# Patient Record
Sex: FEMALE | Race: WHITE | Employment: OTHER | URBAN - METROPOLITAN AREA
[De-identification: names, ages, dates, MRNs, and addresses within clinical notes are randomized per-mention and may not be internally consistent; named-entity substitution may affect disease eponyms.]

---

## 2017-01-01 ENCOUNTER — HOSPITAL ENCOUNTER (OUTPATIENT)
Dept: PHYSICAL THERAPY | Age: 77
End: 2017-01-01

## 2017-01-01 ENCOUNTER — HOME HEALTH ADMISSION (OUTPATIENT)
Dept: HOME HEALTH SERVICES | Facility: HOME HEALTH | Age: 77
End: 2017-01-01
Payer: COMMERCIAL

## 2017-01-01 ENCOUNTER — HOSPITAL ENCOUNTER (OUTPATIENT)
Dept: SURGERY | Age: 77
Discharge: HOME | End: 2017-10-04
Attending: SURGERY

## 2017-01-01 ENCOUNTER — HOSPITAL ENCOUNTER (OUTPATIENT)
Dept: MRI IMAGING | Age: 77
Discharge: HOME | End: 2017-08-10
Attending: SURGERY
Payer: COMMERCIAL

## 2017-01-01 ENCOUNTER — PATIENT OUTREACH (OUTPATIENT)
Dept: CASE MANAGEMENT | Age: 77
End: 2017-01-01

## 2017-01-01 ENCOUNTER — HOME CARE VISIT (OUTPATIENT)
Dept: SCHEDULING | Facility: HOME HEALTH | Age: 77
End: 2017-01-01
Payer: COMMERCIAL

## 2017-01-01 ENCOUNTER — HOSPITAL ENCOUNTER (OUTPATIENT)
Dept: MAMMOGRAPHY | Age: 77
Discharge: HOME | End: 2017-08-07
Attending: SURGERY
Payer: COMMERCIAL

## 2017-01-01 ENCOUNTER — HOSPITAL ENCOUNTER (INPATIENT)
Age: 77
LOS: 1 days | Discharge: HOME HEALTH | DRG: 580 | End: 2017-10-14
Attending: SURGERY | Admitting: SURGERY
Payer: COMMERCIAL

## 2017-01-01 ENCOUNTER — HOSPITAL ENCOUNTER (OUTPATIENT)
Dept: MAMMOGRAPHY | Age: 77
Discharge: HOME | End: 2017-08-01
Attending: FAMILY MEDICINE
Payer: COMMERCIAL

## 2017-01-01 ENCOUNTER — HOME CARE VISIT (OUTPATIENT)
Dept: HOME HEALTH SERVICES | Facility: HOME HEALTH | Age: 77
End: 2017-01-01
Payer: COMMERCIAL

## 2017-01-01 ENCOUNTER — ANESTHESIA (OUTPATIENT)
Dept: SURGERY | Age: 77
DRG: 580 | End: 2017-01-01
Payer: COMMERCIAL

## 2017-01-01 ENCOUNTER — APPOINTMENT (OUTPATIENT)
Dept: NUCLEAR MEDICINE | Age: 77
DRG: 580 | End: 2017-01-01
Attending: SURGERY
Payer: COMMERCIAL

## 2017-01-01 ENCOUNTER — HOSPITAL ENCOUNTER (OUTPATIENT)
Dept: RADIATION ONCOLOGY | Age: 77
Discharge: HOME | End: 2017-12-29
Payer: COMMERCIAL

## 2017-01-01 ENCOUNTER — HOSPITAL ENCOUNTER (OUTPATIENT)
Dept: LAB | Age: 77
Discharge: HOME | End: 2017-12-29

## 2017-01-01 ENCOUNTER — APPOINTMENT (OUTPATIENT)
Dept: RADIATION ONCOLOGY | Age: 77
End: 2017-01-01
Payer: COMMERCIAL

## 2017-01-01 ENCOUNTER — APPOINTMENT (OUTPATIENT)
Dept: GENERAL RADIOLOGY | Age: 77
DRG: 580 | End: 2017-01-01
Attending: SURGERY
Payer: COMMERCIAL

## 2017-01-01 ENCOUNTER — HOSPITAL ENCOUNTER (OUTPATIENT)
Dept: RADIATION ONCOLOGY | Age: 77
Discharge: HOME | End: 2017-12-29

## 2017-01-01 ENCOUNTER — HOSPITAL ENCOUNTER (OUTPATIENT)
Dept: PET IMAGING | Age: 77
Discharge: HOME | End: 2017-12-19
Payer: COMMERCIAL

## 2017-01-01 ENCOUNTER — ANESTHESIA EVENT (OUTPATIENT)
Dept: SURGERY | Age: 77
DRG: 580 | End: 2017-01-01
Payer: COMMERCIAL

## 2017-01-01 VITALS
OXYGEN SATURATION: 98 % | RESPIRATION RATE: 18 BRPM | HEART RATE: 70 BPM | DIASTOLIC BLOOD PRESSURE: 52 MMHG | BODY MASS INDEX: 32.07 KG/M2 | HEIGHT: 63 IN | SYSTOLIC BLOOD PRESSURE: 118 MMHG | WEIGHT: 181 LBS | TEMPERATURE: 98.5 F

## 2017-01-01 VITALS
RESPIRATION RATE: 17 BRPM | SYSTOLIC BLOOD PRESSURE: 128 MMHG | OXYGEN SATURATION: 95 % | DIASTOLIC BLOOD PRESSURE: 68 MMHG | TEMPERATURE: 97.8 F | HEART RATE: 68 BPM

## 2017-01-01 VITALS
OXYGEN SATURATION: 94 % | TEMPERATURE: 96.4 F | DIASTOLIC BLOOD PRESSURE: 70 MMHG | SYSTOLIC BLOOD PRESSURE: 120 MMHG | HEART RATE: 67 BPM

## 2017-01-01 VITALS
OXYGEN SATURATION: 95 % | HEART RATE: 68 BPM | SYSTOLIC BLOOD PRESSURE: 118 MMHG | DIASTOLIC BLOOD PRESSURE: 70 MMHG | TEMPERATURE: 97.6 F | RESPIRATION RATE: 16 BRPM

## 2017-01-01 VITALS
WEIGHT: 170 LBS | DIASTOLIC BLOOD PRESSURE: 62 MMHG | BODY MASS INDEX: 30.11 KG/M2 | OXYGEN SATURATION: 93 % | SYSTOLIC BLOOD PRESSURE: 103 MMHG | TEMPERATURE: 97.6 F | HEART RATE: 99 BPM | RESPIRATION RATE: 18 BRPM

## 2017-01-01 VITALS
DIASTOLIC BLOOD PRESSURE: 62 MMHG | SYSTOLIC BLOOD PRESSURE: 118 MMHG | HEART RATE: 56 BPM | TEMPERATURE: 97.1 F | RESPIRATION RATE: 19 BRPM

## 2017-01-01 VITALS — SYSTOLIC BLOOD PRESSURE: 118 MMHG | OXYGEN SATURATION: 94 % | DIASTOLIC BLOOD PRESSURE: 74 MMHG | HEART RATE: 64 BPM

## 2017-01-01 VITALS
DIASTOLIC BLOOD PRESSURE: 70 MMHG | SYSTOLIC BLOOD PRESSURE: 118 MMHG | TEMPERATURE: 98.1 F | HEART RATE: 88 BPM | RESPIRATION RATE: 19 BRPM

## 2017-01-01 VITALS
DIASTOLIC BLOOD PRESSURE: 74 MMHG | RESPIRATION RATE: 18 BRPM | SYSTOLIC BLOOD PRESSURE: 118 MMHG | OXYGEN SATURATION: 95 % | HEART RATE: 70 BPM

## 2017-01-01 VITALS
SYSTOLIC BLOOD PRESSURE: 128 MMHG | TEMPERATURE: 98 F | DIASTOLIC BLOOD PRESSURE: 70 MMHG | RESPIRATION RATE: 17 BRPM | HEART RATE: 67 BPM | OXYGEN SATURATION: 96 %

## 2017-01-01 VITALS
OXYGEN SATURATION: 94 % | DIASTOLIC BLOOD PRESSURE: 70 MMHG | RESPIRATION RATE: 18 BRPM | HEART RATE: 75 BPM | TEMPERATURE: 98.7 F | SYSTOLIC BLOOD PRESSURE: 132 MMHG

## 2017-01-01 VITALS
RESPIRATION RATE: 16 BRPM | BODY MASS INDEX: 32.09 KG/M2 | OXYGEN SATURATION: 95 % | WEIGHT: 181.13 LBS | HEIGHT: 63 IN | HEART RATE: 79 BPM | SYSTOLIC BLOOD PRESSURE: 118 MMHG | DIASTOLIC BLOOD PRESSURE: 79 MMHG | TEMPERATURE: 97.3 F

## 2017-01-01 DIAGNOSIS — N63.10 BREAST MASS, RIGHT: ICD-10-CM

## 2017-01-01 DIAGNOSIS — Z17.0 MALIGNANT NEOPLASM INVOLVING BOTH NIPPLE AND AREOLA IN BOTH BREASTS IN FEMALE, ESTROGEN RECEPTOR POSITIVE (HCC): Primary | ICD-10-CM

## 2017-01-01 DIAGNOSIS — Z17.1 MALIGNANT NEOPLASM OF BOTH BREASTS IN FEMALE, ESTROGEN RECEPTOR NEGATIVE, UNSPECIFIED SITE OF BREAST (HCC): ICD-10-CM

## 2017-01-01 DIAGNOSIS — C50.012 MALIGNANT NEOPLASM INVOLVING BOTH NIPPLE AND AREOLA IN BOTH BREASTS IN FEMALE, ESTROGEN RECEPTOR POSITIVE (HCC): ICD-10-CM

## 2017-01-01 DIAGNOSIS — C50.912 MALIGNANT NEOPLASM OF BOTH BREASTS IN FEMALE, ESTROGEN RECEPTOR NEGATIVE, UNSPECIFIED SITE OF BREAST (HCC): ICD-10-CM

## 2017-01-01 DIAGNOSIS — C50.911 MALIGNANT NEOPLASM OF BOTH BREASTS IN FEMALE, ESTROGEN RECEPTOR NEGATIVE, UNSPECIFIED SITE OF BREAST (HCC): ICD-10-CM

## 2017-01-01 DIAGNOSIS — C50.012 MALIGNANT NEOPLASM INVOLVING BOTH NIPPLE AND AREOLA IN BOTH BREASTS IN FEMALE, ESTROGEN RECEPTOR POSITIVE (HCC): Primary | ICD-10-CM

## 2017-01-01 DIAGNOSIS — C50.011 MALIGNANT NEOPLASM INVOLVING BOTH NIPPLE AND AREOLA IN BOTH BREASTS IN FEMALE, ESTROGEN RECEPTOR POSITIVE (HCC): ICD-10-CM

## 2017-01-01 DIAGNOSIS — N63.20 LEFT BREAST MASS: ICD-10-CM

## 2017-01-01 DIAGNOSIS — Z90.13 S/P BILATERAL MASTECTOMY: ICD-10-CM

## 2017-01-01 DIAGNOSIS — Z17.0 MALIGNANT NEOPLASM INVOLVING BOTH NIPPLE AND AREOLA IN BOTH BREASTS IN FEMALE, ESTROGEN RECEPTOR POSITIVE (HCC): ICD-10-CM

## 2017-01-01 DIAGNOSIS — N63.20 MASSES OF BOTH BREASTS: ICD-10-CM

## 2017-01-01 DIAGNOSIS — C50.911 BREAST CANCER, RIGHT (HCC): ICD-10-CM

## 2017-01-01 DIAGNOSIS — C50.011 MALIGNANT NEOPLASM INVOLVING BOTH NIPPLE AND AREOLA IN BOTH BREASTS IN FEMALE, ESTROGEN RECEPTOR POSITIVE (HCC): Primary | ICD-10-CM

## 2017-01-01 DIAGNOSIS — N63.10 MASSES OF BOTH BREASTS: ICD-10-CM

## 2017-01-01 DIAGNOSIS — C50.919 MALIGNANT NEOPLASM OF FEMALE BREAST (HCC): ICD-10-CM

## 2017-01-01 LAB
ABO + RH BLD: NORMAL
ANION GAP SERPL CALC-SCNC: 8 MMOL/L (ref 7–16)
ANION GAP SERPL CALC-SCNC: 9 MMOL/L (ref 7–16)
APPEARANCE UR: CLEAR
BACTERIA SPEC CULT: NORMAL
BACTERIA SPEC CULT: NORMAL
BASOPHILS # BLD: 0 K/UL (ref 0–0.2)
BASOPHILS NFR BLD: 0 % (ref 0–2)
BILIRUB UR QL: NEGATIVE
BLOOD GROUP ANTIBODIES SERPL: NORMAL
BUN SERPL-MCNC: 20 MG/DL (ref 8–23)
BUN SERPL-MCNC: 21 MG/DL (ref 8–23)
CALCIUM SERPL-MCNC: 8.2 MG/DL (ref 8.3–10.4)
CALCIUM SERPL-MCNC: 8.7 MG/DL (ref 8.3–10.4)
CHLORIDE SERPL-SCNC: 104 MMOL/L (ref 98–107)
CHLORIDE SERPL-SCNC: 105 MMOL/L (ref 98–107)
CO2 SERPL-SCNC: 25 MMOL/L (ref 21–32)
CO2 SERPL-SCNC: 28 MMOL/L (ref 21–32)
COLOR UR: YELLOW
CREAT BLD-MCNC: 0.9 MG/DL (ref 0.6–1)
CREAT SERPL-MCNC: 0.81 MG/DL (ref 0.6–1)
CREAT SERPL-MCNC: 1.07 MG/DL (ref 0.6–1)
DIFFERENTIAL METHOD BLD: ABNORMAL
EOSINOPHIL # BLD: 0.2 K/UL (ref 0–0.8)
EOSINOPHIL NFR BLD: 1 % (ref 0.5–7.8)
ERYTHROCYTE [DISTWIDTH] IN BLOOD BY AUTOMATED COUNT: 13.8 % (ref 11.9–14.6)
ERYTHROCYTE [DISTWIDTH] IN BLOOD BY AUTOMATED COUNT: 13.9 % (ref 11.9–14.6)
GLUCOSE SERPL-MCNC: 118 MG/DL (ref 65–100)
GLUCOSE SERPL-MCNC: 141 MG/DL (ref 65–100)
GLUCOSE UR STRIP.AUTO-MCNC: NEGATIVE MG/DL
HCT VFR BLD AUTO: 33.1 % (ref 35.8–46.3)
HCT VFR BLD AUTO: 34.7 % (ref 35.8–46.3)
HGB BLD-MCNC: 11.2 G/DL (ref 11.7–15.4)
HGB BLD-MCNC: 11.8 G/DL (ref 11.7–15.4)
HGB UR QL STRIP: NEGATIVE
IMM GRANULOCYTES # BLD: 0 K/UL (ref 0–0.5)
IMM GRANULOCYTES NFR BLD: 0.3 % (ref 0–5)
KETONES UR QL STRIP.AUTO: NEGATIVE MG/DL
LEUKOCYTE ESTERASE UR QL STRIP.AUTO: NEGATIVE
LYMPHOCYTES # BLD: 2.2 K/UL (ref 0.5–4.6)
LYMPHOCYTES NFR BLD: 19 % (ref 13–44)
MCH RBC QN AUTO: 31.8 PG (ref 26.1–32.9)
MCH RBC QN AUTO: 31.9 PG (ref 26.1–32.9)
MCHC RBC AUTO-ENTMCNC: 33.8 G/DL (ref 31.4–35)
MCHC RBC AUTO-ENTMCNC: 34 G/DL (ref 31.4–35)
MCV RBC AUTO: 93.5 FL (ref 79.6–97.8)
MCV RBC AUTO: 94.3 FL (ref 79.6–97.8)
MONOCYTES # BLD: 1.2 K/UL (ref 0.1–1.3)
MONOCYTES NFR BLD: 10 % (ref 4–12)
NEUTS SEG # BLD: 7.8 K/UL (ref 1.7–8.2)
NEUTS SEG NFR BLD: 70 % (ref 43–78)
NITRITE UR QL STRIP.AUTO: NEGATIVE
PH UR STRIP: 5.5 [PH] (ref 5–9)
PLATELET # BLD AUTO: 140 K/UL (ref 150–450)
PLATELET # BLD AUTO: 147 K/UL (ref 150–450)
PMV BLD AUTO: 10 FL (ref 10.8–14.1)
PMV BLD AUTO: 10.2 FL (ref 10.8–14.1)
POTASSIUM SERPL-SCNC: 3 MMOL/L (ref 3.5–5.1)
POTASSIUM SERPL-SCNC: 3.8 MMOL/L (ref 3.5–5.1)
PROT UR STRIP-MCNC: NEGATIVE MG/DL
RBC # BLD AUTO: 3.51 M/UL (ref 4.05–5.25)
RBC # BLD AUTO: 3.71 M/UL (ref 4.05–5.25)
SERVICE CMNT-IMP: NORMAL
SERVICE CMNT-IMP: NORMAL
SODIUM SERPL-SCNC: 138 MMOL/L (ref 136–145)
SODIUM SERPL-SCNC: 141 MMOL/L (ref 136–145)
SP GR UR REFRACTOMETRY: 1.01 (ref 1–1.02)
SPECIMEN EXP DATE BLD: NORMAL
TROPONIN I SERPL-MCNC: <0.04 NG/ML (ref 0.02–0.05)
UROBILINOGEN UR QL STRIP.AUTO: 0.2 EU/DL (ref 0.2–1)
WBC # BLD AUTO: 11.1 K/UL (ref 4.3–11.1)
WBC # BLD AUTO: 11.3 K/UL (ref 4.3–11.1)

## 2017-01-01 PROCEDURE — A9577 INJ MULTIHANCE: HCPCS | Performed by: SURGERY

## 2017-01-01 PROCEDURE — 77030031139 HC SUT VCRL2 J&J -A: Performed by: SURGERY

## 2017-01-01 PROCEDURE — 77030020782 HC GWN BAIR PAWS FLX 3M -B: Performed by: ANESTHESIOLOGY

## 2017-01-01 PROCEDURE — 74011250637 HC RX REV CODE- 250/637: Performed by: INTERNAL MEDICINE

## 2017-01-01 PROCEDURE — G0299 HHS/HOSPICE OF RN EA 15 MIN: HCPCS

## 2017-01-01 PROCEDURE — 77010033678 HC OXYGEN DAILY

## 2017-01-01 PROCEDURE — 77030013567 HC DRN WND RESERV BARD -A: Performed by: SURGERY

## 2017-01-01 PROCEDURE — 84484 ASSAY OF TROPONIN QUANT: CPT | Performed by: INTERNAL MEDICINE

## 2017-01-01 PROCEDURE — 77030013674 HC FIL EXPND TISS ALGN -A: Performed by: SURGERY

## 2017-01-01 PROCEDURE — 77030011640 HC PAD GRND REM COVD -A: Performed by: SURGERY

## 2017-01-01 PROCEDURE — 88305 TISSUE EXAM BY PATHOLOGIST: CPT | Performed by: SURGERY

## 2017-01-01 PROCEDURE — 74011250637 HC RX REV CODE- 250/637: Performed by: SURGERY

## 2017-01-01 PROCEDURE — 19083 BX BREAST 1ST LESION US IMAG: CPT

## 2017-01-01 PROCEDURE — 77030002996 HC SUT SLK J&J -A: Performed by: SURGERY

## 2017-01-01 PROCEDURE — 77030008703 HC TU ET UNCUF COVD -A: Performed by: ANESTHESIOLOGY

## 2017-01-01 PROCEDURE — G0157 HHC PT ASSISTANT EA 15: HCPCS

## 2017-01-01 PROCEDURE — 36415 COLL VENOUS BLD VENIPUNCTURE: CPT | Performed by: INTERNAL MEDICINE

## 2017-01-01 PROCEDURE — 74011250636 HC RX REV CODE- 250/636: Performed by: ANESTHESIOLOGY

## 2017-01-01 PROCEDURE — 77030002933 HC SUT MCRYL J&J -A: Performed by: SURGERY

## 2017-01-01 PROCEDURE — 94640 AIRWAY INHALATION TREATMENT: CPT

## 2017-01-01 PROCEDURE — 74011636320 HC RX REV CODE- 636/320: Performed by: INTERNAL MEDICINE

## 2017-01-01 PROCEDURE — 88361 TUMOR IMMUNOHISTOCHEM/COMPUT: CPT

## 2017-01-01 PROCEDURE — 74011250637 HC RX REV CODE- 250/637: Performed by: PLASTIC SURGERY

## 2017-01-01 PROCEDURE — 74011000250 HC RX REV CODE- 250: Performed by: SURGERY

## 2017-01-01 PROCEDURE — 74011000250 HC RX REV CODE- 250: Performed by: INTERNAL MEDICINE

## 2017-01-01 PROCEDURE — 74011250636 HC RX REV CODE- 250/636: Performed by: INTERNAL MEDICINE

## 2017-01-01 PROCEDURE — 74011000250 HC RX REV CODE- 250

## 2017-01-01 PROCEDURE — 99218 HC RM OBSERVATION: CPT

## 2017-01-01 PROCEDURE — 99211 OFF/OP EST MAY X REQ PHY/QHP: CPT

## 2017-01-01 PROCEDURE — 77030026227 HC FUNL KELLR 2 PCH ALGN -C: Performed by: SURGERY

## 2017-01-01 PROCEDURE — 74011250636 HC RX REV CODE- 250/636: Performed by: SURGERY

## 2017-01-01 PROCEDURE — 77030002916 HC SUT ETHLN J&J -A: Performed by: SURGERY

## 2017-01-01 PROCEDURE — 77066 DX MAMMO INCL CAD BI: CPT

## 2017-01-01 PROCEDURE — 71010 XR CHEST SNGL V: CPT

## 2017-01-01 PROCEDURE — 77030002966 HC SUT PDS J&J -A: Performed by: SURGERY

## 2017-01-01 PROCEDURE — 76060000040 HC ANESTHESIA 4.5 TO 5 HR: Performed by: SURGERY

## 2017-01-01 PROCEDURE — 77030012406 HC DRN WND PENRS BARD -A: Performed by: SURGERY

## 2017-01-01 PROCEDURE — 76210000016 HC OR PH I REC 1 TO 1.5 HR: Performed by: SURGERY

## 2017-01-01 PROCEDURE — 77030013131 HC IV BLD ST ICUM -A

## 2017-01-01 PROCEDURE — 65270000029 HC RM PRIVATE

## 2017-01-01 PROCEDURE — 88307 TISSUE EXAM BY PATHOLOGIST: CPT | Performed by: SURGERY

## 2017-01-01 PROCEDURE — 77030011256 HC DRSG MEPILEX <16IN NO BORD MOLN -A

## 2017-01-01 PROCEDURE — 74011250636 HC RX REV CODE- 250/636

## 2017-01-01 PROCEDURE — A9552 F18 FDG: HCPCS

## 2017-01-01 PROCEDURE — 07B60ZX EXCISION OF LEFT AXILLARY LYMPHATIC, OPEN APPROACH, DIAGNOSTIC: ICD-10-PCS | Performed by: SURGERY

## 2017-01-01 PROCEDURE — 88331 PATH CONSLTJ SURG 1 BLK 1SPC: CPT | Performed by: SURGERY

## 2017-01-01 PROCEDURE — 82565 ASSAY OF CREATININE: CPT

## 2017-01-01 PROCEDURE — 76010000176 HC OR TIME 4.5 TO 5 HR INTENSV-TIER 1: Performed by: SURGERY

## 2017-01-01 PROCEDURE — 77030018836 HC SOL IRR NACL ICUM -A: Performed by: SURGERY

## 2017-01-01 PROCEDURE — 76642 ULTRASOUND BREAST LIMITED: CPT

## 2017-01-01 PROCEDURE — G0151 HHCP-SERV OF PT,EA 15 MIN: HCPCS

## 2017-01-01 PROCEDURE — 400013 HH SOC

## 2017-01-01 PROCEDURE — 80048 BASIC METABOLIC PNL TOTAL CA: CPT | Performed by: INTERNAL MEDICINE

## 2017-01-01 PROCEDURE — 77030010514 HC APPL CLP LIG COVD -B: Performed by: SURGERY

## 2017-01-01 PROCEDURE — 85027 COMPLETE CBC AUTOMATED: CPT | Performed by: INTERNAL MEDICINE

## 2017-01-01 PROCEDURE — 73060999999 HC MISC LAB CHARGE

## 2017-01-01 PROCEDURE — 85025 COMPLETE CBC W/AUTO DIFF WBC: CPT | Performed by: INTERNAL MEDICINE

## 2017-01-01 PROCEDURE — 77030032490 HC SLV COMPR SCD KNE COVD -B: Performed by: SURGERY

## 2017-01-01 PROCEDURE — 77030013211 HC SUPP BRA GLDA -B: Performed by: SURGERY

## 2017-01-01 PROCEDURE — 77030034849: Performed by: SURGERY

## 2017-01-01 PROCEDURE — 81003 URINALYSIS AUTO W/O SCOPE: CPT | Performed by: INTERNAL MEDICINE

## 2017-01-01 PROCEDURE — 77030012268 HC CATH PAIN PMP/Q AVNM -E: Performed by: SURGERY

## 2017-01-01 PROCEDURE — 0HHV0NZ INSERTION OF TISSUE EXPANDER INTO BILATERAL BREAST, OPEN APPROACH: ICD-10-PCS | Performed by: PLASTIC SURGERY

## 2017-01-01 PROCEDURE — 76937 US GUIDE VASCULAR ACCESS: CPT

## 2017-01-01 PROCEDURE — 07B50ZX EXCISION OF RIGHT AXILLARY LYMPHATIC, OPEN APPROACH, DIAGNOSTIC: ICD-10-PCS | Performed by: SURGERY

## 2017-01-01 PROCEDURE — 0HTV0ZZ RESECTION OF BILATERAL BREAST, OPEN APPROACH: ICD-10-PCS | Performed by: SURGERY

## 2017-01-01 PROCEDURE — 77030011266 HC ELECTRD BLD INSL COVD -A: Performed by: SURGERY

## 2017-01-01 PROCEDURE — C1789 PROSTHESIS, BREAST, IMP: HCPCS | Performed by: SURGERY

## 2017-01-01 PROCEDURE — 86900 BLOOD TYPING SEROLOGIC ABO: CPT | Performed by: ANESTHESIOLOGY

## 2017-01-01 PROCEDURE — 94760 N-INVAS EAR/PLS OXIMETRY 1: CPT

## 2017-01-01 PROCEDURE — A9541 TC99M SULFUR COLLOID: HCPCS

## 2017-01-01 PROCEDURE — G0152 HHCP-SERV OF OT,EA 15 MIN: HCPCS

## 2017-01-01 PROCEDURE — 74011000250 HC RX REV CODE- 250: Performed by: PLASTIC SURGERY

## 2017-01-01 PROCEDURE — 74011636320 HC RX REV CODE- 636/320: Performed by: SURGERY

## 2017-01-01 PROCEDURE — 87040 BLOOD CULTURE FOR BACTERIA: CPT | Performed by: INTERNAL MEDICINE

## 2017-01-01 PROCEDURE — 77030018846 HC SOL IRR STRL H20 ICUM -A: Performed by: SURGERY

## 2017-01-01 PROCEDURE — 77030008467 HC STPLR SKN COVD -B: Performed by: SURGERY

## 2017-01-01 PROCEDURE — 77059 MRI BREAST BI W WO CONT: CPT

## 2017-01-01 DEVICE — TEXTURED, ULTRA HIGH PROFILE, SUTURE TABS, INTEGRAL INJECTION DOME 700CC
Type: IMPLANTABLE DEVICE | Site: BREAST | Status: FUNCTIONAL
Brand: ARTOURA BREAST TISSUE EXPANDER

## 2017-01-01 DEVICE — Z DISCONTINUED USE 2423046 ALLOGRAFT DERMAL THCK 6X16 CM RDY TO USE TISS MTRX ALLDERM: Type: IMPLANTABLE DEVICE | Site: BREAST | Status: FUNCTIONAL

## 2017-01-01 RX ORDER — FUROSEMIDE 10 MG/ML
20 INJECTION INTRAMUSCULAR; INTRAVENOUS ONCE
Status: DISCONTINUED | OUTPATIENT
Start: 2017-01-01 | End: 2017-01-01

## 2017-01-01 RX ORDER — SODIUM CHLORIDE 0.9 % (FLUSH) 0.9 %
10 SYRINGE (ML) INJECTION
Status: COMPLETED | OUTPATIENT
Start: 2017-01-01 | End: 2017-01-01

## 2017-01-01 RX ORDER — OXYCODONE HYDROCHLORIDE 5 MG/1
5 TABLET ORAL
Status: DISCONTINUED | OUTPATIENT
Start: 2017-01-01 | End: 2017-01-01 | Stop reason: HOSPADM

## 2017-01-01 RX ORDER — LIDOCAINE HYDROCHLORIDE 20 MG/ML
15 SOLUTION OROPHARYNGEAL EVERY 6 HOURS
Status: DISCONTINUED | OUTPATIENT
Start: 2017-01-01 | End: 2017-01-01

## 2017-01-01 RX ORDER — LIDOCAINE HYDROCHLORIDE 20 MG/ML
INJECTION, SOLUTION EPIDURAL; INFILTRATION; INTRACAUDAL; PERINEURAL AS NEEDED
Status: DISCONTINUED | OUTPATIENT
Start: 2017-01-01 | End: 2017-01-01 | Stop reason: HOSPADM

## 2017-01-01 RX ORDER — BUPIVACAINE HYDROCHLORIDE AND EPINEPHRINE 2.5; 5 MG/ML; UG/ML
INJECTION, SOLUTION EPIDURAL; INFILTRATION; INTRACAUDAL; PERINEURAL AS NEEDED
Status: DISCONTINUED | OUTPATIENT
Start: 2017-01-01 | End: 2017-01-01 | Stop reason: HOSPADM

## 2017-01-01 RX ORDER — MIDAZOLAM HYDROCHLORIDE 1 MG/ML
2 INJECTION, SOLUTION INTRAMUSCULAR; INTRAVENOUS
Status: DISCONTINUED | OUTPATIENT
Start: 2017-01-01 | End: 2017-01-01 | Stop reason: HOSPADM

## 2017-01-01 RX ORDER — HYDROMORPHONE HCL IN 0.9% NACL 50 MG/50ML
1 PLASTIC BAG, INJECTION (ML) INJECTION
Status: DISCONTINUED | OUTPATIENT
Start: 2017-01-01 | End: 2017-01-01 | Stop reason: HOSPADM

## 2017-01-01 RX ORDER — SODIUM CHLORIDE 0.9 % (FLUSH) 0.9 %
5-10 SYRINGE (ML) INJECTION AS NEEDED
Status: DISCONTINUED | OUTPATIENT
Start: 2017-01-01 | End: 2017-01-01 | Stop reason: HOSPADM

## 2017-01-01 RX ORDER — CYCLOBENZAPRINE HCL 10 MG
10 TABLET ORAL
Status: DISCONTINUED | OUTPATIENT
Start: 2017-01-01 | End: 2017-01-01 | Stop reason: HOSPADM

## 2017-01-01 RX ORDER — IPRATROPIUM BROMIDE AND ALBUTEROL SULFATE 2.5; .5 MG/3ML; MG/3ML
3 SOLUTION RESPIRATORY (INHALATION)
Status: COMPLETED | OUTPATIENT
Start: 2017-01-01 | End: 2017-01-01

## 2017-01-01 RX ORDER — ATORVASTATIN CALCIUM 10 MG/1
20 TABLET, FILM COATED ORAL DAILY
Status: DISCONTINUED | OUTPATIENT
Start: 2017-01-01 | End: 2017-01-01 | Stop reason: HOSPADM

## 2017-01-01 RX ORDER — DOCUSATE SODIUM 100 MG/1
100 CAPSULE, LIQUID FILLED ORAL
Status: DISCONTINUED | OUTPATIENT
Start: 2017-01-01 | End: 2017-01-01 | Stop reason: HOSPADM

## 2017-01-01 RX ORDER — ZOLPIDEM TARTRATE 5 MG/1
5 TABLET ORAL
Status: DISCONTINUED | OUTPATIENT
Start: 2017-01-01 | End: 2017-01-01

## 2017-01-01 RX ORDER — CEFAZOLIN SODIUM IN 0.9 % NACL 2 G/50 ML
2 INTRAVENOUS SOLUTION, PIGGYBACK (ML) INTRAVENOUS ONCE
Status: COMPLETED | OUTPATIENT
Start: 2017-01-01 | End: 2017-01-01

## 2017-01-01 RX ORDER — NALOXONE HYDROCHLORIDE 0.4 MG/ML
0.4 INJECTION, SOLUTION INTRAMUSCULAR; INTRAVENOUS; SUBCUTANEOUS AS NEEDED
Status: DISCONTINUED | OUTPATIENT
Start: 2017-01-01 | End: 2017-01-01 | Stop reason: HOSPADM

## 2017-01-01 RX ORDER — SODIUM CHLORIDE 0.9 % (FLUSH) 0.9 %
5-10 SYRINGE (ML) INJECTION EVERY 8 HOURS
Status: DISCONTINUED | OUTPATIENT
Start: 2017-01-01 | End: 2017-01-01 | Stop reason: HOSPADM

## 2017-01-01 RX ORDER — VANCOMYCIN HYDROCHLORIDE 1 G/20ML
INJECTION, POWDER, LYOPHILIZED, FOR SOLUTION INTRAVENOUS AS NEEDED
Status: DISCONTINUED | OUTPATIENT
Start: 2017-01-01 | End: 2017-01-01 | Stop reason: HOSPADM

## 2017-01-01 RX ORDER — CYCLOBENZAPRINE HCL 10 MG
10 TABLET ORAL 3 TIMES DAILY
Status: CANCELLED | OUTPATIENT
Start: 2017-01-01

## 2017-01-01 RX ORDER — LIDOCAINE HYDROCHLORIDE 10 MG/ML
10 INJECTION INFILTRATION; PERINEURAL
Status: COMPLETED | OUTPATIENT
Start: 2017-01-01 | End: 2017-01-01

## 2017-01-01 RX ORDER — ISOSULFAN BLUE 50 MG/5ML
INJECTION, SOLUTION SUBCUTANEOUS AS NEEDED
Status: DISCONTINUED | OUTPATIENT
Start: 2017-01-01 | End: 2017-01-01 | Stop reason: HOSPADM

## 2017-01-01 RX ORDER — ONDANSETRON 2 MG/ML
INJECTION INTRAMUSCULAR; INTRAVENOUS AS NEEDED
Status: DISCONTINUED | OUTPATIENT
Start: 2017-01-01 | End: 2017-01-01 | Stop reason: HOSPADM

## 2017-01-01 RX ORDER — OXYCODONE AND ACETAMINOPHEN 7.5; 325 MG/1; MG/1
1 TABLET ORAL
Status: DISCONTINUED | OUTPATIENT
Start: 2017-01-01 | End: 2017-01-01 | Stop reason: HOSPADM

## 2017-01-01 RX ORDER — FUROSEMIDE 10 MG/ML
40 INJECTION INTRAMUSCULAR; INTRAVENOUS ONCE
Status: DISCONTINUED | OUTPATIENT
Start: 2017-01-01 | End: 2017-01-01

## 2017-01-01 RX ORDER — BUPIVACAINE HYDROCHLORIDE 2.5 MG/ML
INJECTION, SOLUTION EPIDURAL; INFILTRATION; INTRACAUDAL AS NEEDED
Status: DISCONTINUED | OUTPATIENT
Start: 2017-01-01 | End: 2017-01-01 | Stop reason: HOSPADM

## 2017-01-01 RX ORDER — LORATADINE 10 MG/1
5 TABLET ORAL
Status: DISCONTINUED | OUTPATIENT
Start: 2017-01-01 | End: 2017-01-01 | Stop reason: HOSPADM

## 2017-01-01 RX ORDER — CEFAZOLIN SODIUM IN 0.9 % NACL 2 G/50 ML
2 INTRAVENOUS SOLUTION, PIGGYBACK (ML) INTRAVENOUS EVERY 8 HOURS
Status: COMPLETED | OUTPATIENT
Start: 2017-01-01 | End: 2017-01-01

## 2017-01-01 RX ORDER — PROPOFOL 10 MG/ML
INJECTION, EMULSION INTRAVENOUS AS NEEDED
Status: DISCONTINUED | OUTPATIENT
Start: 2017-01-01 | End: 2017-01-01 | Stop reason: HOSPADM

## 2017-01-01 RX ORDER — ONDANSETRON 2 MG/ML
4 INJECTION INTRAMUSCULAR; INTRAVENOUS
Status: DISCONTINUED | OUTPATIENT
Start: 2017-01-01 | End: 2017-01-01 | Stop reason: HOSPADM

## 2017-01-01 RX ORDER — NEOSTIGMINE METHYLSULFATE 1 MG/ML
INJECTION INTRAVENOUS AS NEEDED
Status: DISCONTINUED | OUTPATIENT
Start: 2017-01-01 | End: 2017-01-01 | Stop reason: HOSPADM

## 2017-01-01 RX ORDER — ACETAMINOPHEN 325 MG/1
650 TABLET ORAL
Status: DISCONTINUED | OUTPATIENT
Start: 2017-01-01 | End: 2017-01-01 | Stop reason: HOSPADM

## 2017-01-01 RX ORDER — DEXTROSE, SODIUM CHLORIDE, AND POTASSIUM CHLORIDE 5; .45; .15 G/100ML; G/100ML; G/100ML
75 INJECTION INTRAVENOUS CONTINUOUS
Status: DISCONTINUED | OUTPATIENT
Start: 2017-01-01 | End: 2017-01-01

## 2017-01-01 RX ORDER — FENTANYL CITRATE 50 UG/ML
INJECTION, SOLUTION INTRAMUSCULAR; INTRAVENOUS AS NEEDED
Status: DISCONTINUED | OUTPATIENT
Start: 2017-01-01 | End: 2017-01-01 | Stop reason: HOSPADM

## 2017-01-01 RX ORDER — SODIUM CHLORIDE, SODIUM LACTATE, POTASSIUM CHLORIDE, CALCIUM CHLORIDE 600; 310; 30; 20 MG/100ML; MG/100ML; MG/100ML; MG/100ML
75 INJECTION, SOLUTION INTRAVENOUS CONTINUOUS
Status: DISCONTINUED | OUTPATIENT
Start: 2017-01-01 | End: 2017-01-01 | Stop reason: HOSPADM

## 2017-01-01 RX ORDER — SERTRALINE HYDROCHLORIDE 100 MG/1
100 TABLET, FILM COATED ORAL DAILY
Status: DISCONTINUED | OUTPATIENT
Start: 2017-01-01 | End: 2017-01-01 | Stop reason: HOSPADM

## 2017-01-01 RX ORDER — HYDROMORPHONE HYDROCHLORIDE 1 MG/ML
1 INJECTION, SOLUTION INTRAMUSCULAR; INTRAVENOUS; SUBCUTANEOUS
Status: DISCONTINUED | OUTPATIENT
Start: 2017-01-01 | End: 2017-01-01 | Stop reason: SDUPTHER

## 2017-01-01 RX ORDER — FUROSEMIDE 10 MG/ML
20 INJECTION INTRAMUSCULAR; INTRAVENOUS ONCE
Status: COMPLETED | OUTPATIENT
Start: 2017-01-01 | End: 2017-01-01

## 2017-01-01 RX ORDER — KETOROLAC TROMETHAMINE 30 MG/ML
15 INJECTION, SOLUTION INTRAMUSCULAR; INTRAVENOUS
Status: DISPENSED | OUTPATIENT
Start: 2017-01-01 | End: 2017-01-01

## 2017-01-01 RX ORDER — ROCURONIUM BROMIDE 10 MG/ML
INJECTION, SOLUTION INTRAVENOUS AS NEEDED
Status: DISCONTINUED | OUTPATIENT
Start: 2017-01-01 | End: 2017-01-01 | Stop reason: HOSPADM

## 2017-01-01 RX ORDER — DIPHENHYDRAMINE HYDROCHLORIDE 50 MG/ML
12.5 INJECTION, SOLUTION INTRAMUSCULAR; INTRAVENOUS
Status: DISCONTINUED | OUTPATIENT
Start: 2017-01-01 | End: 2017-01-01 | Stop reason: HOSPADM

## 2017-01-01 RX ORDER — METHYLENE BLUE 10 MG/ML
INJECTION INTRAVENOUS AS NEEDED
Status: DISCONTINUED | OUTPATIENT
Start: 2017-01-01 | End: 2017-01-01 | Stop reason: HOSPADM

## 2017-01-01 RX ORDER — SCOLOPAMINE TRANSDERMAL SYSTEM 1 MG/1
1.5 PATCH, EXTENDED RELEASE TRANSDERMAL
COMMUNITY
End: 2017-01-01

## 2017-01-01 RX ORDER — DEXAMETHASONE SODIUM PHOSPHATE 4 MG/ML
INJECTION, SOLUTION INTRA-ARTICULAR; INTRALESIONAL; INTRAMUSCULAR; INTRAVENOUS; SOFT TISSUE AS NEEDED
Status: DISCONTINUED | OUTPATIENT
Start: 2017-01-01 | End: 2017-01-01 | Stop reason: HOSPADM

## 2017-01-01 RX ORDER — BACITRACIN ZINC 500 UNIT/G
OINTMENT (GRAM) TOPICAL AS NEEDED
Status: DISCONTINUED | OUTPATIENT
Start: 2017-01-01 | End: 2017-01-01 | Stop reason: HOSPADM

## 2017-01-01 RX ORDER — ONDANSETRON HYDROCHLORIDE 8 MG/1
8 TABLET, FILM COATED ORAL
COMMUNITY
End: 2017-01-01 | Stop reason: SDUPTHER

## 2017-01-01 RX ORDER — ATENOLOL 25 MG/1
25 TABLET ORAL DAILY
Status: DISCONTINUED | OUTPATIENT
Start: 2017-01-01 | End: 2017-01-01 | Stop reason: HOSPADM

## 2017-01-01 RX ORDER — HYDROMORPHONE HYDROCHLORIDE 2 MG/ML
0.5 INJECTION, SOLUTION INTRAMUSCULAR; INTRAVENOUS; SUBCUTANEOUS
Status: COMPLETED | OUTPATIENT
Start: 2017-01-01 | End: 2017-01-01

## 2017-01-01 RX ORDER — SCOLOPAMINE TRANSDERMAL SYSTEM 1 MG/1
1.5 PATCH, EXTENDED RELEASE TRANSDERMAL
Status: DISCONTINUED | OUTPATIENT
Start: 2017-01-01 | End: 2017-01-01 | Stop reason: HOSPADM

## 2017-01-01 RX ORDER — OXYCODONE AND ACETAMINOPHEN 10; 325 MG/1; MG/1
1 TABLET ORAL AS NEEDED
Status: DISCONTINUED | OUTPATIENT
Start: 2017-01-01 | End: 2017-01-01 | Stop reason: HOSPADM

## 2017-01-01 RX ORDER — GLYCOPYRROLATE 0.2 MG/ML
INJECTION INTRAMUSCULAR; INTRAVENOUS AS NEEDED
Status: DISCONTINUED | OUTPATIENT
Start: 2017-01-01 | End: 2017-01-01 | Stop reason: HOSPADM

## 2017-01-01 RX ADMIN — FENTANYL CITRATE 25 MCG: 50 INJECTION, SOLUTION INTRAMUSCULAR; INTRAVENOUS at 14:33

## 2017-01-01 RX ADMIN — CYCLOBENZAPRINE HYDROCHLORIDE 10 MG: 10 TABLET, FILM COATED ORAL at 08:53

## 2017-01-01 RX ADMIN — GADOBENATE DIMEGLUMINE 18 ML: 529 INJECTION, SOLUTION INTRAVENOUS at 11:28

## 2017-01-01 RX ADMIN — Medication 10 ML: at 21:34

## 2017-01-01 RX ADMIN — CYCLOBENZAPRINE HYDROCHLORIDE 10 MG: 10 TABLET, FILM COATED ORAL at 22:55

## 2017-01-01 RX ADMIN — LIDOCAINE HYDROCHLORIDE 15 ML: 20 SOLUTION ORAL; TOPICAL at 22:38

## 2017-01-01 RX ADMIN — FUROSEMIDE 60 MG: 40 TABLET ORAL at 11:20

## 2017-01-01 RX ADMIN — IPRATROPIUM BROMIDE AND ALBUTEROL SULFATE 3 ML: .5; 3 SOLUTION RESPIRATORY (INHALATION) at 21:25

## 2017-01-01 RX ADMIN — DEXTROSE MONOHYDRATE, SODIUM CHLORIDE, AND POTASSIUM CHLORIDE 75 ML/HR: 50; 4.5; 1.49 INJECTION, SOLUTION INTRAVENOUS at 08:58

## 2017-01-01 RX ADMIN — HYDROCHLOROTHIAZIDE: 12.5 CAPSULE ORAL at 08:52

## 2017-01-01 RX ADMIN — Medication 10 ML: at 11:28

## 2017-01-01 RX ADMIN — PROPOFOL 50 MG: 10 INJECTION, EMULSION INTRAVENOUS at 13:42

## 2017-01-01 RX ADMIN — SERTRALINE HYDROCHLORIDE 100 MG: 100 TABLET ORAL at 08:53

## 2017-01-01 RX ADMIN — ACETAMINOPHEN 650 MG: 325 TABLET, FILM COATED ORAL at 05:05

## 2017-01-01 RX ADMIN — PROPOFOL 150 MG: 10 INJECTION, EMULSION INTRAVENOUS at 11:45

## 2017-01-01 RX ADMIN — ACETAMINOPHEN 650 MG: 325 TABLET, FILM COATED ORAL at 21:37

## 2017-01-01 RX ADMIN — LIDOCAINE HYDROCHLORIDE 15 ML: 20 SOLUTION ORAL; TOPICAL at 15:08

## 2017-01-01 RX ADMIN — IPRATROPIUM BROMIDE AND ALBUTEROL SULFATE 3 ML: .5; 3 SOLUTION RESPIRATORY (INHALATION) at 04:00

## 2017-01-01 RX ADMIN — HYDROMORPHONE HYDROCHLORIDE 1 MG: 1 INJECTION, SOLUTION INTRAMUSCULAR; INTRAVENOUS; SUBCUTANEOUS at 20:13

## 2017-01-01 RX ADMIN — ATORVASTATIN CALCIUM 20 MG: 10 TABLET, FILM COATED ORAL at 08:52

## 2017-01-01 RX ADMIN — ONDANSETRON 4 MG: 2 INJECTION INTRAMUSCULAR; INTRAVENOUS at 11:45

## 2017-01-01 RX ADMIN — FENTANYL CITRATE 50 MCG: 50 INJECTION, SOLUTION INTRAMUSCULAR; INTRAVENOUS at 13:03

## 2017-01-01 RX ADMIN — FENTANYL CITRATE 50 MCG: 50 INJECTION, SOLUTION INTRAMUSCULAR; INTRAVENOUS at 13:32

## 2017-01-01 RX ADMIN — HYDROMORPHONE HYDROCHLORIDE 0.5 MG: 2 INJECTION, SOLUTION INTRAMUSCULAR; INTRAVENOUS; SUBCUTANEOUS at 16:40

## 2017-01-01 RX ADMIN — HYDROCHLOROTHIAZIDE: 12.5 CAPSULE ORAL at 08:16

## 2017-01-01 RX ADMIN — OXYCODONE HYDROCHLORIDE AND ACETAMINOPHEN 1 TABLET: 7.5; 325 TABLET ORAL at 07:17

## 2017-01-01 RX ADMIN — ACETAMINOPHEN 650 MG: 325 TABLET, FILM COATED ORAL at 12:59

## 2017-01-01 RX ADMIN — GLYCOPYRROLATE 0.4 MG: 0.2 INJECTION INTRAMUSCULAR; INTRAVENOUS at 16:00

## 2017-01-01 RX ADMIN — FENTANYL CITRATE 50 MCG: 50 INJECTION, SOLUTION INTRAMUSCULAR; INTRAVENOUS at 12:45

## 2017-01-01 RX ADMIN — HYDROMORPHONE HYDROCHLORIDE 0.5 MG: 2 INJECTION, SOLUTION INTRAMUSCULAR; INTRAVENOUS; SUBCUTANEOUS at 16:30

## 2017-01-01 RX ADMIN — ACETAMINOPHEN 650 MG: 325 TABLET, FILM COATED ORAL at 04:41

## 2017-01-01 RX ADMIN — SERTRALINE HYDROCHLORIDE 100 MG: 100 TABLET ORAL at 08:36

## 2017-01-01 RX ADMIN — HYDROMORPHONE HYDROCHLORIDE 0.5 MG: 2 INJECTION, SOLUTION INTRAMUSCULAR; INTRAVENOUS; SUBCUTANEOUS at 16:35

## 2017-01-01 RX ADMIN — OXYCODONE HYDROCHLORIDE AND ACETAMINOPHEN 1 TABLET: 7.5; 325 TABLET ORAL at 16:03

## 2017-01-01 RX ADMIN — ONDANSETRON 4 MG: 2 INJECTION INTRAMUSCULAR; INTRAVENOUS at 08:52

## 2017-01-01 RX ADMIN — FENTANYL CITRATE 25 MCG: 50 INJECTION, SOLUTION INTRAMUSCULAR; INTRAVENOUS at 14:05

## 2017-01-01 RX ADMIN — SERTRALINE HYDROCHLORIDE 100 MG: 100 TABLET ORAL at 08:16

## 2017-01-01 RX ADMIN — ATORVASTATIN CALCIUM 20 MG: 10 TABLET, FILM COATED ORAL at 08:16

## 2017-01-01 RX ADMIN — SODIUM CHLORIDE, SODIUM LACTATE, POTASSIUM CHLORIDE, AND CALCIUM CHLORIDE: 600; 310; 30; 20 INJECTION, SOLUTION INTRAVENOUS at 11:35

## 2017-01-01 RX ADMIN — ATORVASTATIN CALCIUM 20 MG: 10 TABLET, FILM COATED ORAL at 08:35

## 2017-01-01 RX ADMIN — CEFAZOLIN 2 G: 1 INJECTION, POWDER, FOR SOLUTION INTRAMUSCULAR; INTRAVENOUS; PARENTERAL at 11:36

## 2017-01-01 RX ADMIN — HYDROCHLOROTHIAZIDE: 12.5 CAPSULE ORAL at 08:36

## 2017-01-01 RX ADMIN — DOCUSATE SODIUM 100 MG: 100 CAPSULE, LIQUID FILLED ORAL at 08:52

## 2017-01-01 RX ADMIN — KETOROLAC TROMETHAMINE 15 MG: 30 INJECTION, SOLUTION INTRAMUSCULAR at 08:52

## 2017-01-01 RX ADMIN — ATENOLOL 25 MG: 25 TABLET ORAL at 08:53

## 2017-01-01 RX ADMIN — CEFAZOLIN 2 G: 1 INJECTION, POWDER, FOR SOLUTION INTRAMUSCULAR; INTRAVENOUS; PARENTERAL at 03:40

## 2017-01-01 RX ADMIN — LIDOCAINE HYDROCHLORIDE 100 MG: 20 INJECTION, SOLUTION EPIDURAL; INFILTRATION; INTRACAUDAL; PERINEURAL at 11:45

## 2017-01-01 RX ADMIN — DIATRIZOATE MEGLUMINE AND DIATRIZOATE SODIUM 10 ML: 660; 100 LIQUID ORAL; RECTAL at 11:11

## 2017-01-01 RX ADMIN — NEOSTIGMINE METHYLSULFATE 3 MG: 1 INJECTION INTRAVENOUS at 16:00

## 2017-01-01 RX ADMIN — FENTANYL CITRATE 25 MCG: 50 INJECTION, SOLUTION INTRAMUSCULAR; INTRAVENOUS at 14:55

## 2017-01-01 RX ADMIN — DEXTROSE MONOHYDRATE, SODIUM CHLORIDE, AND POTASSIUM CHLORIDE 75 ML/HR: 50; 4.5; 1.49 INJECTION, SOLUTION INTRAVENOUS at 18:36

## 2017-01-01 RX ADMIN — DOCUSATE SODIUM 100 MG: 100 CAPSULE, LIQUID FILLED ORAL at 08:16

## 2017-01-01 RX ADMIN — HYDROMORPHONE HYDROCHLORIDE 0.5 MG: 2 INJECTION, SOLUTION INTRAMUSCULAR; INTRAVENOUS; SUBCUTANEOUS at 16:45

## 2017-01-01 RX ADMIN — LIDOCAINE HYDROCHLORIDE 10 ML: 10 INJECTION, SOLUTION INFILTRATION; PERINEURAL at 10:57

## 2017-01-01 RX ADMIN — Medication 10 ML: at 11:11

## 2017-01-01 RX ADMIN — IPRATROPIUM BROMIDE AND ALBUTEROL SULFATE 3 ML: .5; 3 SOLUTION RESPIRATORY (INHALATION) at 07:44

## 2017-01-01 RX ADMIN — ATENOLOL 25 MG: 25 TABLET ORAL at 08:16

## 2017-01-01 RX ADMIN — CEFAZOLIN 2 G: 1 INJECTION, POWDER, FOR SOLUTION INTRAMUSCULAR; INTRAVENOUS; PARENTERAL at 12:58

## 2017-01-01 RX ADMIN — Medication 5 ML: at 05:42

## 2017-01-01 RX ADMIN — ROCURONIUM BROMIDE 40 MG: 10 INJECTION, SOLUTION INTRAVENOUS at 11:45

## 2017-01-01 RX ADMIN — KETOROLAC TROMETHAMINE 15 MG: 30 INJECTION, SOLUTION INTRAMUSCULAR at 19:42

## 2017-01-01 RX ADMIN — ATENOLOL 25 MG: 25 TABLET ORAL at 08:35

## 2017-01-01 RX ADMIN — Medication 10 ML: at 22:38

## 2017-01-01 RX ADMIN — FUROSEMIDE 20 MG: 10 INJECTION, SOLUTION INTRAMUSCULAR; INTRAVENOUS at 05:04

## 2017-01-01 RX ADMIN — DEXAMETHASONE SODIUM PHOSPHATE 10 MG: 4 INJECTION, SOLUTION INTRA-ARTICULAR; INTRALESIONAL; INTRAMUSCULAR; INTRAVENOUS; SOFT TISSUE at 11:45

## 2017-01-01 RX ADMIN — CEFAZOLIN 2 G: 1 INJECTION, POWDER, FOR SOLUTION INTRAMUSCULAR; INTRAVENOUS; PARENTERAL at 19:41

## 2017-01-01 RX ADMIN — FENTANYL CITRATE 100 MCG: 50 INJECTION, SOLUTION INTRAMUSCULAR; INTRAVENOUS at 11:45

## 2017-09-19 PROBLEM — C50.011 BILATERAL MALIGNANT NEOPLASM INVOLVING BOTH NIPPLE AND AREOLA IN FEMALE (HCC): Status: ACTIVE | Noted: 2017-01-01

## 2017-09-19 PROBLEM — C50.012 BILATERAL MALIGNANT NEOPLASM INVOLVING BOTH NIPPLE AND AREOLA IN FEMALE (HCC): Status: ACTIVE | Noted: 2017-01-01

## 2017-10-04 NOTE — PERIOP NOTES
Patient verified name, , and surgery as listed in [facility]. Type 3 surgery, walk in assessment complete. Labs per surgeon: none;   Labs per anesthesia protocol: cbc, bmp~drawn on 17~all within anesthesia guidelines, placed on chart for reference;  EKG: none needed per protocol    Hibiclens and instructions given per hospital policy. Patient provided with and instructed on educational handouts including Guide to Surgery, Pain Management, Hand Hygiene, Blood Transfusion Education, and [facility] Brochure. Patient answered medical/surgical history questions at their best of ability. All prior to admission medications documented in [facility]. Original medication prescription bottle NOT visualized during patient appointment. Patient instructed to hold all vitamins 7 days prior to surgery and NSAIDS 5 days prior to surgery, patient verbalized understanding. Medications to be held: Meloxicam hold for 5 days prior to surgery. Patient instructed to continue previous medications as prescribed prior to surgery and to take the following medications the day of surgery according to anesthesia guidelines with a small sip of water: Atenolol, Lipitor, Zyrtec, Zoloft, Zantac; patient reminded to bring nonformulary Zyrtec. Patient teach back successful and patient demonstrates knowledge of instructions.

## 2017-10-11 PROBLEM — C50.919 BREAST CANCER (HCC): Status: ACTIVE | Noted: 2017-01-01

## 2017-10-11 NOTE — PROGRESS NOTES
Admission Assessment completed. Pt is alert and oriented x 3, drowsy but answers questions appropriately. Verbalizes needs. States pain is controlled. Ivan to bedside bag with blue urine. IV in Lt foot # 20. Mastectomy bra with gauze dsg in place. Pain pump and drain. Family at bedside.

## 2017-10-11 NOTE — OP NOTES
[address]   OPERATIVE REPORT       Name:  Clifton Mendoza   MR#:  [id number]   :  1940   Account #:  [de-identified]   Date of Adm:  10/11/2017       DATE OF PROCEDURE: 10/11/2017     PREOPERATIVE DIAGNOSIS: Bilateral breast cancer. POSTOPERATIVE DIAGNOSIS: Bilateral breast cancer with positive   lymph nodes on the right. PROCEDURE:   1. Right modified radical mastectomy. 2. Right sentinel lymph node biopsy with frozen section. 3. Left simple mastectomy. 4. Left sentinel lymph node biopsy with frozen section. 5. Bilateral breast reconstruction by Dr. Ayesha Prado (please see his   dictation). ANESTHESIA: General endotracheal.    SURGEON: Carlos Zhong DO    ASSISTANT: None. COMPLICATIONS: None. DISPOSITION: Stable. INDICATIONS: This is a 71-year-old female with bilateral breast   cancer diagnosed with biopsy and MRI. She is prepared for   bilateral simple mastectomy and bilateral sentinel lymph node   biopsy with possibility of bilateral axillary dissection. Consent was obtained by describing the procedure to the patient,   including potential complications to include infection,   bleeding, axillary dissection, nerve damage and lymphedema. Consent was obtained, placed upon the chart. She was   administered Ancef 2 grams IV preoperatively, taken to the   operating suite in a supine position. General anesthesia was   initiated without complications. She had been injected in the   periareolar space bilaterally with technetium sulfur colloid. General anesthesia was initiated without complications. Following this, we injected 5 mL of Lymphazurin blue dye in the   subareolar space bilaterally and then provided 5 minutes of   breast massage. The patient was then prepped and draped in the   usual sterile fashion and then the right sentinel lymph node was   addressed first using a NeoProbe, the proximal location was   identified in the right axilla.  A #15 scalpel blade was used to   make a 4 cm incision and Bovie cauterization was used to dissect   down the subcutaneous tissue. Using a combination of NeoProbe   and identifying lymphatic blue channels, we were able to   identify sentinel lymph node. This was excised using Ligaclips   and spot cauterization. The lymph node was sent to Pathology for   frozen section and Pathology called reporting a positive lymph   node. The left sentinel lymph node was then located using a   NeoProbe and then a #15 scalpel blade was used to make a 4 cm   incision. Bovie cauterization used to dissect down the   subcutaneous tissue using NeoProbe guidance as well as   identifying lymphatic blue channels. We were able to identify 2   lymph nodes that were blue and hot and these were both sent as   sentinel lymph nodes of the left. Pathology called frozen   section reporting negative lymph nodes. At this point, we   transitioned back to the right side, planning for a right   modified radical mastectomy. The patient had an ulcerated tumor   just superior to the nipple and a #15 scalpel blade was used to   make a superior incision encompassing the ulcerated cancer. Bovie cauterization was used to dissect down the subcutaneous   tissue to the clavicle superiorly and to the sternum medially. An inferior incision was then created with a 15 scalpel blade,   dissection was taken to the rectus muscle and then laterally to   the pectoralis muscle. We then dissected the breast off of the   pectoralis muscle, while including the prepectoral fascia from a   medial to lateral orientation. We continued our dissection into   the right axilla tracing our dissection up to the axillary vein   superiorly, and posteriorly to the latissimus dorsi muscle. The   long thoracodorsal nerves were identified. The intercostal   brachial nerve was identified and sacrificed.  Once the lymph   node basin was evacuated, we concluded the dissection and marked   the specimen en bloc with axillary contents with a long lateral   and short superior suture and sent this to the pathologist. At   this point, we copiously irrigated with saline until clear. We   ensured hemostasis using spot cauterization and a combination of   2-0 silk sutures. At this point, Dr. Marybeth Benítez was called and he   began preparation of the right breast for tissue expanders. We   then transitioned to the left side and a superior incision was   created with a #15 scalpel blade in an elliptical fashion and   Bovie cauterization was used to dissect down the subcutaneous   tissue to the clavicle on the left, and to the sternum medially   and inferior incision was created with a 15 scalpel blade. Dissection was taken down to the rectus muscle inferiorly and   then to the serratus muscle laterally. The breast was then   dissected off of the pectoralis muscle, while including the   prepectoral fascia from a medial to lateral approach. The   specimen was then marked with a long lateral and short superior   suture and sent as a left breast simple mastectomy specimen. At   this point, I concluded my portion of the surgery. Hemostasis   was achieved. We copiously irrigated with saline until clear. Dr. Marybeth Benítez would conclude the tissue expanders and close all   wounds. At this point in the case, patient tolerated the   procedure well without immediate complications. FINDINGS: A 60-year-old female with bilateral breast cancer. Shel[place] lymph node on the right was positive. A right modified   radical mastectomy was performed. The left sentinel lymph node   was negative and a left simple mastectomy was performed. She   tolerated the procedure well. Pathology pending. CAROLANN ARENAS DO DD / Ivy Mayo   D:  10/11/2017   14:19   T:  10/11/2017   15:06   Job #:  [id number]

## 2017-10-11 NOTE — BRIEF OP NOTE
BRIEF OPERATIVE NOTE    Date of Procedure: 10/11/2017   Preoperative Diagnosis: Cancer of right nipple ([address] [C50.011]  Cancer of left nipple (HCC) [C50.012]  Postoperative Diagnosis: Cancer of right nipple (HCC) [C50.011]  Cancer of left nipple (HCC) [C50.012]    Procedure(s):  BILATERAL BREAST MASTECTOMY SIMPLE ARTOURA ULTRA HIGH PROFILE BREAST IMPLANTS 700cc 13.5 cm 8.3cm [id number] X 3 /  MEDIUM HEIGHT 550cc 13.5 cm 11.7cm 7.4cm [id number] X 3   BILATERAL SENTINEL NODE BIOPSY WITH LYMPHATIC MAPPING  RIGHT AXILLARY DISSECTION  BILATERAL BREAST RECONSTRUCTION WITH PLACEMENT TISSUE EXPANDERS AND ALLODERM  Surgeon(s) and Role:  Panel 1:     * Nolberto Dowd DO - Primary    Panel 2:     * Paola Lisa MD - Primary         Assistant Staff:       Surgical Staff:  Circ-1: Fransisca Brunner, RN  Circ-Relief: Shawn Rodriguez RN  Scrub Tech-1: Lawrcodey Minus  Scrub Tech-2: Selin Hough  Scrub Tech-3: Dale Lopez  Event Time In   Incision Start 1206   Incision Close      Anesthesia: General   Estimated Blood Loss: 100cc  Specimens:   ID Type Source Tests Collected by Time Destination   1 : RIGHT BREAST SENTINEL NODE # 1 Frozen Section Breast  Marinell Neither, DO 10/11/2017 1230 Pathology   2 : LEFT BREAST SENTINEL NODE # 1 Frozen Section Breast  Marinell Neither, DO 10/11/2017 1245 Pathology   3 : Left [place] Lymph Node # 2 Frozen Section Breast  Edytall Neither, DO 10/11/2017 1248 Pathology   4 : Right Breast and Axillary Contents Fresh Breast  Marinell Neither, DO 10/11/2017 1325 Pathology   5 : RIGHT BREAST CONTENTS Fresh Breast  Marinell Neither, DO 10/11/2017 1402 Pathology      Findings: Right breast positive sentinel lymph node, followed by Right MRM. Left negative SLN followed by left simple mastectomy. Complications: none  Implants:   Implant Name Type Inv.  Item Serial No.  Lot No. LRB No. Used Action   GRAFT TISS ALLODERM 6X16CM -- THICK 96 SQ CM - [id number] GRAFT TISS ALLODERM 6X16CM -- THICK 96 SQ CM [id number] [employer] [id number] N/A 1 Implanted     Naoma Anahi Jensen, [address]

## 2017-10-11 NOTE — ANESTHESIA POSTPROCEDURE EVALUATION
Post-Anesthesia Evaluation and Assessment    Patient: Jenae Das MRN: [id number]  SSN: [id number]    YOB: 1940  Age: 77 y.o. Sex: female       Cardiovascular Function/Vital Signs  Visit Vitals    /50 (BP 1 Location: Left arm, BP Patient Position: At rest)    Pulse 85    Temp 35.6 °C (96 °F)    Resp 19    Ht 5' 3\" (1.6 m)    Wt 82.1 kg (181 lb)    SpO2 95%    BMI 32.06 kg/m2       Patient is status post general anesthesia for Procedure(s):  BILATERAL BREAST MASTECTOMY SIMPLE ARTOURA ULTRA HIGH PROFILE BREAST IMPLANTS 700cc 13.5 cm 8.3cm [id number] X 3 /  MEDIUM HEIGHT 550cc 13.5 cm 11.7cm 7.4cm [id number] X 3   BILATERAL SENTINEL NODE BIOPSY WITH LYMPHATIC MAPPING  RIGHT AXILLARY DISSECTION  BILATERAL BREAST RECONSTRUCTION WITH PLACEMENT TISSUE EXPANDERS AND ALLODERM. Nausea/Vomiting: None    Postoperative hydration reviewed and adequate. Pain:  Pain Scale 1: Visual (10/11/17 1707)  Pain Intensity 1: 0 (10/11/17 1707)   Managed    Neurological Status:   Neuro (WDL): Exceptions to WDL (10/11/17 1707)  Neuro  Neurologic State: Drowsy (10/11/17 1707)  Orientation Level: Oriented X4 (10/11/17 1707)  Cognition: Follows commands (10/11/17 1707)  Speech: Clear (10/11/17 1707)  LUE Motor Response: Purposeful (10/11/17 1707)  LLE Motor Response: Purposeful (10/11/17 1707)  RUE Motor Response: Purposeful (10/11/17 1707)  RLE Motor Response: Purposeful (10/11/17 1707)   At baseline    Mental Status and Level of Consciousness: Arousable    Pulmonary Status:   O2 Device: Nasal cannula (10/11/17 1722)   Adequate oxygenation and airway patent    Complications related to anesthesia: None    Post-anesthesia assessment completed.  No concerns    Signed By: Oswaldo Herndon MD     October 11, 2017

## 2017-10-11 NOTE — PROGRESS NOTES
Pt back from surgery. Tolerated well. Alert and oriented with occasional confusion. Verbalizes needs well. Ivan to bedside bag draining well. Clears until tolerated. Safety measures in place. Continue to monitor.

## 2017-10-11 NOTE — BRIEF OP NOTE
BRIEF OPERATIVE NOTE    Date of Procedure: 10/11/2017   Preoperative Diagnosis: Cancer of right nipple ([address] [C50.011]  Cancer of left nipple (HCC) [C50.012]  Postoperative Diagnosis: bilateral breast cancer  [C50.011]  Cancer of left nipple (HCC) [C50.012]    Procedure(s):  BILATERAL BREAST MASTECTOMY SIMPLE ARTOURA ULTRA HIGH PROFILE BREAST IMPLANTS 700cc 13.5 cm 8.3cm [id number] X 3 /  MEDIUM HEIGHT 550cc 13.5 cm 11.7cm 7.4cm [id number] X 3   BILATERAL SENTINEL NODE BIOPSY WITH LYMPHATIC MAPPING  RIGHT AXILLARY DISSECTION  BILATERAL BREAST RECONSTRUCTION WITH PLACEMENT TISSUE EXPANDERS AND ALLODERM  Surgeon(s) and Role:  Panel 1:     * Shari Souza DO - Primary    Panel 2:     * Beryl Dorman MD - Primary         Assistant Staff:       Surgical Staff:  Circ-1: Maximino Gill RN  Circ-Relief: Hansel Live RN; Jennifer Hernandez RN  Scrub Tech-1: Berny Hoyt  Scrub Tech-2: Ivon Lawrence  Scrub Tech-Relief: Wiley Myers  Scrub Tech-3: Manuel Solis  Event Time In   Incision Start 1206   Incision Close 1600     Anesthesia: General   Estimated Blood Loss: 15-20 cc  Specimens:   ID Type Source Tests Collected by Time Destination   1 : RIGHT BREAST SENTINEL NODE # 1 Frozen Section Breast  Shari Souza, DO 10/11/2017 1230 Pathology   2 : LEFT BREAST SENTINEL NODE # 1 Frozen Section Breast  Shari Mealjailyn, DO 10/11/2017 1245 Pathology   3 : Left [place] Lymph Node # 2 Frozen Section Breast  Shari Souza, DO 10/11/2017 1248 Pathology   4 : Right Breast and Axillary Contents Fresh Breast  Shari Mealy, DO 10/11/2017 1325 Pathology   5 : LEFT  BREAST CONTENTS Fresh Breast  Shari Mealy, DO 10/11/2017 1402 Pathology      Findings: WNL   Complications: NONe  Implants:   Implant Name Type Inv.  Item Serial No.  Lot No. LRB No. Used Action   GRAFT TISS ALLODERM 6X16CM -- THICK 96 SQ CM - [id number]  GRAFT TISS ALLODERM 6X16CM -- THICK 96 SQ CM [id number] [employer] [id number] N/A 1 Implanted   EXPNDR BRST TISS UHP 700ML --  - [id number]  EXPNDR BRST TISS UHP 700ML --  [id number] [employer] [id number] Left 1 Implanted   EXPNDR BRST TISS UHP 700ML --  - [id number]   EXPNDR BRST TISS UHP 700ML --  [id number] [employer] [id number] Left 1 Implanted

## 2017-10-11 NOTE — ANESTHESIA PREPROCEDURE EVALUATION
Anesthetic History               Review of Systems / Medical History  Patient summary reviewed, nursing notes reviewed and pertinent labs reviewed    Pulmonary                   Neuro/Psych              Cardiovascular    Hypertension          Hyperlipidemia    Exercise tolerance: >4 METS     GI/Hepatic/Renal     GERD: well controlled    Renal disease: stones       Endo/Other        Obesity     Other Findings              Physical Exam    Airway  Mallampati: II  TM Distance: 4 - 6 cm  Neck ROM: decreased range of motion   Mouth opening: Normal     Cardiovascular  Regular rate and rhythm,  S1 and S2 normal,  no murmur, click, rub, or gallop             Dental    Dentition: Edentulous     Pulmonary  Breath sounds clear to auscultation               Abdominal  GI exam deferred       Other Findings            Anesthetic Plan    ASA: 3  Anesthesia type: general            Anesthetic plan and risks discussed with: Patient and Family

## 2017-10-11 NOTE — PROGRESS NOTES
TRANSFER - IN REPORT:    Verbal report received from Dania Allen, (name) on Deena Viramontes  being received from PACU (unit) for routine progression of care      Report consisted of patients Situation, Background, Assessment and   Recommendations(SBAR). Information from the following report(s) SBAR, Kardex and Procedure Summary was reviewed with the receiving nurse. Opportunity for questions and clarification was provided. Assessment completed upon patients arrival to unit and care assumed.

## 2017-10-11 NOTE — PERIOP NOTES
TRANSFER - OUT REPORT:    Verbal report given to clarice on Jovani Jeffers  being transferred to [facility] for routine progression of care       Report consisted of patients Situation, Background, Assessment and   Recommendations(SBAR). Information from the following report(s) SBAR was reviewed with the receiving nurse. Lines:   Peripheral IV 10/11/17 Left Foot (Active)   Site Assessment Clean, dry, & intact 10/11/2017  5:16 PM   Phlebitis Assessment 0 10/11/2017  5:16 PM   Infiltration Assessment 0 10/11/2017  5:16 PM   Dressing Status Clean, dry, & intact 10/11/2017  5:16 PM   Dressing Type Tape;Transparent 10/11/2017  5:16 PM   Hub Color/Line Status Pink 10/11/2017  5:16 PM        Opportunity for questions and clarification was provided.       Patient transported with:   O2 @ 2 liters

## 2017-10-11 NOTE — IP AVS SNAPSHOT
[address] [place] [address] 
[phone] Patient: Clau Camacho MRN: [id number] BON:[id number] You are allergic to the following Allergen Reactions Sulfa (Sulfonamide Antibiotics) Itching Tetracycline Nausea Only Recent Documentation Height Weight BMI OB Status Smoking Status 1.6 m 82.1 kg 32.06 kg/m2 Postmenopausal Former Smoker Unresulted Labs Order Current Status CULTURE, BLOOD Preliminary result CULTURE, BLOOD Preliminary result Emergency Contacts Name Discharge Info Relation Home Work Mobile [id number] [employer] CAREGIVER [3] Daughter [21] [phone] About your hospitalization You were admitted on:  October 11, 2017 You last received care in the:  [address] You were discharged on:  October 14, 2017 Unit phone number:  [phone] Why you were hospitalized Your primary diagnosis was:  S/P Bilateral Mastectomy Your diagnoses also included:  Breast Cancer (Hcc) Providers Seen During Your Hospitalizations Provider Role Specialty Primary office phone Benedict Alford DO Attending Provider General Surgery [phone] Ann Marie Sheehan MD Attending Provider Plastic Surgery [phone] Your Primary Care Physician (PCP) Primary Care Physician Office Phone Office Fax Mame Herrera [phone] Follow-up Information Follow up With Details Comments Contact Info Everton Chavis MD   [address] [place] [id number] [phone] Your Appointments Thursday October 26, 2017  1:50 PM EDT Global Post Op with Emilie Crespo DO  
[facility] ([facility]) [address] [place] [id number] [phone] Current Discharge Medication List  
  
 CONTINUE these medications which have CHANGED Dose & Instructions Dispensing Information Comments Morning Noon Evening Bedtime  
 atenolol 25 mg tablet Commonly known as:  TENORMIN What changed:  additional instructions Your last dose was:  9 am  
Your next dose is:  Tomorrow am  
   
 Dose:  25 mg Take 1 Tab by mouth daily. Quantity:  90 Tab Refills:  3 Do not fill until patient requests  
    
   
   
   
  
 atorvastatin 20 mg tablet Commonly known as:  LIPITOR What changed:  additional instructions Your last dose was:  9 am  
Your next dose is:  Tomorrow am  
   
 Dose:  20 mg Take 1 Tab by mouth daily. Quantity:  90 Tab Refills:  3  
     
   
   
   
  
 meloxicam 7.5 mg tablet Commonly known as:  MOBIC What changed:   
- when to take this 
- reasons to take this Dose:  7.5-15 mg Take 1-2 Tabs by mouth daily. Quantity:  180 Tab Refills:  3  
     
   
   
   
  
 sertraline 100 mg tablet Commonly known as:  ZOLOFT What changed:  additional instructions Your last dose was:  9 am  
Your next dose is:  Tomorrow 9 am  
   
 Dose:  100 mg Take 1 Tab by mouth daily. Quantity:  90 Tab Refills:  3 CONTINUE these medications which have NOT CHANGED Dose & Instructions Dispensing Information Comments Morning Noon Evening Bedtime  
 doxylamine succinate 25 mg tablet Commonly known as:  Elihue Foots Dose:  25 mg Take 25 mg by mouth nightly as needed for Sleep. For sleep Refills:  0  
     
   
   
   
  
 losartan-hydroCHLOROthiazide 50-12.5 mg per tablet Commonly known as:  HYZAAR Your last dose was:  9 am today Your next dose is:  9 am tomorrow Dose:  1 Tab Take 1 Tab by mouth daily. Quantity:  90 Tab Refills:  3 Do not fill until patient requests  
    
   
   
   
  
 meclizine 25 mg tablet Commonly known as:  ANTIVERT  Dose:  25 mg  
 Take 1 Tab by mouth every six (6) hours as needed for Dizziness. Quantity:  30 Tab Refills:  1 MUCINEX -30 mg per tablet Generic drug:  guaiFENesin-dextromethorphan SR Dose:  1 Tab Take 1 Tab by mouth every twelve (12) hours as needed for Cough. Refills:  0  
     
   
   
   
  
 scopolamine 1 mg over 3 days Pt3d Commonly known as:  TRANSDERM-SCOP Dose:  1.5 mg  
1.5 mg by TransDERmal route every seventy-two (72) hours. Refills:  0 STOOL SOFTENER PO Take  by mouth as needed. Refills:  0 SUDAFED 12 HOUR PO Take  by mouth as needed. Refills:  0  
     
   
   
   
  
 ZANTAC 150 mg tablet Generic drug:  raNITIdine Dose:  150 mg Take 150 mg by mouth as needed for Indigestion. Take / use AM day of surgery  per anesthesia protocols. Refills:  0 ZOFRAN (AS HYDROCHLORIDE) 8 mg tablet Generic drug:  ondansetron hcl Dose:  8 mg Take 8 mg by mouth every eight (8) hours as needed for Nausea. Refills:  0 ZyrTEC 10 mg Cap Generic drug:  Cetirizine Dose:  10 mg Take 10 mg by mouth daily. Take / use AM day of surgery  per anesthesia protocols. Refills:  0 Discharge Instructions DISCHARGE SUMMARY from Nurse The following personal items are in your possession at time of discharge: 
 
Dental Appliances: Uppers, Lowers Visual Aid: Glasses Jewelry: None PATIENT INSTRUCTIONS: 
 
After general anesthesia or intravenous sedation, for 24 hours or while taking prescription Narcotics: · Limit your activities · Do not drive and operate hazardous machinery · Do not make important personal or business decisions · Do  not drink alcoholic beverages · If you have not urinated within 8 hours after discharge, please contact your surgeon on call. Report the following to your surgeon: 
· Excessive pain, swelling, redness or odor of or around the surgical area · Temperature over 100.5 · Nausea and vomiting lasting longer than 4 hours or if unable to take medications · Any signs of decreased circulation or nerve impairment to extremity: change in color, persistent  numbness, tingling, coldness or increase pain · Any questions What to do at Home: 
Recommended activity: No lifting, Driving, or Strenuous exercise until follow up appt with surgeon If you experience any of the following symptoms  Increased drainage from BRADY drains or thicker, clotty drainage, redness or swelling, in arms or armpit areas, fever greater than 100.5 and unrelieved by tylenol, please follow up with surgeon. *  Please give a list of your current medications to your Primary Care Provider. *  Please update this list whenever your medications are discontinued, doses are 
    changed, or new medications (including over-the-counter products) are added. *  Please carry medication information at all times in case of emergency situations. Axillary Lymph Node Dissection: What to Expect at Home Your Recovery Right after the surgery you will probably feel weak, and your shoulder area will feel sore and stiff for a few days. It may be hard to move your arm and shoulder in all directions. Your doctor or physical therapist will teach you some arm exercises. You now have a higher chance of swelling in the affected arm. This is called lymphedema. From now on, you will have to be careful when using your arm. You will have a scar under your arm that will fade over time. You may also notice a hollow area in your armpit. It may also feel like you have a lump in your armpit. You may lose some feeling under your arm, or the arm may have a tingling or burning feeling. The loss of feeling may last only a little while, or it may last the rest of your life. You will probably be able to go back to work or your normal routine in 3 to 6 weeks. This depends on the type of work you do and any further treatment. If cancer was found in the lymph nodes, you will probably need more treatment. An axillary node dissection may be done at the same time as other breast cancer surgeries. If this is the case, your recovery may be different. When you find out that you have cancer, you may feel many emotions and may need some help coping. Seek out family, friends, and counselors for support. You also can do things at home to make yourself feel better while you go through treatment. Call the Your.MD (3-412.915.7525) or visit its website at [employer] for more information. This care sheet gives you a general idea about how long it will take for you to recover. But each person recovers at a different pace. Follow the steps below to get better as quickly as possible. How can you care for yourself at home? Activity · Rest when you feel tired. Getting enough sleep will help you recover. · Try to walk each day. Start by walking a little more than you did the day before. Bit by bit, increase the amount you walk. Walking boosts blood flow and helps prevent pneumonia and constipation. · Avoid strenuous activities, such as biking, jogging, weightlifting, or aerobic exercise, until your doctor says it is okay. This includes housework, especially if you have to use your affected arm. You will probably be able to do your normal activities in 3 to 6 weeks. But for the next 3 to 6 months, be careful when you do tasks that use the same motions over and over, such as vacuuming, weed pulling, and window cleaning. · For 4 to 6 weeks, avoid lifting anything that weighs more than 10 to 15 pounds or that would make you strain. This may include heavy grocery bags and milk containers, a heavy briefcase or backpack, cat litter or dog food bags, a vacuum , or a child. · Ask your doctor when you can drive again. · You will probably be able to go back to work or your normal routine in 3 to 6 weeks. It will also depend on the type of work you do and any further treatment. · You may be able to take showers (unless you have a drain in your incision) 24 to 48 hours after surgery. Pat the cut (incision) dry. Do not take a bath for the first 2 weeks, or until your doctor tells you it is okay. If you have a drain coming out of your incision, follow your doctor's instructions to empty and care for it. · Take precautions to prevent infection and swelling in your arm. This is called lymphedema. ¨ Wear gloves when you garden, handle garbage, wash dishes, and clean house. ¨ Protect your hands and arms from burns, including sunburns. ¨ Do not wear tight sleeves, elastic cuffs, bracelets, wristwatches, or rings on the affected arm. ¨ Do not let anyone take blood pressure, draw blood, or give shots in that arm. ¨ Do not carry heavy purses, suitcases, grocery bags, and other heavy items with that arm. ¨ Keep the skin of that arm well moisturized. ¨ Do not cut your cuticles. ¨ Use an electric shaver if you shave your armpits. ¨ Protect yourself from insect bites on the arm. ¨ Wear medical alert jewelry that says you can develop lymphedema. You can buy this at most drugstores and on the Internet. Diet · You can eat your normal diet. If your stomach is upset, try bland, low-fat foods like plain rice, broiled chicken, toast, and yogurt. · You may notice that your bowel movements are not regular right after your surgery. This is common. Try to avoid constipation and straining with bowel movements. You may want to take a fiber supplement every day. If you have not had a bowel movement after a couple of days, ask your doctor about taking a mild laxative. Medicines · Your doctor will tell you if and when you can restart your medicines.  He or she will also give you instructions about taking any new medicines. · If you take blood thinners, such as warfarin (Coumadin), clopidogrel (Plavix), or aspirin, be sure to talk to your doctor. He or she will tell you if and when to start taking those medicines again. Make sure that you understand exactly what your doctor wants you to do. · Be safe with medicines. Take pain medicines exactly as directed. ¨ If the doctor gave you a prescription medicine for pain, take it as prescribed. ¨ If you are not taking a prescription pain medicine, ask your doctor if you can take an over-the-counter medicine. · If your doctor prescribed antibiotics, take them as directed. Do not stop taking them just because you feel better. You need to take the full course of antibiotics. · If you think your pain medicine is making you sick to your stomach: 
¨ Take your medicine after meals (unless your doctor has told you not to). ¨ Ask your doctor for a different pain medicine. Incision care · If you have strips of tape on the cut (incision) the doctor made, leave the tape on for a week or until it falls off. · After 24 to 48 hours, wash the area daily with warm, soapy water and pat it dry. Keep the area clean and dry. · You may cover the area with a gauze bandage if it weeps or rubs against clothing. Change the bandage every day. Exercise · You will need to do arm exercises once your doctor tells you it is okay. Do the range-of-motion exercises as instructed by your doctor. Elevation · Prop up your arm on a pillow anytime you sit or lie down. Try to keep it above the level of your heart. This will help reduce swelling. Other instructions · You may have a drain in your armpit. Follow your doctor's instructions to empty and care for it. Follow-up care is a key part of your treatment and safety.  Be sure to make and go to all appointments, and call your doctor if you are having problems. It's also a good idea to know your test results and keep a list of the medicines you take. When should you call for help? Call 911 anytime you think you may need emergency care. For example, call if: 
· You passed out (lost consciousness). · You have severe trouble breathing. · You have sudden chest pain and shortness of breath, or you cough up blood. Call your doctor now or seek immediate medical care if: 
· You have signs of lymphedema. ¨ You have a feeling of tightness or swelling in or around your arm. ¨ You have pain, weakness that keeps getting worse, or a tingling \"pins and needles\" feeling. ¨ Your arm feels full or heavy. ¨ You notice that your hand or wrist is becoming stiff and hard to move. ¨ You notice swelling in your fingers. · You have increased swelling in the breast. 
· You have loose stitches, or your incision comes open. · Bright red blood has soaked through the bandage over your incision. · You have signs of infection, such as: 
¨ Increased pain, swelling, warmth, or redness. ¨ Red streaks leading from the incision. ¨ Pus draining from the incision. ¨ A fever. · Fluid is leaking around the drain, you have a sudden increase in fluid, or you have no new fluid in the drain for 24 hours. Watch closely for changes in your health, and be sure to contact your doctor if: 
· Your rings, watches, or bracelets feel tight, but you have not gained weight. · You do not have a bowel movement after taking a laxative. Where can you learn more? Go to http://jennifer-jag.info/. Enter L189 in the search box to learn more about \"Axillary Lymph Node Dissection: What to Expect at Home. \" Current as of: September 22, 2016 Content Version: 11.3 © 0246-9248 [employer]. Care instructions adapted under license by [employer] (which disclaims liability or warranty for this information).  If you have questions about a medical condition or this instruction, always ask your healthcare professional. [place] [address] any warranty or liability for your use of this information. [place] Node Biopsy for Breast Cancer: What to Expect at Home Your Recovery After a sentinel node biopsy, many women have no side effects. Some women have pain or bruising at the cut (incision) and feel tired. Your breast and underarm area may be slightly swollen. This may last a few days. You should feel close to normal in a few days. The incision the doctor made usually heals in about 2 weeks. The scar usually fades with time. Some women have a buildup of fluid in the area where the lymph nodes were removed. This is known as seroma. This goes away on its own, or your doctor can drain it. When you had this test, your doctor injected blue dye or radioactive material (or both) into your breast. The blue dye may give your breast a bluish color and turn your urine green for about 24 hours. The radioactive material leaves the body on its own in 24 to 48 hours. A sentinel node biopsy may be done at the same time as other breast surgeries. If this is the case, how you recover will be different. This care sheet gives you a general idea about how long it will take for you to recover. But each person recovers at a different pace. Follow the steps below to get better as quickly as possible. How can you care for yourself at home? Activity · Rest when you feel tired. Getting enough sleep will help you recover. · Try to walk each day. Start by walking a little more than you did the day before. Bit by bit, increase the amount you walk. Walking boosts blood flow and helps prevent pneumonia and constipation. · You may drive when you are no longer taking pain medicine and you feel up to it. · You can lift things when you feel comfortable doing so. · Most women return to work and their normal routines in 2 to 7 days. · You may shower 24 to 48 hours after surgery, if your doctor okays it. Pat the incision dry. Do not take a bath for the first 2 weeks, or until your doctor tells you it is okay. · Avoid activity or exercise that may put stress on the cut. This includes washing windows, vacuuming, or gardening with the affected arm. Diet · You can eat your normal diet. If your stomach is upset, try bland, low-fat foods like plain rice, broiled chicken, toast, and yogurt. · You may notice that your bowels are not regular right after your surgery. This is common. Try to avoid constipation and straining with bowel movements. Take a fiber supplement such as Citrucel or Metamucil every day. If you have not had a bowel movement after a couple of days, take a mild laxative. Medicines · Your doctor will tell you if and when you can restart your medicines. He or she will also give you instructions about taking any new medicines. · If you take blood thinners, such as warfarin (Coumadin), clopidogrel (Plavix), or aspirin, be sure to talk to your doctor. He or she will tell you if and when to start taking those medicines again. Make sure that you understand exactly what your doctor wants you to do. · Take pain medicines exactly as directed. ¨ If the doctor gave you a prescription medicine for pain, take it as prescribed. ¨ If you are not taking a prescription pain medicine, take an over-the-counter medicine such as acetaminophen (Tylenol), ibuprofen (Advil, Motrin), or naproxen (Aleve). Read and follow all instructions on the label. ¨ Do not take two or more pain medicines at the same time unless the doctor told you to. Many pain medicines have acetaminophen, which is Tylenol. Too much acetaminophen (Tylenol) can be harmful. · If your doctor prescribed antibiotics, take them as directed. Do not stop taking them just because you feel better. You need to take the full course of antibiotics. · If you think your pain medicine is making you sick to your stomach: 
¨ Take your medicine after meals (unless your doctor has told you not to). ¨ Ask your doctor for a different pain medicine. Incision care · If you have strips of tape on the cut (incision) the doctor made, leave the tape on for about 1 week or until it falls off. · After you can shower, wash the area daily with warm, soapy water and pat it dry. Follow-up care is a key part of your treatment and safety. Be sure to make and go to all appointments, and call your doctor if you are having problems. It's also a good idea to know your test results and keep a list of the medicines you take. When should you call for help? Call 911 anytime you think you may need emergency care. For example, call if: 
· You passed out (lost consciousness). · You have severe trouble breathing. · You have sudden chest pain and shortness of breath, or you cough up blood. Call your doctor now or seek immediate medical care if: 
· You have increased swelling in the breast. 
· You have signs of infection, such as: 
¨ Increased pain, swelling, warmth, or redness. ¨ Red streaks leading from the incision. ¨ Pus draining from the incision. ¨ Swollen lymph nodes in your neck, armpits, or groin. ¨ A fever. · You have loose stitches, or your incision comes open. · You need to change your dressing 3 times because of bleeding. Watch closely for changes in your health, and be sure to contact your doctor if: 
· You have signs of lymphedema. ¨ You have a feeling of tightness or swelling in or around your arm. ¨ You have pain, weakness that keeps getting worse, or a tingling \"pins and needles\" feeling. ¨ Your arm feels full or heavy. ¨ You notice that your hand or wrist is becoming stiff and hard to move. ¨ You notice swelling in your fingers. · You do not have a bowel movement after taking a laxative. Where can you learn more? Go to http://jennifer-jag.info/. Enter 813 1354 in the search box to learn more about \"[place] Node Biopsy for Breast Cancer: What to Expect at Home. \" Current as of: August 1, 2016 Content Version: 11.3 © 5474-8910 [employer]. Care instructions adapted under license by [employer] (which disclaims liability or warranty for this information). If you have questions about a medical condition or this instruction, always ask your healthcare professional. [address] any warranty or liability for your use of this information. These are general instructions for a healthy lifestyle: No smoking/ No tobacco products/ Avoid exposure to second hand smoke Surgeon General's Warning:  Quitting smoking now greatly reduces serious risk to your health. Obesity, smoking, and sedentary lifestyle greatly increases your risk for illness A healthy diet, regular physical exercise & weight monitoring are important for maintaining a healthy lifestyle You may be retaining fluid if you have a history of heart failure or if you experience any of the following symptoms:  Weight gain of 3 pounds or more overnight or 5 pounds in a week, increased swelling in our hands or feet or shortness of breath while lying flat in bed. Please call your doctor as soon as you notice any of these symptoms; do not wait until your next office visit. Recognize signs and symptoms of STROKE: 
 
F-face looks uneven A-arms unable to move or move unevenly S-speech slurred or non-existent T-time-call 911 as soon as signs and symptoms begin-DO NOT go Back to bed or wait to see if you get better-TIME IS BRAIN. Warning Signs of HEART ATTACK Call 911 if you have these symptoms: 
? Chest discomfort.  Most heart attacks involve discomfort in the center of the chest that lasts more than a few minutes, or that goes away and comes back. It can feel like uncomfortable pressure, squeezing, fullness, or pain. ? Discomfort in other areas of the upper body. Symptoms can include pain or discomfort in one or both arms, the back, neck, jaw, or stomach. ? Shortness of breath with or without chest discomfort. ? Other signs may include breaking out in a cold sweat, nausea, or lightheadedness. Don't wait more than five minutes to call [address]! Fast action can save your life. Calling 911 is almost always the fastest way to get lifesaving treatment. Emergency Medical Services staff can begin treatment when they arrive  up to an hour sooner than if someone gets to the hospital by car. The discharge information has been reviewed with the patient. The patient verbalized understanding. Discharge medications reviewed with the patient and appropriate educational materials and side effects teaching were provided. Discharge Orders None Introducing [facility]! Dear Miriam Flower: 
Thank you for requesting a [employer] account. Our records indicate that you already have an active [employer] account. You can access your account anytime at [email or web address] "[email or web address] Did you know that you can access your hospital and ER discharge instructions at any time in [employer]? You can also review all of your test results from your hospital stay or ER visit. Additional Information If you have questions, please visit the Frequently Asked Questions section of the [employer] website at [email or web address] "[email or web address] Remember, [employer] is NOT to be used for urgent needs. For medical emergencies, dial 911. Now available from your iPhone and Android! General Information Please provide this summary of care documentation to your next provider. Patient Signature:  ____________________________________________________________ Date:  ____________________________________________________________  
  
Suzon Mems Provider Signature:  ____________________________________________________________ Date:  ____________________________________________________________

## 2017-10-11 NOTE — IP AVS SNAPSHOT
Summary of Care Report The Summary of Care report has been created to help improve care coordination. Users with access to [employer] or [address] (Web-based application) may access additional patient information including the Discharge Summary. If you are not currently a [address] user and need more information, please call the number listed below in the [place] [address] section and ask to be connected with Medical Records. Facility Information Name Address Phone [address] [place] [address] [address] [place] [id number] [phone] Patient Information Patient Name Sex  Ann-Marie Latham ([id number]) Female 1940 Discharge Information Admitting Provider Service Area Unit Santiago [facility]edillo MD / [id number] [facility] / [phone] Discharge Provider Discharge Date/Time Discharge Disposition Destination (none) 10/14/2017 Morning (Pending) AHR (none) Patient Language Language ENGLISH [13] Hospital Problems as of 10/14/2017  Reviewed: 10/12/2017  5:36 AM by Argentina Crespo,  Class Noted - Resolved Last Modified POA Active Problems Breast cancer ([facility] [address]  10/11/2017 - Present 10/11/2017 by Susann Galeazzi, MD Unknown Entered by Susann Galeazzi, MD  
  * (Principal)S/P bilateral mastectomy  10/12/2017 - Present 10/12/2017 by Argentina Crespo, DO Unknown Entered by Argentina Crespo, DO Non-Hospital Problems as of 10/14/2017  Reviewed: 10/12/2017  5:36 AM by Argentina Crespo, DO Class Noted - Resolved Last Modified Active Problems   HTN (hypertension), benign  Unknown - Present 2013 by Mary Rivas MD  
  Entered by Mary Rivas MD  
  Hypercholesteremia  Unknown - Present 2013 by Mary Rivas MD  
  Entered by Mary Rivas MD  
 [id number](875.7)  Unknown - Present 5/9/2013 by Florencio Pruett MD  
  Entered by Florencio Pruett MD  
  Recurrent major depressive disorder, in remission [facility])  Unknown - Present 9/19/2017 by Florecnio Pruett MD  
  Entered by Florencio Pruett MD  
  Allergic rhinitis  Unknown - Present 12/2/2013 by Florencio Pruett MD  
  Entered by Florencio Pruett MD  
  Chronic midline low back pain without sciatica  8/17/2016 - Present 8/17/2016 by Florencio Pruett MD  
  Entered by Florencio Pruett MD  
  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 [facility])  9/19/2017 - Present 9/19/2017 by Florencio Pruett MD  
  Entered by Florencio Pruett MD  
  
You are allergic to the following Allergen Reactions Sulfa (Sulfonamide Antibiotics) Itching Tetracycline Nausea Only Current Discharge Medication List  
  
CONTINUE these medications which have CHANGED Dose & Instructions Dispensing Information Comments  
 atenolol 25 mg tablet Commonly known as:  TENORMIN What changed:  additional instructions Dose:  25 mg Take 1 Tab by mouth daily. Quantity:  90 Tab Refills:  3 Do not fill until patient requests  
  
 atorvastatin 20 mg tablet Commonly known as:  LIPITOR What changed:  additional instructions Dose:  20 mg Take 1 Tab by mouth daily. Quantity:  90 Tab Refills:  3  
   
 meloxicam 7.5 mg tablet Commonly known as:  MOBIC What changed:   
- when to take this 
- reasons to take this Dose:  7.5-15 mg Take 1-2 Tabs by mouth daily. Quantity:  180 Tab Refills:  3  
   
 sertraline 100 mg tablet Commonly known as:  ZOLOFT What changed:  additional instructions Dose:  100 mg Take 1 Tab by mouth daily. Quantity:  90 Tab Refills:  3 CONTINUE these medications which have NOT CHANGED Dose & Instructions Dispensing Information Comments  
 doxylamine succinate 25 mg tablet Commonly known as:  Aubrey Killings  Dose:  25 mg  
 Take 25 mg by mouth nightly as needed for Sleep. For sleep Refills:  0  
   
 losartan-hydroCHLOROthiazide 50-12.5 mg per tablet Commonly known as:  HYZAAR Dose:  1 Tab Take 1 Tab by mouth daily. Quantity:  90 Tab Refills:  3 Do not fill until patient requests  
  
 meclizine 25 mg tablet Commonly known as:  ANTIVERT Dose:  25 mg Take 1 Tab by mouth every six (6) hours as needed for Dizziness. Quantity:  30 Tab Refills:  1 MUCINEX -30 mg per tablet Generic drug:  guaiFENesin-dextromethorphan SR Dose:  1 Tab Take 1 Tab by mouth every twelve (12) hours as needed for Cough. Refills:  0  
   
 scopolamine 1 mg over 3 days Pt3d Commonly known as:  TRANSDERM-SCOP Dose:  1.5 mg  
1.5 mg by TransDERmal route every seventy-two (72) hours. Refills:  0 STOOL SOFTENER PO Take  by mouth as needed. Refills:  0 SUDAFED 12 HOUR PO Take  by mouth as needed. Refills:  0  
   
 ZANTAC 150 mg tablet Generic drug:  raNITIdine Dose:  150 mg Take 150 mg by mouth as needed for Indigestion. Take / use AM day of surgery  per anesthesia protocols. Refills:  0 ZOFRAN (AS HYDROCHLORIDE) 8 mg tablet Generic drug:  ondansetron hcl Dose:  8 mg Take 8 mg by mouth every eight (8) hours as needed for Nausea. Refills:  0 ZyrTEC 10 mg Cap Generic drug:  Cetirizine Dose:  10 mg Take 10 mg by mouth daily. Take / use AM day of surgery  per anesthesia protocols. Refills:  0 Current Immunizations Name Date Influenza High Dose Vaccine PF 9/7/2017, 10/28/2016, 10/20/2014 Pneumococcal Conjugate (PCV-13) 8/17/2016 Pneumococcal Polysaccharide (PPSV-23) 9/7/2017 Surgery Information ID Date/Time Status Primary Surgeon All Procedures Location  [id number] 10/11/2017 1130 Posted Carmine Crespo, DO BILATERAL BREAST MASTECTOMY SIMPLE ARTOURA ULTRA HIGH PROFILE BREAST IMPLANTS 700cc 13.5 cm 8.3cm [id number] X 3 /  MEDIUM HEIGHT 550cc 13.5 cm 11.7cm 7.4cm [id number] X 3 BILATERAL SENTINEL NODE BIOPSY WITH LYMPHATIC MAPPING 
RIGHT AXILLARY DISSECTION 
BILATERAL BREAST RECONSTRUCTION WITH PLACEMENT TISSUE EXPANDERS AND ALLODERM SFE MAIN OR Follow-up Information Follow up With Details Comments Contact Info Michelle Olguin MD   [address] [place] [address] Tariq Bartholomew [id number] [phone] Discharge Instructions DISCHARGE SUMMARY from Nurse The following personal items are in your possession at time of discharge: 
 
Dental Appliances: Uppers, Lowers Visual Aid: Glasses Jewelry: None PATIENT INSTRUCTIONS: 
 
After general anesthesia or intravenous sedation, for 24 hours or while taking prescription Narcotics: · Limit your activities · Do not drive and operate hazardous machinery · Do not make important personal or business decisions · Do  not drink alcoholic beverages · If you have not urinated within 8 hours after discharge, please contact your surgeon on call. Report the following to your surgeon: 
· Excessive pain, swelling, redness or odor of or around the surgical area · Temperature over 100.5 · Nausea and vomiting lasting longer than 4 hours or if unable to take medications · Any signs of decreased circulation or nerve impairment to extremity: change in color, persistent  numbness, tingling, coldness or increase pain · Any questions What to do at Home: 
Recommended activity: No lifting, Driving, or Strenuous exercise until follow up appt with surgeon If you experience any of the following symptoms  Increased drainage from BRADY drains or thicker, clotty drainage, redness or swelling, in arms or armpit areas, fever greater than 100.5 and unrelieved by tylenol, please follow up with surgeon. *  Please give a list of your current medications to your Primary Care Provider. *  Please update this list whenever your medications are discontinued, doses are 
    changed, or new medications (including over-the-counter products) are added. *  Please carry medication information at all times in case of emergency situations. Axillary Lymph Node Dissection: What to Expect at Home Your Recovery Right after the surgery you will probably feel weak, and your shoulder area will feel sore and stiff for a few days. It may be hard to move your arm and shoulder in all directions. Your doctor or physical therapist will teach you some arm exercises. You now have a higher chance of swelling in the affected arm. This is called lymphedema. From now on, you will have to be careful when using your arm. You will have a scar under your arm that will fade over time. You may also notice a hollow area in your armpit. It may also feel like you have a lump in your armpit. You may lose some feeling under your arm, or the arm may have a tingling or burning feeling. The loss of feeling may last only a little while, or it may last the rest of your life. You will probably be able to go back to work or your normal routine in 3 to 6 weeks. This depends on the type of work you do and any further treatment. If cancer was found in the lymph nodes, you will probably need more treatment. An axillary node dissection may be done at the same time as other breast cancer surgeries. If this is the case, your recovery may be different. When you find out that you have cancer, you may feel many emotions and may need some help coping. Seek out family, friends, and counselors for support. You also can do things at home to make yourself feel better while you go through treatment. Call the [facility] ([phone]) or visit its website at [address] for more information.  
This care sheet gives you a general idea about how long it will take for you to recover. But each person recovers at a different pace. Follow the steps below to get better as quickly as possible. How can you care for yourself at home? Activity · Rest when you feel tired. Getting enough sleep will help you recover. · Try to walk each day. Start by walking a little more than you did the day before. Bit by bit, increase the amount you walk. Walking boosts blood flow and helps prevent pneumonia and constipation. · Avoid strenuous activities, such as biking, jogging, weightlifting, or aerobic exercise, until your doctor says it is okay. This includes housework, especially if you have to use your affected arm. You will probably be able to do your normal activities in 3 to 6 weeks. But for the next 3 to 6 months, be careful when you do tasks that use the same motions over and over, such as vacuuming, weed pulling, and window cleaning. · For 4 to 6 weeks, avoid lifting anything that weighs more than 10 to 15 pounds or that would make you strain. This may include heavy grocery bags and milk containers, a heavy briefcase or backpack, cat litter or dog food bags, a vacuum , or a child. · Ask your doctor when you can drive again. · You will probably be able to go back to work or your normal routine in 3 to 6 weeks. It will also depend on the type of work you do and any further treatment. · You may be able to take showers (unless you have a drain in your incision) 24 to 48 hours after surgery. Pat the cut (incision) dry. Do not take a bath for the first 2 weeks, or until your doctor tells you it is okay. If you have a drain coming out of your incision, follow your doctor's instructions to empty and care for it. · Take precautions to prevent infection and swelling in your arm. This is called lymphedema. ¨ Wear gloves when you garden, handle garbage, wash dishes, and clean house. ¨ Protect your hands and arms from burns, including sunburns. ¨ Do not wear tight sleeves, elastic cuffs, bracelets, wristwatches, or rings on the affected arm. ¨ Do not let anyone take blood pressure, draw blood, or give shots in that arm. ¨ Do not carry heavy purses, suitcases, grocery bags, and other heavy items with that arm. ¨ Keep the skin of that arm well moisturized. ¨ Do not cut your cuticles. ¨ Use an electric shaver if you shave your armpits. ¨ Protect yourself from insect bites on the arm. ¨ Wear medical alert jewelry that says you can develop lymphedema. You can buy this at most [employer] and on the Internet. Diet · You can eat your normal diet. If your stomach is upset, try bland, low-fat foods like plain rice, broiled chicken, toast, and yogurt. · You may notice that your bowel movements are not regular right after your surgery. This is common. Try to avoid constipation and straining with bowel movements. You may want to take a fiber supplement every day. If you have not had a bowel movement after a couple of days, ask your doctor about taking a mild laxative. Medicines · Your doctor will tell you if and when you can restart your medicines. He or she will also give you instructions about taking any new medicines. · If you take blood thinners, such as warfarin (Coumadin), clopidogrel (Plavix), or aspirin, be sure to talk to your doctor. He or she will tell you if and when to start taking those medicines again. Make sure that you understand exactly what your doctor wants you to do. · Be safe with medicines. Take pain medicines exactly as directed. ¨ If the doctor gave you a prescription medicine for pain, take it as prescribed. ¨ If you are not taking a prescription pain medicine, ask your doctor if you can take an over-the-counter medicine. · If your doctor prescribed antibiotics, take them as directed. Do not stop taking them just because you feel better. You need to take the full course of antibiotics. · If you think your pain medicine is making you sick to your stomach: 
¨ Take your medicine after meals (unless your doctor has told you not to). ¨ Ask your doctor for a different pain medicine. Incision care · If you have strips of tape on the cut (incision) the doctor made, leave the tape on for a week or until it falls off. · After 24 to 48 hours, wash the area daily with warm, soapy water and pat it dry. Keep the area clean and dry. · You may cover the area with a gauze bandage if it weeps or rubs against clothing. Change the bandage every day. Exercise · You will need to do arm exercises once your doctor tells you it is okay. Do the range-of-motion exercises as instructed by your doctor. Elevation · Prop up your arm on a pillow anytime you sit or lie down. Try to keep it above the level of your heart. This will help reduce swelling. Other instructions · You may have a drain in your armpit. Follow your doctor's instructions to empty and care for it. Follow-up care is a key part of your treatment and safety. Be sure to make and go to all appointments, and call your doctor if you are having problems. It's also a good idea to know your test results and keep a list of the medicines you take. When should you call for help? Call 911 anytime you think you may need emergency care. For example, call if: 
· You passed out (lost consciousness). · You have severe trouble breathing. · You have sudden chest pain and shortness of breath, or you cough up blood. Call your doctor now or seek immediate medical care if: 
· You have signs of lymphedema. ¨ You have a feeling of tightness or swelling in or around your arm. ¨ You have pain, weakness that keeps getting worse, or a tingling \"pins and needles\" feeling. ¨ Your arm feels full or heavy. ¨ You notice that your hand or wrist is becoming stiff and hard to move. ¨ You notice swelling in your fingers.  
· You have increased swelling in the breast. 
 · You have loose stitches, or your incision comes open. · Bright red blood has soaked through the bandage over your incision. · You have signs of infection, such as: 
¨ Increased pain, swelling, warmth, or redness. ¨ Red streaks leading from the incision. ¨ Pus draining from the incision. ¨ A fever. · Fluid is leaking around the drain, you have a sudden increase in fluid, or you have no new fluid in the drain for 24 hours. Watch closely for changes in your health, and be sure to contact your doctor if: 
· Your rings, watches, or bracelets feel tight, but you have not gained weight. · You do not have a bowel movement after taking a laxative. Where can you learn more? Go to http://jennifer-jag.info/. Enter L189 in the search box to learn more about \"Axillary Lymph Node Dissection: What to Expect at Home. \" Current as of: September 22, 2016 Content Version: 11.3 © 0627-2690 [employer]. Care instructions adapted under license by [employer] (which disclaims liability or warranty for this information). If you have questions about a medical condition or this instruction, always ask your healthcare professional. [place] [address] any warranty or liability for your use of this information. [place] Node Biopsy for Breast Cancer: What to Expect at Home Your Recovery After a sentinel node biopsy, many women have no side effects. Some women have pain or bruising at the cut (incision) and feel tired. Your breast and underarm area may be slightly swollen. This may last a few days. You should feel close to normal in a few days. The incision the doctor made usually heals in about 2 weeks. The scar usually fades with time. Some women have a buildup of fluid in the area where the lymph nodes were removed. This is known as seroma. This goes away on its own, or your doctor can drain it. When you had this test, your doctor injected blue dye or radioactive material (or both) into your breast. The blue dye may give your breast a bluish color and turn your urine green for about 24 hours. The radioactive material leaves the body on its own in 24 to 48 hours. A sentinel node biopsy may be done at the same time as other breast surgeries. If this is the case, how you recover will be different. This care sheet gives you a general idea about how long it will take for you to recover. But each person recovers at a different pace. Follow the steps below to get better as quickly as possible. How can you care for yourself at home? Activity · Rest when you feel tired. Getting enough sleep will help you recover. · Try to walk each day. Start by walking a little more than you did the day before. Bit by bit, increase the amount you walk. Walking boosts blood flow and helps prevent pneumonia and constipation. · You may drive when you are no longer taking pain medicine and you feel up to it. · You can lift things when you feel comfortable doing so. · Most women return to work and their normal routines in 2 to 7 days. · You may shower 24 to 48 hours after surgery, if your doctor okays it. Pat the incision dry. Do not take a bath for the first 2 weeks, or until your doctor tells you it is okay. · Avoid activity or exercise that may put stress on the cut. This includes washing windows, vacuuming, or gardening with the affected arm. Diet · You can eat your normal diet. If your stomach is upset, try bland, low-fat foods like plain rice, broiled chicken, toast, and yogurt. · You may notice that your bowels are not regular right after your surgery. This is common. Try to avoid constipation and straining with bowel movements. Take a fiber supplement such as Citrucel or Metamucil every day. If you have not had a bowel movement after a couple of days, take a mild laxative. Medicines · Your doctor will tell you if and when you can restart your medicines. He or she will also give you instructions about taking any new medicines. · If you take blood thinners, such as warfarin (Coumadin), clopidogrel (Plavix), or aspirin, be sure to talk to your doctor. He or she will tell you if and when to start taking those medicines again. Make sure that you understand exactly what your doctor wants you to do. · Take pain medicines exactly as directed. ¨ If the doctor gave you a prescription medicine for pain, take it as prescribed. ¨ If you are not taking a prescription pain medicine, take an over-the-counter medicine such as acetaminophen (Tylenol), ibuprofen (Advil, Motrin), or naproxen (Aleve). Read and follow all instructions on the label. ¨ Do not take two or more pain medicines at the same time unless the doctor told you to. Many pain medicines have acetaminophen, which is Tylenol. Too much acetaminophen (Tylenol) can be harmful. · If your doctor prescribed antibiotics, take them as directed. Do not stop taking them just because you feel better. You need to take the full course of antibiotics. · If you think your pain medicine is making you sick to your stomach: 
¨ Take your medicine after meals (unless your doctor has told you not to). ¨ Ask your doctor for a different pain medicine. Incision care · If you have strips of tape on the cut (incision) the doctor made, leave the tape on for about 1 week or until it falls off. · After you can shower, wash the area daily with warm, soapy water and pat it dry. Follow-up care is a key part of your treatment and safety. Be sure to make and go to all appointments, and call your doctor if you are having problems. It's also a good idea to know your test results and keep a list of the medicines you take. When should you call for help? Call 911 anytime you think you may need emergency care. For example, call if: 
· You passed out (lost consciousness). · You have severe trouble breathing. · You have sudden chest pain and shortness of breath, or you cough up blood. Call your doctor now or seek immediate medical care if: 
· You have increased swelling in the breast. 
· You have signs of infection, such as: 
¨ Increased pain, swelling, warmth, or redness. ¨ Red streaks leading from the incision. ¨ Pus draining from the incision. ¨ Swollen lymph nodes in your neck, armpits, or groin. ¨ A fever. · You have loose stitches, or your incision comes open. · You need to change your dressing 3 times because of bleeding. Watch closely for changes in your health, and be sure to contact your doctor if: 
· You have signs of lymphedema. ¨ You have a feeling of tightness or swelling in or around your arm. ¨ You have pain, weakness that keeps getting worse, or a tingling \"pins and needles\" feeling. ¨ Your arm feels full or heavy. ¨ You notice that your hand or wrist is becoming stiff and hard to move. ¨ You notice swelling in your fingers. · You do not have a bowel movement after taking a laxative. Where can you learn more? Go to http://jennifer-jag.info/. Enter 960 5721 in the search box to learn more about \"[place] Node Biopsy for Breast Cancer: What to Expect at Home. \" Current as of: August 1, 2016 Content Version: 11.3 © 8720-5955 [employer]. Care instructions adapted under license by [employer] (which disclaims liability or warranty for this information). If you have questions about a medical condition or this instruction, always ask your healthcare professional. [place] [address] any warranty or liability for your use of this information. These are general instructions for a healthy lifestyle: No smoking/ No tobacco products/ Avoid exposure to second hand smoke Surgeon General's Warning:  Quitting smoking now greatly reduces serious risk to your health. Obesity, smoking, and sedentary lifestyle greatly increases your risk for illness A healthy diet, regular physical exercise & weight monitoring are important for maintaining a healthy lifestyle You may be retaining fluid if you have a history of heart failure or if you experience any of the following symptoms:  Weight gain of 3 pounds or more overnight or 5 pounds in a week, increased swelling in our hands or feet or shortness of breath while lying flat in bed. Please call your doctor as soon as you notice any of these symptoms; do not wait until your next office visit. Recognize signs and symptoms of STROKE: 
 
F-face looks uneven A-arms unable to move or move unevenly S-speech slurred or non-existent T-time-call 911 as soon as signs and symptoms begin-DO NOT go Back to bed or wait to see if you get better-TIME IS BRAIN. Warning Signs of HEART ATTACK Call 911 if you have these symptoms: 
? Chest discomfort. Most heart attacks involve discomfort in the center of the chest that lasts more than a few minutes, or that goes away and comes back. It can feel like uncomfortable pressure, squeezing, fullness, or pain. ? Discomfort in other areas of the upper body. Symptoms can include pain or discomfort in one or both arms, the back, neck, jaw, or stomach. ? Shortness of breath with or without chest discomfort. ? Other signs may include breaking out in a cold sweat, nausea, or lightheadedness. Don't wait more than five minutes to call [address]! Fast action can save your life. Calling 911 is almost always the fastest way to get lifesaving treatment. Emergency Medical Services staff can begin treatment when they arrive  up to an hour sooner than if someone gets to the hospital by car. The discharge information has been reviewed with the patient. The patient verbalized understanding.  
 
Discharge medications reviewed with the patient and appropriate educational materials and side effects teaching were provided. Chart Review Routing History No Routing History on File

## 2017-10-11 NOTE — IP AVS SNAPSHOT
[address] 
[phone] Patient: Carlota Christian MRN: [id number] Current Discharge Medication List  
  
CONTINUE these medications which have CHANGED Dose & Instructions Dispensing Information Comments Morning Noon Evening Bedtime  
 atenolol 25 mg tablet Commonly known as:  TENORMIN What changed:  additional instructions Your last dose was:  9 am  
Your next dose is:  Tomorrow am  
   
 Dose:  25 mg Take 1 Tab by mouth daily. Quantity:  90 Tab Refills:  3 Do not fill until patient requests  
    
   
   
   
  
 atorvastatin 20 mg tablet Commonly known as:  LIPITOR What changed:  additional instructions Your last dose was:  9 am  
Your next dose is:  Tomorrow am  
   
 Dose:  20 mg Take 1 Tab by mouth daily. Quantity:  90 Tab Refills:  3  
     
   
   
   
  
 meloxicam 7.5 mg tablet Commonly known as:  MOBIC What changed:   
- when to take this 
- reasons to take this Dose:  7.5-15 mg Take 1-2 Tabs by mouth daily. Quantity:  180 Tab Refills:  3  
     
   
   
   
  
 sertraline 100 mg tablet Commonly known as:  ZOLOFT What changed:  additional instructions Your last dose was:  9 am  
Your next dose is:  Tomorrow 9 am  
   
 Dose:  100 mg Take 1 Tab by mouth daily. Quantity:  90 Tab Refills:  3 CONTINUE these medications which have NOT CHANGED Dose & Instructions Dispensing Information Comments Morning Noon Evening Bedtime  
 doxylamine succinate 25 mg tablet Commonly known as:  Orasuncion Ann Dose:  25 mg Take 25 mg by mouth nightly as needed for Sleep. For sleep Refills:  0  
     
   
   
   
  
 losartan-hydroCHLOROthiazide 50-12.5 mg per tablet Commonly known as:  HYZAAR Your last dose was:  9 am today Your next dose is:  9 am tomorrow Dose:  1 Tab Take 1 Tab by mouth daily. Quantity:  90 Tab Refills:  3 Do not fill until patient requests  
    
   
   
   
  
 meclizine 25 mg tablet Commonly known as:  ANTIVERT Dose:  25 mg Take 1 Tab by mouth every six (6) hours as needed for Dizziness. Quantity:  30 Tab Refills:  1 MUCINEX -30 mg per tablet Generic drug:  guaiFENesin-dextromethorphan SR Dose:  1 Tab Take 1 Tab by mouth every twelve (12) hours as needed for Cough. Refills:  0  
     
   
   
   
  
 scopolamine 1 mg over 3 days Pt3d Commonly known as:  TRANSDERM-SCOP Dose:  1.5 mg  
1.5 mg by TransDERmal route every seventy-two (72) hours. Refills:  0 STOOL SOFTENER PO Take  by mouth as needed. Refills:  0 SUDAFED 12 HOUR PO Take  by mouth as needed. Refills:  0  
     
   
   
   
  
 ZANTAC 150 mg tablet Generic drug:  raNITIdine Dose:  150 mg Take 150 mg by mouth as needed for Indigestion. Take / use AM day of surgery  per anesthesia protocols. Refills:  0 ZOFRAN (AS HYDROCHLORIDE) 8 mg tablet Generic drug:  ondansetron hcl Dose:  8 mg Take 8 mg by mouth every eight (8) hours as needed for Nausea. Refills:  0 ZyrTEC 10 mg Cap Generic drug:  Cetirizine Dose:  10 mg Take 10 mg by mouth daily. Take / use AM day of surgery  per anesthesia protocols. Refills:  0

## 2017-10-11 NOTE — H&P
[facility]   [address] [facility]  Radha Pandyawilliam Javed  Phone [phone]   Fax [phone]        Date of visit: 10/11/17        Primary/Requesting provider: Rowan Pritchard MD          Chief Complaint   Patient presents with    Follow-up       discuss surgical option               Name: Radha Liriano      MRN: [id number]       : 1940       Age: 77 y.o. Sex: female      PCP: Rowan Pritchard MD         CC:         Chief Complaint   Patient presents with    Follow-up       discuss surgical option          HPI:      Radha Liriano is a 77 y.o. female who presents for evaluation of bilateral breast cancer. Patient returns the office today to discuss details of surgery. She has met with Dr. Mattie Salter the plastic surgeon for reconstruction at the time of surgery. She is here to coordinate surgery and to discuss details. In review she has large breast cancers bilaterally and has elected bilateral mastectomy with reconstruction. I have recommended bilateral simple mastectomy with sentinel lymph node biopsy and possible axillary dissection. I discussed the details of the procedure with patient and her daughter today in the office. We will coordinate surgery with Dr. Elgin Davila and schedule for the near future. Please refer to previous progress notes for details of the MRI biopsy results and pathology. .      PMH:          Past Medical History:   Diagnosis Date    Depression      Headache       previously on Topamax    HTN (hypertension), benign      Hypercholesteremia           PSH:           Past Surgical History:   Procedure Laterality Date    HX CATARACT REMOVAL         bilateral    HX COLONOSCOPY   Oct 2010     Diverticula    HX HERNIA REPAIR   2011    HX KNEE REPLACEMENT         left         MEDS:          Current Outpatient Prescriptions   Medication Sig    sertraline (ZOLOFT) 100 mg tablet Take 1 Tab by mouth daily.     atorvastatin (LIPITOR) 20 mg tablet Take 1 Tab by mouth daily.  fexofenadine-pseudoephedrine (ALLEGRA-D 24 HOUR) 180-240 mg per tablet Take 1 Tab by mouth daily.  ranitidine (ZANTAC) 150 mg tablet Take 150 mg by mouth as needed for Indigestion.  cholecalciferol (VITAMIN D3) 1,000 unit cap Take  by mouth daily.  atenolol (TENORMIN) 25 mg tablet Take 1 Tab by mouth daily.  meloxicam (MOBIC) 7.5 mg tablet Take 1-2 Tabs by mouth daily.  losartan-hydrochlorothiazide (HYZAAR) 50-12.5 mg per tablet Take 1 Tab by mouth daily.  meclizine (ANTIVERT) 25 mg tablet Take 1 Tab by mouth every six (6) hours as needed for Dizziness.      No current facility-administered medications for this visit.          ALLERGIES:            Allergies   Allergen Reactions    Sulfa (Sulfonamide Antibiotics) Itching    Tetracycline Nausea Only         SH:             Social History   Substance Use Topics    Smoking status: Former Smoker       Types: Cigarettes       Quit date: 1/1/1992    Smokeless tobacco: Never Used    Alcohol use 0.0 oz/week        0 Standard drinks or equivalent per week          Comment: Occasional         FH:            Family History   Problem Relation Age of Onset    Other Brother         Myodisplasia syndrome     Heart Disease Brother      Hypertension Brother      Hypertension Mother      Diabetes Mother      Cancer Daughter         cervical            Review of Systems   Respiratory: Negative for cough, hemoptysis, sputum production and shortness of breath. Cardiovascular: Negative. Negative for chest pain, palpitations, orthopnea, claudication, leg swelling and PND. Gastrointestinal: Negative for abdominal pain, blood in stool, constipation, diarrhea, heartburn, nausea and vomiting. Neurological: Negative for headaches. Psychiatric/Behavioral: Negative. Negative for depression, hallucinations, substance abuse and suicidal ideas.  The patient is not nervous/anxious.             Physical Exam:           Visit Vitals    BP 124/88    Pulse 66    Ht 5' 3\" (1.6 m)    Wt 184 lb (83.5 kg)    BMI 32.59 kg/m2            Physical Exam  Physical exam is deferred on today's visit. Assessment/Plan:  Aly Mckeon is a 77 y.o. female who has signs and symptoms consistent with bilateral breast cancer. I recommended bilateral simple mastectomy with sentinel lymph node biopsy frozen section and possible axillary dissection. The details of the surgery were discussed and we will coordinate reconstruction with Dr. Sudhakar Lyon for date in the near future.         ICD-10-CM ICD-9-CM     1. 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 C50.011 174. 0       C50.012             I went through the risks of bleeding, infection and anesthesia.      Counseling time:counseling time more than 50% of visit: 30 minutes were utilized in office visit today 50% of the time was used to discuss details of simple mastectomy sentinel lymph node biopsy and axillary dissection.   Once again we reviewed tumor pathology and answered all questions.     Signed: Misael Crespo, DO

## 2017-10-12 PROBLEM — Z90.13 S/P BILATERAL MASTECTOMY: Status: ACTIVE | Noted: 2017-01-01

## 2017-10-12 NOTE — PROGRESS NOTES
Assessment completed and documented. Mild pain. Respirations even and unlabored but coarse. Surgical bra in place. Bowel sounds hypoactive but abdomen is soft and non-tender. Patient is currently resting in bed. Will continue to monitor with hourly rounding.

## 2017-10-12 NOTE — PROGRESS NOTES
Am assessment completed. Pt is alert and oriented x 3, lungs clear breathing a little short when ambulating to bathroom due to Incentive not being used. Voiding without difficulty. Pt is unsteady on feet. Complaining of sore throat and something in throat - using chloraseptic spray. Pt got nauseated when breakfast arrived the vomited medium amount of emesis. Zofran administered IV. Also gave patient flexeril and toradol for anti-inflammatory pain. Relabeled drains and recorded amounts and taught daughter how to empty and measure. Pain pump operational. Bra and dsg intact no break through bleeding. IV in Lt foot fluids infusing well. Continue to monitor. Safety measures in place.

## 2017-10-12 NOTE — PROGRESS NOTES
\"rough morning\"- can't keep solids down. Feels as though it gets stuck in mid esophagus and then regurgitates. Visit Vitals    /58 (BP 1 Location: Right leg, BP Patient Position: At rest)    Pulse 80    Temp 97.8 °F (36.6 °C)    Resp 18    Ht 5' 3\" (1.6 m)    Wt 181 lb (82.1 kg)    SpO2 95%    BMI 32.06 kg/m2      Lungs-clear  CV-reg  Chest- no ischemia of skin flaps. JPs all blood-tinged. Plan-  Try viscous lidocaine  Keep diet to clear/full liquids.   Hopefully can go home tomorrow

## 2017-10-13 NOTE — PROGRESS NOTES
Talked to the hospitalist and informed her of patient's vitals and altered mental status. She ordered for the patient to be put on O2. No further orders. Will continue to monitor patient.

## 2017-10-13 NOTE — PROGRESS NOTES
Assessment completed and documented. Lung sounds coarse and respirations tachy-patient having pain when she breathes due to surgery. Will medicate for pain and reassess. Abdomen soft and non-tender. BS hypoactive. Currently resting in bed with family at bedside. Will continue to monitor with hourly rounding.

## 2017-10-13 NOTE — PROGRESS NOTES
Patient's pain has not improved and respirations are still tachy at 28/min. Patient also has an elevated temp at 100.1 axillary and lung sounds are coarse. Have paged the MD on call.

## 2017-10-13 NOTE — PROGRESS NOTES
Am assessment completed. Pt is alert and oriented and can actually carry on a conversation but then says something totally off the wall that makes no sense. Daughter at bedside. Lungs have scattered crackles and diminished in base. Verbalizes needs well. Daughter is now keeping up with drains and recording. Will give me the totals. Mastectomy Bra in place with dry gauze. Safety measures in place. Continue to monitor.

## 2017-10-13 NOTE — PROGRESS NOTES
Brief hospitalist note    Patient 76F admitted on 10/11 with bilateral mastectomy and reconstruction. Hospitliast consulted after midnight due to tachypnea and confusion. She was found to have some pulm edema and given oral lasix. Delerium thought process of sun-downing vs medication s/e. Today patient is completely oriented and knows the name of her RN today. Seems likely close to baseline although RN reports she still has some periods of confusion. cvs rrr s1 s2 no mrg, lungs bibasilar crackles, ext w/o edema. abd soft/nt/nd. Suspect delirium to improve with time - likely narcotic and anesthesia s/e. No infectious source identified yet and \"low grade temp\" has normalized. Follow cultures. Will repeat dose of lasix for pulm edema.     Will see in AM.

## 2017-10-13 NOTE — CONSULTS
HOSPITALIST HISTORY AND PHYSICAL  NAME:  Mira Singh   Age:  77 y.o.  :   1940   MRN:   [id number]  PCP: Radha Morgan MD  Consulting MD:  Treatment Team: Attending Provider: Kiko Goodman MD; Consulting Provider: Dean Zhang MD; Utilization Review: Meseret Martin RN; Consulting Provider: Clarice Shetty MD    REASON FOR ADMISSION: tachypnea, fever, confusion    HPI:   Patient is a 74yr old female admitted on 10/11/2017- after having bilateral mastectomy with  Reconstruction. Was confused earlier today suspected to be secondary to her pain meds. Persisted tonight with the patient trying to get in and out of bed, having phone conversations in the bed without the phone, wanting to get into bed although she is already in bed. . She was noted to be tachypneic RR in the 30's and to have a low grade fever so hospitalist consulted. CXr pending. Pt daughter at bedside.      Complete ROS done and is as stated in HPI or otherwise negative  Past Medical History:   Diagnosis Date    Adverse effect of anesthesia     after hernia surgery bp dropped low and she went to ICU    Arthritis     hands; wrists; back; right knee    Breast cancer ([address]     Depression     Fibrocystic breast disease     bilateral    GERD (gastroesophageal reflux disease)     Headache(784.0)     previously on Topamax    HTN (hypertension), benign     Hypercholesteremia     Kidney stones     Morbid obesity ([address]       Past Surgical History:   Procedure Laterality Date    HX BREAST RECONSTRUCTION  10/11/2017    HX BREAST RECONSTRUCTION Bilateral 10/11/2017    BILATERAL BREAST RECONSTRUCTION WITH PLACEMENT TISSUE EXPANDERS AND ALLODERM performed by Kiko Goodman MD at [address] [place] [address] HX CATARACT REMOVAL      bilateral    HX COLONOSCOPY  Oct 2010    Diverticula    HX HERNIA REPAIR      umbilical hernia; done at [facility] in [place]    HX KNEE REPLACEMENT      left    HX KNEE REPLACEMENT  2009 total knee replacement (Left)     HX MASTECTOMY Bilateral 10/11/2017    BILATERAL BREAST MASTECTOMY SIMPLE ARTOURA ULTRA HIGH PROFILE BREAST IMPLANTS 700cc 13.5 cm 8.3cm [id number] X 3 /  MEDIUM HEIGHT 550cc 13.5 cm 11.7cm 7.4cm [id number] X 3  performed by Ian Zendejas DO at [facility] HX SKIN BIOPSY  10/03/2017    on nose      Prior to Admission Medications   Prescriptions Last Dose Informant Patient Reported? Taking? Cetirizine (ZYRTEC) 10 mg cap 10/10/2017 at Unknown time  Yes Yes   Sig: Take 10 mg by mouth daily. Take / use AM day of surgery  per anesthesia protocols. DOCUSATE CALCIUM (STOOL SOFTENER PO) 10/10/2017 at Unknown time  Yes Yes   Sig: Take  by mouth as needed. PSEUDOEPHEDRINE HCL (SUDAFED 12 HOUR PO) 9/11/2017 at Unknown time  Yes Yes   Sig: Take  by mouth as needed. atenolol (TENORMIN) 25 mg tablet 10/11/2017 at 0600  No Yes   Sig: Take 1 Tab by mouth daily. Patient taking differently: Take 25 mg by mouth daily. Take / use AM day of surgery  per anesthesia protocols. Indications: hypertension   atorvastatin (LIPITOR) 20 mg tablet 10/11/2017 at Unknown time  No Yes   Sig: Take 1 Tab by mouth daily. Patient taking differently: Take 20 mg by mouth daily. Take / use AM day of surgery  per anesthesia protocols. Indications: hypercholesterolemia, hyperlipidemia   doxylamine succinate (UNISOM) 25 mg tablet 10/10/2017 at Unknown time  Yes Yes   Sig: Take 25 mg by mouth nightly as needed for Sleep. For sleep   guaiFENesin-dextromethorphan SR (MUCINEX DM) 600-30 mg per tablet   Yes Yes   Sig: Take 1 Tab by mouth every twelve (12) hours as needed for Cough. losartan-hydroCHLOROthiazide (HYZAAR) 50-12.5 mg per tablet 10/11/2017 at Unknown time  No Yes   Sig: Take 1 Tab by mouth daily. Patient taking differently: Take 1 Tab by mouth daily.  Indications: hypertension   meclizine (ANTIVERT) 25 mg tablet 9/11/2017 at Unknown time  No Yes   Sig: Take 1 Tab by mouth every six (6) hours as needed for Dizziness. Patient taking differently: Take 25 mg by mouth every six (6) hours as needed for Dizziness. Indications: VERTIGO   meloxicam (MOBIC) 7.5 mg tablet 10/4/2017 at Unknown time  No Yes   Sig: Take 1-2 Tabs by mouth daily. Patient taking differently: Take 7.5-15 mg by mouth as needed. Indications: OSTEOARTHRITIS   ondansetron hcl (ZOFRAN, AS HYDROCHLORIDE,) 8 mg tablet 10/11/2017 at Unknown time  Yes Yes   Sig: Take 8 mg by mouth every eight (8) hours as needed for Nausea. ranitidine (ZANTAC) 150 mg tablet 10/11/2017 at Unknown time  Yes Yes   Sig: Take 150 mg by mouth as needed for Indigestion. Take / use AM day of surgery  per anesthesia protocols. scopolamine (TRANSDERM-SCOP) 1 mg over 3 days pt3d 10/11/2017 at Unknown time  Yes Yes   Si.5 mg by TransDERmal route every seventy-two (72) hours. sertraline (ZOLOFT) 100 mg tablet 10/11/2017 at Unknown time  No Yes   Sig: Take 1 Tab by mouth daily. Patient taking differently: Take 100 mg by mouth daily. Take / use AM day of surgery  per anesthesia protocols.   Indications: ANXIETY WITH DEPRESSION      Facility-Administered Medications: None     Allergies   Allergen Reactions    Sulfa (Sulfonamide Antibiotics) Itching    Tetracycline Nausea Only      Social History   Substance Use Topics    Smoking status: Former Smoker     Types: Cigarettes     Quit date: 1992    Smokeless tobacco: Never Used    Alcohol use 0.0 oz/week     0 Standard drinks or equivalent per week      Comment: Occasional      Family History   Problem Relation Age of Onset    Other Brother      Myodisplasia syndrome     Heart Disease Brother     Hypertension Brother     Hypertension Mother     Diabetes Mother     Cancer Daughter      cervical      Objective:     Visit Vitals    /57 (BP 1 Location: Right leg, BP Patient Position: At rest)  Comment (BP 1 Location): lower right leg    Pulse 86    Temp 100.3 °F (37.9 °C)    Resp (!) 36    Ht 5' 3\" (1.6 m)    Wt 82.1 kg (181 lb)    SpO2 92%    BMI 32.06 kg/m2      Temp (24hrs), Av.2 °F (36.8 °C), Min:96.8 °F (36 °C), Max:100.3 °F (37.9 °C)    Oxygen Therapy  O2 Sat (%): 92 % (10/13/17 0112)  Pulse via Oximetry: 74 beats per minute (10/12/17 0857)  O2 Device: Room air (10/12/17 0857)  O2 Flow Rate (L/min): 0 l/min (10/12/17 0857)  Physical Exam:  General:    Alert, cooperative, no distress, appears stated age. Head:   Normocephalic, without obvious abnormality, atraumatic. Nose:  Nares normal. No drainage or sinus tenderness. Lungs:   Clear to auscultation bilaterally. No Wheezing or Rhonchi. No rales. Mild creps  Heart:   Regular rate and rhythm,  no murmur, rub or gallop. Abdomen:   Soft, non-tender. Not distended. Bowel sounds normal.   Extremities: No cyanosis. No edema. No clubbing  Skin:     Texture, turgor normal. Lesion to rt nose which she says is squamous cell cancer that needs to be removed once current issues are adressed. Not Jaundiced  Neurologic: Alert and oriented x 3, no focal deficits but does get confused and goes off on tangents. Data Review: personally by me   Recent Results (from the past 24 hour(s))   CBC WITH AUTOMATED DIFF    Collection Time: 10/13/17  2:27 AM   Result Value Ref Range    WBC 11.3 (H) 4.3 - 11.1 K/uL    RBC 3.51 (L) 4.05 - 5.25 M/uL    HGB 11.2 (L) 11.7 - 15.4 g/dL    HCT 33.1 (L) 35.8 - 46.3 %    MCV 94.3 79.6 - 97.8 FL    MCH 31.9 26.1 - 32.9 PG    MCHC 33.8 31.4 - 35.0 g/dL    RDW 13.9 11.9 - 14.6 %    PLATELET 104 (L) 051 - 450 K/uL    MPV 10.2 (L) 10.8 - 14.1 FL    DF AUTOMATED      NEUTROPHILS 70 43 - 78 %    LYMPHOCYTES 19 13 - 44 %    MONOCYTES 10 4.0 - 12.0 %    EOSINOPHILS 1 0.5 - 7.8 %    BASOPHILS 0 0.0 - 2.0 %    IMMATURE GRANULOCYTES 0.3 0.0 - 5.0 %    ABS. NEUTROPHILS 7.8 1.7 - 8.2 K/UL    ABS. LYMPHOCYTES 2.2 0.5 - 4.6 K/UL    ABS. MONOCYTES 1.2 0.1 - 1.3 K/UL    ABS. EOSINOPHILS 0.2 0.0 - 0.8 K/UL    ABS.  BASOPHILS 0.0 0.0 - 0.2 K/UL    ABS. IMM. GRANS. 0.0 0.0 - 0.5 K/UL   METABOLIC PANEL, BASIC    Collection Time: 10/13/17  2:27 AM   Result Value Ref Range    Sodium 138 136 - 145 mmol/L    Potassium 3.8 3.5 - 5.1 mmol/L    Chloride 105 98 - 107 mmol/L    CO2 25 21 - 32 mmol/L    Anion gap 8 7 - 16 mmol/L    Glucose 118 (H) 65 - 100 mg/dL    BUN 20 8 - 23 MG/DL    Creatinine 0.81 0.6 - 1.0 MG/DL    GFR est AA >60 >60 ml/min/1.73m2    GFR est non-AA >60 >60 ml/min/1.73m2    Calcium 8.2 (L) 8.3 - 10.4 MG/DL   TROPONIN I    Collection Time: 10/13/17  2:27 AM   Result Value Ref Range    Troponin-I, Qt. <0.04 0.02 - 0.05 NG/ML   CULTURE, BLOOD    Collection Time: 10/13/17  2:27 AM   Result Value Ref Range    Special Requests: LEFT ANTECUBITAL      Culture result: PENDING    CULTURE, BLOOD    Collection Time: 10/13/17  2:28 AM   Result Value Ref Range    Special Requests: LEFT HAND      Culture result: PENDING      Imaging /Procedures /Studies reviewed personally by me  XR Results (most recent):  No results found for this or any previous visit. CT Scan  No results found for this or any previous visit. VAS/US Results (most recent):  No results found for this or any previous visit. Assessment and Plan:      Active Hospital Problems    Diagnosis Date Noted    S/P bilateral mastectomy 10/12/2017    Breast cancer ([facility] 10/11/2017       PLAN  · Concern for Hypoxic respiratory failure and tachypnea- O2 sats have dropped a few percentage points and she is tachypneic- will continue o2 to decrease work of breathing, give lasix, duonebs, cxr- monitor for resolution if she does not improve and no cause found consider cta  · Low grade fever- send fever workup - check labs- none since admit  · Metabolic encephalopathy  - pt can answer appropriately when focused but does have some delirium suspect some sundowning vs infectious component    · Encourage ICS- only using sporadically per daughter  · S/p surgery as per gen surgery and plastics. · Further workup and mgt as her clinical course dictates.    · Will follow    Signed By: Mamadou Perez MD     October 13, 2017

## 2017-10-13 NOTE — PROGRESS NOTES
Respirations still increased at 36 and patient now has a MEWS of 3. Charge nurse and MD notified. MD has ordered a hospitalist consult. Will consult them to evaluate patient.

## 2017-10-13 NOTE — PROGRESS NOTES
Patient with a MEWS 3. Patient resting quietly. RR 34. Primary RN spoke with MD, orders received. Primary RN will continue to monitor.

## 2017-10-13 NOTE — PROGRESS NOTES
Spoke with MD about patient's breathing and temp and he ordered a chest x ray. Will continue to monitor.

## 2017-10-13 NOTE — PHYSICIAN ADVISORY
Letter of Determination: Upgrade from Observation to Inpatient Status    This patient was originally hospitalized as observation status on 10/12/2012 for post procedural complication with hypoxemia. This patient now meets for Inpatient Admission in accordance with CMS regulation Section 43 .3. Specifically, patient's stay is now over Two Midnights and was medically necessary. The patient's stay was medically necessary based on oxygen saturation to 91% on 3 liters of oxygen by nasal canula, with continued decline and progression of tachypnia with respiratory rate to 36 breaths per minute, persistent after initial evaluation to 24 breaths per minute. A consultation was placed to pulmonary medicine for evlauation. Consistent with CMS guidelines, patient meets for inpatient status. It is our recommendation that this patient's hospitalization status should be upgraded from OBSERVATION to INPATIENT status.      The final decision regarding the patient's hospitalization status depends on the attending physician's judgement.     Regine Mark MD, DEEPA,   Physician Abel Tony.

## 2017-10-14 NOTE — PROGRESS NOTES
[address]  Face to Face Encounter    Patients Name: Domingo Kate    YOB: 1940    Ordering Physician: Carmine Crespo    Primary Diagnosis: bilateral masectomy    Date of Face to Face:   10/14/2017                                  Face to Face Encounter findings are related to primary reason for home care:   yes. 1. I certify that the patient needs intermittent care as follows: skilled nursing care:  skilled observation/assessment, patient education  physical therapy: strengthening    2. I certify that this patient is homebound, that is: 1) patient requires the use of a walker device, special transportation, or assistance of another to leave the home; or 2) patient's condition makes leaving the home medically contraindicated; and 3) patient has a normal inability to leave the home and leaving the home requires considerable and taxing effort. Patient may leave the home for infrequent and short duration for medical reasons, and occasional absences for non-medical reasons. Homebound status is due to the following functional limitations: Patient currently under activity restrictions secondary to recent surgical procedure, this hinders their ability to safely leave the home. 3. I certify that this patient is under my care and that I, or a nurse practitioner or [facility], or clinical nurse specialist, or certified nurse midwife, working with me, had a Face-to-Face Encounter that meets the physician Face-to-Face Encounter requirements. The following are the clinical findings from the [address] [place] [address] encounter that support the need for skilled services and is a summary of the encounter: See hospital chart.         See attached progess note      Malik Mcdermott LMSW  10/14/2017      THE FOLLOWING TO BE COMPLETED BY THE COMMUNITY PHYSICIAN:    I concur with the findings described above from the [facility] encounter that this patient is homebound and in need of a skilled service.     Certifying Physician: _____________________________________      Printed Certifying Physician Name: _____________________________________    Date: _________________

## 2017-10-14 NOTE — DISCHARGE INSTRUCTIONS
DISCHARGE SUMMARY from Nurse    The following personal items are in your possession at time of discharge:    Dental Appliances: Uppers, Lowers  Visual Aid: Glasses        Jewelry: None                   PATIENT INSTRUCTIONS:    After general anesthesia or intravenous sedation, for 24 hours or while taking prescription Narcotics:  · Limit your activities  · Do not drive and operate hazardous machinery  · Do not make important personal or business decisions  · Do  not drink alcoholic beverages  · If you have not urinated within 8 hours after discharge, please contact your surgeon on call. Report the following to your surgeon:  · Excessive pain, swelling, redness or odor of or around the surgical area  · Temperature over 100.5  · Nausea and vomiting lasting longer than 4 hours or if unable to take medications  · Any signs of decreased circulation or nerve impairment to extremity: change in color, persistent  numbness, tingling, coldness or increase pain  · Any questions        What to do at Home:  Recommended activity: No lifting, Driving, or Strenuous exercise until follow up appt with surgeon  If you experience any of the following symptoms  Increased drainage from BRADY drains or thicker, clotty drainage, redness or swelling, in arms or armpit areas, fever greater than 100.5 and unrelieved by tylenol, please follow up with surgeon. *  Please give a list of your current medications to your Primary Care Provider. *  Please update this list whenever your medications are discontinued, doses are      changed, or new medications (including over-the-counter products) are added. *  Please carry medication information at all times in case of emergency situations. Axillary Lymph Node Dissection: What to Expect at [address] [place] [address]  Right after the surgery you will probably feel weak, and your shoulder area will feel sore and stiff for a few days.  It may be hard to move your arm and shoulder in all directions. Your doctor or physical therapist will teach you some arm exercises. You now have a higher chance of swelling in the affected arm. This is called lymphedema. From now on, you will have to be careful when using your arm. You will have a scar under your arm that will fade over time. You may also notice a hollow area in your armpit. It may also feel like you have a lump in your armpit. You may lose some feeling under your arm, or the arm may have a tingling or burning feeling. The loss of feeling may last only a little while, or it may last the rest of your life. You will probably be able to go back to work or your normal routine in 3 to 6 weeks. This depends on the type of work you do and any further treatment. If cancer was found in the lymph nodes, you will probably need more treatment. An axillary node dissection may be done at the same time as other breast cancer surgeries. If this is the case, your recovery may be different. When you find out that you have cancer, you may feel many emotions and may need some help coping. Seek out family, friends, and counselors for support. You also can do things at home to make yourself feel better while you go through treatment. Call the [employer] (5-828.725.6947) or visit its website at [employer] for more information. This care sheet gives you a general idea about how long it will take for you to recover. But each person recovers at a different pace. Follow the steps below to get better as quickly as possible. How can you care for yourself at home? Activity  · Rest when you feel tired. Getting enough sleep will help you recover. · Try to walk each day. Start by walking a little more than you did the day before. Bit by bit, increase the amount you walk. Walking boosts blood flow and helps prevent pneumonia and constipation.   · Avoid strenuous activities, such as biking, jogging, weightlifting, or aerobic exercise, until your doctor says it is okay. This includes housework, especially if you have to use your affected arm. You will probably be able to do your normal activities in 3 to 6 weeks. But for the next 3 to 6 months, be careful when you do tasks that use the same motions over and over, such as vacuuming, weed pulling, and window cleaning. · For 4 to 6 weeks, avoid lifting anything that weighs more than 10 to 15 pounds or that would make you strain. This may include heavy grocery bags and milk containers, a heavy briefcase or backpack, cat litter or dog food bags, a vacuum , or a child. · Ask your doctor when you can drive again. · You will probably be able to go back to work or your normal routine in 3 to 6 weeks. It will also depend on the type of work you do and any further treatment. · You may be able to take showers (unless you have a drain in your incision) 24 to 48 hours after surgery. Pat the cut (incision) dry. Do not take a bath for the first 2 weeks, or until your doctor tells you it is okay. If you have a drain coming out of your incision, follow your doctor's instructions to empty and care for it. · Take precautions to prevent infection and swelling in your arm. This is called lymphedema. ¨ Wear gloves when you garden, handle garbage, wash dishes, and clean house. ¨ Protect your hands and arms from burns, including sunburns. ¨ Do not wear tight sleeves, elastic cuffs, bracelets, wristwatches, or rings on the affected arm. ¨ Do not let anyone take blood pressure, draw blood, or give shots in that arm. ¨ Do not carry heavy purses, suitcases, grocery bags, and other heavy items with that arm. ¨ Keep the skin of that arm well moisturized. ¨ Do not cut your cuticles. ¨ Use an electric shaver if you shave your armpits. ¨ Protect yourself from insect bites on the arm. ¨ Wear medical alert jewelry that says you can develop lymphedema. You can buy this at most drugstores and on the Internet.   Diet  · You can eat your normal diet. If your stomach is upset, try bland, low-fat foods like plain rice, broiled chicken, toast, and yogurt. · You may notice that your bowel movements are not regular right after your surgery. This is common. Try to avoid constipation and straining with bowel movements. You may want to take a fiber supplement every day. If you have not had a bowel movement after a couple of days, ask your doctor about taking a mild laxative. Medicines  · Your doctor will tell you if and when you can restart your medicines. He or she will also give you instructions about taking any new medicines. · If you take blood thinners, such as warfarin (Coumadin), clopidogrel (Plavix), or aspirin, be sure to talk to your doctor. He or she will tell you if and when to start taking those medicines again. Make sure that you understand exactly what your doctor wants you to do. · Be safe with medicines. Take pain medicines exactly as directed. ¨ If the doctor gave you a prescription medicine for pain, take it as prescribed. ¨ If you are not taking a prescription pain medicine, ask your doctor if you can take an over-the-counter medicine. · If your doctor prescribed antibiotics, take them as directed. Do not stop taking them just because you feel better. You need to take the full course of antibiotics. · If you think your pain medicine is making you sick to your stomach:  ¨ Take your medicine after meals (unless your doctor has told you not to). ¨ Ask your doctor for a different pain medicine. Incision care  · If you have strips of tape on the cut (incision) the doctor made, leave the tape on for a week or until it falls off. · After 24 to 48 hours, wash the area daily with warm, soapy water and pat it dry. Keep the area clean and dry. · You may cover the area with a gauze bandage if it weeps or rubs against clothing. Change the bandage every day.   Exercise  · You will need to do arm exercises once your doctor tells you it is okay. Do the range-of-motion exercises as instructed by your doctor. Elevation  · Prop up your arm on a pillow anytime you sit or lie down. Try to keep it above the level of your heart. This will help reduce swelling. Other instructions  · You may have a drain in your armpit. Follow your doctor's instructions to empty and care for it. Follow-up care is a key part of your treatment and safety. Be sure to make and go to all appointments, and call your doctor if you are having problems. It's also a good idea to know your test results and keep a list of the medicines you take. When should you call for help? Call 911 anytime you think you may need emergency care. For example, call if:  · You passed out (lost consciousness). · You have severe trouble breathing. · You have sudden chest pain and shortness of breath, or you cough up blood. Call your doctor now or seek immediate medical care if:  · You have signs of lymphedema. ¨ You have a feeling of tightness or swelling in or around your arm. ¨ You have pain, weakness that keeps getting worse, or a tingling \"pins and needles\" feeling. ¨ Your arm feels full or heavy. ¨ You notice that your hand or wrist is becoming stiff and hard to move. ¨ You notice swelling in your fingers. · You have increased swelling in the breast.  · You have loose stitches, or your incision comes open. · Bright red blood has soaked through the bandage over your incision. · You have signs of infection, such as:  ¨ Increased pain, swelling, warmth, or redness. ¨ Red streaks leading from the incision. ¨ Pus draining from the incision. ¨ A fever. · Fluid is leaking around the drain, you have a sudden increase in fluid, or you have no new fluid in the drain for 24 hours. Watch closely for changes in your health, and be sure to contact your doctor if:  · Your rings, watches, or bracelets feel tight, but you have not gained weight.   · You do not have a bowel movement after taking a laxative. Where can you learn more? Go to http://jennifer-jag.info/. Enter L189 in the search box to learn more about \"Axillary Lymph Node Dissection: What to Expect at Home. \"  Current as of: September 22, 2016  Content Version: 11.3  © 4277-4439 [employer]. Care instructions adapted under license by [employer] (which disclaims liability or warranty for this information). If you have questions about a medical condition or this instruction, always ask your healthcare professional. [address] any warranty or liability for your use of this information. [place] Node Biopsy for Breast Cancer: What to Expect at Home  Your Recovery  After a sentinel node biopsy, many women have no side effects. Some women have pain or bruising at the cut (incision) and feel tired. Your breast and underarm area may be slightly swollen. This may last a few days. You should feel close to normal in a few days. The incision the doctor made usually heals in about 2 weeks. The scar usually fades with time. Some women have a buildup of fluid in the area where the lymph nodes were removed. This is known as seroma. This goes away on its own, or your doctor can drain it. When you had this test, your doctor injected blue dye or radioactive material (or both) into your breast. The blue dye may give your breast a bluish color and turn your urine green for about 24 hours. The radioactive material leaves the body on its own in 24 to 48 hours. A sentinel node biopsy may be done at the same time as other breast surgeries. If this is the case, how you recover will be different. This care sheet gives you a general idea about how long it will take for you to recover. But each person recovers at a different pace. Follow the steps below to get better as quickly as possible. How can you care for yourself at home? Activity  · Rest when you feel tired.  Getting enough sleep will help you recover. · Try to walk each day. Start by walking a little more than you did the day before. Bit by bit, increase the amount you walk. Walking boosts blood flow and helps prevent pneumonia and constipation. · You may drive when you are no longer taking pain medicine and you feel up to it. · You can lift things when you feel comfortable doing so. · Most women return to work and their normal routines in 2 to 7 days. · You may shower 24 to 48 hours after surgery, if your doctor okays it. Pat the incision dry. Do not take a bath for the first 2 weeks, or until your doctor tells you it is okay. · Avoid activity or exercise that may put stress on the cut. This includes washing windows, vacuuming, or gardening with the affected arm. Diet  · You can eat your normal diet. If your stomach is upset, try bland, low-fat foods like plain rice, broiled chicken, toast, and yogurt. · You may notice that your bowels are not regular right after your surgery. This is common. Try to avoid constipation and straining with bowel movements. Take a fiber supplement such as Citrucel or Metamucil every day. If you have not had a bowel movement after a couple of days, take a mild laxative. Medicines  · Your doctor will tell you if and when you can restart your medicines. He or she will also give you instructions about taking any new medicines. · If you take blood thinners, such as warfarin (Coumadin), clopidogrel (Plavix), or aspirin, be sure to talk to your doctor. He or she will tell you if and when to start taking those medicines again. Make sure that you understand exactly what your doctor wants you to do. · Take pain medicines exactly as directed. ¨ If the doctor gave you a prescription medicine for pain, take it as prescribed. ¨ If you are not taking a prescription pain medicine, take an over-the-counter medicine such as acetaminophen (Tylenol), ibuprofen (Advil, Motrin), or naproxen (Aleve).  Read and follow all instructions on the label. ¨ Do not take two or more pain medicines at the same time unless the doctor told you to. Many pain medicines have acetaminophen, which is Tylenol. Too much acetaminophen (Tylenol) can be harmful. · If your doctor prescribed antibiotics, take them as directed. Do not stop taking them just because you feel better. You need to take the full course of antibiotics. · If you think your pain medicine is making you sick to your stomach:  ¨ Take your medicine after meals (unless your doctor has told you not to). ¨ Ask your doctor for a different pain medicine. Incision care  · If you have strips of tape on the cut (incision) the doctor made, leave the tape on for about 1 week or until it falls off. · After you can shower, wash the area daily with warm, soapy water and pat it dry. Follow-up care is a key part of your treatment and safety. Be sure to make and go to all appointments, and call your doctor if you are having problems. It's also a good idea to know your test results and keep a list of the medicines you take. When should you call for help? Call 911 anytime you think you may need emergency care. For example, call if:  · You passed out (lost consciousness). · You have severe trouble breathing. · You have sudden chest pain and shortness of breath, or you cough up blood. Call your doctor now or seek immediate medical care if:  · You have increased swelling in the breast.  · You have signs of infection, such as:  ¨ Increased pain, swelling, warmth, or redness. ¨ Red streaks leading from the incision. ¨ Pus draining from the incision. ¨ Swollen lymph nodes in your neck, armpits, or groin. ¨ A fever. · You have loose stitches, or your incision comes open. · You need to change your dressing 3 times because of bleeding. Watch closely for changes in your health, and be sure to contact your doctor if:  · You have signs of lymphedema.   ¨ You have a feeling of tightness or swelling in or around your arm. ¨ You have pain, weakness that keeps getting worse, or a tingling \"pins and needles\" feeling. ¨ Your arm feels full or heavy. ¨ You notice that your hand or wrist is becoming stiff and hard to move. ¨ You notice swelling in your fingers. · You do not have a bowel movement after taking a laxative. Where can you learn more? Go to http://jennifer-jag.info/. Enter 488 9736 in the search box to learn more about \"[place] Node Biopsy for Breast Cancer: What to Expect at Home. \"  Current as of: August 1, 2016  Content Version: 11.3  © 2180-0833 [employer]. Care instructions adapted under license by [employer] (which disclaims liability or warranty for this information). If you have questions about a medical condition or this instruction, always ask your healthcare professional. [place] [address] any warranty or liability for your use of this information. These are general instructions for a healthy lifestyle:    No smoking/ No tobacco products/ Avoid exposure to second hand smoke    Surgeon General's Warning:  Quitting smoking now greatly reduces serious risk to your health. Obesity, smoking, and sedentary lifestyle greatly increases your risk for illness    A healthy diet, regular physical exercise & weight monitoring are important for maintaining a healthy lifestyle    You may be retaining fluid if you have a history of heart failure or if you experience any of the following symptoms:  Weight gain of 3 pounds or more overnight or 5 pounds in a week, increased swelling in our hands or feet or shortness of breath while lying flat in bed. Please call your doctor as soon as you notice any of these symptoms; do not wait until your next office visit.     Recognize signs and symptoms of STROKE:    F-face looks uneven    A-arms unable to move or move unevenly    S-speech slurred or non-existent    T-time-call 911 as soon as signs and symptoms begin-DO NOT go       Back to bed or wait to see if you get better-TIME IS BRAIN. Warning Signs of HEART ATTACK     Call 911 if you have these symptoms:   Chest discomfort. Most heart attacks involve discomfort in the center of the chest that lasts more than a few minutes, or that goes away and comes back. It can feel like uncomfortable pressure, squeezing, fullness, or pain.  Discomfort in other areas of the upper body. Symptoms can include pain or discomfort in one or both arms, the back, neck, jaw, or stomach.  Shortness of breath with or without chest discomfort.  Other signs may include breaking out in a cold sweat, nausea, or lightheadedness. Don't wait more than five minutes to call 911 - MINUTES MATTER! Fast action can save your life. Calling 911 is almost always the fastest way to get lifesaving treatment. Emergency Medical Services staff can begin treatment when they arrive -- up to an hour sooner than if someone gets to the hospital by car. The discharge information has been reviewed with the patient. The patient verbalized understanding. Discharge medications reviewed with the patient and appropriate educational materials and side effects teaching were provided.

## 2017-10-14 NOTE — PROGRESS NOTES
Pt assessment completed. BRADY drains emptied with minimal drainage. No reports of pain at this time. Will be discharged today per MD orders.

## 2017-10-14 NOTE — PROGRESS NOTES
General Surgery Progress Note    10/14/2017    Admit Date: 10/11/2017    Subjective:     Surgery On Call (for Dr. Khushbu Melo)  No further confusion overnight after 2 days of no benzodiazepines or narcotics. The patient's daughter feels she can go home today. Objective:     Visit Vitals    /89 (BP 1 Location: Right leg, BP Patient Position: At rest)    Pulse 80    Temp 99.6 °F (37.6 °C)    Resp 20    Ht 5' 3\" (1.6 m)    Wt 181 lb (82.1 kg)    SpO2 94%    BMI 32.06 kg/m2         Intake/Output Summary (Last 24 hours) at 10/14/17 0751  Last data filed at 10/14/17 0442   Gross per 24 hour   Intake                0 ml   Output              495 ml   Net             -495 ml        EXAM:  Chest: Dressings dry without strikethrough. BRADY drains with serous drainage. Data Review    Recent Results (from the past 24 hour(s))   METABOLIC PANEL, BASIC    Collection Time: 10/14/17  6:15 AM   Result Value Ref Range    Sodium 141 136 - 145 mmol/L    Potassium 3.0 (L) 3.5 - 5.1 mmol/L    Chloride 104 98 - 107 mmol/L    CO2 28 21 - 32 mmol/L    Anion gap 9 7 - 16 mmol/L    Glucose 141 (H) 65 - 100 mg/dL    BUN 21 8 - 23 MG/DL    Creatinine 1.07 (H) 0.6 - 1.0 MG/DL    GFR est AA >60 >60 ml/min/1.73m2    GFR est non-AA 53 (L) >60 ml/min/1.73m2    Calcium 8.7 8.3 - 10.4 MG/DL   CBC W/O DIFF    Collection Time: 10/14/17  6:15 AM   Result Value Ref Range    WBC 11.1 4.3 - 11.1 K/uL    RBC 3.71 (L) 4.05 - 5.25 M/uL    HGB 11.8 11.7 - 15.4 g/dL    HCT 34.7 (L) 35.8 - 46.3 %    MCV 93.5 79.6 - 97.8 FL    MCH 31.8 26.1 - 32.9 PG    MCHC 34.0 31.4 - 35.0 g/dL    RDW 13.8 11.9 - 14.6 %    PLATELET 788 (L) 030 - 450 K/uL    MPV 10.0 (L) 10.8 - 14.1 [facility] Problems  Date Reviewed: 10/12/2017          Codes Class Noted POA    * (Principal)S/P bilateral mastectomy ICD-10-CM: Z90.13  ICD-9-CM: V45.71  10/12/2017 Unknown        Breast cancer ([facility] 76[facility] ICD-10-CM: C50.919  ICD-9-CM: 174.9  10/11/2017 Unknown          1. Discharge to home  2. Set up follow up appointments with Dr. José Miguel Zhu and Dr. Sudhakar Lyon on 10/16/17  3. Tylenol/ibuprofen for pain. No prescriptions written.    MD Jake

## 2017-10-14 NOTE — PROGRESS NOTES
Shift assessment complete. Pt resting quietly in bed. No signs of acute distress. Resp even and unlabored. Family at bedside. 4 BRADY drains draining and charged. Pt ambulated to bathroom with minimal assistance, tolerated well. Assisted pt back to bed. Call light within reach.

## 2017-10-14 NOTE — PROGRESS NOTES
Progress Note    Patient: Radha Liriano MRN: [id number]  SSN: [id number]    YOB: 1940  Age: 77 y.o. Sex: female      Admit Date: 10/11/2017    LOS: 1 day     Subjective:   Patient 76F admitted on 10/11 with bilateral mastectomy and reconstruction. Hospitliast consulted after midnight due to tachypnea and confusion. She was found to have some pulm edema and given oral lasix. Delerium thought process of sun-downing vs medication s/e. This morning she is alert and oriented, daughter at bedside. She had no shortness of breath. She was wearing 2 L NC, I have asked her to take that off. All other ROS negative  Objective:     Vitals:    10/13/17 2147 10/14/17 0028 10/14/17 0405 10/14/17 0719   BP:  126/67 157/89 118/52   Pulse:  80 80 70   Resp:  25 20 18   Temp:  99.5 °F (37.5 °C) 99.6 °F (37.6 °C) 98.5 °F (36.9 °C)   SpO2: 95% 92% 94% 98%   Weight:       Height:            Intake and Output:  Current Shift: 10/14 0701 - 10/14 1900  In: -   Out: 23 [Drains:23]  Last three shifts: 10/12 1901 - 10/14 0700  In: 308 [I.V.:308]  Out: 1800 [Urine:860; Drains:940]    Physical Exam:   General appearance: NAD, conversant  Neck: Trachea midline   Lungs: CTA, with normal respiratory effort and no intercostal retractions  CV: S1,S2 present, no added murmurs  Abdomen: Soft, non-tender; no masses   Extremities: No peripheral edema or extremity lymphadenopathy  Skin: Normal temperature, turgor and texture; no subcutaneous nodules  Psych: Appropriate affect, alert and oriented to person, place and time    Lab/Data Review: All lab results for the last 24 hours reviewed.      Assessment:     Principal Problem:    S/P bilateral mastectomy (10/12/2017)    Active Problems:    Breast cancer ([address] (10/11/2017)        Plan:     #Delirium  -likely from medications, narcotics and benzodiazepines  -Dr. Phelps Said has told ehr to take only Tylenol and NSAID, patient would really like to take her Meloxicam when she goes home, I advised that if she did she not to take any ibuprofen.    -Will follow up with surgery   -stable for DC, seems like she will go home today    Signed By: Mee Elizabeth MD     October 14, 2017

## 2017-10-14 NOTE — PROGRESS NOTES
Asked to start a PIV. Using U/S assistance, placed a 20g jelco in patients left forarm. Primary nurse informed.   Kisha Parra RN, VAT

## 2017-10-14 NOTE — PROGRESS NOTES
Found one line from ONQ pump on floor. Other line still attached. Dr. Aisha Bliss notified, orders received to clamp the line that has detached.

## 2017-10-16 NOTE — OP NOTES
[address]   OPERATIVE REPORT       Name:  Bill Bunch   MR#:  [id number]   :  1940   Account #:  [de-identified]   Date of Adm:  10/11/2017       DATE OF SURGERY: 10/11/2017. PREPROCEDURE DIAGNOSIS: Carcinoma of the breast.    POSTPROCEDURE DIAGNOSIS: Carcinoma of the breast.    NAME OF PROCEDURE: Bilateral immediate breast reconstruction   with tissue expanders (SimpleRelevance Artoura ultrahigh profile   expander, catalog #[id number] 700 mL expander, left serial   number [id number], right serial number [id number])    SURGEON: Chante Carter III, MD.    ASSISTANT: ANDREW Anthony. ANESTHESIA: General.    ESTIMATED BLOOD LOSS: For my portion of the procedure, 20 mL. FLUIDS AT OPERATION: 3000 mL of crystalloid. CULTURES: None. DRAINS: Two #10 flat BRADY drains were placed in each breast.    COMPLICATIONS: None. CONDITION AT CLOSE OF PROCEDURE: Stable. INDICATIONS: The patient was diagnosed with carcinoma of the   breast and is to undergo bilateral mastectomies and desired   immediate reconstruction. After reviewing the options available,   she has elected to proceed with tissue expanders. The risks and   benefits as well as alternatives were reviewed. She states she   understands the risks to include, but not be limited to   bleeding, infection, scar, asymmetry, poor cosmetic result, loss   of part or all of the mastectomy flaps, implant malposition,   deflation, migration, extrusion or encapsulation resulting in a   firm, painful or distorted breast, hematoma or seroma formation,   deep venous thrombosis, pulmonary emboli, the need for   transfusion, the need for hospitalization and the remote risk of   death. Understanding these issues, she wishes to proceed. PROCEDURE: The patient had been met at the office prior to   surgery where the midline was marked and the inframammary folds   were marked bilaterally.  A transverse elliptical incision was   designed to encompass the nipple areolar complex bilaterally and   the ulcerated skin lesion on the right breast just above the   nipple areolar complex with roughly 2-3 mm margins of normal   tissue. Please see Dr. Sher Mane notes for full details regarding   the position, prep, drape, mastectomy and sentinel node biopsy   and subsequent axillary dissections. Upon completion of that   portion of the procedure, the pectoralis major muscle was   disinserted bilaterally at the level of the fold. Dissection was   then carried out deep to the muscle to the extent of the   previously marked breast pocket. A 6 x 16 cm sheet of thick   AlloDerm, lot number [id number] was rinsed in saline and   vancomycin solution and then divided lengthwise to obtain two 3   x 16 cm sheets. These were pie crusted with a 15 blade and   properly oriented and then sutured to the inframammary fold   utilizing 3-0 PDS suture. A 2 mL per hour pain pump catheter was   introduced on each side deep to the muscle and secured at the   skin with 5-0 nylon. The pocket was inspected for hemostasis and   then irrigated with vancomycin solution. The skin was reprepped   with Betadine. The operative team donned new surgical gloves. The implants were opened, rinsed with vancomycin solution. The   air was evacuated. The implants were placed deep to the muscle,   taking care to seat the base of the expander at the level of the   inframammary fold. The suture tabs were fixated to the chest   wall utilizing 3-0 PDS suture. The cephalic end of the AlloDerm   was then sutured to the cut caudal end of the pectoralis major   muscle for complete implant coverage. The right breast expander   was filled to 300 mL, which created mild tension on the closure    more skin had been removed on this side due to the ulcerated   lesion.  On the left side, a total of 420 mL was placed in the   expander and again creating mild tension on the skin flaps and   two 10 flat BRADY drains were introduced on each side, one in the   axilla and one along the inframammary fold. These were secured   to the skin with 3-0 nylon. The patient was then closed with 3-0   Vicryl in the deep dermis and a 4-0 Monocryl subcuticular   suture. Dressings were applied. The pain pump catheters were   primed. The bulbs were placed to suction. The patient was   awakened, extubated, and transferred to the recovery room in   stable condition.         Nereyda Herrmann MD      [facility] / [facility], [facility].   D:  10/15/2017   17:37   T:  10/16/2017   04:44   Job #:  [id number]

## 2017-10-19 NOTE — DISCHARGE SUMMARY
Discharge Note    [facility]  Admission date:  10/11/2017  Discharge date:  10/14/2017    Admitting Diagnosis:  Cancer of right nipple ([facility] [address] Chris Tam; Cancer of left nippl*    Discharge Diagnoses:    Hospital Problems  Date Reviewed: 10/12/2017          Codes Class Noted POA    * (Principal)S/P bilateral mastectomy ICD-10-CM: Z90.13  ICD-9-CM: V45.71  10/12/2017 Unknown        Breast cancer ([facility] [address] ICD-10-CM: C50.919  ICD-9-CM: 174.9  10/11/2017 Unknown              Consultations: Plastic surgery    Procedures/studies:    Procedure(s):  BILATERAL BREAST MASTECTOMY SIMPLE ARTOURA ULTRA HIGH PROFILE BREAST IMPLANTS 700cc 13.5 cm 8.3cm [id number] X 3 /  MEDIUM HEIGHT 550cc 13.5 cm 11.7cm 7.4cm [id number] X 3   BILATERAL SENTINEL NODE BIOPSY WITH LYMPHATIC MAPPING  RIGHT AXILLARY DISSECTION  BILATERAL BREAST RECONSTRUCTION WITH PLACEMENT TISSUE EXPANDERS AND ALLODERM              Hospital Course: Patient was admitted after bilateral mastectomy with Right axillary dissection for post op care. Post operatively the patient had some bouts of confusion. She otherwise remained stable. Her pain was eventually controlled without narcotics and she was discharged home with expectant follow-up. Physical Exam:   GEN: well developed and in no acute distress   HEENT: PERRL, EOMI, no alar flaring or epistaxis, oral mucosa moist without cyanosis,   NECK: no JVD, no retractions, no thyromegaly or masses,   LUNGS: resp even/unlab on room air   HEART: RRR with no M,G,R;  ABDOMEN: soft with no tenderness, no peritoneal signs, rebound, or guarding  EXTREMITIES: warm with no cyanosis, no upper or lower extremity edema  SKIN: no jaundice; rashes, sores, or ecchymoses  NEURO:  Awake, alert, oriented x3, no distress, nonfocal exam.    No results for input(s): WBC, HGB, HCT, PLT, HGBEXT, HCTEXT, PLTEXT in the last 72 hours. No results for input(s): NA, K, CL, CO2, BUN, CREA, MG, PHOS, TROIQ, INR, BNPP in the last 72 hours.     No lab exists for component: TROIP, INREXT    Discharge Medications: Cannot display discharge medications since this patient is not currently admitted. Diet: Regular diet  Activity: No lifting or straining  Follow-up: With Dr. Lew Webster in 2 weeks.     Polo Crespo, DO

## 2017-10-20 NOTE — ADT AUTH CERT NOTES
Mastectomy, Complete, with Insertion of Breast Prosthesis or Tissue Expander - Care Day 1 (10/13/2017) by Pamela Verdin RN        Review Status Review Entered       Completed 10/19/2017       Details              Care Day: 1 Care Date: 10/13/2017 Level of Care:        Guideline Day 1        Level Of Care       (X) Early AM OR to floor              Clinical Status       (X) * Clinical Indications met [H]       10/19/2017 12:50 PM EDT by Justina Willis         Breast Cancer                     Routes       (X) IV fluids, medications       10/19/2017 12:50 PM EDT by Justina Willis         Tylenol 650mg po prn x 1, Duoneb q 8hrs, Tenormin 25mg po q day, Lipitor 20mg po daily, Hyzaar 50/12.5mg po daily, Zoloft 100mg po daily, Lasix 20mg IM x 1                                          * Milestone              Additional Notes       Patient is a 74yr old female admitted on 10/11/2017- after having bilateral mastectomy with  Reconstruction. Was confused earlier today suspected to be secondary to her pain meds. Persisted tonight with the patient trying to get in and out of bed, having phone conversations in the bed without the phone, wanting to get into bed although she is already in bed. . She was noted to be tachypneic RR in the 30's and to have a low grade fever.            Alert and oriented x 3, no focal deficits but does get confused and goes off on tangents.       o /57        o Pulse 86       o Temp 100.3 °F (37.9 °C)       o Resp (!) 36       o Ht 5' 3\" (1.6 m)       o Wt 82.1 kg (181 lb)       o SpO2 92%2L/NC to RA              Assessment and Plan:                      Active Hospital Problems         Diagnosis Date Noted       o S/P bilateral mastectomy 10/12/2017       o Breast cancer ([address] 10/11/2017                       PLAN       \" Concern for Hypoxic respiratory failure and tachypnea- O2 sats have dropped a few percentage points and she is tachypneic- will continue o2 to decrease work of breathing, give lasix, duonebs, cxr- monitor for resolution if she does not improve and no cause found consider cta       \" Low grade fever- send fever workup - check labs- none since admit       \" Metabolic encephalopathy  - pt can answer appropriately when focused but does have some delirium suspect some sundowning vs infectious component         \" Encourage ICS- only using sporadically per daughter       \" S/p surgery as per gen surgery and plastics.        \" Further workup and mgt as her clinical course dictates.        Labs:  WBC 11.3, RBC 3.51, H/H 11.2/33.1, Glu 118, blood cx pending       CXR: Diffuse interstitial prominence, suggesting a component of interstitial edema.           Mastectomy, Complete, with Insertion of Breast Prosthesis or Tissue Expander - Clinical Indications for Procedure by Tanvir Patel RN        Review Status Review Entered       Completed 10/19/2017       Details              Clinical Indications for Procedure       Most Recent : Francois Willard Most Recent Date: 10/19/2017 12:47 PM EDT       (X) Procedure is indicated for 1 or more of the following  (1) (2) (3) (4) (5) (6):          (X) Subcutaneous or total (simple) mastectomy needed for 1 or more of the following :             (X) Ductal carcinoma in situ not appropriate for partial mastectomy             10/19/2017 12:47 PM EDT by Francois Willard               Preoperative Diagnosis: Cancer of right nipple ([address] [C50.011] Cancer of left nipple ([address] [C50.012] Postoperative Diagnosis: bilateral breast cancer [C50.011] Cancer of left nipple ([address] [C50.012]

## 2017-11-08 NOTE — PROGRESS NOTES
11/8/17  Saw patient for a new patient consult with Dr. Holden Connolly and Cathy Bernstein RN. She was accompanied by her daughter. She was referred by Dr. Carolyn Johnson for bilateral infiltrating ductal carcinoma. She is s/p bilateral mastectomies with right axillary lymph node dissection; left sentinel node biopsy and bilateral expanders. Dr. Trini Jameson is her plastic surgeon. Drains are still in place. Dr. Holden Connolly explained pathology, risk of recurrence, treatment options and risk reduction, and potential side effects. She is reluctant to consider chemotherapy or radiation; her daughter recently passed away from cervical cancer at age 52 despite both treatments. Dr. Holden Connolly recommends a PET scan and patient is receptive. She will return for follow-up/PET results and further discuss plan of care. Role of navigation was explained and contact information printed for patient. She was advised to contact us with any concerns or questions. Navigation will follow.

## 2017-11-28 NOTE — PROGRESS NOTES
11/28/17  Spoke with patient by phone. She asked that we cancel her PET and Radiation Oncology consult for this week. Her drains have not been discontinued. She sees Dr. Anthony Flores this Friday and will follow-up with her after that to check the status of the drains. Advised her that we are close to her 2 month post op window for treatment and she verbalizes understanding. Dr. Darin Arana aware.

## 2017-12-29 NOTE — PROGRESS NOTES
12/29/17 saw pt today with Dr. Jenny Larsen for follow up breast cancer. PET showed no evidence of cancer. She was seen by Dr. sIa Keating to discuss radiation. Recommendation is to proceed with chemo and then radiation. Regimen would be TC. Potential side effects discussed. Pt is agreeable with the plan. Chemo education will be arranged. Provided opportunity to ask questions and all were discussed. Plan is to start chemo in 1 week. Encouraged to call with any concerns. Navigation will continue to follow.

## 2017-12-29 NOTE — CONSULTS
Patient: Sierra Carrel MRN: [id number]  SSN: [id number]    YOB: 1940  Age: 69 y.o. Sex: female      Other Providers:  Sonya Pascual MD, Dorian Segundo MD    CHIEF COMPLAINT: Breast cancer    DIAGNOSIS: Bilateral breast cancer  Right breast cT4N0, [id number], Stage IIIB, high grade IDC, ER negative, IA 15% positive, HER2 negative. Skin invasion with 1/15 lymph nodes  Left breast cT2, [id number], Stage IIB, grade 2 IDC, ER1%, IA/HER2 negative. 5.5 cm primary    PREVIOUS TREATMENT:  1) 8/10/17:  Bilateral mastectomy with bilateral sentinel node sampling and delayed immediate reconstruction with expander placement. 2) Completion right axillary dissection     HISTORY OF PRESENT ILLNESS:  Sierra Carrel is a 69 y.o. female who I am seeing at the request of Dr. Ortiz Banda. She originally noted a mass in her right breast in early 2017 which she neglected for a short time, then presented to her PCP in July 2017, at which time it was weeping through the skin and causing nipple inversion. There was also a palpable lump in the left UOQ as well. Mammogram and ultrasound 8/1/17 showed a 2.2 cm right retroareolar mass, and a 2.2 cm mass corresponding to the left palpable lump associated with extensive adjacent microcalcifications suspicious for invasive carcinoma with DCIS. She underwent ultrasound-guided core biopsy of bilateral lesions on 8/7/17. Pathology from the left breast revealed grade 2 IDC, ER1%, IA/HER2 negative. Pathology from the right breast revealed high grade IDC, ER negative, IA 15% positive, HER2 negative. MRI showed more extensive enhancement on the right measuring approximately 6cm and extending to the skin, and enhancement occupying the majority of the left breast concerning for extensive tumor. She was seen by general surgery and she ultimately elected to proceed with bilateral mastectomy with reconstruction.   On 8/10/2017, she underwent right modified radical mastectomy, right sentinel lymph node biopsy with frozen section, left simple mastectomy, left sentinel lymph node biopsy with frozen section, and bilateral breast reconstruction by Dr. Shubham Buckley. Subsequent pathology from the right breast and axillary contents revealed infiltrating duct carcinoma, high grade measuring approximately 2.5 cm in greatest dimension but with skin involvement. [place] node was positive, so completion axillary dissection was undertaken with additional fifteen lymph nodes negative for metastatic carcinoma (1/16 total). Pathology from the left breast revealed infiltrating duct carcinoma, intermediate grade measuring 5.5 cm in greatest dimension with associated ductal carcinoma in situ, both sentinel nodes in the left axilla were negative for metastatic carcinoma. She was referred to [facility] for medical oncology evaluation and discussion of treatment options for her newly diagnosed bilateral breast cancer and met with Dr. Zane Bueno 11/8/17. He discussed the importance of adjuvant chemotherapy based on numerous factors, bilateral disease, tumor size, lymph node involvement, and triple negative histology. He discussed the use of Taxotere plus Cytoxan ×4 cycles. According to the record, she was hesitant to consider the therapy based on risk of side effects. He also wished to have PET CT staging which was dated 12/19/2017 and showed no evidence of recurrent tumor or metastatic disease. She had her expanders expanded but was not clear if there were any remaining.         PAST MEDICAL HISTORY:    Past Medical History:   Diagnosis Date    Adverse effect of anesthesia     after hernia surgery bp dropped low and she went to ICU    Arthritis     hands; wrists; back; right knee    Breast cancer ([address]     Depression     Fibrocystic breast disease     bilateral    GERD (gastroesophageal reflux disease)     Headache(784.0)     previously on Topamax    HTN (hypertension), benign     Hypercholesteremia     Kidney stones        The patient denies history of collagen vascular diseases, pacemaker insertion, prior radiation or prior chemotherapy. PAST SURGICAL HISTORY:   Past Surgical History:   Procedure Laterality Date    HX BREAST RECONSTRUCTION  10/11/2017    HX BREAST RECONSTRUCTION Bilateral 10/11/2017    BILATERAL BREAST RECONSTRUCTION WITH PLACEMENT TISSUE EXPANDERS AND ALLODERM performed by Zuleima Mclaughlin MD at [address] HX CATARACT REMOVAL      bilateral    HX COLONOSCOPY  Oct 2010    Diverticula    HX HERNIA REPAIR  89/3492    umbilical hernia; done at [address] in [place]    HX KNEE REPLACEMENT      left    HX KNEE REPLACEMENT  02/19/2009    total knee replacement (Left)     HX MASTECTOMY Bilateral 10/11/2017    BILATERAL BREAST MASTECTOMY SIMPLE ARTOURA ULTRA HIGH PROFILE BREAST IMPLANTS 700cc 13.5 cm 8.3cm [id number] X 3 /  MEDIUM HEIGHT 550cc 13.5 cm 11.7cm 7.4cm [id number] X 3  performed by Jose M Lemus DO at [address] HX SKIN BIOPSY  10/03/2017    on nose       MEDICATIONS:     Current Outpatient Prescriptions:     albuterol (PROVENTIL HFA, VENTOLIN HFA, PROAIR HFA) 90 mcg/actuation inhaler, Take 1-2 Puffs by inhalation every four (4) hours as needed for Wheezing or Shortness of Breath., Disp: 1 Inhaler, Rfl: 0    predniSONE (DELTASONE) 20 mg tablet, Take 2 daily for 3 days, then 1 daily for 2 days, Disp: 8 Tab, Rfl: 0    metoprolol succinate (TOPROL-XL) 50 mg XL tablet, Take 1 Tab by mouth daily. , Disp: 90 Tab, Rfl: 3    acetaminophen (TYLENOL) 325 mg tablet, Take 325 mg by mouth every four (4) hours as needed for Pain., Disp: , Rfl:     oxyCODONE IR (ROXICODONE) 5 mg immediate release tablet, Take 5 mg by mouth every six (6) hours as needed for Pain., Disp: , Rfl:     ondansetron hcl (ZOFRAN, AS HYDROCHLORIDE,) 8 mg tablet, Take 8 mg by mouth every eight (8) hours as needed for Nausea., Disp: , Rfl:     guaiFENesin-dextromethorphan SR (MUCINEX DM) 600-30 mg per tablet, Take 1 Tab by mouth every twelve (12) hours as needed for Cough. , Disp: , Rfl:     DOCUSATE CALCIUM (STOOL SOFTENER PO), Take  by mouth as needed. , Disp: , Rfl:     meloxicam (MOBIC) 7.5 mg tablet, Take 1-2 Tabs by mouth daily. (Patient taking differently: Take 7.5-15 mg by mouth as needed. Indications: OSTEOARTHRITIS), Disp: 180 Tab, Rfl: 3    losartan-hydroCHLOROthiazide (HYZAAR) 50-12.5 mg per tablet, Take 1 Tab by mouth daily. (Patient taking differently: Take 1 Tab by mouth daily. Indications: hypertension), Disp: 90 Tab, Rfl: 3    Cetirizine (ZYRTEC) 10 mg cap, Take 10 mg by mouth daily. Take / use AM day of surgery  per anesthesia protocols. , Disp: , Rfl:     PSEUDOEPHEDRINE HCL (SUDAFED 12 HOUR PO), Take  by mouth as needed. , Disp: , Rfl:     sertraline (ZOLOFT) 100 mg tablet, Take 1 Tab by mouth daily. (Patient taking differently: Take 100 mg by mouth daily. Take / use AM day of surgery  per anesthesia protocols. Indications: ANXIETY WITH DEPRESSION), Disp: 90 Tab, Rfl: 3    atorvastatin (LIPITOR) 20 mg tablet, Take 1 Tab by mouth daily. (Patient taking differently: Take 20 mg by mouth daily. Take / use AM day of surgery  per anesthesia protocols. Indications: hypercholesterolemia, hyperlipidemia), Disp: 90 Tab, Rfl: 3    ranitidine (ZANTAC) 150 mg tablet, Take 150 mg by mouth as needed for Indigestion. Take / use AM day of surgery  per anesthesia protocols. , Disp: , Rfl:     meclizine (ANTIVERT) 25 mg tablet, Take 1 Tab by mouth every six (6) hours as needed for Dizziness. (Patient taking differently: Take 25 mg by mouth every six (6) hours as needed for Dizziness.  Indications: VERTIGO), Disp: 30 Tab, Rfl: 1    ALLERGIES:   Allergies   Allergen Reactions    Dilaudid [Hydromorphone] Other (comments)     \"out of her mind\"    Sulfa (Sulfonamide Antibiotics) Itching    Tetracycline Nausea Only       SOCIAL HISTORY:   Social History     Social History    Marital status:      Spouse name: N/A    Number of children: N/A    Years of education: N/A     Occupational History     Retired     [employer]     Social History Main Topics    Smoking status: Former Smoker     Types: Cigarettes     Quit date: 1/1/1992    Smokeless tobacco: Never Used    Alcohol use 0.0 oz/week     0 Standard drinks or equivalent per week      Comment: Occasional    Drug use: No    Sexual activity: Not Currently     Other Topics Concern    Not on file     Social History Narrative     June 2012       FAMILY HISTORY:   Family History   Problem Relation Age of Onset    Other Brother      Myodisplasia syndrome     Heart Disease Brother     Hypertension Brother     Hypertension Mother     Diabetes Mother     Cancer Daughter      cervical       REVIEW OF SYSTEMS: A full 12 point review of systems was completed and was negative unless noted in the history of present illness. PHYSICAL EXAMINATION:   ECOG Performance status 1  VITAL SIGNS:   Visit Vitals    /62    Pulse 99    Temp 97.6 °F (36.4 °C)    Resp 18    Wt 77.1 kg (170 lb)    SpO2 93%    BMI 30.11 kg/m2        GENERAL: The patient is well-developed, ambulatory, alert and in no acute distress. HEENT: Head is normocephalic, atraumatic. Pupils are equal, round and reactive to light and accommodation. Extraocular movement intact. Hearing is intact bilaterally to finger rub. Oral cavity reveals no lesions. Mucous membranes are moist. NECK: Neck is supple with no masses. CARDIOVASCULAR: Heart is regular rate and rhythm. There are no murmurs rubs or gallups. Radial pulses are 2+ RESPIRATORY: Lungs are clear to auscultation and percussion. There is normal respiratory effort. GASTROINTESTINAL: The abdomen is soft, non-tender, nondistended with no hepatospelnomagaly. Digital rectal examination: deferred LYMPHATIC: There is no cervical, supraclavicular or axillary lymphadenopathy bilaterally. MUSCULOSKELETAL: Extremities reveal no cyanosis, clubbing or edema.  is 5+/5. BREASTS: Examination of the bilateral chest walls shows some asymmetry with more indentation medially in the right breast with the implants being placed higher in this location as well. Saray Gone NEURO:  Cranial nerves II-XII grossly intact. Muscular strength and sensation are intact throughout all four extremities. PATHOLOGY:    10/11/17:     DIAGNOSIS   A: RIGHT BREAST SENTINEL NODE: MACROMETASTATIC CARCINOMA TO ONE OF ONE LYMPH NODES WITH EXTRACAPSULAR EXTENSION. B: LEFT BREAST SENTINEL NODE #1: LYMPH NODE NEGATIVE FOR METASTATIC CARCINOMA. C: LEFT SENTINEL NODE #2: LYMPH NODE NEGATIVE FOR METASTATIC CARCINOMA. D: RIGHT BREAST AND AXILLARY CONTENTS: INFILTRATING DUCT CARCINOMA, HIGH GRADE (POORLY DIFFERENTIATED) MEASURING APPROXIMATELY 2.5 X 2.1 X 2 CM. DEFINITE IN SITU COMPONENT ARE NOT IDENTIFIED. TUMOR FOCALLY EXTENDS INTO SKIN WITH INVOLVEMENT OF DERMAL LYMPHATICS AND MICROSCOPIC FOCUS OF EPIDERMAL ULCERATION. TUMOR IS PRESENT APPROXIMATELY 0.9 CM FROM MARKED DEEP MARGIN AND GREATER THAN 1 CM FROM OTHER MARGINS. 15 LYMPH NODES NEGATIVE FOR METASTATIC CARCINOMA (SEE A). SEE COMMENT. E: LEFT BREAST: INFITLRATING DUCT CARCINOMA, INTERMEDIATE GRADE (MODERATELY DIFFERENTIATED) MEASURING APPROXIMATELY 5.5 X 5.5 X 2.5 CM WITH ASSOCIATED DUCTAL CARCINOMA IN SITU, HIGH GRADE (CRIBRIFORM TYPE) AND AREAS CONSISTENT WITH LYMPHOVASCULAR INVASION. TUMOR IS PRESENT APPROXIMATELY 0.3 CM FROM MARKED ANTERIOR (SUPERFICIAL) MARGIN WITH OTHER MARGINS GREATER THAN 1 CM FROM TUMOR. MICROSCOPIC FOCI OF TUMOR WITHIN BREAST MOST CONSISTENT WITH INTRAMAMMARY LYMPHATIC SPREAD. SEE COMMENT. Comment   Infiltrating carcinoma in right breast is similar to that in prior core biopsy [id number], B which has estrogen receptors negative, progesterone receptors 16% and Her2 negative.  If clinically indicated receptor studies can be repeated in this material.   Infiltrating carcinoma in left breast is similar to that in prior core biopsy [id number], A which has estrogen receptors of 1%, progesterone receptors negative and Her2 negative. If clinically indicated receptor studies can be repeated in this material.     LABORATORY:   Lab Results   Component Value Date/Time    Sodium 141 10/14/2017 06:15 AM    Potassium 3.0 10/14/2017 06:15 AM    Chloride 104 10/14/2017 06:15 AM    CO2 28 10/14/2017 06:15 AM    Anion gap 9 10/14/2017 06:15 AM    Glucose 141 10/14/2017 06:15 AM    BUN 21 10/14/2017 06:15 AM    Creatinine 1.07 10/14/2017 06:15 AM    GFR est AA >60 10/14/2017 06:15 AM    GFR est non-AA 53 10/14/2017 06:15 AM    Calcium 8.7 10/14/2017 06:15 AM    Albumin 4.2 09/19/2017 02:57 PM    Protein, total 7.0 09/19/2017 02:57 PM    A-G Ratio 1.5 09/19/2017 02:57 PM    AST (SGOT) 25 09/19/2017 02:57 PM    ALT (SGPT) 17 09/19/2017 02:57 PM     Lab Results   Component Value Date/Time    WBC 11.1 10/14/2017 06:15 AM    HGB 11.8 10/14/2017 06:15 AM    HCT 34.7 10/14/2017 06:15 AM    PLATELET 965 97/96/2294 06:15 AM       RADIOLOGY:    I have personally reviewed the imaging and agree with the reports below. 8/1/17:  BILATERAL DIAGNOSTIC DIGITAL MAMMOGRAPHY,  BILATERAL BREAST SONOGRAPHY:      CLINICAL HISTORY:  77year-old status post remote bilateral benign biopsies with  no family history of breast cancer presents today with bilateral palpable lumps.     COMPARISON: February 7, 2008.     BILATERAL MAMMOGRAM:  Metallic BBs were placed at sites of palpable concern. Bilateral craniocaudal, mediolateral oblique, straight lateral, and spot  compression views demonstrate heterogeneously dense tissue bilaterally. There  is a new spiculated right retroareolar mass with overlying skin thickening and  nipple retraction which is very suspicious for malignancy. There is also an  irregular soft tissue mass corresponding to the left posterior 1:30 palpable  lump.   Its margins are irregular, and there are associated pleomorphic  microcalcifications which are very suspicious for malignancy. Calcifications  extend toward the nipple over at least 7 cm there are suspicious for DCIS. Only  small axillary lymph nodes are present bilaterally.     BILATERAL BREAST ULTRASOUND:  Targeted ultrasound evaluation of the right 12:00  retroareolar mass demonstrates an irregular hypoechoic mass with posterior  acoustical shadowing which is very suspicious for malignancy. Maximal diameter  is measured at approximately 2.2 cm. At the left 1:30 position 7 cm from the  nipple, there is likewise a 2.2 cm lobular hypoechoic mass which is suspicious  for malignancy. There is inconsistent acoustical shadowing as well. Ultrasound  evaluation of both axillae demonstrates no pathologically enlarged or dysmorphic  lymph nodes to suggest metastatic disease.     IMPRESSION  IMPRESSION:     AN APPROXIMATELY 2.2 CM IRREGULAR RIGHT RETROAREOLAR MASS WITH SKIN THICKENING  IS VERY SUSPICIOUS FOR CARCINOMA, POSSIBLY INFLAMMATORY, AND A 2.2 CM MASS  CORRESPONDING TO THE LEFT 1:30 PALPABLE LUMP ASSOCIATED WITH EXTENSIVE ADJACENT  MICROCALCIFICATIONS IS SUSPICIOUS FOR INVASIVE CARCINOMA WITH DCIS. BIOPSIES  ARE RECOMMENDED.       BI-RADS Assessment Category 5: Highly suggestive of malignancy- Appropriate  action should be taken. BD3  Information about breast density will be included with the letter sent to the  patient with her mammogram results. 8/10/17:  BREAST MRI BEFORE AND AFTER CONTRAST     CLINICAL HISTORY:  80-year-old with recent needle biopsy diagnosis of bilateral  palpable infiltrating duct carcinoma; for preoperative evaluation.     TECHNIQUE: Standard axial and sagittal images were obtained before and during  bolus injection of 18 cc of MultiHance IV.   CAD was employed.     COMPARISON:  Prior studies dating to February 7, 2008 including bilateral  mammography and ultrasound of August 1, 2017 and bilateral ultrasound biopsies  of August 7, 2017.     FINDINGS: There is moderate background parenchymal enhancement bilaterally. The  irregular right anterior 12:00 carcinoma measures approximately 3.1 cm and  extends to the medial skin, which is retracted. Moreover, adjacent  reticulonodular enhancement extending posterolaterally associated with  suspicious microcalcifications likely represents extension of tumor. Maximal  MRI diameter on the right is measured at approximately 6.0 cm. The ill-defined  left 1:30 carcinoma measures approximately 4.3 cm including anterior and  posterior satellite nodules. However, there is extensive nonmasslike  enhancement associated with pleomorphic microcalcifications occupying most of  the left breast with maximal diameters 7.8 cm which likely reflects extensive  tumor. No pathologically enlarged or dysmorphic lymph nodes are seen. The  liver, lungs, and chest wall appear unremarkable as imaged.     IMPRESSION  IMPRESSION:       1. LARGE RIGHT BREAST CANCER EXTENDS TO THE SKIN MEDIALLY WITH RETRACTION AND  IS ASSOCIATED WITH RETICULONODULAR ENHANCEMENT EXTENDING POSTEROLATERALLY WITH  MAXIMAL DIAMETER OF APPROXIMATELY 6.0 CM.     2. LARGE LEFT 1:30 CARCINOMA IS ASSOCIATED WITH EXTENSIVE NONMASSLIKE  ENHANCEMENT ASSOCIATED WITH PLEOMORPHIC MICROCALCIFICATIONS WITH MAXIMAL  DIAMETER OF 7.8 CM OCCUPYING MOST OF THE BREAST.     3.  NO SPECIFIC MRI EVIDENCE OF METASTATIC DISEASE IN THE AXILLAE OR ELSEWHERE  IN THE IMAGED CHEST. Pet/ct Tumor Image Skull Thigh (sub)    Result Date: 12/19/2017  PET/CT INDICATION: Breast cancer staging, bilateral breast cancer TECHNIQUE: After oral administration of gastroview and intravenous administration of 19.5 mCi of F18 FDG, noncontrast CT images were obtained for attenuation correction and for fusion with emission PET images. A series of overlapping emission PET images were then obtained beginning 60 minutes after injection of FDG. The area imaged spanned the region from the skull base to the mid thighs.  COMPARISON: Breast MRI dated 1017 FINDINGS: Head/Neck:  There is no abnormal uptake in the neck. There is no significant adenopathy. Chest:  Patient is now post bilateral mastectomy with implant reconstruction. There is minimal uptake around both implants, likely reactive. No discrete hypermetabolic mass is seen in either breast.  There are no hypermetabolic axillary or mediastinal adenopathy. There are no pulmonary masses. There are chronic changes in both lungs with areas of groundglass density. There is bronchiectasis in the right upper lobe. Abdomen: There are no hypermetabolic lesions in the liver or adrenal glands. There is no significant adenopathy. Pelvis: There is no significant abnormal uptake in the pelvis. There is no significant adenopathy. There are no bony lesions. IMPRESSION: No evidence of recurrent tumor or metastatic disease. IMPRESSION:  Eddi Jean Baptiste is a 69 y.o. female with Stage IIB left breast cancer and IIIB right breast cancer. The natural history of breast cancer was reviewed with the patient. Prognostic features including stage, performance status and presence or absence of lymph node involvement were reviewed with the patient. Various treatment options including lumpectomy alone, breast conservation therapy, and mastectomy were compared and contrasted with regard to outcome and quality of life. We discussed the data in support of Radiation following mastectomy. We discussed the [employer] and [employer] clinical trials showing a survival benefit from the patient's receiving adjuvant radiation with chemotherapy as opposed to chemotherapy alone. This included patient's with T3, T4, and node positive disease. There are several caveats to the trial including the fact that many patients did not receive adequate lymph node dissections.   Despite these limitations, it has become the standard to treat adjuvantly for patients with T3 or T4 tumors as well as four or more lymph nodes. In the context of this data, I have recommended a course of post postmastectomy radiation. By virtue of her extensive skin involvement in the positive lymph node on the right, she certainly needs radiation to the chest wall is I would estimate her local recurrence rate as easily greater than 50%. I also offered radiation to the left breast based on the tumor size. A dose of 60 Gy will be given over 6 weeks with the first 5 weeks delivered to the chest wall and regional lymphatics followed by a local scar boost for the last week of treatment. The practicalities, indications, benefits, side-effects and complications of radiation therapy were discussed. Skin changes fatigue, and potential for long-term complications including radiation pneumonitis, and rib fractures were among the complications highlighted. I have recommended a course of radiation therapy following mastectomy. However, I spent a great deal time trying to convince her of the benefit of chemotherapy as I feel her local recurrence risk is high just as her original and distant recurrence risk and I would strongly urge her she complete all recommended adjuvant therapies. The patient  asked numerous questions, which were answered to their satisfaction. She seemed to understand the complexities of the therapy, but was very because of how debilitated she feels already. I spoke with Dr. Jennifer Laurent in asked to contact me after his appointment this afternoon when decisions have been finalized about adjuvant chemotherapy    Plan:  1. Genetic testing-not indicated  2. Smoking cessation-not indicated  3. Patient is still deciding between proceeding with chemotherapy, radiation, both, or neither. If we do proceed, a dose of 50 Gy to the bilateral chest wall and regional lymphatics would be followed by a boost to the scars. This would be delivered after chemotherapy if it is given, or once the expansions are complete if no chemotherapy is given. Forest Banks MD  12/29/17

## 2017-12-29 NOTE — PROGRESS NOTES
Pt here today for initial RT consult for breast cancer in both breasts, and who has had bilateral mastectomies with reconstruction--expanders in place. The 12/19/17 PET scan was negative for mets. It has not been decided medically if pt will have RT as of yet, she sees Dr. Kenyatta Stevenson in [place] [address] this afternoon. An overview of RT was given. RT consents were not signed today.

## 2018-01-01 ENCOUNTER — APPOINTMENT (OUTPATIENT)
Dept: GENERAL RADIOLOGY | Age: 78
DRG: 166 | End: 2018-01-01
Attending: INTERNAL MEDICINE
Payer: COMMERCIAL

## 2018-01-01 ENCOUNTER — HOSPITAL ENCOUNTER (OUTPATIENT)
Dept: INTERVENTIONAL RADIOLOGY/VASCULAR | Age: 78
Discharge: HOME | End: 2018-01-06
Payer: COMMERCIAL

## 2018-01-01 ENCOUNTER — HOSPITAL ENCOUNTER (OUTPATIENT)
Dept: LAB | Age: 78
Discharge: HOME | End: 2018-01-05
Payer: COMMERCIAL

## 2018-01-01 ENCOUNTER — HOSPITAL ENCOUNTER (OUTPATIENT)
Dept: INFUSION THERAPY | Age: 78
Discharge: HOME | End: 2018-01-08
Payer: COMMERCIAL

## 2018-01-01 ENCOUNTER — HOSPITAL ENCOUNTER (OUTPATIENT)
Dept: INFUSION THERAPY | Age: 78
Discharge: HOME | End: 2018-01-05
Payer: COMMERCIAL

## 2018-01-01 ENCOUNTER — APPOINTMENT (OUTPATIENT)
Dept: INFUSION THERAPY | Age: 78
End: 2018-01-01

## 2018-01-01 ENCOUNTER — HOSPITAL ENCOUNTER (OUTPATIENT)
Dept: INFUSION THERAPY | Age: 78
End: 2018-01-01
Payer: COMMERCIAL

## 2018-01-01 ENCOUNTER — HOSPITAL ENCOUNTER (OUTPATIENT)
Dept: INFUSION THERAPY | Age: 78
Discharge: HOME | End: 2018-01-22
Payer: COMMERCIAL

## 2018-01-01 ENCOUNTER — HOSPITAL ENCOUNTER (OUTPATIENT)
Dept: INFUSION THERAPY | Age: 78
End: 2018-01-01

## 2018-01-01 ENCOUNTER — HOME HEALTH ADMISSION (OUTPATIENT)
Dept: HOME HEALTH SERVICES | Facility: HOME HEALTH | Age: 78
End: 2018-01-01

## 2018-01-01 ENCOUNTER — HOSPITAL ENCOUNTER (OUTPATIENT)
Dept: LAB | Age: 78
Discharge: HOME | End: 2018-01-22
Payer: COMMERCIAL

## 2018-01-01 ENCOUNTER — HOSPITAL ENCOUNTER (INPATIENT)
Age: 78
LOS: 20 days | DRG: 166 | End: 2018-02-12
Attending: EMERGENCY MEDICINE | Admitting: INTERNAL MEDICINE
Payer: COMMERCIAL

## 2018-01-01 ENCOUNTER — APPOINTMENT (OUTPATIENT)
Dept: GENERAL RADIOLOGY | Age: 78
DRG: 166 | End: 2018-01-01
Attending: EMERGENCY MEDICINE
Payer: COMMERCIAL

## 2018-01-01 ENCOUNTER — DOCUMENTATION ONLY (OUTPATIENT)
Dept: HEMATOLOGY | Age: 78
End: 2018-01-01

## 2018-01-01 ENCOUNTER — HOSPITAL ENCOUNTER (OUTPATIENT)
Dept: INTERVENTIONAL RADIOLOGY/VASCULAR | Age: 78
Discharge: HOME | End: 2018-01-06
Attending: NURSE PRACTITIONER
Payer: COMMERCIAL

## 2018-01-01 ENCOUNTER — APPOINTMENT (OUTPATIENT)
Dept: GENERAL RADIOLOGY | Age: 78
DRG: 166 | End: 2018-01-01
Attending: FAMILY MEDICINE
Payer: COMMERCIAL

## 2018-01-01 ENCOUNTER — PATIENT OUTREACH (OUTPATIENT)
Dept: CASE MANAGEMENT | Age: 78
End: 2018-01-01

## 2018-01-01 ENCOUNTER — HOSPITAL ENCOUNTER (OUTPATIENT)
Dept: INFUSION THERAPY | Age: 78
Discharge: HOME | End: 2018-01-06
Payer: COMMERCIAL

## 2018-01-01 VITALS
BODY MASS INDEX: 31.35 KG/M2 | DIASTOLIC BLOOD PRESSURE: 68 MMHG | RESPIRATION RATE: 18 BRPM | OXYGEN SATURATION: 92 % | SYSTOLIC BLOOD PRESSURE: 149 MMHG | HEART RATE: 98 BPM | WEIGHT: 177 LBS | TEMPERATURE: 98.1 F

## 2018-01-01 VITALS
DIASTOLIC BLOOD PRESSURE: 43 MMHG | RESPIRATION RATE: 18 BRPM | HEART RATE: 84 BPM | OXYGEN SATURATION: 93 % | SYSTOLIC BLOOD PRESSURE: 92 MMHG | TEMPERATURE: 98 F

## 2018-01-01 VITALS
BODY MASS INDEX: 30.62 KG/M2 | TEMPERATURE: 97.5 F | OXYGEN SATURATION: 30 % | DIASTOLIC BLOOD PRESSURE: 19 MMHG | WEIGHT: 172.84 LBS | SYSTOLIC BLOOD PRESSURE: 49 MMHG

## 2018-01-01 VITALS
DIASTOLIC BLOOD PRESSURE: 79 MMHG | BODY MASS INDEX: 30.11 KG/M2 | SYSTOLIC BLOOD PRESSURE: 149 MMHG | HEART RATE: 85 BPM | OXYGEN SATURATION: 93 % | RESPIRATION RATE: 16 BRPM | WEIGHT: 170 LBS | TEMPERATURE: 97.4 F

## 2018-01-01 VITALS
TEMPERATURE: 97.7 F | RESPIRATION RATE: 18 BRPM | DIASTOLIC BLOOD PRESSURE: 81 MMHG | OXYGEN SATURATION: 93 % | SYSTOLIC BLOOD PRESSURE: 175 MMHG | HEART RATE: 91 BPM

## 2018-01-01 DIAGNOSIS — J80 ARDS (ADULT RESPIRATORY DISTRESS SYNDROME) (HCC): ICD-10-CM

## 2018-01-01 DIAGNOSIS — F33.40 RECURRENT MAJOR DEPRESSIVE DISORDER, IN REMISSION (HCC): Chronic | ICD-10-CM

## 2018-01-01 DIAGNOSIS — J96.01 ACUTE RESPIRATORY FAILURE WITH HYPOXIA (HCC): ICD-10-CM

## 2018-01-01 DIAGNOSIS — C50.912 MALIGNANT NEOPLASM OF BOTH BREASTS IN FEMALE, ESTROGEN RECEPTOR NEGATIVE, UNSPECIFIED SITE OF BREAST (HCC): ICD-10-CM

## 2018-01-01 DIAGNOSIS — J93.12 SECONDARY SPONTANEOUS PNEUMOTHORAX: ICD-10-CM

## 2018-01-01 DIAGNOSIS — Z17.1 MALIGNANT NEOPLASM OF BOTH BREASTS IN FEMALE, ESTROGEN RECEPTOR NEGATIVE, UNSPECIFIED SITE OF BREAST (HCC): ICD-10-CM

## 2018-01-01 DIAGNOSIS — J18.9 HCAP (HEALTHCARE-ASSOCIATED PNEUMONIA): ICD-10-CM

## 2018-01-01 DIAGNOSIS — C50.911 MALIGNANT NEOPLASM OF BOTH BREASTS IN FEMALE, ESTROGEN RECEPTOR NEGATIVE, UNSPECIFIED SITE OF BREAST (HCC): ICD-10-CM

## 2018-01-01 DIAGNOSIS — C50.912 BILATERAL MALIGNANT NEOPLASM OF BREAST IN FEMALE, UNSPECIFIED ESTROGEN RECEPTOR STATUS, UNSPECIFIED SITE OF BREAST (HCC): Chronic | ICD-10-CM

## 2018-01-01 DIAGNOSIS — R79.89 ELEVATED BRAIN NATRIURETIC PEPTIDE (BNP) LEVEL: ICD-10-CM

## 2018-01-01 DIAGNOSIS — C50.911 BILATERAL MALIGNANT NEOPLASM OF BREAST IN FEMALE, UNSPECIFIED ESTROGEN RECEPTOR STATUS, UNSPECIFIED SITE OF BREAST (HCC): Chronic | ICD-10-CM

## 2018-01-01 DIAGNOSIS — R91.8 PULMONARY INFILTRATES: ICD-10-CM

## 2018-01-01 DIAGNOSIS — I10 HTN (HYPERTENSION), BENIGN: Chronic | ICD-10-CM

## 2018-01-01 DIAGNOSIS — A41.9 SEPSIS, DUE TO UNSPECIFIED ORGANISM: ICD-10-CM

## 2018-01-01 DIAGNOSIS — E86.0 DEHYDRATION: ICD-10-CM

## 2018-01-01 DIAGNOSIS — N17.9 ACUTE KIDNEY INJURY (HCC): ICD-10-CM

## 2018-01-01 DIAGNOSIS — C50.012 BILATERAL MALIGNANT NEOPLASM INVOLVING BOTH NIPPLE AND AREOLA IN FEMALE, UNSPECIFIED ESTROGEN RECEPTOR STATUS (HCC): ICD-10-CM

## 2018-01-01 DIAGNOSIS — E87.6 HYPOKALEMIA: ICD-10-CM

## 2018-01-01 DIAGNOSIS — C50.011 BILATERAL MALIGNANT NEOPLASM INVOLVING BOTH NIPPLE AND AREOLA IN FEMALE, UNSPECIFIED ESTROGEN RECEPTOR STATUS (HCC): ICD-10-CM

## 2018-01-01 DIAGNOSIS — C50.919 MALIGNANT NEOPLASM OF FEMALE BREAST, UNSPECIFIED ESTROGEN RECEPTOR STATUS, UNSPECIFIED LATERALITY, UNSPECIFIED SITE OF BREAST (HCC): ICD-10-CM

## 2018-01-01 DIAGNOSIS — J18.9 PNEUMONIA OF RIGHT MIDDLE LOBE DUE TO INFECTIOUS ORGANISM: Primary | ICD-10-CM

## 2018-01-01 DIAGNOSIS — J11.1 INFLUENZA: ICD-10-CM

## 2018-01-01 DIAGNOSIS — A49.3 INFECTION DUE TO MYCOPLASMA: ICD-10-CM

## 2018-01-01 DIAGNOSIS — Z90.13 S/P BILATERAL MASTECTOMY: Chronic | ICD-10-CM

## 2018-01-01 LAB
ALBUMIN SERPL-MCNC: 2 G/DL (ref 3.2–4.6)
ALBUMIN SERPL-MCNC: 2 G/DL (ref 3.2–4.6)
ALBUMIN SERPL-MCNC: 2.1 G/DL (ref 3.2–4.6)
ALBUMIN SERPL-MCNC: 2.3 G/DL (ref 3.2–4.6)
ALBUMIN SERPL-MCNC: 2.3 G/DL (ref 3.2–4.6)
ALBUMIN SERPL-MCNC: 3 G/DL (ref 3.2–4.6)
ALBUMIN SERPL-MCNC: 3.3 G/DL (ref 3.2–4.6)
ALBUMIN/GLOB SERPL: 0.5 {RATIO} (ref 1.2–3.5)
ALBUMIN/GLOB SERPL: 0.6 {RATIO} (ref 1.2–3.5)
ALBUMIN/GLOB SERPL: 0.7 {RATIO} (ref 1.2–3.5)
ALBUMIN/GLOB SERPL: 0.7 {RATIO} (ref 1.2–3.5)
ALBUMIN/GLOB SERPL: 0.8 {RATIO}
ALP SERPL-CCNC: 70 U/L (ref 50–136)
ALP SERPL-CCNC: 79 U/L (ref 50–136)
ALP SERPL-CCNC: 83 U/L (ref 50–136)
ALP SERPL-CCNC: 94 U/L (ref 50–136)
ALP SERPL-CCNC: 95 U/L (ref 50–136)
ALP SERPL-CCNC: 95 U/L (ref 50–136)
ALP SERPL-CCNC: 99 U/L (ref 50–136)
ALT SERPL-CCNC: 13 U/L (ref 12–65)
ALT SERPL-CCNC: 14 U/L (ref 12–65)
ALT SERPL-CCNC: 19 U/L (ref 12–65)
ALT SERPL-CCNC: 19 U/L (ref 12–65)
ALT SERPL-CCNC: 20 U/L (ref 12–65)
ALT SERPL-CCNC: 21 U/L (ref 12–65)
ALT SERPL-CCNC: 23 U/L (ref 12–65)
ANION GAP SERPL CALC-SCNC: 10 MMOL/L (ref 7–16)
ANION GAP SERPL CALC-SCNC: 11 MMOL/L (ref 7–16)
ANION GAP SERPL CALC-SCNC: 12 MMOL/L (ref 7–16)
ANION GAP SERPL CALC-SCNC: 12 MMOL/L (ref 7–16)
ANION GAP SERPL CALC-SCNC: 5 MMOL/L (ref 7–16)
ANION GAP SERPL CALC-SCNC: 7 MMOL/L (ref 7–16)
ANION GAP SERPL CALC-SCNC: 8 MMOL/L
ANION GAP SERPL CALC-SCNC: 8 MMOL/L (ref 7–16)
ANION GAP SERPL CALC-SCNC: 9 MMOL/L (ref 7–16)
APPEARANCE FLD: NORMAL
APPEARANCE UR: ABNORMAL
ARTERIAL PATENCY WRIST A: ABNORMAL
ARTERIAL PATENCY WRIST A: POSITIVE
AST SERPL-CCNC: 22 U/L (ref 15–37)
AST SERPL-CCNC: 26 U/L (ref 15–37)
AST SERPL-CCNC: 32 U/L (ref 15–37)
ATRIAL RATE: 78 BPM
BACTERIA SPEC CULT: ABNORMAL
BACTERIA SPEC CULT: NEGATIVE
BACTERIA SPEC CULT: NORMAL
BACTERIA URNS QL MICRO: 0 /HPF
BASE DEFICIT BLDA-SCNC: 0.5 MMOL/L (ref 0–2)
BASE DEFICIT BLDA-SCNC: 0.5 MMOL/L (ref 0–2)
BASE EXCESS BLDA CALC-SCNC: 0.1 MMOL/L (ref 0–3)
BASE EXCESS BLDA CALC-SCNC: 0.8 MMOL/L (ref 0–3)
BASE EXCESS BLDA CALC-SCNC: 12.3 MMOL/L (ref 0–3)
BASE EXCESS BLDA CALC-SCNC: 13.8 MMOL/L (ref 0–3)
BASE EXCESS BLDA CALC-SCNC: 2.9 MMOL/L (ref 0–3)
BASE EXCESS BLDA CALC-SCNC: 3.9 MMOL/L (ref 0–3)
BASE EXCESS BLDA CALC-SCNC: 5.7 MMOL/L (ref 0–3)
BASE EXCESS BLDA CALC-SCNC: 5.8 MMOL/L (ref 0–3)
BASE EXCESS BLDA CALC-SCNC: 6.2 MMOL/L (ref 0–3)
BASE EXCESS BLDA CALC-SCNC: 6.2 MMOL/L (ref 0–3)
BASE EXCESS BLDA CALC-SCNC: 8.6 MMOL/L (ref 0–3)
BASOPHILS # BLD: 0 K/UL (ref 0–0.2)
BASOPHILS # BLD: 0.1 K/UL (ref 0–0.2)
BASOPHILS NFR BLD: 0 % (ref 0–2)
BDY SITE: ABNORMAL
BILIRUB SERPL-MCNC: 0.3 MG/DL (ref 0.2–1.1)
BILIRUB SERPL-MCNC: 0.4 MG/DL (ref 0.2–1.1)
BILIRUB SERPL-MCNC: 0.5 MG/DL (ref 0.2–1.1)
BILIRUB UR QL: NEGATIVE
BNP SERPL-MCNC: 127 PG/ML
BNP SERPL-MCNC: 238 PG/ML
BNP SERPL-MCNC: 251 PG/ML
BNP SERPL-MCNC: 39 PG/ML
BUN SERPL-MCNC: 10 MG/DL (ref 8–23)
BUN SERPL-MCNC: 15 MG/DL (ref 8–23)
BUN SERPL-MCNC: 16 MG/DL (ref 8–23)
BUN SERPL-MCNC: 17 MG/DL (ref 8–23)
BUN SERPL-MCNC: 20 MG/DL (ref 8–23)
BUN SERPL-MCNC: 21 MG/DL (ref 8–23)
BUN SERPL-MCNC: 23 MG/DL (ref 8–23)
BUN SERPL-MCNC: 23 MG/DL (ref 8–23)
BUN SERPL-MCNC: 25 MG/DL (ref 8–23)
BUN SERPL-MCNC: 27 MG/DL (ref 8–23)
BUN SERPL-MCNC: 28 MG/DL (ref 8–23)
BUN SERPL-MCNC: 28 MG/DL (ref 8–23)
BUN SERPL-MCNC: 32 MG/DL (ref 8–23)
BUN SERPL-MCNC: 33 MG/DL (ref 8–23)
BUN SERPL-MCNC: 51 MG/DL (ref 8–23)
BUN SERPL-MCNC: 66 MG/DL (ref 8–23)
BUN SERPL-MCNC: 87 MG/DL (ref 8–23)
BUN SERPL-MCNC: 89 MG/DL (ref 8–23)
C. PNEUMONAIE, CPPT: NOT DETECTED
CALCIUM SERPL-MCNC: 7.4 MG/DL (ref 8.3–10.4)
CALCIUM SERPL-MCNC: 7.7 MG/DL (ref 8.3–10.4)
CALCIUM SERPL-MCNC: 8 MG/DL (ref 8.3–10.4)
CALCIUM SERPL-MCNC: 8 MG/DL (ref 8.3–10.4)
CALCIUM SERPL-MCNC: 8.2 MG/DL (ref 8.3–10.4)
CALCIUM SERPL-MCNC: 8.4 MG/DL (ref 8.3–10.4)
CALCIUM SERPL-MCNC: 8.5 MG/DL (ref 8.3–10.4)
CALCIUM SERPL-MCNC: 8.6 MG/DL (ref 8.3–10.4)
CALCIUM SERPL-MCNC: 8.7 MG/DL (ref 8.3–10.4)
CALCIUM SERPL-MCNC: 8.8 MG/DL (ref 8.3–10.4)
CALCIUM SERPL-MCNC: 8.9 MG/DL (ref 8.3–10.4)
CALCIUM SERPL-MCNC: 8.9 MG/DL (ref 8.3–10.4)
CALCIUM SERPL-MCNC: 9 MG/DL (ref 8.3–10.4)
CALCIUM SERPL-MCNC: 9 MG/DL (ref 8.3–10.4)
CALCIUM SERPL-MCNC: 9.1 MG/DL (ref 8.3–10.4)
CALCULATED P AXIS, ECG09: 59 DEGREES
CALCULATED R AXIS, ECG10: 28 DEGREES
CALCULATED T AXIS, ECG11: 28 DEGREES
CASTS URNS QL MICRO: ABNORMAL /LPF
CD3 CELLS # SPEC: 79 %
CD3+CD4+ CELLS # BLD: 68 %
CD4:CD8 RATIO, C48RBT: 8.5 RATIO
CD8, CD8BT: 8 %
CHLORIDE SERPL-SCNC: 100 MMOL/L (ref 98–107)
CHLORIDE SERPL-SCNC: 102 MMOL/L (ref 98–107)
CHLORIDE SERPL-SCNC: 102 MMOL/L (ref 98–107)
CHLORIDE SERPL-SCNC: 103 MMOL/L (ref 98–107)
CHLORIDE SERPL-SCNC: 103 MMOL/L (ref 98–107)
CHLORIDE SERPL-SCNC: 105 MMOL/L (ref 98–107)
CHLORIDE SERPL-SCNC: 105 MMOL/L (ref 98–107)
CHLORIDE SERPL-SCNC: 107 MMOL/L (ref 98–107)
CHLORIDE SERPL-SCNC: 108 MMOL/L (ref 98–107)
CHLORIDE SERPL-SCNC: 109 MMOL/L (ref 98–107)
CHLORIDE SERPL-SCNC: 111 MMOL/L (ref 98–107)
CHLORIDE SERPL-SCNC: 111 MMOL/L (ref 98–107)
CHLORIDE SERPL-SCNC: 112 MMOL/L (ref 98–107)
CHLORIDE SERPL-SCNC: 113 MMOL/L (ref 98–107)
CHLORIDE SERPL-SCNC: 114 MMOL/L (ref 98–107)
CHLORIDE SERPL-SCNC: 90 MMOL/L (ref 98–107)
CMV DNA # BAL NAA+PROBE: NOT DETECTED IU/ML
CO2 SERPL-SCNC: 20 MMOL/L (ref 21–32)
CO2 SERPL-SCNC: 22 MMOL/L (ref 21–32)
CO2 SERPL-SCNC: 22 MMOL/L (ref 21–32)
CO2 SERPL-SCNC: 23 MMOL/L (ref 21–32)
CO2 SERPL-SCNC: 23 MMOL/L (ref 21–32)
CO2 SERPL-SCNC: 24 MMOL/L (ref 21–32)
CO2 SERPL-SCNC: 25 MMOL/L (ref 21–32)
CO2 SERPL-SCNC: 26 MMOL/L (ref 21–32)
CO2 SERPL-SCNC: 27 MMOL/L (ref 21–32)
CO2 SERPL-SCNC: 28 MMOL/L (ref 21–32)
CO2 SERPL-SCNC: 30 MMOL/L (ref 21–32)
CO2 SERPL-SCNC: 30 MMOL/L (ref 21–32)
CO2 SERPL-SCNC: 32 MMOL/L (ref 21–32)
CO2 SERPL-SCNC: 35 MMOL/L (ref 21–32)
CO2 SERPL-SCNC: 35 MMOL/L (ref 21–32)
CO2 SERPL-SCNC: 44 MMOL/L (ref 21–32)
COHGB MFR BLD: 0 % (ref 0.5–1.5)
COHGB MFR BLD: 0 % (ref 0.5–1.5)
COHGB MFR BLD: 0.3 % (ref 0.5–1.5)
COHGB MFR BLD: 0.5 % (ref 0.5–1.5)
COHGB MFR BLD: 0.7 % (ref 0.5–1.5)
COHGB MFR BLD: 0.7 % (ref 0.5–1.5)
COHGB MFR BLD: 0.9 % (ref 0.5–1.5)
COHGB MFR BLD: 1.1 % (ref 0.5–1.5)
COHGB MFR BLD: 1.1 % (ref 0.5–1.5)
COLOR FLD: COLORLESS
COLOR UR: YELLOW
CREAT SERPL-MCNC: 0.57 MG/DL (ref 0.6–1)
CREAT SERPL-MCNC: 0.62 MG/DL (ref 0.6–1)
CREAT SERPL-MCNC: 0.7 MG/DL (ref 0.6–1)
CREAT SERPL-MCNC: 0.71 MG/DL (ref 0.6–1)
CREAT SERPL-MCNC: 0.73 MG/DL (ref 0.6–1)
CREAT SERPL-MCNC: 0.73 MG/DL (ref 0.6–1)
CREAT SERPL-MCNC: 0.77 MG/DL (ref 0.6–1)
CREAT SERPL-MCNC: 0.79 MG/DL (ref 0.6–1)
CREAT SERPL-MCNC: 0.8 MG/DL (ref 0.6–1)
CREAT SERPL-MCNC: 0.8 MG/DL (ref 0.6–1)
CREAT SERPL-MCNC: 0.81 MG/DL (ref 0.6–1)
CREAT SERPL-MCNC: 0.87 MG/DL (ref 0.6–1)
CREAT SERPL-MCNC: 0.88 MG/DL (ref 0.6–1)
CREAT SERPL-MCNC: 0.89 MG/DL (ref 0.6–1)
CREAT SERPL-MCNC: 0.9 MG/DL (ref 0.6–1)
CREAT SERPL-MCNC: 0.91 MG/DL (ref 0.6–1)
CREAT SERPL-MCNC: 0.95 MG/DL (ref 0.6–1)
CREAT SERPL-MCNC: 1.08 MG/DL (ref 0.6–1)
CREAT SERPL-MCNC: 1.24 MG/DL (ref 0.6–1)
CREAT SERPL-MCNC: 1.25 MG/DL (ref 0.6–1)
CREAT SERPL-MCNC: 1.64 MG/DL (ref 0.6–1)
CREAT SERPL-MCNC: 1.78 MG/DL (ref 0.6–1)
CRP SERPL-MCNC: 17.2 MG/DL (ref 0–0.9)
CRP SERPL-MCNC: 6 MG/DL (ref 0–0.9)
DIAGNOSIS, 93000: NORMAL
DIFFERENTIAL METHOD BLD: ABNORMAL
DO-HGB BLD-MCNC: 1 % (ref 0–5)
DO-HGB BLD-MCNC: 2 % (ref 0–5)
DO-HGB BLD-MCNC: 3 % (ref 0–5)
DO-HGB BLD-MCNC: 3 % (ref 0–5)
DO-HGB BLD-MCNC: 4 % (ref 0–5)
DO-HGB BLD-MCNC: 4 % (ref 0–5)
DO-HGB BLD-MCNC: 5 % (ref 0–5)
DO-HGB BLD-MCNC: 5 % (ref 0–5)
DO-HGB BLD-MCNC: 6 % (ref 0–5)
DO-HGB BLD-MCNC: 8 % (ref 0–5)
EBV PCR QUANT VIACTI,XEBVPT: NORMAL
EOSINOPHIL # BLD: 0 K/UL (ref 0–0.8)
EOSINOPHIL # BLD: 0.1 K/UL (ref 0–0.8)
EOSINOPHIL # BLD: 0.5 K/UL (ref 0–0.8)
EOSINOPHIL NFR BLD: 0 % (ref 0.5–7.8)
EOSINOPHIL NFR BLD: 1 % (ref 0.5–7.8)
EOSINOPHIL NFR BLD: 5 % (ref 0.5–7.8)
EPI CELLS #/AREA URNS HPF: ABNORMAL /HPF
ERYTHROCYTE [DISTWIDTH] IN BLOOD BY AUTOMATED COUNT: 14.4 % (ref 11.9–14.6)
ERYTHROCYTE [DISTWIDTH] IN BLOOD BY AUTOMATED COUNT: 14.6 % (ref 11.9–14.6)
ERYTHROCYTE [DISTWIDTH] IN BLOOD BY AUTOMATED COUNT: 14.8 % (ref 11.9–14.6)
ERYTHROCYTE [DISTWIDTH] IN BLOOD BY AUTOMATED COUNT: 15 % (ref 11.9–14.6)
ERYTHROCYTE [DISTWIDTH] IN BLOOD BY AUTOMATED COUNT: 15.2 % (ref 11.9–14.6)
ERYTHROCYTE [DISTWIDTH] IN BLOOD BY AUTOMATED COUNT: 15.3 % (ref 11.9–14.6)
ERYTHROCYTE [DISTWIDTH] IN BLOOD BY AUTOMATED COUNT: 15.5 % (ref 11.9–14.6)
ERYTHROCYTE [DISTWIDTH] IN BLOOD BY AUTOMATED COUNT: 15.5 % (ref 11.9–14.6)
ERYTHROCYTE [DISTWIDTH] IN BLOOD BY AUTOMATED COUNT: 15.6 % (ref 11.9–14.6)
ERYTHROCYTE [DISTWIDTH] IN BLOOD BY AUTOMATED COUNT: 15.6 % (ref 11.9–14.6)
ERYTHROCYTE [DISTWIDTH] IN BLOOD BY AUTOMATED COUNT: 15.7 % (ref 11.9–14.6)
ERYTHROCYTE [DISTWIDTH] IN BLOOD BY AUTOMATED COUNT: 15.8 % (ref 11.9–14.6)
ERYTHROCYTE [DISTWIDTH] IN BLOOD BY AUTOMATED COUNT: 16 % (ref 11.9–14.6)
ERYTHROCYTE [DISTWIDTH] IN BLOOD BY AUTOMATED COUNT: 16.4 % (ref 11.9–14.6)
ERYTHROCYTE [DISTWIDTH] IN BLOOD BY AUTOMATED COUNT: 16.4 % (ref 11.9–14.6)
ERYTHROCYTE [DISTWIDTH] IN BLOOD BY AUTOMATED COUNT: 16.5 % (ref 11.9–14.6)
ERYTHROCYTE [DISTWIDTH] IN BLOOD BY AUTOMATED COUNT: 16.8 % (ref 11.9–14.6)
ERYTHROCYTE [DISTWIDTH] IN BLOOD BY AUTOMATED COUNT: 16.9 % (ref 11.9–14.6)
ERYTHROCYTE [DISTWIDTH] IN BLOOD BY AUTOMATED COUNT: 17.1 % (ref 11.9–14.6)
ERYTHROCYTE [DISTWIDTH] IN BLOOD BY AUTOMATED COUNT: 17.2 % (ref 11.9–14.6)
ERYTHROCYTE [DISTWIDTH] IN BLOOD BY AUTOMATED COUNT: 17.7 % (ref 11.9–14.6)
ERYTHROCYTE [DISTWIDTH] IN BLOOD BY AUTOMATED COUNT: 17.8 % (ref 11.9–14.6)
FIO2 ON VENT: 100 %
FIO2 ON VENT: 75 %
FIO2 ON VENT: 80 %
FIO2 ON VENT: 80 %
FIO2 ON VENT: 85 %
FIO2 ON VENT: 90 %
FLUAV AG NPH QL IA: NEGATIVE
FLUAV AG NPH QL IA: NEGATIVE
FLUAV RNA SPEC QL NAA+PROBE: NEGATIVE
FLUBV AG NPH QL IA: NEGATIVE
FLUBV AG NPH QL IA: POSITIVE
FLUBV RNA SPEC QL NAA+PROBE: NEGATIVE
GLOBULIN SER CALC-MCNC: 3.5 G/DL (ref 2.3–3.5)
GLOBULIN SER CALC-MCNC: 3.6 G/DL (ref 2.3–3.5)
GLOBULIN SER CALC-MCNC: 3.7 G/DL (ref 2.3–3.5)
GLOBULIN SER CALC-MCNC: 3.7 G/DL (ref 2.3–3.5)
GLOBULIN SER CALC-MCNC: 4 G/DL (ref 2.3–3.5)
GLOBULIN SER CALC-MCNC: 4.2 G/DL (ref 2.3–3.5)
GLOBULIN SER CALC-MCNC: 4.4 G/DL
GLUCOSE BLD STRIP.AUTO-MCNC: 110 MG/DL (ref 65–100)
GLUCOSE BLD STRIP.AUTO-MCNC: 266 MG/DL (ref 65–100)
GLUCOSE SERPL-MCNC: 101 MG/DL (ref 65–100)
GLUCOSE SERPL-MCNC: 103 MG/DL (ref 65–100)
GLUCOSE SERPL-MCNC: 110 MG/DL (ref 65–100)
GLUCOSE SERPL-MCNC: 123 MG/DL (ref 65–100)
GLUCOSE SERPL-MCNC: 127 MG/DL (ref 65–100)
GLUCOSE SERPL-MCNC: 129 MG/DL (ref 65–100)
GLUCOSE SERPL-MCNC: 130 MG/DL (ref 65–100)
GLUCOSE SERPL-MCNC: 131 MG/DL (ref 65–100)
GLUCOSE SERPL-MCNC: 133 MG/DL (ref 65–100)
GLUCOSE SERPL-MCNC: 137 MG/DL (ref 65–100)
GLUCOSE SERPL-MCNC: 137 MG/DL (ref 65–100)
GLUCOSE SERPL-MCNC: 173 MG/DL (ref 65–100)
GLUCOSE SERPL-MCNC: 199 MG/DL (ref 65–100)
GLUCOSE SERPL-MCNC: 209 MG/DL (ref 65–100)
GLUCOSE SERPL-MCNC: 243 MG/DL (ref 65–100)
GLUCOSE SERPL-MCNC: 244 MG/DL (ref 65–100)
GLUCOSE SERPL-MCNC: 322 MG/DL (ref 65–100)
GLUCOSE SERPL-MCNC: 83 MG/DL (ref 65–100)
GLUCOSE SERPL-MCNC: 85 MG/DL (ref 65–100)
GLUCOSE SERPL-MCNC: 87 MG/DL (ref 65–100)
GLUCOSE SERPL-MCNC: 94 MG/DL (ref 65–100)
GLUCOSE SERPL-MCNC: 98 MG/DL (ref 65–100)
GLUCOSE UR STRIP.AUTO-MCNC: NEGATIVE MG/DL
GRAM STN SPEC: ABNORMAL
HADV DNA SPEC QL NAA+PROBE: NEGATIVE
HCO3 BLDA-SCNC: 24 MMOL/L (ref 22–26)
HCO3 BLDA-SCNC: 29 MMOL/L (ref 22–26)
HCO3 BLDA-SCNC: 30 MMOL/L (ref 22–26)
HCO3 BLDA-SCNC: 31 MMOL/L (ref 22–26)
HCO3 BLDA-SCNC: 32 MMOL/L (ref 22–26)
HCO3 BLDA-SCNC: 35 MMOL/L (ref 22–26)
HCO3 BLDA-SCNC: 38 MMOL/L (ref 22–26)
HCO3 BLDA-SCNC: 41 MMOL/L (ref 22–26)
HCO3 BLDA-SCNC: 44 MMOL/L (ref 22–26)
HCT VFR BLD AUTO: 28.7 % (ref 35.8–46.3)
HCT VFR BLD AUTO: 29 % (ref 35.8–46.3)
HCT VFR BLD AUTO: 30 % (ref 35.8–46.3)
HCT VFR BLD AUTO: 30.9 % (ref 35.8–46.3)
HCT VFR BLD AUTO: 31.1 % (ref 35.8–46.3)
HCT VFR BLD AUTO: 31.1 % (ref 35.8–46.3)
HCT VFR BLD AUTO: 31.2 % (ref 35.8–46.3)
HCT VFR BLD AUTO: 31.4 % (ref 35.8–46.3)
HCT VFR BLD AUTO: 31.8 % (ref 35.8–46.3)
HCT VFR BLD AUTO: 32.1 % (ref 35.8–46.3)
HCT VFR BLD AUTO: 32.1 % (ref 35.8–46.3)
HCT VFR BLD AUTO: 32.3 % (ref 35.8–46.3)
HCT VFR BLD AUTO: 32.4 % (ref 35.8–46.3)
HCT VFR BLD AUTO: 32.7 % (ref 35.8–46.3)
HCT VFR BLD AUTO: 32.7 % (ref 35.8–46.3)
HCT VFR BLD AUTO: 32.8 % (ref 35.8–46.3)
HCT VFR BLD AUTO: 33.1 % (ref 35.8–46.3)
HCT VFR BLD AUTO: 33.3 % (ref 35.8–46.3)
HCT VFR BLD AUTO: 33.6 % (ref 35.8–46.3)
HCT VFR BLD AUTO: 33.8 % (ref 35.8–46.3)
HCT VFR BLD AUTO: 33.8 % (ref 35.8–46.3)
HCT VFR BLD AUTO: 40.4 % (ref 35.8–46.3)
HGB BLD-MCNC: 10 G/DL (ref 11.7–15.4)
HGB BLD-MCNC: 10.1 G/DL (ref 11.7–15.4)
HGB BLD-MCNC: 10.3 G/DL (ref 11.7–15.4)
HGB BLD-MCNC: 10.4 G/DL (ref 11.7–15.4)
HGB BLD-MCNC: 10.5 G/DL (ref 11.7–15.4)
HGB BLD-MCNC: 10.6 G/DL (ref 11.7–15.4)
HGB BLD-MCNC: 10.7 G/DL (ref 11.7–15.4)
HGB BLD-MCNC: 10.7 G/DL (ref 11.7–15.4)
HGB BLD-MCNC: 10.8 G/DL (ref 11.7–15.4)
HGB BLD-MCNC: 10.8 G/DL (ref 11.7–15.4)
HGB BLD-MCNC: 10.9 G/DL (ref 11.7–15.4)
HGB BLD-MCNC: 11.1 G/DL (ref 11.7–15.4)
HGB BLD-MCNC: 11.2 G/DL (ref 11.7–15.4)
HGB BLD-MCNC: 11.3 G/DL (ref 11.7–15.4)
HGB BLD-MCNC: 11.5 G/DL (ref 11.7–15.4)
HGB BLD-MCNC: 13.4 G/DL (ref 11.7–15.4)
HGB BLD-MCNC: 8.7 G/DL (ref 11.7–15.4)
HGB BLD-MCNC: 8.8 G/DL (ref 11.7–15.4)
HGB BLD-MCNC: 9.8 G/DL (ref 11.7–15.4)
HGB BLDMV-MCNC: 10 GM/DL (ref 11.7–15)
HGB BLDMV-MCNC: 10.2 GM/DL (ref 11.7–15)
HGB BLDMV-MCNC: 10.2 GM/DL (ref 11.7–15)
HGB BLDMV-MCNC: 10.3 GM/DL (ref 11.7–15)
HGB BLDMV-MCNC: 10.7 GM/DL (ref 11.7–15)
HGB BLDMV-MCNC: 11.2 GM/DL (ref 11.7–15)
HGB BLDMV-MCNC: 11.4 GM/DL (ref 11.7–15)
HGB BLDMV-MCNC: 11.6 GM/DL (ref 11.7–15)
HGB BLDMV-MCNC: 11.7 GM/DL (ref 11.7–15)
HGB BLDMV-MCNC: 12.3 GM/DL (ref 11.7–15)
HGB BLDMV-MCNC: 9.2 GM/DL (ref 11.7–15)
HGB BLDMV-MCNC: 9.3 GM/DL (ref 11.7–15)
HGB BLDMV-MCNC: 9.9 GM/DL (ref 11.7–15)
HGB UR QL STRIP: NEGATIVE
HMPV RNA SPEC QL NAA+PROBE: NEGATIVE
HPIV1 RNA SPEC QL NAA+PROBE: NEGATIVE
HPIV2 RNA SPEC QL NAA+PROBE: NEGATIVE
HPIV3 RNA SPEC QL NAA+PROBE: NEGATIVE
HSV1 DNA # BAL NAA+PROBE: NOT DETECTED COPIES/ML
HSV2 DNA # BAL NAA+PROBE: NOT DETECTED COPIES/ML
IMM GRANULOCYTES # BLD: 0 K/UL (ref 0–0.5)
IMM GRANULOCYTES # BLD: 0.1 K/UL (ref 0–0.5)
IMM GRANULOCYTES # BLD: 0.2 K/UL (ref 0–0.5)
IMM GRANULOCYTES # BLD: 0.2 K/UL (ref 0–0.5)
IMM GRANULOCYTES NFR BLD AUTO: 0 % (ref 0–5)
IMM GRANULOCYTES NFR BLD AUTO: 1 % (ref 0–5)
KETONES UR QL STRIP.AUTO: NEGATIVE MG/DL
L PNEUMO DNA SPEC QL NAA+PROBE: NOT DETECTED
LACTATE BLD-SCNC: 1.2 MMOL/L (ref 0.5–1.9)
LEUKOCYTE ESTERASE UR QL STRIP.AUTO: ABNORMAL
LYMPHOCYTES # BLD: 0.6 K/UL (ref 0.5–4.6)
LYMPHOCYTES # BLD: 0.7 K/UL (ref 0.5–4.6)
LYMPHOCYTES # BLD: 0.7 K/UL (ref 0.5–4.6)
LYMPHOCYTES # BLD: 0.8 K/UL (ref 0.5–4.6)
LYMPHOCYTES # BLD: 1 K/UL (ref 0.5–4.6)
LYMPHOCYTES # BLD: 1.1 K/UL (ref 0.5–4.6)
LYMPHOCYTES # BLD: 1.2 K/UL (ref 0.5–4.6)
LYMPHOCYTES # BLD: 1.3 K/UL (ref 0.5–4.6)
LYMPHOCYTES # BLD: 1.4 K/UL (ref 0.5–4.6)
LYMPHOCYTES # BLD: 3.1 K/UL (ref 0.5–4.6)
LYMPHOCYTES NFR BLD: 15 % (ref 13–44)
LYMPHOCYTES NFR BLD: 3 % (ref 13–44)
LYMPHOCYTES NFR BLD: 30 % (ref 13–44)
LYMPHOCYTES NFR BLD: 4 % (ref 13–44)
LYMPHOCYTES NFR BLD: 5 % (ref 13–44)
LYMPHOCYTES NFR BLD: 5 % (ref 13–44)
LYMPHOCYTES NFR BLD: 6 % (ref 13–44)
LYMPHOCYTES NFR BLD: 6 % (ref 13–44)
LYMPHOCYTES NFR BLD: 7 % (ref 13–44)
LYMPHOCYTES NFR BLD: 8 % (ref 13–44)
LYMPHOCYTES NFR FLD: 31 %
M PNEUMO IGG SER IA-ACNC: 214 U/ML (ref 0–99)
M PNEUMO IGM SER IA-ACNC: <770 U/ML (ref 0–769)
M. PNEUMONIAE, MPPT: NOT DETECTED
MAGNESIUM SERPL-MCNC: 1.5 MG/DL (ref 1.8–2.4)
MAGNESIUM SERPL-MCNC: 1.6 MG/DL (ref 1.8–2.4)
MAGNESIUM SERPL-MCNC: 1.6 MG/DL (ref 1.8–2.4)
MAGNESIUM SERPL-MCNC: 2 MG/DL (ref 1.8–2.4)
MAGNESIUM SERPL-MCNC: 2.1 MG/DL (ref 1.8–2.4)
MAGNESIUM SERPL-MCNC: 2.2 MG/DL (ref 1.8–2.4)
MAGNESIUM SERPL-MCNC: 2.2 MG/DL (ref 1.8–2.4)
MAGNESIUM SERPL-MCNC: 2.3 MG/DL (ref 1.8–2.4)
MAGNESIUM SERPL-MCNC: 2.5 MG/DL (ref 1.8–2.4)
MAGNESIUM SERPL-MCNC: 2.5 MG/DL (ref 1.8–2.4)
MCH RBC QN AUTO: 29.1 PG (ref 26.1–32.9)
MCH RBC QN AUTO: 29.2 PG (ref 26.1–32.9)
MCH RBC QN AUTO: 29.3 PG (ref 26.1–32.9)
MCH RBC QN AUTO: 29.4 PG (ref 26.1–32.9)
MCH RBC QN AUTO: 29.5 PG (ref 26.1–32.9)
MCH RBC QN AUTO: 29.6 PG (ref 26.1–32.9)
MCH RBC QN AUTO: 29.6 PG (ref 26.1–32.9)
MCH RBC QN AUTO: 29.7 PG (ref 26.1–32.9)
MCH RBC QN AUTO: 29.8 PG (ref 26.1–32.9)
MCH RBC QN AUTO: 29.9 PG (ref 26.1–32.9)
MCH RBC QN AUTO: 30 PG (ref 26.1–32.9)
MCH RBC QN AUTO: 30.1 PG (ref 26.1–32.9)
MCH RBC QN AUTO: 30.2 PG (ref 26.1–32.9)
MCH RBC QN AUTO: 30.6 PG (ref 26.1–32.9)
MCHC RBC AUTO-ENTMCNC: 30.3 G/DL (ref 31.4–35)
MCHC RBC AUTO-ENTMCNC: 30.3 G/DL (ref 31.4–35)
MCHC RBC AUTO-ENTMCNC: 31.2 G/DL (ref 31.4–35)
MCHC RBC AUTO-ENTMCNC: 31.9 G/DL (ref 31.4–35)
MCHC RBC AUTO-ENTMCNC: 32.4 G/DL (ref 31.4–35)
MCHC RBC AUTO-ENTMCNC: 32.6 G/DL (ref 31.4–35)
MCHC RBC AUTO-ENTMCNC: 32.7 G/DL (ref 31.4–35)
MCHC RBC AUTO-ENTMCNC: 32.8 G/DL (ref 31.4–35)
MCHC RBC AUTO-ENTMCNC: 32.9 G/DL (ref 31.4–35)
MCHC RBC AUTO-ENTMCNC: 33 G/DL (ref 31.4–35)
MCHC RBC AUTO-ENTMCNC: 33 G/DL (ref 31.4–35)
MCHC RBC AUTO-ENTMCNC: 33.1 G/DL (ref 31.4–35)
MCHC RBC AUTO-ENTMCNC: 33.2 G/DL (ref 31.4–35)
MCHC RBC AUTO-ENTMCNC: 33.3 G/DL (ref 31.4–35)
MCHC RBC AUTO-ENTMCNC: 33.6 G/DL (ref 31.4–35)
MCHC RBC AUTO-ENTMCNC: 34 G/DL (ref 31.4–35)
MCV RBC AUTO: 88.1 FL (ref 79.6–97.8)
MCV RBC AUTO: 88.4 FL (ref 79.6–97.8)
MCV RBC AUTO: 88.7 FL (ref 79.6–97.8)
MCV RBC AUTO: 88.9 FL (ref 79.6–97.8)
MCV RBC AUTO: 88.9 FL (ref 79.6–97.8)
MCV RBC AUTO: 89 FL (ref 79.6–97.8)
MCV RBC AUTO: 89.4 FL (ref 79.6–97.8)
MCV RBC AUTO: 89.9 FL (ref 79.6–97.8)
MCV RBC AUTO: 90 FL (ref 79.6–97.8)
MCV RBC AUTO: 90.4 FL (ref 79.6–97.8)
MCV RBC AUTO: 90.6 FL (ref 79.6–97.8)
MCV RBC AUTO: 90.7 FL (ref 79.6–97.8)
MCV RBC AUTO: 91 FL (ref 79.6–97.8)
MCV RBC AUTO: 91.1 FL (ref 79.6–97.8)
MCV RBC AUTO: 91.1 FL (ref 79.6–97.8)
MCV RBC AUTO: 91.8 FL (ref 79.6–97.8)
MCV RBC AUTO: 92.2 FL (ref 79.6–97.8)
MCV RBC AUTO: 96.3 FL (ref 79.6–97.8)
MCV RBC AUTO: 96.4 FL (ref 79.6–97.8)
MCV RBC AUTO: 96.7 FL (ref 79.6–97.8)
METHGB MFR BLD: 0.1 % (ref 0–1.5)
METHGB MFR BLD: 0.2 % (ref 0–1.5)
METHGB MFR BLD: 0.2 % (ref 0–1.5)
METHGB MFR BLD: 0.4 % (ref 0–1.5)
METHGB MFR BLD: 0.6 % (ref 0–1.5)
METHGB MFR BLD: 0.7 % (ref 0–1.5)
METHGB MFR BLD: 0.8 % (ref 0–1.5)
METHGB MFR BLD: 0.9 % (ref 0–1.5)
MM INDURATION POC: 0 MM (ref 0–5)
MM INDURATION POC: 0 MM (ref 0–5)
MM INDURATION POC: NORMAL MM (ref 0–5)
MONOCYTES # BLD: 0.2 K/UL (ref 0.1–1.3)
MONOCYTES # BLD: 0.3 K/UL (ref 0.1–1.3)
MONOCYTES # BLD: 0.6 K/UL (ref 0.1–1.3)
MONOCYTES # BLD: 0.7 K/UL (ref 0.1–1.3)
MONOCYTES # BLD: 0.7 K/UL (ref 0.1–1.3)
MONOCYTES # BLD: 0.8 K/UL (ref 0.1–1.3)
MONOCYTES # BLD: 0.9 K/UL (ref 0.1–1.3)
MONOCYTES # BLD: 0.9 K/UL (ref 0.1–1.3)
MONOCYTES # BLD: 1 K/UL (ref 0.1–1.3)
MONOCYTES # BLD: 1.3 K/UL (ref 0.1–1.3)
MONOCYTES # BLD: 1.3 K/UL (ref 0.1–1.3)
MONOCYTES # BLD: 1.6 K/UL (ref 0.1–1.3)
MONOCYTES NFR BLD: 1 % (ref 4–12)
MONOCYTES NFR BLD: 2 % (ref 4–12)
MONOCYTES NFR BLD: 4 % (ref 4–12)
MONOCYTES NFR BLD: 5 % (ref 4–12)
MONOCYTES NFR BLD: 6 % (ref 4–12)
MONOCYTES NFR BLD: 7 % (ref 4–12)
MONOCYTES NFR BLD: 8 % (ref 4–12)
MONOCYTES NFR BLD: 9 % (ref 4–12)
MONOS+MACROS NFR FLD: 9 %
NEUTROPHILS NFR FLD: 60 %
NEUTS SEG # BLD: 10.1 K/UL (ref 1.7–8.2)
NEUTS SEG # BLD: 11 K/UL (ref 1.7–8.2)
NEUTS SEG # BLD: 11.8 K/UL (ref 1.7–8.2)
NEUTS SEG # BLD: 12.5 K/UL (ref 1.7–8.2)
NEUTS SEG # BLD: 12.6 K/UL (ref 1.7–8.2)
NEUTS SEG # BLD: 12.6 K/UL (ref 1.7–8.2)
NEUTS SEG # BLD: 12.7 K/UL (ref 1.7–8.2)
NEUTS SEG # BLD: 12.9 K/UL (ref 1.7–8.2)
NEUTS SEG # BLD: 13.4 K/UL (ref 1.7–8.2)
NEUTS SEG # BLD: 13.9 K/UL (ref 1.7–8.2)
NEUTS SEG # BLD: 14.1 K/UL (ref 1.7–8.2)
NEUTS SEG # BLD: 15.2 K/UL (ref 1.7–8.2)
NEUTS SEG # BLD: 17.4 K/UL (ref 1.7–8.2)
NEUTS SEG # BLD: 21 K/UL (ref 1.7–8.2)
NEUTS SEG # BLD: 5.8 K/UL (ref 1.7–8.2)
NEUTS SEG # BLD: 7.6 K/UL (ref 1.7–8.2)
NEUTS SEG NFR BLD: 58 % (ref 43–78)
NEUTS SEG NFR BLD: 77 % (ref 43–78)
NEUTS SEG NFR BLD: 84 % (ref 43–78)
NEUTS SEG NFR BLD: 85 % (ref 43–78)
NEUTS SEG NFR BLD: 86 % (ref 43–78)
NEUTS SEG NFR BLD: 86 % (ref 43–78)
NEUTS SEG NFR BLD: 87 % (ref 43–78)
NEUTS SEG NFR BLD: 88 % (ref 43–78)
NEUTS SEG NFR BLD: 89 % (ref 43–78)
NEUTS SEG NFR BLD: 89 % (ref 43–78)
NEUTS SEG NFR BLD: 90 % (ref 43–78)
NEUTS SEG NFR BLD: 90 % (ref 43–78)
NEUTS SEG NFR BLD: 94 % (ref 43–78)
NEUTS SEG NFR BLD: 95 % (ref 43–78)
NITRITE UR QL STRIP.AUTO: NEGATIVE
NRBC # BLD: 0 K/UL (ref 0–0.2)
NRBC # BLD: 0 K/UL (ref 0–0.2)
NUC CELL # FLD: 355 /CU MM
OXYHGB MFR BLDA: 91.6 % (ref 94–97)
OXYHGB MFR BLDA: 92.8 % (ref 94–97)
OXYHGB MFR BLDA: 93.1 % (ref 94–97)
OXYHGB MFR BLDA: 93.2 % (ref 94–97)
OXYHGB MFR BLDA: 93.3 % (ref 94–97)
OXYHGB MFR BLDA: 93.9 % (ref 94–97)
OXYHGB MFR BLDA: 94.7 % (ref 94–97)
OXYHGB MFR BLDA: 95 % (ref 94–97)
OXYHGB MFR BLDA: 95.1 % (ref 94–97)
OXYHGB MFR BLDA: 96.1 % (ref 94–97)
OXYHGB MFR BLDA: 96.8 % (ref 94–97)
OXYHGB MFR BLDA: 97.4 % (ref 94–97)
OXYHGB MFR BLDA: 97.5 % (ref 94–97)
P JIROVECII DNA # BAL NAA+PROBE: NEGATIVE
P-R INTERVAL, ECG05: 196 MS
PCO2 BLDA: 101 MMHG (ref 35–45)
PCO2 BLDA: 113 MMHG (ref 35–45)
PCO2 BLDA: 39 MMHG (ref 35–45)
PCO2 BLDA: 42 MMHG (ref 35–45)
PCO2 BLDA: 46 MMHG (ref 35–45)
PCO2 BLDA: 54 MMHG (ref 35–45)
PCO2 BLDA: 66 MMHG (ref 35–45)
PCO2 BLDA: 66 MMHG (ref 35–45)
PCO2 BLDA: 76 MMHG (ref 35–45)
PCO2 BLDA: 83 MMHG (ref 35–45)
PCO2 BLDA: 84 MMHG (ref 35–45)
PEEP RESPIRATORY: 10 CM[H2O]
PH BLDA: 7.07 [PH] (ref 7.35–7.45)
PH BLDA: 7.18 [PH] (ref 7.35–7.45)
PH BLDA: 7.21 [PH] (ref 7.35–7.45)
PH BLDA: 7.24 [PH] (ref 7.35–7.45)
PH BLDA: 7.26 [PH] (ref 7.35–7.45)
PH BLDA: 7.27 [PH] (ref 7.35–7.45)
PH BLDA: 7.29 [PH] (ref 7.35–7.45)
PH BLDA: 7.3 [PH] (ref 7.35–7.45)
PH BLDA: 7.31 [PH] (ref 7.35–7.45)
PH BLDA: 7.4 [PH] (ref 7.35–7.45)
PH BLDA: 7.41 [PH] (ref 7.35–7.45)
PH BLDA: 7.45 [PH] (ref 7.35–7.45)
PH BLDA: 7.47 [PH] (ref 7.35–7.45)
PH UR STRIP: 5.5 [PH] (ref 5–9)
PHOSPHATE SERPL-MCNC: 3 MG/DL (ref 2.3–3.7)
PHOSPHATE SERPL-MCNC: 3.6 MG/DL (ref 2.3–3.7)
PHOSPHATE SERPL-MCNC: 4 MG/DL (ref 2.3–3.7)
PLATELET # BLD AUTO: 121 K/UL (ref 150–450)
PLATELET # BLD AUTO: 136 K/UL (ref 150–450)
PLATELET # BLD AUTO: 187 K/UL (ref 150–450)
PLATELET # BLD AUTO: 202 K/UL (ref 150–450)
PLATELET # BLD AUTO: 213 K/UL (ref 150–450)
PLATELET # BLD AUTO: 216 K/UL (ref 150–450)
PLATELET # BLD AUTO: 227 K/UL (ref 150–450)
PLATELET # BLD AUTO: 234 K/UL (ref 150–450)
PLATELET # BLD AUTO: 237 K/UL (ref 150–450)
PLATELET # BLD AUTO: 243 K/UL (ref 150–450)
PLATELET # BLD AUTO: 246 K/UL (ref 150–450)
PLATELET # BLD AUTO: 248 K/UL (ref 150–450)
PLATELET # BLD AUTO: 248 K/UL (ref 150–450)
PLATELET # BLD AUTO: 252 K/UL (ref 150–450)
PLATELET # BLD AUTO: 259 K/UL (ref 150–450)
PLATELET # BLD AUTO: 281 K/UL (ref 150–450)
PLATELET # BLD AUTO: 281 K/UL (ref 150–450)
PLATELET # BLD AUTO: 285 K/UL (ref 150–450)
PLATELET # BLD AUTO: 287 K/UL (ref 150–450)
PLATELET # BLD AUTO: 288 K/UL (ref 150–450)
PLATELET # BLD AUTO: 298 K/UL (ref 150–450)
PLATELET # BLD AUTO: 322 K/UL (ref 150–450)
PMV BLD AUTO: 10 FL (ref 10.8–14.1)
PMV BLD AUTO: 10 FL (ref 10.8–14.1)
PMV BLD AUTO: 10.1 FL (ref 10.8–14.1)
PMV BLD AUTO: 10.2 FL (ref 10.8–14.1)
PMV BLD AUTO: 10.2 FL (ref 10.8–14.1)
PMV BLD AUTO: 10.3 FL (ref 10.8–14.1)
PMV BLD AUTO: 10.9 FL (ref 10.8–14.1)
PMV BLD AUTO: 11.5 FL (ref 10.8–14.1)
PMV BLD AUTO: 9.4 FL (ref 10.8–14.1)
PMV BLD AUTO: 9.4 FL (ref 10.8–14.1)
PMV BLD AUTO: 9.5 FL (ref 10.8–14.1)
PMV BLD AUTO: 9.5 FL (ref 10.8–14.1)
PMV BLD AUTO: 9.6 FL (ref 10.8–14.1)
PMV BLD AUTO: 9.7 FL (ref 10.8–14.1)
PMV BLD AUTO: 9.7 FL (ref 10.8–14.1)
PMV BLD AUTO: 9.8 FL (ref 10.8–14.1)
PMV BLD AUTO: 9.9 FL (ref 10.8–14.1)
PO2 BLDA: 109 MMHG (ref 80–105)
PO2 BLDA: 110 MMHG (ref 80–105)
PO2 BLDA: 128 MMHG (ref 80–105)
PO2 BLDA: 67 MMHG (ref 80–105)
PO2 BLDA: 68 MMHG (ref 80–105)
PO2 BLDA: 68 MMHG (ref 80–105)
PO2 BLDA: 71 MMHG (ref 80–105)
PO2 BLDA: 76 MMHG (ref 80–105)
PO2 BLDA: 77 MMHG (ref 80–105)
PO2 BLDA: 79 MMHG (ref 80–105)
PO2 BLDA: 89 MMHG (ref 80–105)
PO2 BLDA: 98 MMHG (ref 80–105)
PO2 BLDA: 98 MMHG (ref 80–105)
POTASSIUM SERPL-SCNC: 3 MMOL/L (ref 3.5–5.1)
POTASSIUM SERPL-SCNC: 3 MMOL/L (ref 3.5–5.1)
POTASSIUM SERPL-SCNC: 3.1 MMOL/L (ref 3.5–5.1)
POTASSIUM SERPL-SCNC: 3.1 MMOL/L (ref 3.5–5.1)
POTASSIUM SERPL-SCNC: 3.2 MMOL/L (ref 3.5–5.1)
POTASSIUM SERPL-SCNC: 3.4 MMOL/L (ref 3.5–5.1)
POTASSIUM SERPL-SCNC: 3.5 MMOL/L (ref 3.5–5.1)
POTASSIUM SERPL-SCNC: 3.6 MMOL/L (ref 3.5–5.1)
POTASSIUM SERPL-SCNC: 3.7 MMOL/L (ref 3.5–5.1)
POTASSIUM SERPL-SCNC: 3.7 MMOL/L (ref 3.5–5.1)
POTASSIUM SERPL-SCNC: 3.8 MMOL/L (ref 3.5–5.1)
POTASSIUM SERPL-SCNC: 3.9 MMOL/L (ref 3.5–5.1)
POTASSIUM SERPL-SCNC: 4 MMOL/L (ref 3.5–5.1)
POTASSIUM SERPL-SCNC: 4.1 MMOL/L (ref 3.5–5.1)
POTASSIUM SERPL-SCNC: 4.2 MMOL/L (ref 3.5–5.1)
POTASSIUM SERPL-SCNC: 4.4 MMOL/L (ref 3.5–5.1)
POTASSIUM SERPL-SCNC: 4.6 MMOL/L (ref 3.5–5.1)
POTASSIUM SERPL-SCNC: 4.6 MMOL/L (ref 3.5–5.1)
PPD POC: NEGATIVE NEGATIVE
PPD POC: NEGATIVE NEGATIVE
PPD POC: NORMAL NEGATIVE
PROCALCITONIN SERPL-MCNC: 0.1 NG/ML
PROT SERPL-MCNC: 5.7 G/DL (ref 6.3–8.2)
PROT SERPL-MCNC: 5.7 G/DL (ref 6.3–8.2)
PROT SERPL-MCNC: 5.8 G/DL (ref 6.3–8.2)
PROT SERPL-MCNC: 5.9 G/DL (ref 6.3–8.2)
PROT SERPL-MCNC: 6.1 G/DL (ref 6.3–8.2)
PROT SERPL-MCNC: 7.2 G/DL (ref 6.3–8.2)
PROT SERPL-MCNC: 7.7 G/DL (ref 6.3–8.2)
PROT UR STRIP-MCNC: NEGATIVE MG/DL
Q-T INTERVAL, ECG07: 364 MS
QRS DURATION, ECG06: 92 MS
QTC CALCULATION (BEZET), ECG08: 414 MS
RBC # BLD AUTO: 2.98 M/UL (ref 4.05–5.25)
RBC # BLD AUTO: 3 M/UL (ref 4.05–5.25)
RBC # BLD AUTO: 3.32 M/UL (ref 4.05–5.25)
RBC # BLD AUTO: 3.33 M/UL (ref 4.05–5.25)
RBC # BLD AUTO: 3.43 M/UL (ref 4.05–5.25)
RBC # BLD AUTO: 3.47 M/UL (ref 4.05–5.25)
RBC # BLD AUTO: 3.48 M/UL (ref 4.05–5.25)
RBC # BLD AUTO: 3.49 M/UL (ref 4.05–5.25)
RBC # BLD AUTO: 3.52 M/UL (ref 4.05–5.25)
RBC # BLD AUTO: 3.52 M/UL (ref 4.05–5.25)
RBC # BLD AUTO: 3.54 M/UL (ref 4.05–5.25)
RBC # BLD AUTO: 3.6 M/UL (ref 4.05–5.25)
RBC # BLD AUTO: 3.61 M/UL (ref 4.05–5.25)
RBC # BLD AUTO: 3.62 M/UL (ref 4.05–5.25)
RBC # BLD AUTO: 3.65 M/UL (ref 4.05–5.25)
RBC # BLD AUTO: 3.65 M/UL (ref 4.05–5.25)
RBC # BLD AUTO: 3.68 M/UL (ref 4.05–5.25)
RBC # BLD AUTO: 3.71 M/UL (ref 4.05–5.25)
RBC # BLD AUTO: 3.78 M/UL (ref 4.05–5.25)
RBC # BLD AUTO: 3.78 M/UL (ref 4.05–5.25)
RBC # BLD AUTO: 3.81 M/UL (ref 4.05–5.25)
RBC # BLD AUTO: 4.38 M/UL (ref 4.05–5.25)
RBC # FLD: NORMAL /CU MM
RBC #/AREA URNS HPF: ABNORMAL /HPF
RESP RATE: 14
RESP RATE: 20
RESP RATE: 24
RESP RATE: 25
RHINOVIRUS RNA SPEC QL NAA+PROBE: NEGATIVE
RSV A RNA SPEC QL NAA+PROBE: NEGATIVE
RSV B RNA SPEC QL NAA+PROBE: NEGATIVE
SAO2 % BLD: 92 % (ref 92–98.5)
SAO2 % BLD: 94 % (ref 92–98.5)
SAO2 % BLD: 95 % (ref 92–98.5)
SAO2 % BLD: 96 % (ref 92–98.5)
SAO2 % BLD: 97 % (ref 92–98.5)
SAO2 % BLD: 97 % (ref 92–98.5)
SAO2 % BLD: 98 % (ref 92–98.5)
SAO2 % BLD: 99 % (ref 92–98.5)
SERVICE CMNT-IMP: ABNORMAL
SERVICE CMNT-IMP: NEGATIVE
SERVICE CMNT-IMP: NORMAL
SODIUM SERPL-SCNC: 139 MMOL/L (ref 136–145)
SODIUM SERPL-SCNC: 139 MMOL/L (ref 136–145)
SODIUM SERPL-SCNC: 141 MMOL/L (ref 136–145)
SODIUM SERPL-SCNC: 142 MMOL/L (ref 136–145)
SODIUM SERPL-SCNC: 143 MMOL/L (ref 136–145)
SODIUM SERPL-SCNC: 144 MMOL/L (ref 136–145)
SODIUM SERPL-SCNC: 145 MMOL/L (ref 136–145)
SODIUM SERPL-SCNC: 146 MMOL/L (ref 136–145)
SOURCE, PJPB1: NORMAL
SP GR UR REFRACTOMETRY: 1.01 (ref 1–1.02)
SPECIMEN SOURCE FLD: NORMAL
SPECIMEN SOURCE: NORMAL
SPECIMEN SOURCE: NORMAL
TRIGL SERPL-MCNC: 132 MG/DL (ref 35–150)
TRIGL SERPL-MCNC: 172 MG/DL (ref 35–150)
UROBILINOGEN UR QL STRIP.AUTO: 0.2 EU/DL (ref 0.2–1)
VANCOMYCIN TROUGH SERPL-MCNC: 16.5 UG/ML (ref 5–20)
VANCOMYCIN TROUGH SERPL-MCNC: 16.8 UG/ML (ref 5–20)
VANCOMYCIN TROUGH SERPL-MCNC: 17 UG/ML (ref 5–20)
VANCOMYCIN TROUGH SERPL-MCNC: 26.9 UG/ML (ref 5–20)
VENTILATION MODE VENT: ABNORMAL
VENTRICULAR RATE, ECG03: 78 BPM
VT SETTING VENT: 400 ML
VT SETTING VENT: 420 ML
VT SETTING VENT: 526 ML
VZV DNA DETEC, PCR,XVZVPT: NORMAL
WBC # BLD AUTO: 10.1 K/UL (ref 4.3–11.1)
WBC # BLD AUTO: 11.6 K/UL (ref 4.3–11.1)
WBC # BLD AUTO: 12.6 K/UL (ref 4.3–11.1)
WBC # BLD AUTO: 13.6 K/UL (ref 4.3–11.1)
WBC # BLD AUTO: 13.8 K/UL (ref 4.3–11.1)
WBC # BLD AUTO: 14.1 K/UL (ref 4.3–11.1)
WBC # BLD AUTO: 14.8 K/UL (ref 4.3–11.1)
WBC # BLD AUTO: 15 K/UL (ref 4.3–11.1)
WBC # BLD AUTO: 15.1 K/UL (ref 4.3–11.1)
WBC # BLD AUTO: 15.2 K/UL (ref 4.3–11.1)
WBC # BLD AUTO: 15.7 K/UL (ref 4.3–11.1)
WBC # BLD AUTO: 15.7 K/UL (ref 4.3–11.1)
WBC # BLD AUTO: 16.8 K/UL (ref 4.3–11.1)
WBC # BLD AUTO: 17.9 K/UL (ref 4.3–11.1)
WBC # BLD AUTO: 18 K/UL (ref 4.3–11.1)
WBC # BLD AUTO: 19.6 K/UL (ref 4.3–11.1)
WBC # BLD AUTO: 20.4 K/UL (ref 4.3–11.1)
WBC # BLD AUTO: 20.4 K/UL (ref 4.3–11.1)
WBC # BLD AUTO: 21.9 K/UL (ref 4.3–11.1)
WBC # BLD AUTO: 23 K/UL (ref 4.3–11.1)
WBC # BLD AUTO: 23.4 K/UL (ref 4.3–11.1)
WBC # BLD AUTO: 9.9 K/UL (ref 4.3–11.1)
WBC MORPH BLD: NORMAL
WBC URNS QL MICRO: ABNORMAL /HPF

## 2018-01-01 PROCEDURE — 74011250637 HC RX REV CODE- 250/637: Performed by: INTERNAL MEDICINE

## 2018-01-01 PROCEDURE — 74011250636 HC RX REV CODE- 250/636

## 2018-01-01 PROCEDURE — 94760 N-INVAS EAR/PLS OXIMETRY 1: CPT

## 2018-01-01 PROCEDURE — 83605 ASSAY OF LACTIC ACID: CPT

## 2018-01-01 PROCEDURE — 87804 INFLUENZA ASSAY W/OPTIC: CPT | Performed by: EMERGENCY MEDICINE

## 2018-01-01 PROCEDURE — 99291 CRITICAL CARE FIRST HOUR: CPT | Performed by: INTERNAL MEDICINE

## 2018-01-01 PROCEDURE — 99233 SBSQ HOSP IP/OBS HIGH 50: CPT | Performed by: INTERNAL MEDICINE

## 2018-01-01 PROCEDURE — 94003 VENT MGMT INPAT SUBQ DAY: CPT

## 2018-01-01 PROCEDURE — 74011250636 HC RX REV CODE- 250/636: Performed by: INTERNAL MEDICINE

## 2018-01-01 PROCEDURE — 74011250637 HC RX REV CODE- 250/637: Performed by: NURSE PRACTITIONER

## 2018-01-01 PROCEDURE — 99239 HOSP IP/OBS DSCHRG MGMT >30: CPT | Performed by: INTERNAL MEDICINE

## 2018-01-01 PROCEDURE — 85025 COMPLETE CBC W/AUTO DIFF WBC: CPT | Performed by: NURSE PRACTITIONER

## 2018-01-01 PROCEDURE — 94640 AIRWAY INHALATION TREATMENT: CPT

## 2018-01-01 PROCEDURE — 71045 X-RAY EXAM CHEST 1 VIEW: CPT

## 2018-01-01 PROCEDURE — 36600 WITHDRAWAL OF ARTERIAL BLOOD: CPT

## 2018-01-01 PROCEDURE — 74011250636 HC RX REV CODE- 250/636: Performed by: NURSE PRACTITIONER

## 2018-01-01 PROCEDURE — 99232 SBSQ HOSP IP/OBS MODERATE 35: CPT | Performed by: INTERNAL MEDICINE

## 2018-01-01 PROCEDURE — 85027 COMPLETE CBC AUTOMATED: CPT | Performed by: INTERNAL MEDICINE

## 2018-01-01 PROCEDURE — 96366 THER/PROPH/DIAG IV INF ADDON: CPT | Performed by: EMERGENCY MEDICINE

## 2018-01-01 PROCEDURE — 83735 ASSAY OF MAGNESIUM: CPT | Performed by: INTERNAL MEDICINE

## 2018-01-01 PROCEDURE — 80048 BASIC METABOLIC PNL TOTAL CA: CPT | Performed by: INTERNAL MEDICINE

## 2018-01-01 PROCEDURE — 97166 OT EVAL MOD COMPLEX 45 MIN: CPT

## 2018-01-01 PROCEDURE — 80053 COMPREHEN METABOLIC PANEL: CPT | Performed by: NURSE PRACTITIONER

## 2018-01-01 PROCEDURE — 74011000250 HC RX REV CODE- 250

## 2018-01-01 PROCEDURE — 99218 HC RM OBSERVATION: CPT

## 2018-01-01 PROCEDURE — 85027 COMPLETE CBC AUTOMATED: CPT | Performed by: NURSE PRACTITIONER

## 2018-01-01 PROCEDURE — 99152 MOD SED SAME PHYS/QHP 5/>YRS: CPT | Performed by: INTERNAL MEDICINE

## 2018-01-01 PROCEDURE — 77030018798 HC PMP KT ENTRL FED COVD -A

## 2018-01-01 PROCEDURE — 74011258636 HC RX REV CODE- 258/636: Performed by: INTERNAL MEDICINE

## 2018-01-01 PROCEDURE — 77030020186 HC BOOT HL PROTCT SAGE -B

## 2018-01-01 PROCEDURE — 77010033678 HC OXYGEN DAILY

## 2018-01-01 PROCEDURE — 77010033711 HC HIGH FLOW OXYGEN

## 2018-01-01 PROCEDURE — 74011000258 HC RX REV CODE- 258: Performed by: INTERNAL MEDICINE

## 2018-01-01 PROCEDURE — 83735 ASSAY OF MAGNESIUM: CPT | Performed by: NURSE PRACTITIONER

## 2018-01-01 PROCEDURE — 94660 CPAP INITIATION&MGMT: CPT

## 2018-01-01 PROCEDURE — 96367 TX/PROPH/DG ADDL SEQ IV INF: CPT | Performed by: EMERGENCY MEDICINE

## 2018-01-01 PROCEDURE — 94762 N-INVAS EAR/PLS OXIMTRY CONT: CPT

## 2018-01-01 PROCEDURE — 80048 BASIC METABOLIC PNL TOTAL CA: CPT | Performed by: NURSE PRACTITIONER

## 2018-01-01 PROCEDURE — 85025 COMPLETE CBC W/AUTO DIFF WBC: CPT | Performed by: INTERNAL MEDICINE

## 2018-01-01 PROCEDURE — 77030019605

## 2018-01-01 PROCEDURE — 65270000029 HC RM PRIVATE

## 2018-01-01 PROCEDURE — 65620000000 HC RM CCU GENERAL

## 2018-01-01 PROCEDURE — 74011000250 HC RX REV CODE- 250: Performed by: INTERNAL MEDICINE

## 2018-01-01 PROCEDURE — 74011000302 HC RX REV CODE- 302: Performed by: INTERNAL MEDICINE

## 2018-01-01 PROCEDURE — 36415 COLL VENOUS BLD VENIPUNCTURE: CPT | Performed by: NURSE PRACTITIONER

## 2018-01-01 PROCEDURE — 74011636637 HC RX REV CODE- 636/637: Performed by: INTERNAL MEDICINE

## 2018-01-01 PROCEDURE — 99285 EMERGENCY DEPT VISIT HI MDM: CPT | Performed by: EMERGENCY MEDICINE

## 2018-01-01 PROCEDURE — 84145 PROCALCITONIN (PCT): CPT | Performed by: NURSE PRACTITIONER

## 2018-01-01 PROCEDURE — 36561 INSERT TUNNELED CV CATH: CPT

## 2018-01-01 PROCEDURE — 83880 ASSAY OF NATRIURETIC PEPTIDE: CPT | Performed by: INTERNAL MEDICINE

## 2018-01-01 PROCEDURE — 76040000025: Performed by: INTERNAL MEDICINE

## 2018-01-01 PROCEDURE — 77030003560 HC NDL HUBR BARD -A

## 2018-01-01 PROCEDURE — 87486 CHLMYD PNEUM DNA AMP PROBE: CPT | Performed by: INTERNAL MEDICINE

## 2018-01-01 PROCEDURE — 87040 BLOOD CULTURE FOR BACTERIA: CPT | Performed by: EMERGENCY MEDICINE

## 2018-01-01 PROCEDURE — 87798 DETECT AGENT NOS DNA AMP: CPT | Performed by: INTERNAL MEDICINE

## 2018-01-01 PROCEDURE — 97161 PT EVAL LOW COMPLEX 20 MIN: CPT

## 2018-01-01 PROCEDURE — 80053 COMPREHEN METABOLIC PANEL: CPT | Performed by: EMERGENCY MEDICINE

## 2018-01-01 PROCEDURE — 74011250636 HC RX REV CODE- 250/636: Performed by: EMERGENCY MEDICINE

## 2018-01-01 PROCEDURE — 97530 THERAPEUTIC ACTIVITIES: CPT

## 2018-01-01 PROCEDURE — 36591 DRAW BLOOD OFF VENOUS DEVICE: CPT

## 2018-01-01 PROCEDURE — 85025 COMPLETE CBC W/AUTO DIFF WBC: CPT | Performed by: EMERGENCY MEDICINE

## 2018-01-01 PROCEDURE — 89050 BODY FLUID CELL COUNT: CPT | Performed by: INTERNAL MEDICINE

## 2018-01-01 PROCEDURE — 83735 ASSAY OF MAGNESIUM: CPT | Performed by: EMERGENCY MEDICINE

## 2018-01-01 PROCEDURE — 83880 ASSAY OF NATRIURETIC PEPTIDE: CPT | Performed by: NURSE PRACTITIONER

## 2018-01-01 PROCEDURE — 77030021668 HC NEB PREFIL KT VYRM -A

## 2018-01-01 PROCEDURE — C1894 INTRO/SHEATH, NON-LASER: HCPCS

## 2018-01-01 PROCEDURE — 84145 PROCALCITONIN (PCT): CPT | Performed by: INTERNAL MEDICINE

## 2018-01-01 PROCEDURE — 74011000258 HC RX REV CODE- 258: Performed by: EMERGENCY MEDICINE

## 2018-01-01 PROCEDURE — 74011000250 HC RX REV CODE- 250: Performed by: NURSE PRACTITIONER

## 2018-01-01 PROCEDURE — 87804 INFLUENZA ASSAY W/OPTIC: CPT | Performed by: INTERNAL MEDICINE

## 2018-01-01 PROCEDURE — 74011250636 HC RX REV CODE- 250/636: Performed by: RADIOLOGY

## 2018-01-01 PROCEDURE — 76450000000

## 2018-01-01 PROCEDURE — 0BH18EZ INSERTION OF ENDOTRACHEAL AIRWAY INTO TRACHEA, VIA NATURAL OR ARTIFICIAL OPENING ENDOSCOPIC: ICD-10-PCS | Performed by: INTERNAL MEDICINE

## 2018-01-01 PROCEDURE — 80202 ASSAY OF VANCOMYCIN: CPT | Performed by: INTERNAL MEDICINE

## 2018-01-01 PROCEDURE — 36415 COLL VENOUS BLD VENIPUNCTURE: CPT | Performed by: INTERNAL MEDICINE

## 2018-01-01 PROCEDURE — 96361 HYDRATE IV INFUSION ADD-ON: CPT

## 2018-01-01 PROCEDURE — 96417 CHEMO IV INFUS EACH ADDL SEQ: CPT

## 2018-01-01 PROCEDURE — 77030027138 HC INCENT SPIROMETER -A

## 2018-01-01 PROCEDURE — 5A1955Z RESPIRATORY VENTILATION, GREATER THAN 96 CONSECUTIVE HOURS: ICD-10-PCS | Performed by: INTERNAL MEDICINE

## 2018-01-01 PROCEDURE — 82803 BLOOD GASES ANY COMBINATION: CPT

## 2018-01-01 PROCEDURE — 80053 COMPREHEN METABOLIC PANEL: CPT | Performed by: INTERNAL MEDICINE

## 2018-01-01 PROCEDURE — 0B9D8ZX DRAINAGE OF RIGHT MIDDLE LUNG LOBE, VIA NATURAL OR ARTIFICIAL OPENING ENDOSCOPIC, DIAGNOSTIC: ICD-10-PCS | Performed by: INTERNAL MEDICINE

## 2018-01-01 PROCEDURE — 74011000250 HC RX REV CODE- 250: Performed by: EMERGENCY MEDICINE

## 2018-01-01 PROCEDURE — 99152 MOD SED SAME PHYS/QHP 5/>YRS: CPT

## 2018-01-01 PROCEDURE — 96413 CHEMO IV INFUSION 1 HR: CPT

## 2018-01-01 PROCEDURE — 96372 THER/PROPH/DIAG INJ SC/IM: CPT | Performed by: EMERGENCY MEDICINE

## 2018-01-01 PROCEDURE — C9113 INJ PANTOPRAZOLE SODIUM, VIA: HCPCS | Performed by: NURSE PRACTITIONER

## 2018-01-01 PROCEDURE — 87799 DETECT AGENT NOS DNA QUANT: CPT | Performed by: INTERNAL MEDICINE

## 2018-01-01 PROCEDURE — 87633 RESP VIRUS 12-25 TARGETS: CPT | Performed by: INTERNAL MEDICINE

## 2018-01-01 PROCEDURE — 83880 ASSAY OF NATRIURETIC PEPTIDE: CPT | Performed by: EMERGENCY MEDICINE

## 2018-01-01 PROCEDURE — 87493 C DIFF AMPLIFIED PROBE: CPT | Performed by: INTERNAL MEDICINE

## 2018-01-01 PROCEDURE — 77030034849

## 2018-01-01 PROCEDURE — 74011250637 HC RX REV CODE- 250/637: Performed by: FAMILY MEDICINE

## 2018-01-01 PROCEDURE — 84478 ASSAY OF TRIGLYCERIDES: CPT | Performed by: INTERNAL MEDICINE

## 2018-01-01 PROCEDURE — 99223 1ST HOSP IP/OBS HIGH 75: CPT | Performed by: INTERNAL MEDICINE

## 2018-01-01 PROCEDURE — C1729 CATH, DRAINAGE: HCPCS

## 2018-01-01 PROCEDURE — 99153 MOD SED SAME PHYS/QHP EA: CPT

## 2018-01-01 PROCEDURE — 84145 PROCALCITONIN (PCT): CPT | Performed by: EMERGENCY MEDICINE

## 2018-01-01 PROCEDURE — 96365 THER/PROPH/DIAG IV INF INIT: CPT | Performed by: EMERGENCY MEDICINE

## 2018-01-01 PROCEDURE — 0W9930Z DRAINAGE OF RIGHT PLEURAL CAVITY WITH DRAINAGE DEVICE, PERCUTANEOUS APPROACH: ICD-10-PCS | Performed by: INTERNAL MEDICINE

## 2018-01-01 PROCEDURE — 84132 ASSAY OF SERUM POTASSIUM: CPT | Performed by: INTERNAL MEDICINE

## 2018-01-01 PROCEDURE — 74011000258 HC RX REV CODE- 258: Performed by: NURSE PRACTITIONER

## 2018-01-01 PROCEDURE — 86140 C-REACTIVE PROTEIN: CPT | Performed by: NURSE PRACTITIONER

## 2018-01-01 PROCEDURE — 99222 1ST HOSP IP/OBS MODERATE 55: CPT | Performed by: INTERNAL MEDICINE

## 2018-01-01 PROCEDURE — 31500 INSERT EMERGENCY AIRWAY: CPT | Performed by: INTERNAL MEDICINE

## 2018-01-01 PROCEDURE — 36593 DECLOT VASCULAR DEVICE: CPT

## 2018-01-01 PROCEDURE — 96372 THER/PROPH/DIAG INJ SC/IM: CPT

## 2018-01-01 PROCEDURE — 83520 IMMUNOASSAY QUANT NOS NONAB: CPT | Performed by: INTERNAL MEDICINE

## 2018-01-01 PROCEDURE — 77030008771 HC TU NG SALEM SUMP -A

## 2018-01-01 PROCEDURE — 87541 LEGION PNEUMO DNA AMP PROB: CPT | Performed by: INTERNAL MEDICINE

## 2018-01-01 PROCEDURE — 86738 MYCOPLASMA ANTIBODY: CPT | Performed by: INTERNAL MEDICINE

## 2018-01-01 PROCEDURE — C1788 PORT, INDWELLING, IMP: HCPCS

## 2018-01-01 PROCEDURE — 86580 TB INTRADERMAL TEST: CPT | Performed by: INTERNAL MEDICINE

## 2018-01-01 PROCEDURE — C1751 CATH, INF, PER/CENT/MIDLINE: HCPCS

## 2018-01-01 PROCEDURE — 77030010507 HC ADH SKN DERMBND J&J -B

## 2018-01-01 PROCEDURE — 88305 TISSUE EXAM BY PATHOLOGIST: CPT | Performed by: INTERNAL MEDICINE

## 2018-01-01 PROCEDURE — 97535 SELF CARE MNGMENT TRAINING: CPT

## 2018-01-01 PROCEDURE — 84478 ASSAY OF TRIGLYCERIDES: CPT | Performed by: NURSE PRACTITIONER

## 2018-01-01 PROCEDURE — 77030002933 HC SUT MCRYL J&J -A

## 2018-01-01 PROCEDURE — 99233 SBSQ HOSP IP/OBS HIGH 50: CPT | Performed by: NURSE PRACTITIONER

## 2018-01-01 PROCEDURE — 84100 ASSAY OF PHOSPHORUS: CPT | Performed by: INTERNAL MEDICINE

## 2018-01-01 PROCEDURE — 87077 CULTURE AEROBIC IDENTIFY: CPT | Performed by: INTERNAL MEDICINE

## 2018-01-01 PROCEDURE — 74011000250 HC RX REV CODE- 250: Performed by: RADIOLOGY

## 2018-01-01 PROCEDURE — 94002 VENT MGMT INPAT INIT DAY: CPT

## 2018-01-01 PROCEDURE — 31624 DX BRONCHOSCOPE/LAVAGE: CPT | Performed by: INTERNAL MEDICINE

## 2018-01-01 PROCEDURE — 93005 ELECTROCARDIOGRAM TRACING: CPT | Performed by: EMERGENCY MEDICINE

## 2018-01-01 PROCEDURE — 96375 TX/PRO/DX INJ NEW DRUG ADDON: CPT

## 2018-01-01 PROCEDURE — 0W9B30Z DRAINAGE OF LEFT PLEURAL CAVITY WITH DRAINAGE DEVICE, PERCUTANEOUS APPROACH: ICD-10-PCS | Performed by: INTERNAL MEDICINE

## 2018-01-01 PROCEDURE — 74018 RADEX ABDOMEN 1 VIEW: CPT

## 2018-01-01 PROCEDURE — 88112 CYTOPATH CELL ENHANCE TECH: CPT | Performed by: INTERNAL MEDICINE

## 2018-01-01 PROCEDURE — 87106 FUNGI IDENTIFICATION YEAST: CPT | Performed by: INTERNAL MEDICINE

## 2018-01-01 PROCEDURE — 77030037656 HC DRN CHST TU PLEURAGLD KT GTNG -B

## 2018-01-01 PROCEDURE — 76937 US GUIDE VASCULAR ACCESS: CPT

## 2018-01-01 PROCEDURE — 82962 GLUCOSE BLOOD TEST: CPT

## 2018-01-01 PROCEDURE — 77030012699 HC VLV SUC CNTRL OCOA -A: Performed by: INTERNAL MEDICINE

## 2018-01-01 PROCEDURE — 96360 HYDRATION IV INFUSION INIT: CPT

## 2018-01-01 PROCEDURE — 86140 C-REACTIVE PROTEIN: CPT | Performed by: INTERNAL MEDICINE

## 2018-01-01 PROCEDURE — 87070 CULTURE OTHR SPECIMN AEROBIC: CPT | Performed by: INTERNAL MEDICINE

## 2018-01-01 PROCEDURE — 81001 URINALYSIS AUTO W/SCOPE: CPT | Performed by: EMERGENCY MEDICINE

## 2018-01-01 PROCEDURE — 87186 SC STD MICRODIL/AGAR DIL: CPT | Performed by: INTERNAL MEDICINE

## 2018-01-01 PROCEDURE — 86356 MONONUCLEAR CELL ANTIGEN: CPT | Performed by: INTERNAL MEDICINE

## 2018-01-01 RX ORDER — FENTANYL CITRATE 50 UG/ML
100 INJECTION, SOLUTION INTRAMUSCULAR; INTRAVENOUS ONCE
Status: COMPLETED | OUTPATIENT
Start: 2018-01-01 | End: 2018-01-01

## 2018-01-01 RX ORDER — DEXAMETHASONE SODIUM PHOSPHATE 4 MG/ML
8 INJECTION, SOLUTION INTRA-ARTICULAR; INTRALESIONAL; INTRAMUSCULAR; INTRAVENOUS; SOFT TISSUE EVERY 12 HOURS
Status: DISCONTINUED | OUTPATIENT
Start: 2018-01-01 | End: 2018-02-13 | Stop reason: HOSPADM

## 2018-01-01 RX ORDER — ACETAMINOPHEN 325 MG/1
650 TABLET ORAL
Status: DISCONTINUED | OUTPATIENT
Start: 2018-01-01 | End: 2018-02-13 | Stop reason: HOSPADM

## 2018-01-01 RX ORDER — POTASSIUM CHLORIDE 29.8 MG/ML
20 INJECTION INTRAVENOUS ONCE
Status: DISCONTINUED | OUTPATIENT
Start: 2018-01-01 | End: 2018-01-01 | Stop reason: SDUPTHER

## 2018-01-01 RX ORDER — POTASSIUM CHLORIDE 14.9 MG/ML
20 INJECTION INTRAVENOUS
Status: COMPLETED | OUTPATIENT
Start: 2018-01-01 | End: 2018-01-01

## 2018-01-01 RX ORDER — METOPROLOL TARTRATE 5 MG/5ML
5 INJECTION INTRAVENOUS
Status: DISCONTINUED | OUTPATIENT
Start: 2018-01-01 | End: 2018-02-13 | Stop reason: HOSPADM

## 2018-01-01 RX ORDER — SODIUM CHLORIDE 0.9 % (FLUSH) 0.9 %
5-10 SYRINGE (ML) INJECTION EVERY 8 HOURS
Status: DISCONTINUED | OUTPATIENT
Start: 2018-01-01 | End: 2018-02-13 | Stop reason: HOSPADM

## 2018-01-01 RX ORDER — FLUMAZENIL 0.1 MG/ML
0.2 INJECTION INTRAVENOUS
Status: DISCONTINUED | OUTPATIENT
Start: 2018-01-01 | End: 2018-01-01 | Stop reason: HOSPADM

## 2018-01-01 RX ORDER — IPRATROPIUM BROMIDE AND ALBUTEROL SULFATE 2.5; .5 MG/3ML; MG/3ML
3 SOLUTION RESPIRATORY (INHALATION)
Status: DISCONTINUED | OUTPATIENT
Start: 2018-01-01 | End: 2018-01-01

## 2018-01-01 RX ORDER — POTASSIUM CHLORIDE 20 MEQ/1
40 TABLET, EXTENDED RELEASE ORAL DAILY
Status: DISCONTINUED | OUTPATIENT
Start: 2018-01-01 | End: 2018-01-01

## 2018-01-01 RX ORDER — FUROSEMIDE 10 MG/ML
40 INJECTION INTRAMUSCULAR; INTRAVENOUS ONCE
Status: COMPLETED | OUTPATIENT
Start: 2018-01-01 | End: 2018-01-01

## 2018-01-01 RX ORDER — FUROSEMIDE 10 MG/ML
40 INJECTION INTRAMUSCULAR; INTRAVENOUS EVERY 12 HOURS
Status: COMPLETED | OUTPATIENT
Start: 2018-01-01 | End: 2018-01-01

## 2018-01-01 RX ORDER — PROPOFOL 10 MG/ML
INJECTION, EMULSION INTRAVENOUS
Status: ACTIVE
Start: 2018-01-01 | End: 2018-01-01

## 2018-01-01 RX ORDER — IPRATROPIUM BROMIDE AND ALBUTEROL SULFATE 2.5; .5 MG/3ML; MG/3ML
3 SOLUTION RESPIRATORY (INHALATION)
Status: COMPLETED | OUTPATIENT
Start: 2018-01-01 | End: 2018-01-01

## 2018-01-01 RX ORDER — SODIUM CHLORIDE 9 MG/ML
1000 INJECTION, SOLUTION INTRAVENOUS ONCE
Status: COMPLETED | OUTPATIENT
Start: 2018-01-01 | End: 2018-01-01

## 2018-01-01 RX ORDER — LIDOCAINE HYDROCHLORIDE 40 MG/ML
SOLUTION TOPICAL
Status: COMPLETED | OUTPATIENT
Start: 2018-01-01 | End: 2018-01-01

## 2018-01-01 RX ORDER — SODIUM CHLORIDE 0.9 % (FLUSH) 0.9 %
10 SYRINGE (ML) INJECTION AS NEEDED
Status: ACTIVE | OUTPATIENT
Start: 2018-01-01 | End: 2018-01-01

## 2018-01-01 RX ORDER — SUCCINYLCHOLINE CHLORIDE 20 MG/ML
INJECTION INTRAMUSCULAR; INTRAVENOUS
Status: ACTIVE
Start: 2018-01-01 | End: 2018-01-01

## 2018-01-01 RX ORDER — HEPARIN SODIUM 5000 [USP'U]/ML
5000 INJECTION, SOLUTION INTRAVENOUS; SUBCUTANEOUS EVERY 12 HOURS
Status: DISCONTINUED | OUTPATIENT
Start: 2018-01-01 | End: 2018-02-13 | Stop reason: HOSPADM

## 2018-01-01 RX ORDER — LOPERAMIDE HYDROCHLORIDE 2 MG/1
2 CAPSULE ORAL
Status: DISCONTINUED | OUTPATIENT
Start: 2018-01-01 | End: 2018-01-01

## 2018-01-01 RX ORDER — MAGNESIUM SULFATE HEPTAHYDRATE 40 MG/ML
2 INJECTION, SOLUTION INTRAVENOUS ONCE
Status: COMPLETED | OUTPATIENT
Start: 2018-01-01 | End: 2018-01-01

## 2018-01-01 RX ORDER — HEPARIN 100 UNIT/ML
300-500 SYRINGE INTRAVENOUS AS NEEDED
Status: DISPENSED | OUTPATIENT
Start: 2018-01-01 | End: 2018-01-01

## 2018-01-01 RX ORDER — HEPARIN SODIUM (PORCINE) LOCK FLUSH IV SOLN 100 UNIT/ML 100 UNIT/ML
300 SOLUTION INTRAVENOUS AS NEEDED
Status: DISCONTINUED | OUTPATIENT
Start: 2018-01-01 | End: 2018-01-01 | Stop reason: HOSPADM

## 2018-01-01 RX ORDER — LIDOCAINE HYDROCHLORIDE 20 MG/ML
1-20 INJECTION, SOLUTION INFILTRATION; PERINEURAL
Status: DISCONTINUED | OUTPATIENT
Start: 2018-01-01 | End: 2018-01-01 | Stop reason: HOSPADM

## 2018-01-01 RX ORDER — FUROSEMIDE 10 MG/ML
20 INJECTION INTRAMUSCULAR; INTRAVENOUS DAILY
Status: DISCONTINUED | OUTPATIENT
Start: 2018-01-01 | End: 2018-01-01

## 2018-01-01 RX ORDER — ATORVASTATIN CALCIUM 10 MG/1
20 TABLET, FILM COATED ORAL DAILY
Status: DISCONTINUED | OUTPATIENT
Start: 2018-01-01 | End: 2018-02-13 | Stop reason: HOSPADM

## 2018-01-01 RX ORDER — HEPARIN SODIUM 5000 [USP'U]/ML
5000 INJECTION, SOLUTION INTRAVENOUS; SUBCUTANEOUS EVERY 12 HOURS
Status: DISCONTINUED | OUTPATIENT
Start: 2018-01-01 | End: 2018-01-01 | Stop reason: SDUPTHER

## 2018-01-01 RX ORDER — ETOMIDATE 2 MG/ML
INJECTION INTRAVENOUS
Status: COMPLETED
Start: 2018-01-01 | End: 2018-01-01

## 2018-01-01 RX ORDER — VANCOMYCIN/0.9 % SOD CHLORIDE 1.5G/250ML
1500 PLASTIC BAG, INJECTION (ML) INTRAVENOUS ONCE
Status: COMPLETED | OUTPATIENT
Start: 2018-01-01 | End: 2018-01-01

## 2018-01-01 RX ORDER — NALOXONE HYDROCHLORIDE 0.4 MG/ML
0.4 INJECTION, SOLUTION INTRAMUSCULAR; INTRAVENOUS; SUBCUTANEOUS
Status: DISCONTINUED | OUTPATIENT
Start: 2018-01-01 | End: 2018-01-01 | Stop reason: HOSPADM

## 2018-01-01 RX ORDER — LIDOCAINE HYDROCHLORIDE AND EPINEPHRINE 20; 5 MG/ML; UG/ML
1-20 INJECTION, SOLUTION EPIDURAL; INFILTRATION; INTRACAUDAL; PERINEURAL
Status: DISCONTINUED | OUTPATIENT
Start: 2018-01-01 | End: 2018-01-01 | Stop reason: HOSPADM

## 2018-01-01 RX ORDER — ONDANSETRON 2 MG/ML
8 INJECTION INTRAMUSCULAR; INTRAVENOUS ONCE
Status: COMPLETED | OUTPATIENT
Start: 2018-01-01 | End: 2018-01-01

## 2018-01-01 RX ORDER — LIDOCAINE HYDROCHLORIDE 20 MG/ML
JELLY TOPICAL
Status: COMPLETED | OUTPATIENT
Start: 2018-01-01 | End: 2018-01-01

## 2018-01-01 RX ORDER — METOPROLOL SUCCINATE 50 MG/1
50 TABLET, EXTENDED RELEASE ORAL DAILY
Status: DISCONTINUED | OUTPATIENT
Start: 2018-01-01 | End: 2018-01-01

## 2018-01-01 RX ORDER — SODIUM CHLORIDE 9 MG/ML
25 INJECTION, SOLUTION INTRAVENOUS ONCE
Status: COMPLETED | OUTPATIENT
Start: 2018-01-01 | End: 2018-01-01

## 2018-01-01 RX ORDER — DEXTROSE, SODIUM CHLORIDE, SODIUM LACTATE, POTASSIUM CHLORIDE, AND CALCIUM CHLORIDE 5; .6; .31; .03; .02 G/100ML; G/100ML; G/100ML; G/100ML; G/100ML
75 INJECTION, SOLUTION INTRAVENOUS CONTINUOUS
Status: DISCONTINUED | OUTPATIENT
Start: 2018-01-01 | End: 2018-02-13 | Stop reason: HOSPADM

## 2018-01-01 RX ORDER — HYDRALAZINE HYDROCHLORIDE 20 MG/ML
20 INJECTION INTRAMUSCULAR; INTRAVENOUS
Status: DISCONTINUED | OUTPATIENT
Start: 2018-01-01 | End: 2018-02-13 | Stop reason: HOSPADM

## 2018-01-01 RX ORDER — HYDROCODONE BITARTRATE AND ACETAMINOPHEN 7.5; 325 MG/1; MG/1
1 TABLET ORAL
Status: DISCONTINUED | OUTPATIENT
Start: 2018-01-01 | End: 2018-02-13 | Stop reason: HOSPADM

## 2018-01-01 RX ORDER — LOPERAMIDE HYDROCHLORIDE 2 MG/1
2 CAPSULE ORAL
Status: DISCONTINUED | OUTPATIENT
Start: 2018-01-01 | End: 2018-02-13 | Stop reason: HOSPADM

## 2018-01-01 RX ORDER — MORPHINE SULFATE 10 MG/ML
1-10 INJECTION, SOLUTION INTRAMUSCULAR; INTRAVENOUS
Status: DISCONTINUED | OUTPATIENT
Start: 2018-01-01 | End: 2018-02-13 | Stop reason: HOSPADM

## 2018-01-01 RX ORDER — ETOMIDATE 2 MG/ML
22 INJECTION INTRAVENOUS ONCE
Status: COMPLETED | OUTPATIENT
Start: 2018-01-01 | End: 2018-01-01

## 2018-01-01 RX ORDER — POTASSIUM CHLORIDE 29.8 MG/ML
40 INJECTION INTRAVENOUS ONCE
Status: COMPLETED | OUTPATIENT
Start: 2018-01-01 | End: 2018-01-01

## 2018-01-01 RX ORDER — PROPOFOL 10 MG/ML
0-50 VIAL (ML) INTRAVENOUS
Status: DISCONTINUED | OUTPATIENT
Start: 2018-01-01 | End: 2018-02-13 | Stop reason: HOSPADM

## 2018-01-01 RX ORDER — NOREPINEPHRINE BITARTRATE/D5W 4MG/250ML
PLASTIC BAG, INJECTION (ML) INTRAVENOUS
Status: COMPLETED
Start: 2018-01-01 | End: 2018-01-01

## 2018-01-01 RX ORDER — DIPHENHYDRAMINE HYDROCHLORIDE 50 MG/ML
50 INJECTION, SOLUTION INTRAMUSCULAR; INTRAVENOUS AS NEEDED
Status: DISPENSED | OUTPATIENT
Start: 2018-01-01 | End: 2018-01-01

## 2018-01-01 RX ORDER — OSELTAMIVIR PHOSPHATE 30 MG/1
30 CAPSULE ORAL EVERY 12 HOURS
Status: COMPLETED | OUTPATIENT
Start: 2018-01-01 | End: 2018-01-01

## 2018-01-01 RX ORDER — SODIUM CHLORIDE 9 MG/ML
1000 INJECTION, SOLUTION INTRAVENOUS
Status: COMPLETED | OUTPATIENT
Start: 2018-01-01 | End: 2018-01-01

## 2018-01-01 RX ORDER — GUAIFENESIN 600 MG/1
1200 TABLET, EXTENDED RELEASE ORAL 2 TIMES DAILY
Status: DISCONTINUED | OUTPATIENT
Start: 2018-01-01 | End: 2018-01-01

## 2018-01-01 RX ORDER — DEXAMETHASONE SODIUM PHOSPHATE 100 MG/10ML
10 INJECTION INTRAMUSCULAR; INTRAVENOUS ONCE
Status: COMPLETED | OUTPATIENT
Start: 2018-01-01 | End: 2018-01-01

## 2018-01-01 RX ORDER — MIDAZOLAM HYDROCHLORIDE 1 MG/ML
.25-5 INJECTION, SOLUTION INTRAMUSCULAR; INTRAVENOUS
Status: DISCONTINUED | OUTPATIENT
Start: 2018-01-01 | End: 2018-01-01 | Stop reason: HOSPADM

## 2018-01-01 RX ORDER — LORAZEPAM 1 MG/1
1 TABLET ORAL
Status: DISCONTINUED | OUTPATIENT
Start: 2018-01-01 | End: 2018-01-01

## 2018-01-01 RX ORDER — MORPHINE SULFATE 8 MG/ML
2 INJECTION, SOLUTION INTRAMUSCULAR; INTRAVENOUS
Status: DISCONTINUED | OUTPATIENT
Start: 2018-01-01 | End: 2018-02-13 | Stop reason: HOSPADM

## 2018-01-01 RX ORDER — PROCHLORPERAZINE MALEATE 10 MG
10 TABLET ORAL
Status: DISCONTINUED | OUTPATIENT
Start: 2018-01-01 | End: 2018-02-13 | Stop reason: HOSPADM

## 2018-01-01 RX ORDER — GUAIFENESIN 100 MG/5ML
400 SOLUTION ORAL
Status: DISCONTINUED | OUTPATIENT
Start: 2018-01-01 | End: 2018-02-13 | Stop reason: HOSPADM

## 2018-01-01 RX ORDER — FENTANYL CITRATE 50 UG/ML
25 INJECTION, SOLUTION INTRAMUSCULAR; INTRAVENOUS
Status: DISCONTINUED | OUTPATIENT
Start: 2018-01-01 | End: 2018-01-01 | Stop reason: HOSPADM

## 2018-01-01 RX ORDER — HEPARIN 100 UNIT/ML
500 SYRINGE INTRAVENOUS AS NEEDED
Status: ACTIVE | OUTPATIENT
Start: 2018-01-01 | End: 2018-01-01

## 2018-01-01 RX ORDER — DEXTROSE, SODIUM CHLORIDE, AND POTASSIUM CHLORIDE 5; .45; .075 G/100ML; G/100ML; G/100ML
50 INJECTION INTRAVENOUS CONTINUOUS
Status: DISCONTINUED | OUTPATIENT
Start: 2018-01-01 | End: 2018-01-01

## 2018-01-01 RX ORDER — HEPARIN SODIUM (PORCINE) LOCK FLUSH IV SOLN 100 UNIT/ML 100 UNIT/ML
500 SOLUTION INTRAVENOUS ONCE
Status: COMPLETED | OUTPATIENT
Start: 2018-01-01 | End: 2018-01-01

## 2018-01-01 RX ORDER — METOPROLOL TARTRATE 25 MG/1
25 TABLET, FILM COATED ORAL EVERY 12 HOURS
Status: DISCONTINUED | OUTPATIENT
Start: 2018-01-01 | End: 2018-02-13 | Stop reason: HOSPADM

## 2018-01-01 RX ORDER — FENTANYL CITRATE 50 UG/ML
INJECTION, SOLUTION INTRAMUSCULAR; INTRAVENOUS
Status: COMPLETED
Start: 2018-01-01 | End: 2018-01-01

## 2018-01-01 RX ORDER — MAGNESIUM SULFATE HEPTAHYDRATE 40 MG/ML
4 INJECTION, SOLUTION INTRAVENOUS ONCE
Status: COMPLETED | OUTPATIENT
Start: 2018-01-01 | End: 2018-01-01

## 2018-01-01 RX ORDER — HEPARIN SODIUM 200 [USP'U]/100ML
1000 INJECTION, SOLUTION INTRAVENOUS
Status: DISCONTINUED | OUTPATIENT
Start: 2018-01-01 | End: 2018-01-01 | Stop reason: HOSPADM

## 2018-01-01 RX ORDER — POTASSIUM CHLORIDE 750 MG/1
40 TABLET, EXTENDED RELEASE ORAL 2 TIMES DAILY
Status: DISPENSED | OUTPATIENT
Start: 2018-01-01 | End: 2018-01-01

## 2018-01-01 RX ORDER — MIDAZOLAM HYDROCHLORIDE 1 MG/ML
.5-2 INJECTION, SOLUTION INTRAMUSCULAR; INTRAVENOUS
Status: DISCONTINUED | OUTPATIENT
Start: 2018-01-01 | End: 2018-01-01 | Stop reason: HOSPADM

## 2018-01-01 RX ORDER — FENTANYL CITRATE-0.9 % NACL/PF 25 MCG/ML
0-200 PLASTIC BAG, INJECTION (ML) INJECTION
Status: DISCONTINUED | OUTPATIENT
Start: 2018-01-01 | End: 2018-02-13 | Stop reason: HOSPADM

## 2018-01-01 RX ORDER — FENTANYL CITRATE 50 UG/ML
25-100 INJECTION, SOLUTION INTRAMUSCULAR; INTRAVENOUS
Status: DISCONTINUED | OUTPATIENT
Start: 2018-01-01 | End: 2018-01-01 | Stop reason: HOSPADM

## 2018-01-01 RX ORDER — SODIUM CHLORIDE 0.9 % (FLUSH) 0.9 %
5-10 SYRINGE (ML) INJECTION AS NEEDED
Status: DISCONTINUED | OUTPATIENT
Start: 2018-01-01 | End: 2018-02-13 | Stop reason: HOSPADM

## 2018-01-01 RX ORDER — DIPHENHYDRAMINE HYDROCHLORIDE 50 MG/ML
25-50 INJECTION, SOLUTION INTRAMUSCULAR; INTRAVENOUS ONCE
Status: ACTIVE | OUTPATIENT
Start: 2018-01-01 | End: 2018-01-01

## 2018-01-01 RX ORDER — PREDNISONE 20 MG/1
40 TABLET ORAL
Status: DISCONTINUED | OUTPATIENT
Start: 2018-01-01 | End: 2018-01-01

## 2018-01-01 RX ORDER — POTASSIUM CHLORIDE 20 MEQ/1
40 TABLET, EXTENDED RELEASE ORAL
Status: DISCONTINUED | OUTPATIENT
Start: 2018-01-01 | End: 2018-01-01

## 2018-01-01 RX ORDER — SODIUM CHLORIDE 0.9 % (FLUSH) 0.9 %
10 SYRINGE (ML) INJECTION EVERY 8 HOURS
Status: DISCONTINUED | OUTPATIENT
Start: 2018-01-01 | End: 2018-01-01 | Stop reason: HOSPADM

## 2018-01-01 RX ORDER — HYDROCODONE BITARTRATE AND HOMATROPINE METHYLBROMIDE 1.5; 5 MG/5ML; MG/5ML
5 SYRUP ORAL
Status: DISCONTINUED | OUTPATIENT
Start: 2018-01-01 | End: 2018-02-13 | Stop reason: HOSPADM

## 2018-01-01 RX ORDER — LORAZEPAM 2 MG/ML
0.5 INJECTION INTRAMUSCULAR
Status: DISCONTINUED | OUTPATIENT
Start: 2018-01-01 | End: 2018-02-13 | Stop reason: HOSPADM

## 2018-01-01 RX ORDER — LOSARTAN POTASSIUM 50 MG/1
50 TABLET ORAL DAILY
Status: DISCONTINUED | OUTPATIENT
Start: 2018-01-01 | End: 2018-02-13 | Stop reason: HOSPADM

## 2018-01-01 RX ORDER — ALBUTEROL SULFATE 0.83 MG/ML
2.5 SOLUTION RESPIRATORY (INHALATION)
Status: DISCONTINUED | OUTPATIENT
Start: 2018-01-01 | End: 2018-02-13 | Stop reason: HOSPADM

## 2018-01-01 RX ORDER — HYDROCORTISONE SODIUM SUCCINATE 100 MG/2ML
100 INJECTION, POWDER, FOR SOLUTION INTRAMUSCULAR; INTRAVENOUS AS NEEDED
Status: DISPENSED | OUTPATIENT
Start: 2018-01-01 | End: 2018-01-01

## 2018-01-01 RX ORDER — SERTRALINE HYDROCHLORIDE 100 MG/1
100 TABLET, FILM COATED ORAL DAILY
Status: DISCONTINUED | OUTPATIENT
Start: 2018-01-01 | End: 2018-02-13 | Stop reason: HOSPADM

## 2018-01-01 RX ADMIN — PIPERACILLIN SODIUM,TAZOBACTAM SODIUM 4.5 G: 4; .5 INJECTION, POWDER, FOR SOLUTION INTRAVENOUS at 17:29

## 2018-01-01 RX ADMIN — METHYLPREDNISOLONE SODIUM SUCCINATE 80 MG: 125 INJECTION, POWDER, FOR SOLUTION INTRAMUSCULAR; INTRAVENOUS at 18:17

## 2018-01-01 RX ADMIN — HYDRALAZINE HYDROCHLORIDE 20 MG: 20 INJECTION INTRAMUSCULAR; INTRAVENOUS at 08:42

## 2018-01-01 RX ADMIN — HEPARIN SODIUM 5000 UNITS: 5000 INJECTION, SOLUTION INTRAVENOUS; SUBCUTANEOUS at 19:48

## 2018-01-01 RX ADMIN — ACETAMINOPHEN 650 MG: 325 TABLET, FILM COATED ORAL at 06:43

## 2018-01-01 RX ADMIN — ALBUTEROL SULFATE 2.5 MG: 2.5 SOLUTION RESPIRATORY (INHALATION) at 11:05

## 2018-01-01 RX ADMIN — LOPERAMIDE HYDROCHLORIDE 2 MG: 2 CAPSULE ORAL at 18:00

## 2018-01-01 RX ADMIN — ATORVASTATIN CALCIUM 20 MG: 40 TABLET, FILM COATED ORAL at 10:15

## 2018-01-01 RX ADMIN — METHYLPREDNISOLONE SODIUM SUCCINATE 80 MG: 125 INJECTION, POWDER, FOR SOLUTION INTRAMUSCULAR; INTRAVENOUS at 05:47

## 2018-01-01 RX ADMIN — ALBUTEROL SULFATE 2.5 MG: 2.5 SOLUTION RESPIRATORY (INHALATION) at 08:53

## 2018-01-01 RX ADMIN — Medication 10 ML: at 14:39

## 2018-01-01 RX ADMIN — ATORVASTATIN CALCIUM 20 MG: 40 TABLET, FILM COATED ORAL at 08:57

## 2018-01-01 RX ADMIN — HEPARIN SODIUM 5000 UNITS: 5000 INJECTION, SOLUTION INTRAVENOUS; SUBCUTANEOUS at 08:46

## 2018-01-01 RX ADMIN — ALBUTEROL SULFATE 2.5 MG: 2.5 SOLUTION RESPIRATORY (INHALATION) at 13:46

## 2018-01-01 RX ADMIN — METHYLPREDNISOLONE SODIUM SUCCINATE 60 MG: 125 INJECTION, POWDER, FOR SOLUTION INTRAMUSCULAR; INTRAVENOUS at 04:32

## 2018-01-01 RX ADMIN — Medication 10 ML: at 21:30

## 2018-01-01 RX ADMIN — PIPERACILLIN SODIUM,TAZOBACTAM SODIUM 4.5 G: 4; .5 INJECTION, POWDER, FOR SOLUTION INTRAVENOUS at 17:09

## 2018-01-01 RX ADMIN — Medication 2 MG: at 21:56

## 2018-01-01 RX ADMIN — PIPERACILLIN SODIUM,TAZOBACTAM SODIUM 4.5 G: 4; .5 INJECTION, POWDER, FOR SOLUTION INTRAVENOUS at 18:26

## 2018-01-01 RX ADMIN — HEPARIN SODIUM 5000 UNITS: 5000 INJECTION, SOLUTION INTRAVENOUS; SUBCUTANEOUS at 09:35

## 2018-01-01 RX ADMIN — ALBUTEROL SULFATE 2.5 MG: 2.5 SOLUTION RESPIRATORY (INHALATION) at 15:32

## 2018-01-01 RX ADMIN — DEXTROSE MONOHYDRATE: 5 INJECTION, SOLUTION INTRAVENOUS at 05:30

## 2018-01-01 RX ADMIN — ETOMIDATE 22 MG: 2 INJECTION, SOLUTION INTRAVENOUS at 13:38

## 2018-01-01 RX ADMIN — AZITHROMYCIN MONOHYDRATE 500 MG: 500 INJECTION, POWDER, LYOPHILIZED, FOR SOLUTION INTRAVENOUS at 09:35

## 2018-01-01 RX ADMIN — HEPARIN SODIUM 5000 UNITS: 5000 INJECTION, SOLUTION INTRAVENOUS; SUBCUTANEOUS at 09:05

## 2018-01-01 RX ADMIN — DEXTROSE MONOHYDRATE, SODIUM CHLORIDE, AND POTASSIUM CHLORIDE 50 ML/HR: 50; 4.5; .745 INJECTION, SOLUTION INTRAVENOUS at 09:43

## 2018-01-01 RX ADMIN — HEPARIN SODIUM 5000 UNITS: 5000 INJECTION, SOLUTION INTRAVENOUS; SUBCUTANEOUS at 20:11

## 2018-01-01 RX ADMIN — HEPARIN SODIUM 5000 UNITS: 5000 INJECTION, SOLUTION INTRAVENOUS; SUBCUTANEOUS at 20:29

## 2018-01-01 RX ADMIN — LORAZEPAM 1 MG: 1 TABLET ORAL at 22:08

## 2018-01-01 RX ADMIN — METHYLPREDNISOLONE SODIUM SUCCINATE 80 MG: 125 INJECTION, POWDER, FOR SOLUTION INTRAMUSCULAR; INTRAVENOUS at 19:47

## 2018-01-01 RX ADMIN — LOSARTAN POTASSIUM 50 MG: 50 TABLET ORAL at 08:46

## 2018-01-01 RX ADMIN — ATORVASTATIN CALCIUM 20 MG: 40 TABLET, FILM COATED ORAL at 09:57

## 2018-01-01 RX ADMIN — FENTANYL CITRATE 100 MCG: 50 INJECTION INTRAMUSCULAR; INTRAVENOUS at 13:37

## 2018-01-01 RX ADMIN — PROPOFOL 10 MCG/KG/MIN: 10 INJECTION, EMULSION INTRAVENOUS at 13:51

## 2018-01-01 RX ADMIN — GUAIFENESIN 1200 MG: 600 TABLET, EXTENDED RELEASE ORAL at 08:57

## 2018-01-01 RX ADMIN — Medication 4 MCG/MIN: at 16:42

## 2018-01-01 RX ADMIN — ATORVASTATIN CALCIUM 20 MG: 40 TABLET, FILM COATED ORAL at 09:35

## 2018-01-01 RX ADMIN — Medication 10 ML: at 04:24

## 2018-01-01 RX ADMIN — ALBUTEROL SULFATE 2.5 MG: 2.5 SOLUTION RESPIRATORY (INHALATION) at 15:18

## 2018-01-01 RX ADMIN — ATORVASTATIN CALCIUM 20 MG: 40 TABLET, FILM COATED ORAL at 09:40

## 2018-01-01 RX ADMIN — Medication: at 00:20

## 2018-01-01 RX ADMIN — Medication 10 ML: at 13:33

## 2018-01-01 RX ADMIN — GUAIFENESIN 1200 MG: 600 TABLET, EXTENDED RELEASE ORAL at 22:14

## 2018-01-01 RX ADMIN — LOSARTAN POTASSIUM 50 MG: 50 TABLET ORAL at 09:35

## 2018-01-01 RX ADMIN — Medication 10 ML: at 05:42

## 2018-01-01 RX ADMIN — VANCOMYCIN 1000 MG: 1 INJECTION, SOLUTION INTRAVENOUS at 12:23

## 2018-01-01 RX ADMIN — IPRATROPIUM BROMIDE AND ALBUTEROL SULFATE 3 ML: .5; 3 SOLUTION RESPIRATORY (INHALATION) at 11:20

## 2018-01-01 RX ADMIN — ALBUTEROL SULFATE 2.5 MG: 2.5 SOLUTION RESPIRATORY (INHALATION) at 16:13

## 2018-01-01 RX ADMIN — POTASSIUM CHLORIDE 20 MEQ: 14.9 INJECTION, SOLUTION INTRAVENOUS at 02:16

## 2018-01-01 RX ADMIN — METHYLPREDNISOLONE SODIUM SUCCINATE 80 MG: 125 INJECTION, POWDER, FOR SOLUTION INTRAMUSCULAR; INTRAVENOUS at 06:07

## 2018-01-01 RX ADMIN — Medication 10 ML: at 13:17

## 2018-01-01 RX ADMIN — PROPOFOL 30 MCG/KG/MIN: 10 INJECTION, EMULSION INTRAVENOUS at 15:15

## 2018-01-01 RX ADMIN — AZITHROMYCIN MONOHYDRATE 500 MG: 500 INJECTION, POWDER, LYOPHILIZED, FOR SOLUTION INTRAVENOUS at 09:01

## 2018-01-01 RX ADMIN — ALBUTEROL SULFATE 2.5 MG: 2.5 SOLUTION RESPIRATORY (INHALATION) at 20:58

## 2018-01-01 RX ADMIN — HEPARIN SODIUM 5000 UNITS: 5000 INJECTION, SOLUTION INTRAVENOUS; SUBCUTANEOUS at 08:55

## 2018-01-01 RX ADMIN — HEPARIN SODIUM 5000 UNITS: 5000 INJECTION, SOLUTION INTRAVENOUS; SUBCUTANEOUS at 21:03

## 2018-01-01 RX ADMIN — Medication 10 ML: at 05:29

## 2018-01-01 RX ADMIN — IPRATROPIUM BROMIDE AND ALBUTEROL SULFATE 3 ML: .5; 3 SOLUTION RESPIRATORY (INHALATION) at 11:55

## 2018-01-01 RX ADMIN — METOPROLOL SUCCINATE 50 MG: 50 TABLET, FILM COATED, EXTENDED RELEASE ORAL at 09:05

## 2018-01-01 RX ADMIN — LORAZEPAM 1 MG: 1 TABLET ORAL at 22:14

## 2018-01-01 RX ADMIN — METOPROLOL SUCCINATE 50 MG: 50 TABLET, FILM COATED, EXTENDED RELEASE ORAL at 08:58

## 2018-01-01 RX ADMIN — ALBUTEROL SULFATE 2.5 MG: 2.5 SOLUTION RESPIRATORY (INHALATION) at 07:31

## 2018-01-01 RX ADMIN — PROPOFOL 35 MCG/KG/MIN: 10 INJECTION, EMULSION INTRAVENOUS at 03:22

## 2018-01-01 RX ADMIN — ALBUTEROL SULFATE 2.5 MG: 2.5 SOLUTION RESPIRATORY (INHALATION) at 08:32

## 2018-01-01 RX ADMIN — SERTRALINE HYDROCHLORIDE 100 MG: 100 TABLET ORAL at 08:46

## 2018-01-01 RX ADMIN — ATORVASTATIN CALCIUM 20 MG: 40 TABLET, FILM COATED ORAL at 09:49

## 2018-01-01 RX ADMIN — ATORVASTATIN CALCIUM 20 MG: 40 TABLET, FILM COATED ORAL at 09:52

## 2018-01-01 RX ADMIN — VANCOMYCIN HYDROCHLORIDE 1000 MG: 10 INJECTION, POWDER, LYOPHILIZED, FOR SOLUTION INTRAVENOUS at 01:39

## 2018-01-01 RX ADMIN — SODIUM CHLORIDE, SODIUM LACTATE, POTASSIUM CHLORIDE, CALCIUM CHLORIDE, AND DEXTROSE MONOHYDRATE 50 ML/HR: 600; 310; 30; 20; 5 INJECTION, SOLUTION INTRAVENOUS at 12:44

## 2018-01-01 RX ADMIN — GUAIFENESIN 1200 MG: 600 TABLET, EXTENDED RELEASE ORAL at 10:13

## 2018-01-01 RX ADMIN — METOPROLOL SUCCINATE 50 MG: 50 TABLET, FILM COATED, EXTENDED RELEASE ORAL at 09:41

## 2018-01-01 RX ADMIN — ALBUTEROL SULFATE 2.5 MG: 2.5 SOLUTION RESPIRATORY (INHALATION) at 11:35

## 2018-01-01 RX ADMIN — LOSARTAN POTASSIUM 50 MG: 50 TABLET ORAL at 08:18

## 2018-01-01 RX ADMIN — Medication 2 MG: at 09:42

## 2018-01-01 RX ADMIN — LORAZEPAM 1 MG: 1 TABLET ORAL at 22:58

## 2018-01-01 RX ADMIN — FUROSEMIDE 40 MG: 10 INJECTION, SOLUTION INTRAMUSCULAR; INTRAVENOUS at 08:54

## 2018-01-01 RX ADMIN — ALBUTEROL SULFATE 2.5 MG: 2.5 SOLUTION RESPIRATORY (INHALATION) at 11:17

## 2018-01-01 RX ADMIN — AZITHROMYCIN MONOHYDRATE 500 MG: 500 INJECTION, POWDER, LYOPHILIZED, FOR SOLUTION INTRAVENOUS at 10:42

## 2018-01-01 RX ADMIN — HEPARIN SODIUM 5000 UNITS: 5000 INJECTION, SOLUTION INTRAVENOUS; SUBCUTANEOUS at 21:28

## 2018-01-01 RX ADMIN — OSELTAMIVIR PHOSPHATE 30 MG: 30 CAPSULE ORAL at 10:13

## 2018-01-01 RX ADMIN — LORAZEPAM 1 MG: 1 TABLET ORAL at 20:51

## 2018-01-01 RX ADMIN — ALBUTEROL SULFATE 2.5 MG: 2.5 SOLUTION RESPIRATORY (INHALATION) at 12:18

## 2018-01-01 RX ADMIN — GUAIFENESIN 1200 MG: 600 TABLET, EXTENDED RELEASE ORAL at 08:54

## 2018-01-01 RX ADMIN — METOPROLOL TARTRATE 25 MG: 25 TABLET ORAL at 10:27

## 2018-01-01 RX ADMIN — ALBUTEROL SULFATE 2.5 MG: 2.5 SOLUTION RESPIRATORY (INHALATION) at 15:03

## 2018-01-01 RX ADMIN — METHYLPREDNISOLONE SODIUM SUCCINATE 60 MG: 125 INJECTION, POWDER, FOR SOLUTION INTRAMUSCULAR; INTRAVENOUS at 06:00

## 2018-01-01 RX ADMIN — SODIUM CHLORIDE 40 MG: 9 INJECTION INTRAMUSCULAR; INTRAVENOUS; SUBCUTANEOUS at 10:27

## 2018-01-01 RX ADMIN — METOPROLOL TARTRATE 25 MG: 25 TABLET ORAL at 10:02

## 2018-01-01 RX ADMIN — ATORVASTATIN CALCIUM 20 MG: 40 TABLET, FILM COATED ORAL at 10:27

## 2018-01-01 RX ADMIN — ATORVASTATIN CALCIUM 20 MG: 40 TABLET, FILM COATED ORAL at 08:54

## 2018-01-01 RX ADMIN — VANCOMYCIN HYDROCHLORIDE 1000 MG: 10 INJECTION, POWDER, LYOPHILIZED, FOR SOLUTION INTRAVENOUS at 02:01

## 2018-01-01 RX ADMIN — HEPARIN SODIUM 5000 UNITS: 5000 INJECTION, SOLUTION INTRAVENOUS; SUBCUTANEOUS at 09:51

## 2018-01-01 RX ADMIN — METHYLPREDNISOLONE SODIUM SUCCINATE 80 MG: 125 INJECTION, POWDER, FOR SOLUTION INTRAMUSCULAR; INTRAVENOUS at 17:01

## 2018-01-01 RX ADMIN — IPRATROPIUM BROMIDE AND ALBUTEROL SULFATE 3 ML: .5; 3 SOLUTION RESPIRATORY (INHALATION) at 04:19

## 2018-01-01 RX ADMIN — MAGNESIUM SULFATE IN WATER 4 G: 40 INJECTION, SOLUTION INTRAVENOUS at 09:26

## 2018-01-01 RX ADMIN — GUAIFENESIN 1200 MG: 600 TABLET, EXTENDED RELEASE ORAL at 20:07

## 2018-01-01 RX ADMIN — DEXTROSE MONOHYDRATE, SODIUM CHLORIDE, AND POTASSIUM CHLORIDE 50 ML/HR: 50; 4.5; .745 INJECTION, SOLUTION INTRAVENOUS at 10:44

## 2018-01-01 RX ADMIN — METHYLPREDNISOLONE SODIUM SUCCINATE 80 MG: 125 INJECTION, POWDER, FOR SOLUTION INTRAMUSCULAR; INTRAVENOUS at 18:25

## 2018-01-01 RX ADMIN — METOPROLOL SUCCINATE 50 MG: 50 TABLET, FILM COATED, EXTENDED RELEASE ORAL at 08:15

## 2018-01-01 RX ADMIN — HEPARIN SODIUM 5000 UNITS: 5000 INJECTION, SOLUTION INTRAVENOUS; SUBCUTANEOUS at 21:34

## 2018-01-01 RX ADMIN — HEPARIN SODIUM 5000 UNITS: 5000 INJECTION, SOLUTION INTRAVENOUS; SUBCUTANEOUS at 09:42

## 2018-01-01 RX ADMIN — Medication 10 ML: at 13:13

## 2018-01-01 RX ADMIN — LORAZEPAM 1 MG: 1 TABLET ORAL at 21:51

## 2018-01-01 RX ADMIN — HEPARIN SODIUM 5000 UNITS: 5000 INJECTION, SOLUTION INTRAVENOUS; SUBCUTANEOUS at 08:18

## 2018-01-01 RX ADMIN — PIPERACILLIN SODIUM,TAZOBACTAM SODIUM 4.5 G: 4; .5 INJECTION, POWDER, FOR SOLUTION INTRAVENOUS at 19:02

## 2018-01-01 RX ADMIN — IPRATROPIUM BROMIDE AND ALBUTEROL SULFATE 3 ML: .5; 3 SOLUTION RESPIRATORY (INHALATION) at 05:00

## 2018-01-01 RX ADMIN — Medication 5 ML: at 20:59

## 2018-01-01 RX ADMIN — Medication 10 ML: at 05:25

## 2018-01-01 RX ADMIN — ALBUTEROL SULFATE 2.5 MG: 2.5 SOLUTION RESPIRATORY (INHALATION) at 20:04

## 2018-01-01 RX ADMIN — Medication 10 ML: at 20:31

## 2018-01-01 RX ADMIN — Medication 10 ML: at 14:00

## 2018-01-01 RX ADMIN — ALBUTEROL SULFATE 2.5 MG: 2.5 SOLUTION RESPIRATORY (INHALATION) at 15:47

## 2018-01-01 RX ADMIN — Medication 10 ML: at 13:59

## 2018-01-01 RX ADMIN — ALBUTEROL SULFATE 2.5 MG: 2.5 SOLUTION RESPIRATORY (INHALATION) at 17:11

## 2018-01-01 RX ADMIN — WATER 2 MG: 1 INJECTION INTRAMUSCULAR; INTRAVENOUS; SUBCUTANEOUS at 04:15

## 2018-01-01 RX ADMIN — Medication 10 ML: at 14:23

## 2018-01-01 RX ADMIN — VANCOMYCIN 1000 MG: 1 INJECTION, SOLUTION INTRAVENOUS at 17:38

## 2018-01-01 RX ADMIN — PIPERACILLIN SODIUM,TAZOBACTAM SODIUM 4.5 G: 4; .5 INJECTION, POWDER, FOR SOLUTION INTRAVENOUS at 14:23

## 2018-01-01 RX ADMIN — LORAZEPAM 1 MG: 1 TABLET ORAL at 21:37

## 2018-01-01 RX ADMIN — METHYLPREDNISOLONE SODIUM SUCCINATE 80 MG: 125 INJECTION, POWDER, FOR SOLUTION INTRAMUSCULAR; INTRAVENOUS at 05:58

## 2018-01-01 RX ADMIN — PIPERACILLIN SODIUM,TAZOBACTAM SODIUM 4.5 G: 4; .5 INJECTION, POWDER, FOR SOLUTION INTRAVENOUS at 01:47

## 2018-01-01 RX ADMIN — ATORVASTATIN CALCIUM 20 MG: 40 TABLET, FILM COATED ORAL at 08:46

## 2018-01-01 RX ADMIN — LORAZEPAM 0.5 MG: 2 INJECTION INTRAMUSCULAR; INTRAVENOUS at 17:13

## 2018-01-01 RX ADMIN — MAGNESIUM SULFATE IN WATER 4 G: 40 INJECTION, SOLUTION INTRAVENOUS at 09:43

## 2018-01-01 RX ADMIN — NOREPINEPHRINE BITARTRATE 4 MCG/MIN: 1 INJECTION INTRAVENOUS at 16:48

## 2018-01-01 RX ADMIN — Medication 10 ML: at 05:12

## 2018-01-01 RX ADMIN — LOSARTAN POTASSIUM 50 MG: 50 TABLET ORAL at 08:54

## 2018-01-01 RX ADMIN — GUAIFENESIN 1200 MG: 600 TABLET, EXTENDED RELEASE ORAL at 20:30

## 2018-01-01 RX ADMIN — FENTANYL CITRATE 100 MCG: 50 INJECTION, SOLUTION INTRAMUSCULAR; INTRAVENOUS at 13:37

## 2018-01-01 RX ADMIN — ALBUTEROL SULFATE 2.5 MG: 2.5 SOLUTION RESPIRATORY (INHALATION) at 20:34

## 2018-01-01 RX ADMIN — PIPERACILLIN SODIUM,TAZOBACTAM SODIUM 4.5 G: 4; .5 INJECTION, POWDER, FOR SOLUTION INTRAVENOUS at 11:06

## 2018-01-01 RX ADMIN — ALBUTEROL SULFATE 2.5 MG: 2.5 SOLUTION RESPIRATORY (INHALATION) at 11:13

## 2018-01-01 RX ADMIN — POTASSIUM CHLORIDE 20 MEQ: 200 INJECTION, SOLUTION INTRAVENOUS at 12:35

## 2018-01-01 RX ADMIN — PIPERACILLIN SODIUM,TAZOBACTAM SODIUM 4.5 G: 4; .5 INJECTION, POWDER, FOR SOLUTION INTRAVENOUS at 18:08

## 2018-01-01 RX ADMIN — METHYLPREDNISOLONE SODIUM SUCCINATE 60 MG: 125 INJECTION, POWDER, FOR SOLUTION INTRAMUSCULAR; INTRAVENOUS at 18:26

## 2018-01-01 RX ADMIN — LORAZEPAM 0.5 MG: 2 INJECTION INTRAMUSCULAR; INTRAVENOUS at 21:42

## 2018-01-01 RX ADMIN — ALBUTEROL SULFATE 2.5 MG: 2.5 SOLUTION RESPIRATORY (INHALATION) at 12:37

## 2018-01-01 RX ADMIN — OSELTAMIVIR PHOSPHATE 30 MG: 30 CAPSULE ORAL at 22:08

## 2018-01-01 RX ADMIN — SERTRALINE HYDROCHLORIDE 100 MG: 100 TABLET ORAL at 09:50

## 2018-01-01 RX ADMIN — IPRATROPIUM BROMIDE AND ALBUTEROL SULFATE 3 ML: .5; 3 SOLUTION RESPIRATORY (INHALATION) at 11:24

## 2018-01-01 RX ADMIN — PIPERACILLIN SODIUM,TAZOBACTAM SODIUM 4.5 G: 4; .5 INJECTION, POWDER, FOR SOLUTION INTRAVENOUS at 11:49

## 2018-01-01 RX ADMIN — TUBERCULIN PURIFIED PROTEIN DERIVATIVE 5 UNITS: 5 INJECTION INTRADERMAL at 16:45

## 2018-01-01 RX ADMIN — Medication 2 MG: at 03:30

## 2018-01-01 RX ADMIN — SERTRALINE HYDROCHLORIDE 100 MG: 100 TABLET ORAL at 09:05

## 2018-01-01 RX ADMIN — GUAIFENESIN 1200 MG: 600 TABLET, EXTENDED RELEASE ORAL at 09:34

## 2018-01-01 RX ADMIN — SERTRALINE HYDROCHLORIDE 100 MG: 100 TABLET ORAL at 09:40

## 2018-01-01 RX ADMIN — Medication 2 MG: at 18:43

## 2018-01-01 RX ADMIN — METHYLPREDNISOLONE SODIUM SUCCINATE 60 MG: 125 INJECTION, POWDER, FOR SOLUTION INTRAMUSCULAR; INTRAVENOUS at 17:49

## 2018-01-01 RX ADMIN — Medication 10 ML: at 19:38

## 2018-01-01 RX ADMIN — Medication 10 ML: at 06:50

## 2018-01-01 RX ADMIN — HYDROCODONE BITARTRATE AND HOMATROPINE METHYLBROMIDE 5 ML: 5; 1.5 SOLUTION ORAL at 00:16

## 2018-01-01 RX ADMIN — DEXAMETHASONE SODIUM PHOSPHATE 8 MG: 4 INJECTION, SOLUTION INTRAMUSCULAR; INTRAVENOUS at 06:19

## 2018-01-01 RX ADMIN — METHYLPREDNISOLONE SODIUM SUCCINATE 60 MG: 125 INJECTION, POWDER, FOR SOLUTION INTRAMUSCULAR; INTRAVENOUS at 17:33

## 2018-01-01 RX ADMIN — HEPARIN SODIUM 5000 UNITS: 5000 INJECTION, SOLUTION INTRAVENOUS; SUBCUTANEOUS at 08:15

## 2018-01-01 RX ADMIN — PIPERACILLIN SODIUM,TAZOBACTAM SODIUM 4.5 G: 4; .5 INJECTION, POWDER, FOR SOLUTION INTRAVENOUS at 03:27

## 2018-01-01 RX ADMIN — Medication 150 MCG/HR: at 05:30

## 2018-01-01 RX ADMIN — VANCOMYCIN HYDROCHLORIDE 1000 MG: 10 INJECTION, POWDER, LYOPHILIZED, FOR SOLUTION INTRAVENOUS at 13:34

## 2018-01-01 RX ADMIN — IPRATROPIUM BROMIDE AND ALBUTEROL SULFATE 3 ML: .5; 3 SOLUTION RESPIRATORY (INHALATION) at 04:00

## 2018-01-01 RX ADMIN — LOSARTAN POTASSIUM 50 MG: 50 TABLET ORAL at 12:03

## 2018-01-01 RX ADMIN — LORAZEPAM 1 MG: 1 TABLET ORAL at 09:02

## 2018-01-01 RX ADMIN — PREDNISONE 40 MG: 20 TABLET ORAL at 08:34

## 2018-01-01 RX ADMIN — FUROSEMIDE 40 MG: 10 INJECTION, SOLUTION INTRAMUSCULAR; INTRAVENOUS at 21:28

## 2018-01-01 RX ADMIN — Medication 10 ML: at 06:20

## 2018-01-01 RX ADMIN — HEPARIN SODIUM 5000 UNITS: 5000 INJECTION, SOLUTION INTRAVENOUS; SUBCUTANEOUS at 09:19

## 2018-01-01 RX ADMIN — WATER 2 MG: 1 INJECTION INTRAMUSCULAR; INTRAVENOUS; SUBCUTANEOUS at 13:43

## 2018-01-01 RX ADMIN — ALBUTEROL SULFATE 2.5 MG: 2.5 SOLUTION RESPIRATORY (INHALATION) at 16:39

## 2018-01-01 RX ADMIN — METOPROLOL SUCCINATE 50 MG: 50 TABLET, FILM COATED, EXTENDED RELEASE ORAL at 09:49

## 2018-01-01 RX ADMIN — ALBUTEROL SULFATE 2.5 MG: 2.5 SOLUTION RESPIRATORY (INHALATION) at 09:30

## 2018-01-01 RX ADMIN — METOPROLOL SUCCINATE 50 MG: 50 TABLET, FILM COATED, EXTENDED RELEASE ORAL at 10:13

## 2018-01-01 RX ADMIN — ALBUTEROL SULFATE 2.5 MG: 2.5 SOLUTION RESPIRATORY (INHALATION) at 20:00

## 2018-01-01 RX ADMIN — METOPROLOL SUCCINATE 50 MG: 50 TABLET, FILM COATED, EXTENDED RELEASE ORAL at 08:47

## 2018-01-01 RX ADMIN — ATORVASTATIN CALCIUM 20 MG: 40 TABLET, FILM COATED ORAL at 08:18

## 2018-01-01 RX ADMIN — IPRATROPIUM BROMIDE AND ALBUTEROL SULFATE 3 ML: .5; 3 SOLUTION RESPIRATORY (INHALATION) at 16:12

## 2018-01-01 RX ADMIN — LOPERAMIDE HYDROCHLORIDE 2 MG: 2 CAPSULE ORAL at 20:47

## 2018-01-01 RX ADMIN — Medication 10 ML: at 19:49

## 2018-01-01 RX ADMIN — ALBUTEROL SULFATE 2.5 MG: 2.5 SOLUTION RESPIRATORY (INHALATION) at 07:28

## 2018-01-01 RX ADMIN — VANCOMYCIN HYDROCHLORIDE 1000 MG: 10 INJECTION, POWDER, LYOPHILIZED, FOR SOLUTION INTRAVENOUS at 13:58

## 2018-01-01 RX ADMIN — OSELTAMIVIR PHOSPHATE 30 MG: 30 CAPSULE ORAL at 20:52

## 2018-01-01 RX ADMIN — PIPERACILLIN SODIUM,TAZOBACTAM SODIUM 4.5 G: 4; .5 INJECTION, POWDER, FOR SOLUTION INTRAVENOUS at 13:32

## 2018-01-01 RX ADMIN — Medication 10 ML: at 15:36

## 2018-01-01 RX ADMIN — METHYLPREDNISOLONE SODIUM SUCCINATE 80 MG: 125 INJECTION, POWDER, FOR SOLUTION INTRAMUSCULAR; INTRAVENOUS at 17:44

## 2018-01-01 RX ADMIN — METOPROLOL TARTRATE 25 MG: 25 TABLET ORAL at 22:04

## 2018-01-01 RX ADMIN — DEXAMETHASONE SODIUM PHOSPHATE 8 MG: 4 INJECTION, SOLUTION INTRAMUSCULAR; INTRAVENOUS at 16:41

## 2018-01-01 RX ADMIN — IPRATROPIUM BROMIDE AND ALBUTEROL SULFATE 3 ML: .5; 3 SOLUTION RESPIRATORY (INHALATION) at 07:46

## 2018-01-01 RX ADMIN — OSELTAMIVIR PHOSPHATE 30 MG: 30 CAPSULE ORAL at 03:18

## 2018-01-01 RX ADMIN — POTASSIUM CHLORIDE 40 MEQ: 20 TABLET, EXTENDED RELEASE ORAL at 08:56

## 2018-01-01 RX ADMIN — GUAIFENESIN 1200 MG: 600 TABLET, EXTENDED RELEASE ORAL at 22:08

## 2018-01-01 RX ADMIN — Medication 10 ML: at 21:01

## 2018-01-01 RX ADMIN — Medication 10 ML: at 05:49

## 2018-01-01 RX ADMIN — Medication 10 ML: at 13:54

## 2018-01-01 RX ADMIN — LORAZEPAM 1 MG: 1 TABLET ORAL at 22:10

## 2018-01-01 RX ADMIN — Medication 10 ML: at 04:37

## 2018-01-01 RX ADMIN — HYDRALAZINE HYDROCHLORIDE 20 MG: 20 INJECTION INTRAMUSCULAR; INTRAVENOUS at 21:50

## 2018-01-01 RX ADMIN — SERTRALINE HYDROCHLORIDE 100 MG: 100 TABLET ORAL at 09:57

## 2018-01-01 RX ADMIN — METOPROLOL TARTRATE 5 MG: 5 INJECTION INTRAVENOUS at 03:40

## 2018-01-01 RX ADMIN — Medication 10 ML: at 15:20

## 2018-01-01 RX ADMIN — SERTRALINE HYDROCHLORIDE 100 MG: 100 TABLET ORAL at 08:37

## 2018-01-01 RX ADMIN — Medication 10 ML: at 21:06

## 2018-01-01 RX ADMIN — OSELTAMIVIR PHOSPHATE 30 MG: 30 CAPSULE ORAL at 21:15

## 2018-01-01 RX ADMIN — SERTRALINE HYDROCHLORIDE 100 MG: 100 TABLET ORAL at 09:24

## 2018-01-01 RX ADMIN — METOPROLOL SUCCINATE 50 MG: 50 TABLET, FILM COATED, EXTENDED RELEASE ORAL at 09:52

## 2018-01-01 RX ADMIN — IPRATROPIUM BROMIDE AND ALBUTEROL SULFATE 3 ML: .5; 3 SOLUTION RESPIRATORY (INHALATION) at 20:16

## 2018-01-01 RX ADMIN — GUAIFENESIN 1200 MG: 600 TABLET, EXTENDED RELEASE ORAL at 08:18

## 2018-01-01 RX ADMIN — Medication 5 ML: at 12:24

## 2018-01-01 RX ADMIN — PIPERACILLIN SODIUM,TAZOBACTAM SODIUM 4.5 G: 4; .5 INJECTION, POWDER, FOR SOLUTION INTRAVENOUS at 04:05

## 2018-01-01 RX ADMIN — PREDNISONE 40 MG: 20 TABLET ORAL at 08:46

## 2018-01-01 RX ADMIN — Medication 2 MG: at 20:30

## 2018-01-01 RX ADMIN — PROPOFOL 40 MCG/KG/MIN: 10 INJECTION, EMULSION INTRAVENOUS at 21:26

## 2018-01-01 RX ADMIN — LORAZEPAM 0.5 MG: 2 INJECTION INTRAMUSCULAR; INTRAVENOUS at 04:19

## 2018-01-01 RX ADMIN — METHYLPREDNISOLONE SODIUM SUCCINATE 80 MG: 125 INJECTION, POWDER, FOR SOLUTION INTRAMUSCULAR; INTRAVENOUS at 06:49

## 2018-01-01 RX ADMIN — Medication 5 ML: at 21:34

## 2018-01-01 RX ADMIN — METOPROLOL SUCCINATE 50 MG: 50 TABLET, FILM COATED, EXTENDED RELEASE ORAL at 08:22

## 2018-01-01 RX ADMIN — SODIUM CHLORIDE 1000 ML: 900 INJECTION, SOLUTION INTRAVENOUS at 10:51

## 2018-01-01 RX ADMIN — Medication 50 MCG/HR: at 22:31

## 2018-01-01 RX ADMIN — PIPERACILLIN SODIUM,TAZOBACTAM SODIUM 4.5 G: 4; .5 INJECTION, POWDER, FOR SOLUTION INTRAVENOUS at 20:29

## 2018-01-01 RX ADMIN — DEXAMETHASONE SODIUM PHOSPHATE 8 MG: 4 INJECTION, SOLUTION INTRAMUSCULAR; INTRAVENOUS at 18:04

## 2018-01-01 RX ADMIN — Medication 2 MG: at 17:44

## 2018-01-01 RX ADMIN — ALBUTEROL SULFATE 2.5 MG: 2.5 SOLUTION RESPIRATORY (INHALATION) at 21:46

## 2018-01-01 RX ADMIN — ALBUTEROL SULFATE 2.5 MG: 2.5 SOLUTION RESPIRATORY (INHALATION) at 20:14

## 2018-01-01 RX ADMIN — IPRATROPIUM BROMIDE AND ALBUTEROL SULFATE 3 ML: .5; 3 SOLUTION RESPIRATORY (INHALATION) at 15:53

## 2018-01-01 RX ADMIN — HEPARIN SODIUM 2000 UNITS: 200 INJECTION, SOLUTION INTRAVENOUS at 15:10

## 2018-01-01 RX ADMIN — HYDRALAZINE HYDROCHLORIDE 20 MG: 20 INJECTION INTRAMUSCULAR; INTRAVENOUS at 05:39

## 2018-01-01 RX ADMIN — PIPERACILLIN SODIUM,TAZOBACTAM SODIUM 4.5 G: 4; .5 INJECTION, POWDER, FOR SOLUTION INTRAVENOUS at 03:16

## 2018-01-01 RX ADMIN — PIPERACILLIN SODIUM,TAZOBACTAM SODIUM 4.5 G: 4; .5 INJECTION, POWDER, FOR SOLUTION INTRAVENOUS at 11:04

## 2018-01-01 RX ADMIN — Medication 500 UNITS: at 19:30

## 2018-01-01 RX ADMIN — GUAIFENESIN 1200 MG: 600 TABLET, EXTENDED RELEASE ORAL at 09:04

## 2018-01-01 RX ADMIN — PIPERACILLIN SODIUM,TAZOBACTAM SODIUM 4.5 G: 4; .5 INJECTION, POWDER, FOR SOLUTION INTRAVENOUS at 02:59

## 2018-01-01 RX ADMIN — GUAIFENESIN 1200 MG: 600 TABLET, EXTENDED RELEASE ORAL at 21:15

## 2018-01-01 RX ADMIN — ALBUTEROL SULFATE 2.5 MG: 2.5 SOLUTION RESPIRATORY (INHALATION) at 21:27

## 2018-01-01 RX ADMIN — DEXTROSE MONOHYDRATE, SODIUM CHLORIDE, AND POTASSIUM CHLORIDE 50 ML/HR: 50; 4.5; .745 INJECTION, SOLUTION INTRAVENOUS at 05:41

## 2018-01-01 RX ADMIN — HEPARIN SODIUM 5000 UNITS: 5000 INJECTION, SOLUTION INTRAVENOUS; SUBCUTANEOUS at 10:14

## 2018-01-01 RX ADMIN — ALBUTEROL SULFATE 2.5 MG: 2.5 SOLUTION RESPIRATORY (INHALATION) at 15:35

## 2018-01-01 RX ADMIN — POTASSIUM CHLORIDE 40 MEQ: 20 TABLET, EXTENDED RELEASE ORAL at 08:15

## 2018-01-01 RX ADMIN — METHYLPREDNISOLONE SODIUM SUCCINATE 80 MG: 125 INJECTION, POWDER, FOR SOLUTION INTRAMUSCULAR; INTRAVENOUS at 13:43

## 2018-01-01 RX ADMIN — ALBUTEROL SULFATE 2.5 MG: 2.5 SOLUTION RESPIRATORY (INHALATION) at 12:11

## 2018-01-01 RX ADMIN — PIPERACILLIN SODIUM,TAZOBACTAM SODIUM 4.5 G: 4; .5 INJECTION, POWDER, FOR SOLUTION INTRAVENOUS at 01:39

## 2018-01-01 RX ADMIN — ALBUTEROL SULFATE 2.5 MG: 2.5 SOLUTION RESPIRATORY (INHALATION) at 07:55

## 2018-01-01 RX ADMIN — MIDAZOLAM HYDROCHLORIDE 1 MG: 1 INJECTION, SOLUTION INTRAMUSCULAR; INTRAVENOUS at 14:55

## 2018-01-01 RX ADMIN — GUAIFENESIN 1200 MG: 600 TABLET, EXTENDED RELEASE ORAL at 20:52

## 2018-01-01 RX ADMIN — SERTRALINE HYDROCHLORIDE 100 MG: 100 TABLET ORAL at 09:20

## 2018-01-01 RX ADMIN — VANCOMYCIN 1000 MG: 1 INJECTION, SOLUTION INTRAVENOUS at 12:09

## 2018-01-01 RX ADMIN — DEXTROSE MONOHYDRATE, SODIUM CHLORIDE, AND POTASSIUM CHLORIDE 50 ML/HR: 50; 4.5; .745 INJECTION, SOLUTION INTRAVENOUS at 13:46

## 2018-01-01 RX ADMIN — IPRATROPIUM BROMIDE AND ALBUTEROL SULFATE 3 ML: .5; 3 SOLUTION RESPIRATORY (INHALATION) at 07:21

## 2018-01-01 RX ADMIN — ATORVASTATIN CALCIUM 20 MG: 40 TABLET, FILM COATED ORAL at 08:56

## 2018-01-01 RX ADMIN — GUAIFENESIN 1200 MG: 600 TABLET, EXTENDED RELEASE ORAL at 09:40

## 2018-01-01 RX ADMIN — LOPERAMIDE HYDROCHLORIDE 2 MG: 2 CAPSULE ORAL at 14:11

## 2018-01-01 RX ADMIN — Medication 10 ML: at 21:58

## 2018-01-01 RX ADMIN — PIPERACILLIN SODIUM,TAZOBACTAM SODIUM 4.5 G: 4; .5 INJECTION, POWDER, FOR SOLUTION INTRAVENOUS at 09:44

## 2018-01-01 RX ADMIN — ALBUTEROL SULFATE 2.5 MG: 2.5 SOLUTION RESPIRATORY (INHALATION) at 12:05

## 2018-01-01 RX ADMIN — HEPARIN SODIUM 5000 UNITS: 5000 INJECTION, SOLUTION INTRAVENOUS; SUBCUTANEOUS at 21:00

## 2018-01-01 RX ADMIN — PROPOFOL 25 MCG/KG/MIN: 10 INJECTION, EMULSION INTRAVENOUS at 06:20

## 2018-01-01 RX ADMIN — IPRATROPIUM BROMIDE AND ALBUTEROL SULFATE 3 ML: .5; 3 SOLUTION RESPIRATORY (INHALATION) at 20:42

## 2018-01-01 RX ADMIN — GUAIFENESIN 1200 MG: 600 TABLET, EXTENDED RELEASE ORAL at 20:41

## 2018-01-01 RX ADMIN — PEGFILGRASTIM 6 MG: 6 INJECTION SUBCUTANEOUS at 13:15

## 2018-01-01 RX ADMIN — VANCOMYCIN HYDROCHLORIDE 1000 MG: 1 INJECTION, POWDER, LYOPHILIZED, FOR SOLUTION INTRAVENOUS at 02:37

## 2018-01-01 RX ADMIN — SODIUM CHLORIDE, SODIUM LACTATE, POTASSIUM CHLORIDE, CALCIUM CHLORIDE, AND DEXTROSE MONOHYDRATE 75 ML/HR: 600; 310; 30; 20; 5 INJECTION, SOLUTION INTRAVENOUS at 22:07

## 2018-01-01 RX ADMIN — Medication 5 ML: at 06:00

## 2018-01-01 RX ADMIN — METHYLPREDNISOLONE SODIUM SUCCINATE 80 MG: 125 INJECTION, POWDER, FOR SOLUTION INTRAMUSCULAR; INTRAVENOUS at 17:11

## 2018-01-01 RX ADMIN — HEPARIN SODIUM 5000 UNITS: 5000 INJECTION, SOLUTION INTRAVENOUS; SUBCUTANEOUS at 21:15

## 2018-01-01 RX ADMIN — FENTANYL CITRATE 25 MCG: 50 INJECTION, SOLUTION INTRAMUSCULAR; INTRAVENOUS at 11:09

## 2018-01-01 RX ADMIN — POTASSIUM CHLORIDE 40 MEQ: 20 TABLET, EXTENDED RELEASE ORAL at 21:48

## 2018-01-01 RX ADMIN — Medication 10 ML: at 19:30

## 2018-01-01 RX ADMIN — DEXAMETHASONE SODIUM PHOSPHATE 8 MG: 4 INJECTION, SOLUTION INTRAMUSCULAR; INTRAVENOUS at 05:30

## 2018-01-01 RX ADMIN — ACETAMINOPHEN 650 MG: 325 TABLET, FILM COATED ORAL at 13:24

## 2018-01-01 RX ADMIN — Medication 10 ML: at 22:15

## 2018-01-01 RX ADMIN — Medication 10 ML: at 06:07

## 2018-01-01 RX ADMIN — ALBUTEROL SULFATE 2.5 MG: 2.5 SOLUTION RESPIRATORY (INHALATION) at 19:45

## 2018-01-01 RX ADMIN — Medication 25 MCG/HR: at 11:27

## 2018-01-01 RX ADMIN — METOPROLOL TARTRATE 25 MG: 25 TABLET ORAL at 21:22

## 2018-01-01 RX ADMIN — SODIUM CHLORIDE 1 G: 900 INJECTION, SOLUTION INTRAVENOUS at 15:09

## 2018-01-01 RX ADMIN — ALBUTEROL SULFATE 2.5 MG: 2.5 SOLUTION RESPIRATORY (INHALATION) at 21:32

## 2018-01-01 RX ADMIN — SERTRALINE HYDROCHLORIDE 100 MG: 100 TABLET ORAL at 10:13

## 2018-01-01 RX ADMIN — HEPARIN SODIUM 5000 UNITS: 5000 INJECTION, SOLUTION INTRAVENOUS; SUBCUTANEOUS at 08:57

## 2018-01-01 RX ADMIN — VANCOMYCIN 1000 MG: 1 INJECTION, SOLUTION INTRAVENOUS at 13:35

## 2018-01-01 RX ADMIN — HYDRALAZINE HYDROCHLORIDE 20 MG: 20 INJECTION INTRAMUSCULAR; INTRAVENOUS at 02:26

## 2018-01-01 RX ADMIN — IPRATROPIUM BROMIDE AND ALBUTEROL SULFATE 3 ML: .5; 3 SOLUTION RESPIRATORY (INHALATION) at 00:20

## 2018-01-01 RX ADMIN — HYDRALAZINE HYDROCHLORIDE 20 MG: 20 INJECTION INTRAMUSCULAR; INTRAVENOUS at 22:13

## 2018-01-01 RX ADMIN — TOBRAMYCIN SULFATE 420 MG: 40 INJECTION, SOLUTION INTRAMUSCULAR; INTRAVENOUS at 22:48

## 2018-01-01 RX ADMIN — POTASSIUM CHLORIDE: 2 INJECTION, SOLUTION, CONCENTRATE INTRAVENOUS at 16:10

## 2018-01-01 RX ADMIN — ALBUTEROL SULFATE 2.5 MG: 2.5 SOLUTION RESPIRATORY (INHALATION) at 21:13

## 2018-01-01 RX ADMIN — Medication 10 ML: at 13:30

## 2018-01-01 RX ADMIN — DEXAMETHASONE SODIUM PHOSPHATE 8 MG: 4 INJECTION, SOLUTION INTRAMUSCULAR; INTRAVENOUS at 17:19

## 2018-01-01 RX ADMIN — SODIUM CHLORIDE 500 ML: 900 INJECTION, SOLUTION INTRAVENOUS at 15:47

## 2018-01-01 RX ADMIN — VANCOMYCIN 1000 MG: 1 INJECTION, SOLUTION INTRAVENOUS at 18:17

## 2018-01-01 RX ADMIN — SERTRALINE HYDROCHLORIDE 100 MG: 100 TABLET ORAL at 08:18

## 2018-01-01 RX ADMIN — FENTANYL CITRATE 50 MCG: 50 INJECTION, SOLUTION INTRAMUSCULAR; INTRAVENOUS at 14:55

## 2018-01-01 RX ADMIN — METOPROLOL SUCCINATE 50 MG: 50 TABLET, FILM COATED, EXTENDED RELEASE ORAL at 09:25

## 2018-01-01 RX ADMIN — PROPOFOL 20 MCG/KG/MIN: 10 INJECTION, EMULSION INTRAVENOUS at 10:34

## 2018-01-01 RX ADMIN — POTASSIUM CHLORIDE 20 MEQ: 200 INJECTION, SOLUTION INTRAVENOUS at 10:09

## 2018-01-01 RX ADMIN — SERTRALINE HYDROCHLORIDE 100 MG: 100 TABLET ORAL at 08:57

## 2018-01-01 RX ADMIN — METOPROLOL SUCCINATE 50 MG: 50 TABLET, FILM COATED, EXTENDED RELEASE ORAL at 08:18

## 2018-01-01 RX ADMIN — METHYLPREDNISOLONE SODIUM SUCCINATE 60 MG: 125 INJECTION, POWDER, FOR SOLUTION INTRAMUSCULAR; INTRAVENOUS at 05:37

## 2018-01-01 RX ADMIN — HEPARIN SODIUM 5000 UNITS: 5000 INJECTION, SOLUTION INTRAVENOUS; SUBCUTANEOUS at 20:51

## 2018-01-01 RX ADMIN — FUROSEMIDE 40 MG: 10 INJECTION, SOLUTION INTRAMUSCULAR; INTRAVENOUS at 19:48

## 2018-01-01 RX ADMIN — DEXTROSE MONOHYDRATE, SODIUM CHLORIDE, AND POTASSIUM CHLORIDE 50 ML/HR: 50; 4.5; .745 INJECTION, SOLUTION INTRAVENOUS at 01:19

## 2018-01-01 RX ADMIN — ALBUTEROL SULFATE 2.5 MG: 2.5 SOLUTION RESPIRATORY (INHALATION) at 11:29

## 2018-01-01 RX ADMIN — MIDAZOLAM HYDROCHLORIDE 1 MG: 1 INJECTION, SOLUTION INTRAMUSCULAR; INTRAVENOUS at 11:05

## 2018-01-01 RX ADMIN — DOCETAXEL 142 MG: 80 INJECTION, SOLUTION, CONCENTRATE INTRAVENOUS at 16:59

## 2018-01-01 RX ADMIN — DEXAMETHASONE SODIUM PHOSPHATE 8 MG: 4 INJECTION, SOLUTION INTRAMUSCULAR; INTRAVENOUS at 17:38

## 2018-01-01 RX ADMIN — PROPOFOL 40 MCG/KG/MIN: 10 INJECTION, EMULSION INTRAVENOUS at 21:03

## 2018-01-01 RX ADMIN — SODIUM CHLORIDE 40 MG: 9 INJECTION INTRAMUSCULAR; INTRAVENOUS; SUBCUTANEOUS at 09:57

## 2018-01-01 RX ADMIN — ACETAMINOPHEN 650 MG: 325 TABLET, FILM COATED ORAL at 20:41

## 2018-01-01 RX ADMIN — HEPARIN SODIUM 5000 UNITS: 5000 INJECTION, SOLUTION INTRAVENOUS; SUBCUTANEOUS at 21:56

## 2018-01-01 RX ADMIN — FUROSEMIDE 40 MG: 10 INJECTION, SOLUTION INTRAMUSCULAR; INTRAVENOUS at 20:49

## 2018-01-01 RX ADMIN — OSELTAMIVIR PHOSPHATE 30 MG: 30 CAPSULE ORAL at 09:05

## 2018-01-01 RX ADMIN — ACETAMINOPHEN 650 MG: 325 TABLET, FILM COATED ORAL at 08:57

## 2018-01-01 RX ADMIN — LOPERAMIDE HYDROCHLORIDE 2 MG: 2 CAPSULE ORAL at 09:35

## 2018-01-01 RX ADMIN — GUAIFENESIN 1200 MG: 600 TABLET, EXTENDED RELEASE ORAL at 21:27

## 2018-01-01 RX ADMIN — DEXAMETHASONE SODIUM PHOSPHATE 8 MG: 4 INJECTION, SOLUTION INTRAMUSCULAR; INTRAVENOUS at 05:12

## 2018-01-01 RX ADMIN — HEPARIN SODIUM 5000 UNITS: 5000 INJECTION, SOLUTION INTRAVENOUS; SUBCUTANEOUS at 08:47

## 2018-01-01 RX ADMIN — OSELTAMIVIR PHOSPHATE 30 MG: 30 CAPSULE ORAL at 09:25

## 2018-01-01 RX ADMIN — HEPARIN SODIUM 5000 UNITS: 5000 INJECTION, SOLUTION INTRAVENOUS; SUBCUTANEOUS at 21:13

## 2018-01-01 RX ADMIN — SERTRALINE HYDROCHLORIDE 100 MG: 100 TABLET ORAL at 09:51

## 2018-01-01 RX ADMIN — ATORVASTATIN CALCIUM 20 MG: 40 TABLET, FILM COATED ORAL at 09:18

## 2018-01-01 RX ADMIN — Medication 10 ML: at 04:32

## 2018-01-01 RX ADMIN — IPRATROPIUM BROMIDE AND ALBUTEROL SULFATE 3 ML: .5; 3 SOLUTION RESPIRATORY (INHALATION) at 07:22

## 2018-01-01 RX ADMIN — METOPROLOL SUCCINATE 50 MG: 50 TABLET, FILM COATED, EXTENDED RELEASE ORAL at 08:54

## 2018-01-01 RX ADMIN — Medication 2 MG: at 17:52

## 2018-01-01 RX ADMIN — SERTRALINE HYDROCHLORIDE 100 MG: 100 TABLET ORAL at 09:35

## 2018-01-01 RX ADMIN — Medication 10 ML: at 22:08

## 2018-01-01 RX ADMIN — HEPARIN SODIUM 5000 UNITS: 5000 INJECTION, SOLUTION INTRAVENOUS; SUBCUTANEOUS at 08:39

## 2018-01-01 RX ADMIN — Medication 2 MG: at 13:19

## 2018-01-01 RX ADMIN — ALBUTEROL SULFATE 2.5 MG: 2.5 SOLUTION RESPIRATORY (INHALATION) at 07:05

## 2018-01-01 RX ADMIN — DEXAMETHASONE SODIUM PHOSPHATE 8 MG: 4 INJECTION, SOLUTION INTRAMUSCULAR; INTRAVENOUS at 05:42

## 2018-01-01 RX ADMIN — ATORVASTATIN CALCIUM 20 MG: 40 TABLET, FILM COATED ORAL at 09:05

## 2018-01-01 RX ADMIN — LOPERAMIDE HYDROCHLORIDE 2 MG: 2 CAPSULE ORAL at 22:58

## 2018-01-01 RX ADMIN — METHYLPREDNISOLONE SODIUM SUCCINATE 80 MG: 125 INJECTION, POWDER, FOR SOLUTION INTRAMUSCULAR; INTRAVENOUS at 06:48

## 2018-01-01 RX ADMIN — HEPARIN SODIUM 5000 UNITS: 5000 INJECTION, SOLUTION INTRAVENOUS; SUBCUTANEOUS at 08:54

## 2018-01-01 RX ADMIN — Medication 5 ML: at 09:50

## 2018-01-01 RX ADMIN — IPRATROPIUM BROMIDE AND ALBUTEROL SULFATE 3 ML: .5; 3 SOLUTION RESPIRATORY (INHALATION) at 00:16

## 2018-01-01 RX ADMIN — Medication 10 ML: at 13:01

## 2018-01-01 RX ADMIN — Medication 2 MG: at 15:52

## 2018-01-01 RX ADMIN — METOPROLOL SUCCINATE 50 MG: 50 TABLET, FILM COATED, EXTENDED RELEASE ORAL at 08:46

## 2018-01-01 RX ADMIN — PIPERACILLIN SODIUM,TAZOBACTAM SODIUM 4.5 G: 4; .5 INJECTION, POWDER, FOR SOLUTION INTRAVENOUS at 17:50

## 2018-01-01 RX ADMIN — CYCLOPHOSPHAMIDE 1134 MG: 1 INJECTION, POWDER, FOR SOLUTION INTRAVENOUS; ORAL at 18:05

## 2018-01-01 RX ADMIN — LIDOCAINE HYDROCHLORIDE: 20 JELLY TOPICAL at 11:11

## 2018-01-01 RX ADMIN — POTASSIUM CHLORIDE 40 MEQ: 10 TABLET, EXTENDED RELEASE ORAL at 18:26

## 2018-01-01 RX ADMIN — HEPARIN SODIUM 5000 UNITS: 5000 INJECTION, SOLUTION INTRAVENOUS; SUBCUTANEOUS at 22:07

## 2018-01-01 RX ADMIN — METHYLPREDNISOLONE SODIUM SUCCINATE 80 MG: 125 INJECTION, POWDER, FOR SOLUTION INTRAMUSCULAR; INTRAVENOUS at 05:14

## 2018-01-01 RX ADMIN — AZITHROMYCIN MONOHYDRATE 500 MG: 500 INJECTION, POWDER, LYOPHILIZED, FOR SOLUTION INTRAVENOUS at 11:54

## 2018-01-01 RX ADMIN — ALBUTEROL SULFATE 2.5 MG: 2.5 SOLUTION RESPIRATORY (INHALATION) at 07:50

## 2018-01-01 RX ADMIN — DEXAMETHASONE SODIUM PHOSPHATE 10 MG: 10 INJECTION INTRAMUSCULAR; INTRAVENOUS at 16:28

## 2018-01-01 RX ADMIN — ALBUTEROL SULFATE 2.5 MG: 2.5 SOLUTION RESPIRATORY (INHALATION) at 11:44

## 2018-01-01 RX ADMIN — Medication 2 MG: at 20:28

## 2018-01-01 RX ADMIN — SODIUM CHLORIDE 1000 ML: 900 INJECTION, SOLUTION INTRAVENOUS at 17:20

## 2018-01-01 RX ADMIN — LOSARTAN POTASSIUM 50 MG: 50 TABLET ORAL at 09:58

## 2018-01-01 RX ADMIN — ALBUTEROL SULFATE 2.5 MG: 2.5 SOLUTION RESPIRATORY (INHALATION) at 07:52

## 2018-01-01 RX ADMIN — SERTRALINE HYDROCHLORIDE 100 MG: 100 TABLET ORAL at 08:55

## 2018-01-01 RX ADMIN — DEXTROSE MONOHYDRATE: 5 INJECTION, SOLUTION INTRAVENOUS at 17:39

## 2018-01-01 RX ADMIN — HEPARIN SODIUM 5000 UNITS: 5000 INJECTION, SOLUTION INTRAVENOUS; SUBCUTANEOUS at 22:14

## 2018-01-01 RX ADMIN — VANCOMYCIN 1000 MG: 1 INJECTION, SOLUTION INTRAVENOUS at 01:46

## 2018-01-01 RX ADMIN — PROPOFOL 40 MCG/KG/MIN: 10 INJECTION, EMULSION INTRAVENOUS at 10:08

## 2018-01-01 RX ADMIN — HYDRALAZINE HYDROCHLORIDE 20 MG: 20 INJECTION INTRAMUSCULAR; INTRAVENOUS at 02:25

## 2018-01-01 RX ADMIN — GUAIFENESIN 1200 MG: 600 TABLET, EXTENDED RELEASE ORAL at 08:46

## 2018-01-01 RX ADMIN — IPRATROPIUM BROMIDE AND ALBUTEROL SULFATE 3 ML: .5; 3 SOLUTION RESPIRATORY (INHALATION) at 23:50

## 2018-01-01 RX ADMIN — LOSARTAN POTASSIUM 50 MG: 50 TABLET ORAL at 08:38

## 2018-01-01 RX ADMIN — FUROSEMIDE 20 MG: 10 INJECTION, SOLUTION INTRAMUSCULAR; INTRAVENOUS at 17:37

## 2018-01-01 RX ADMIN — Medication 10 ML: at 21:05

## 2018-01-01 RX ADMIN — METHYLPREDNISOLONE SODIUM SUCCINATE 80 MG: 125 INJECTION, POWDER, FOR SOLUTION INTRAMUSCULAR; INTRAVENOUS at 05:44

## 2018-01-01 RX ADMIN — IPRATROPIUM BROMIDE AND ALBUTEROL SULFATE 3 ML: .5; 3 SOLUTION RESPIRATORY (INHALATION) at 03:34

## 2018-01-01 RX ADMIN — LORAZEPAM 1 MG: 1 TABLET ORAL at 21:14

## 2018-01-01 RX ADMIN — METHYLPREDNISOLONE SODIUM SUCCINATE 60 MG: 125 INJECTION, POWDER, FOR SOLUTION INTRAMUSCULAR; INTRAVENOUS at 05:48

## 2018-01-01 RX ADMIN — MAGNESIUM SULFATE HEPTAHYDRATE 2 G: 40 INJECTION, SOLUTION INTRAVENOUS at 21:48

## 2018-01-01 RX ADMIN — VANCOMYCIN 1000 MG: 1 INJECTION, SOLUTION INTRAVENOUS at 06:50

## 2018-01-01 RX ADMIN — Medication 2 MG: at 02:25

## 2018-01-01 RX ADMIN — METHYLPREDNISOLONE SODIUM SUCCINATE 80 MG: 125 INJECTION, POWDER, FOR SOLUTION INTRAMUSCULAR; INTRAVENOUS at 18:28

## 2018-01-01 RX ADMIN — VANCOMYCIN HYDROCHLORIDE 1000 MG: 1 INJECTION, POWDER, LYOPHILIZED, FOR SOLUTION INTRAVENOUS at 19:52

## 2018-01-01 RX ADMIN — METHYLPREDNISOLONE SODIUM SUCCINATE 80 MG: 125 INJECTION, POWDER, FOR SOLUTION INTRAMUSCULAR; INTRAVENOUS at 17:27

## 2018-01-01 RX ADMIN — PIPERACILLIN SODIUM,TAZOBACTAM SODIUM 4.5 G: 4; .5 INJECTION, POWDER, FOR SOLUTION INTRAVENOUS at 11:03

## 2018-01-01 RX ADMIN — LIDOCAINE HYDROCHLORIDE 100 MG: 20 INJECTION, SOLUTION INFILTRATION; PERINEURAL at 15:08

## 2018-01-01 RX ADMIN — SODIUM CHLORIDE 1 G: 900 INJECTION, SOLUTION INTRAVENOUS at 03:00

## 2018-01-01 RX ADMIN — Medication 10 ML: at 21:28

## 2018-01-01 RX ADMIN — ALBUTEROL SULFATE 2.5 MG: 2.5 SOLUTION RESPIRATORY (INHALATION) at 21:10

## 2018-01-01 RX ADMIN — HEPARIN SODIUM 5000 UNITS: 5000 INJECTION, SOLUTION INTRAVENOUS; SUBCUTANEOUS at 09:57

## 2018-01-01 RX ADMIN — HEPARIN SODIUM 5000 UNITS: 5000 INJECTION, SOLUTION INTRAVENOUS; SUBCUTANEOUS at 20:49

## 2018-01-01 RX ADMIN — LORAZEPAM 0.5 MG: 2 INJECTION INTRAMUSCULAR; INTRAVENOUS at 13:16

## 2018-01-01 RX ADMIN — MORPHINE SULFATE 10 MG: 10 INJECTION INTRAMUSCULAR; INTRAVENOUS; SUBCUTANEOUS at 18:18

## 2018-01-01 RX ADMIN — GUAIFENESIN 1200 MG: 600 TABLET, EXTENDED RELEASE ORAL at 21:00

## 2018-01-01 RX ADMIN — METHYLPREDNISOLONE SODIUM SUCCINATE 80 MG: 125 INJECTION, POWDER, FOR SOLUTION INTRAMUSCULAR; INTRAVENOUS at 17:37

## 2018-01-01 RX ADMIN — ALBUTEROL SULFATE 2.5 MG: 2.5 SOLUTION RESPIRATORY (INHALATION) at 08:36

## 2018-01-01 RX ADMIN — PIPERACILLIN SODIUM,TAZOBACTAM SODIUM 4.5 G: 4; .5 INJECTION, POWDER, FOR SOLUTION INTRAVENOUS at 12:36

## 2018-01-01 RX ADMIN — FENTANYL CITRATE 50 MCG: 50 INJECTION, SOLUTION INTRAMUSCULAR; INTRAVENOUS at 15:06

## 2018-01-01 RX ADMIN — GUAIFENESIN 1200 MG: 600 TABLET, EXTENDED RELEASE ORAL at 08:19

## 2018-01-01 RX ADMIN — ALBUTEROL SULFATE 2.5 MG: 2.5 SOLUTION RESPIRATORY (INHALATION) at 11:54

## 2018-01-01 RX ADMIN — ALBUTEROL SULFATE 2.5 MG: 2.5 SOLUTION RESPIRATORY (INHALATION) at 20:37

## 2018-01-01 RX ADMIN — DEXTROSE MONOHYDRATE: 5 INJECTION, SOLUTION INTRAVENOUS at 06:10

## 2018-01-01 RX ADMIN — PIPERACILLIN SODIUM,TAZOBACTAM SODIUM 4.5 G: 4; .5 INJECTION, POWDER, FOR SOLUTION INTRAVENOUS at 18:17

## 2018-01-01 RX ADMIN — LOSARTAN POTASSIUM 50 MG: 50 TABLET ORAL at 09:49

## 2018-01-01 RX ADMIN — SODIUM CHLORIDE 1000 ML: 900 INJECTION, SOLUTION INTRAVENOUS at 09:21

## 2018-01-01 RX ADMIN — Medication 10 ML: at 18:28

## 2018-01-01 RX ADMIN — VANCOMYCIN HYDROCHLORIDE 1000 MG: 1 INJECTION, POWDER, LYOPHILIZED, FOR SOLUTION INTRAVENOUS at 08:33

## 2018-01-01 RX ADMIN — MIDAZOLAM HYDROCHLORIDE 1 MG: 1 INJECTION, SOLUTION INTRAMUSCULAR; INTRAVENOUS at 11:15

## 2018-01-01 RX ADMIN — ALBUTEROL SULFATE 2.5 MG: 2.5 SOLUTION RESPIRATORY (INHALATION) at 17:04

## 2018-01-01 RX ADMIN — MIDAZOLAM HYDROCHLORIDE 1 MG: 1 INJECTION, SOLUTION INTRAMUSCULAR; INTRAVENOUS at 15:06

## 2018-01-01 RX ADMIN — METOPROLOL TARTRATE 5 MG: 5 INJECTION INTRAVENOUS at 23:53

## 2018-01-01 RX ADMIN — LORAZEPAM 1 MG: 1 TABLET ORAL at 08:14

## 2018-01-01 RX ADMIN — DEXTROSE MONOHYDRATE, SODIUM CHLORIDE, AND POTASSIUM CHLORIDE 50 ML/HR: 50; 4.5; .745 INJECTION, SOLUTION INTRAVENOUS at 14:59

## 2018-01-01 RX ADMIN — SODIUM CHLORIDE 1 G: 900 INJECTION, SOLUTION INTRAVENOUS at 03:56

## 2018-01-01 RX ADMIN — DEXTROSE MONOHYDRATE: 5 INJECTION, SOLUTION INTRAVENOUS at 18:41

## 2018-01-01 RX ADMIN — VANCOMYCIN HYDROCHLORIDE 1000 MG: 10 INJECTION, POWDER, LYOPHILIZED, FOR SOLUTION INTRAVENOUS at 13:02

## 2018-01-01 RX ADMIN — SODIUM CHLORIDE 500 ML: 900 INJECTION, SOLUTION INTRAVENOUS at 16:30

## 2018-01-01 RX ADMIN — HEPARIN SODIUM 5000 UNITS: 5000 INJECTION, SOLUTION INTRAVENOUS; SUBCUTANEOUS at 21:27

## 2018-01-01 RX ADMIN — HYDRALAZINE HYDROCHLORIDE 20 MG: 20 INJECTION INTRAMUSCULAR; INTRAVENOUS at 21:25

## 2018-01-01 RX ADMIN — METHYLPREDNISOLONE SODIUM SUCCINATE 80 MG: 125 INJECTION, POWDER, FOR SOLUTION INTRAMUSCULAR; INTRAVENOUS at 21:33

## 2018-01-01 RX ADMIN — HYDRALAZINE HYDROCHLORIDE 20 MG: 20 INJECTION INTRAMUSCULAR; INTRAVENOUS at 22:49

## 2018-01-01 RX ADMIN — POTASSIUM CHLORIDE 40 MEQ: 29.8 INJECTION, SOLUTION INTRAVENOUS at 13:31

## 2018-01-01 RX ADMIN — PREDNISONE 40 MG: 20 TABLET ORAL at 23:26

## 2018-01-01 RX ADMIN — LORAZEPAM 1 MG: 1 TABLET ORAL at 21:00

## 2018-01-01 RX ADMIN — PIPERACILLIN SODIUM,TAZOBACTAM SODIUM 4.5 G: 4; .5 INJECTION, POWDER, FOR SOLUTION INTRAVENOUS at 10:14

## 2018-01-01 RX ADMIN — VANCOMYCIN HYDROCHLORIDE 1000 MG: 1 INJECTION, POWDER, LYOPHILIZED, FOR SOLUTION INTRAVENOUS at 20:30

## 2018-01-01 RX ADMIN — METOPROLOL SUCCINATE 50 MG: 50 TABLET, FILM COATED, EXTENDED RELEASE ORAL at 08:55

## 2018-01-01 RX ADMIN — HEPARIN SODIUM 5000 UNITS: 5000 INJECTION, SOLUTION INTRAVENOUS; SUBCUTANEOUS at 21:20

## 2018-01-01 RX ADMIN — HYDRALAZINE HYDROCHLORIDE 20 MG: 20 INJECTION INTRAMUSCULAR; INTRAVENOUS at 10:53

## 2018-01-01 RX ADMIN — ALBUTEROL SULFATE 2.5 MG: 2.5 SOLUTION RESPIRATORY (INHALATION) at 15:17

## 2018-01-01 RX ADMIN — PIPERACILLIN SODIUM,TAZOBACTAM SODIUM 4.5 G: 4; .5 INJECTION, POWDER, FOR SOLUTION INTRAVENOUS at 02:04

## 2018-01-01 RX ADMIN — SODIUM CHLORIDE 25 ML/HR: 900 INJECTION, SOLUTION INTRAVENOUS at 14:58

## 2018-01-01 RX ADMIN — VANCOMYCIN HYDROCHLORIDE 1500 MG: 10 INJECTION, POWDER, LYOPHILIZED, FOR SOLUTION INTRAVENOUS at 00:43

## 2018-01-01 RX ADMIN — HEPARIN SODIUM 5000 UNITS: 5000 INJECTION, SOLUTION INTRAVENOUS; SUBCUTANEOUS at 09:26

## 2018-01-01 RX ADMIN — ALBUTEROL SULFATE 2.5 MG: 2.5 SOLUTION RESPIRATORY (INHALATION) at 06:20

## 2018-01-01 RX ADMIN — AZITHROMYCIN MONOHYDRATE 500 MG: 500 INJECTION, POWDER, LYOPHILIZED, FOR SOLUTION INTRAVENOUS at 09:41

## 2018-01-01 RX ADMIN — METHYLPREDNISOLONE SODIUM SUCCINATE 80 MG: 125 INJECTION, POWDER, FOR SOLUTION INTRAMUSCULAR; INTRAVENOUS at 05:50

## 2018-01-01 RX ADMIN — VANCOMYCIN 1000 MG: 1 INJECTION, SOLUTION INTRAVENOUS at 06:43

## 2018-01-01 RX ADMIN — PIPERACILLIN SODIUM,TAZOBACTAM SODIUM 4.5 G: 4; .5 INJECTION, POWDER, FOR SOLUTION INTRAVENOUS at 11:15

## 2018-01-01 RX ADMIN — Medication 10 ML: at 05:50

## 2018-01-01 RX ADMIN — FUROSEMIDE 40 MG: 10 INJECTION, SOLUTION INTRAMUSCULAR; INTRAVENOUS at 08:56

## 2018-01-01 RX ADMIN — ETOMIDATE 22 MG: 2 INJECTION INTRAVENOUS at 13:38

## 2018-01-01 RX ADMIN — SODIUM CHLORIDE, SODIUM LACTATE, POTASSIUM CHLORIDE, CALCIUM CHLORIDE, AND DEXTROSE MONOHYDRATE 50 ML/HR: 600; 310; 30; 20; 5 INJECTION, SOLUTION INTRAVENOUS at 05:50

## 2018-01-01 RX ADMIN — ALBUTEROL SULFATE 2.5 MG: 2.5 SOLUTION RESPIRATORY (INHALATION) at 07:32

## 2018-01-01 RX ADMIN — ALBUTEROL SULFATE 2.5 MG: 2.5 SOLUTION RESPIRATORY (INHALATION) at 15:23

## 2018-01-01 RX ADMIN — Medication 10 ML: at 21:22

## 2018-01-01 RX ADMIN — HEPARIN SODIUM 5000 UNITS: 5000 INJECTION, SOLUTION INTRAVENOUS; SUBCUTANEOUS at 09:41

## 2018-01-01 RX ADMIN — ALBUTEROL SULFATE 2.5 MG: 2.5 SOLUTION RESPIRATORY (INHALATION) at 20:50

## 2018-01-01 RX ADMIN — POTASSIUM CHLORIDE 40 MEQ: 20 TABLET, EXTENDED RELEASE ORAL at 08:47

## 2018-01-01 RX ADMIN — AZITHROMYCIN MONOHYDRATE 500 MG: 500 INJECTION, POWDER, LYOPHILIZED, FOR SOLUTION INTRAVENOUS at 08:57

## 2018-01-01 RX ADMIN — Medication 150 MCG/HR: at 03:43

## 2018-01-01 RX ADMIN — Medication 2 MG: at 12:01

## 2018-01-01 RX ADMIN — PREDNISONE 40 MG: 20 TABLET ORAL at 08:56

## 2018-01-01 RX ADMIN — LOPERAMIDE HYDROCHLORIDE 2 MG: 2 CAPSULE ORAL at 10:42

## 2018-01-01 RX ADMIN — Medication 2 MG: at 08:41

## 2018-01-01 RX ADMIN — LIDOCAINE HYDROCHLORIDE: 40 SOLUTION TOPICAL at 11:10

## 2018-01-01 RX ADMIN — ATORVASTATIN CALCIUM 20 MG: 40 TABLET, FILM COATED ORAL at 08:15

## 2018-01-01 RX ADMIN — METOPROLOL SUCCINATE 50 MG: 50 TABLET, FILM COATED, EXTENDED RELEASE ORAL at 09:20

## 2018-01-01 RX ADMIN — VANCOMYCIN 1000 MG: 1 INJECTION, SOLUTION INTRAVENOUS at 03:16

## 2018-01-01 RX ADMIN — HEPARIN SODIUM 5000 UNITS: 5000 INJECTION, SOLUTION INTRAVENOUS; SUBCUTANEOUS at 19:33

## 2018-01-01 RX ADMIN — HYDRALAZINE HYDROCHLORIDE 20 MG: 20 INJECTION INTRAMUSCULAR; INTRAVENOUS at 15:46

## 2018-01-01 RX ADMIN — Medication 2 MG: at 11:01

## 2018-01-01 RX ADMIN — IPRATROPIUM BROMIDE AND ALBUTEROL SULFATE 3 ML: .5; 3 SOLUTION RESPIRATORY (INHALATION) at 20:06

## 2018-01-01 RX ADMIN — ATORVASTATIN CALCIUM 20 MG: 40 TABLET, FILM COATED ORAL at 09:24

## 2018-01-01 RX ADMIN — SERTRALINE HYDROCHLORIDE 100 MG: 100 TABLET ORAL at 08:15

## 2018-01-01 RX ADMIN — HEPARIN SODIUM 5000 UNITS: 5000 INJECTION, SOLUTION INTRAVENOUS; SUBCUTANEOUS at 20:12

## 2018-01-01 RX ADMIN — IPRATROPIUM BROMIDE AND ALBUTEROL SULFATE 3 ML: .5; 3 SOLUTION RESPIRATORY (INHALATION) at 07:57

## 2018-01-01 RX ADMIN — Medication 2 MG: at 05:31

## 2018-01-01 RX ADMIN — Medication 10 ML: at 05:58

## 2018-01-01 RX ADMIN — LORAZEPAM 1 MG: 1 TABLET ORAL at 12:41

## 2018-01-01 RX ADMIN — OSELTAMIVIR PHOSPHATE 30 MG: 30 CAPSULE ORAL at 09:41

## 2018-01-01 RX ADMIN — GUAIFENESIN 1200 MG: 600 TABLET, EXTENDED RELEASE ORAL at 09:21

## 2018-01-01 RX ADMIN — METHYLPREDNISOLONE SODIUM SUCCINATE 80 MG: 125 INJECTION, POWDER, FOR SOLUTION INTRAMUSCULAR; INTRAVENOUS at 04:35

## 2018-01-01 RX ADMIN — SERTRALINE HYDROCHLORIDE 100 MG: 100 TABLET ORAL at 10:27

## 2018-01-01 RX ADMIN — ATORVASTATIN CALCIUM 20 MG: 40 TABLET, FILM COATED ORAL at 08:47

## 2018-01-01 RX ADMIN — HEPARIN SODIUM 5000 UNITS: 5000 INJECTION, SOLUTION INTRAVENOUS; SUBCUTANEOUS at 09:36

## 2018-01-01 RX ADMIN — PIPERACILLIN SODIUM,TAZOBACTAM SODIUM 4.5 G: 4; .5 INJECTION, POWDER, FOR SOLUTION INTRAVENOUS at 17:45

## 2018-01-01 RX ADMIN — AZITHROMYCIN FOR INJECTION INJECTION, POWDER, LYOPHILIZED, FOR SOLUTION 500 MG: 500 INJECTION INTRAVENOUS at 08:54

## 2018-01-01 RX ADMIN — POTASSIUM CHLORIDE: 2 INJECTION, SOLUTION, CONCENTRATE INTRAVENOUS at 15:09

## 2018-01-01 RX ADMIN — LORAZEPAM 1 MG: 1 TABLET ORAL at 22:19

## 2018-01-01 RX ADMIN — Medication 10 ML: at 05:35

## 2018-01-01 RX ADMIN — ALBUTEROL SULFATE 2.5 MG: 2.5 SOLUTION RESPIRATORY (INHALATION) at 08:17

## 2018-01-01 RX ADMIN — Medication 10 ML: at 05:45

## 2018-01-01 RX ADMIN — LOPERAMIDE HYDROCHLORIDE 2 MG: 2 CAPSULE ORAL at 17:32

## 2018-01-01 RX ADMIN — Medication 10 ML: at 20:13

## 2018-01-01 RX ADMIN — METOPROLOL TARTRATE 5 MG: 5 INJECTION INTRAVENOUS at 23:15

## 2018-01-01 RX ADMIN — ALBUTEROL SULFATE 2.5 MG: 2.5 SOLUTION RESPIRATORY (INHALATION) at 11:08

## 2018-01-01 RX ADMIN — SODIUM CHLORIDE 1 G: 900 INJECTION, SOLUTION INTRAVENOUS at 16:00

## 2018-01-01 RX ADMIN — LOPERAMIDE HYDROCHLORIDE 2 MG: 2 CAPSULE ORAL at 22:16

## 2018-01-01 RX ADMIN — Medication 10 ML: at 05:14

## 2018-01-01 RX ADMIN — SERTRALINE HYDROCHLORIDE 100 MG: 100 TABLET ORAL at 08:54

## 2018-01-01 RX ADMIN — SODIUM CHLORIDE, SODIUM LACTATE, POTASSIUM CHLORIDE, CALCIUM CHLORIDE, AND DEXTROSE MONOHYDRATE 75 ML/HR: 600; 310; 30; 20; 5 INJECTION, SOLUTION INTRAVENOUS at 11:35

## 2018-01-01 RX ADMIN — HEPARIN SODIUM 5000 UNITS: 5000 INJECTION, SOLUTION INTRAVENOUS; SUBCUTANEOUS at 21:29

## 2018-01-01 RX ADMIN — GUAIFENESIN 1200 MG: 600 TABLET, EXTENDED RELEASE ORAL at 09:49

## 2018-01-01 RX ADMIN — AZITHROMYCIN MONOHYDRATE 500 MG: 500 INJECTION, POWDER, LYOPHILIZED, FOR SOLUTION INTRAVENOUS at 10:14

## 2018-01-01 RX ADMIN — OSELTAMIVIR PHOSPHATE 30 MG: 30 CAPSULE ORAL at 08:54

## 2018-01-01 RX ADMIN — ALBUTEROL SULFATE 2.5 MG: 2.5 SOLUTION RESPIRATORY (INHALATION) at 07:23

## 2018-01-01 RX ADMIN — IPRATROPIUM BROMIDE AND ALBUTEROL SULFATE 3 ML: .5; 3 SOLUTION RESPIRATORY (INHALATION) at 21:23

## 2018-01-01 RX ADMIN — IPRATROPIUM BROMIDE AND ALBUTEROL SULFATE 3 ML: .5; 3 SOLUTION RESPIRATORY (INHALATION) at 12:30

## 2018-01-01 RX ADMIN — ACETAMINOPHEN 650 MG: 325 TABLET, FILM COATED ORAL at 03:46

## 2018-01-01 RX ADMIN — PIPERACILLIN SODIUM,TAZOBACTAM SODIUM 4.5 G: 4; .5 INJECTION, POWDER, FOR SOLUTION INTRAVENOUS at 03:11

## 2018-01-01 RX ADMIN — LOPERAMIDE HYDROCHLORIDE 2 MG: 2 CAPSULE ORAL at 09:40

## 2018-01-01 RX ADMIN — PIPERACILLIN SODIUM,TAZOBACTAM SODIUM 4.5 G: 4; .5 INJECTION, POWDER, FOR SOLUTION INTRAVENOUS at 18:16

## 2018-01-01 RX ADMIN — LOSARTAN POTASSIUM 50 MG: 50 TABLET ORAL at 08:22

## 2018-01-01 RX ADMIN — METOPROLOL SUCCINATE 50 MG: 50 TABLET, FILM COATED, EXTENDED RELEASE ORAL at 08:37

## 2018-01-01 RX ADMIN — LOSARTAN POTASSIUM: 50 TABLET ORAL at 09:19

## 2018-01-01 RX ADMIN — METHYLPREDNISOLONE SODIUM SUCCINATE 60 MG: 125 INJECTION, POWDER, FOR SOLUTION INTRAMUSCULAR; INTRAVENOUS at 17:03

## 2018-01-01 RX ADMIN — Medication 2 MG: at 16:54

## 2018-01-01 RX ADMIN — VANCOMYCIN HYDROCHLORIDE 1000 MG: 1 INJECTION, POWDER, LYOPHILIZED, FOR SOLUTION INTRAVENOUS at 15:33

## 2018-01-01 RX ADMIN — ALBUTEROL SULFATE 2.5 MG: 2.5 SOLUTION RESPIRATORY (INHALATION) at 15:12

## 2018-01-01 RX ADMIN — LIDOCAINE HYDROCHLORIDE,EPINEPHRINE BITARTRATE 400 MG: 20; .005 INJECTION, SOLUTION EPIDURAL; INFILTRATION; INTRACAUDAL; PERINEURAL at 15:14

## 2018-01-01 RX ADMIN — FENTANYL CITRATE 25 MCG: 50 INJECTION, SOLUTION INTRAMUSCULAR; INTRAVENOUS at 11:05

## 2018-01-01 RX ADMIN — ATORVASTATIN CALCIUM 20 MG: 40 TABLET, FILM COATED ORAL at 08:37

## 2018-01-01 RX ADMIN — FUROSEMIDE 40 MG: 10 INJECTION, SOLUTION INTRAMUSCULAR; INTRAVENOUS at 12:58

## 2018-01-01 RX ADMIN — PIPERACILLIN SODIUM,TAZOBACTAM SODIUM 4.5 G: 4; .5 INJECTION, POWDER, FOR SOLUTION INTRAVENOUS at 20:12

## 2018-01-01 RX ADMIN — SODIUM CHLORIDE 1 G: 900 INJECTION, SOLUTION INTRAVENOUS at 03:22

## 2018-01-01 RX ADMIN — SODIUM CHLORIDE 1 G: 900 INJECTION, SOLUTION INTRAVENOUS at 16:35

## 2018-01-01 RX ADMIN — HYDRALAZINE HYDROCHLORIDE 20 MG: 20 INJECTION INTRAMUSCULAR; INTRAVENOUS at 16:41

## 2018-01-01 RX ADMIN — SODIUM CHLORIDE 1 G: 900 INJECTION, SOLUTION INTRAVENOUS at 16:10

## 2018-01-01 RX ADMIN — PIPERACILLIN SODIUM,TAZOBACTAM SODIUM 4.5 G: 4; .5 INJECTION, POWDER, FOR SOLUTION INTRAVENOUS at 13:29

## 2018-01-01 RX ADMIN — LOPERAMIDE HYDROCHLORIDE 2 MG: 2 CAPSULE ORAL at 21:18

## 2018-01-01 RX ADMIN — IPRATROPIUM BROMIDE AND ALBUTEROL SULFATE 3 ML: .5; 3 SOLUTION RESPIRATORY (INHALATION) at 21:01

## 2018-01-01 RX ADMIN — HEPARIN SODIUM 5000 UNITS: 5000 INJECTION, SOLUTION INTRAVENOUS; SUBCUTANEOUS at 20:41

## 2018-01-01 RX ADMIN — HEPARIN SODIUM 5000 UNITS: 5000 INJECTION, SOLUTION INTRAVENOUS; SUBCUTANEOUS at 10:28

## 2018-01-01 RX ADMIN — IPRATROPIUM BROMIDE AND ALBUTEROL SULFATE 3 ML: .5; 3 SOLUTION RESPIRATORY (INHALATION) at 08:09

## 2018-01-01 RX ADMIN — ALBUTEROL SULFATE 2.5 MG: 2.5 SOLUTION RESPIRATORY (INHALATION) at 11:27

## 2018-01-01 RX ADMIN — GUAIFENESIN 1200 MG: 600 TABLET, EXTENDED RELEASE ORAL at 08:37

## 2018-01-01 RX ADMIN — ALBUTEROL SULFATE 2.5 MG: 2.5 SOLUTION RESPIRATORY (INHALATION) at 15:30

## 2018-01-01 RX ADMIN — VANCOMYCIN HYDROCHLORIDE 1000 MG: 10 INJECTION, POWDER, LYOPHILIZED, FOR SOLUTION INTRAVENOUS at 01:48

## 2018-01-01 RX ADMIN — Medication 2 MG: at 06:46

## 2018-01-01 RX ADMIN — PIPERACILLIN SODIUM,TAZOBACTAM SODIUM 4.5 G: 4; .5 INJECTION, POWDER, FOR SOLUTION INTRAVENOUS at 04:36

## 2018-01-01 RX ADMIN — LOPERAMIDE HYDROCHLORIDE 2 MG: 2 CAPSULE ORAL at 22:14

## 2018-01-01 RX ADMIN — SODIUM CHLORIDE 40 MG: 9 INJECTION INTRAMUSCULAR; INTRAVENOUS; SUBCUTANEOUS at 09:35

## 2018-01-01 RX ADMIN — METOPROLOL SUCCINATE 50 MG: 50 TABLET, FILM COATED, EXTENDED RELEASE ORAL at 09:35

## 2018-01-01 RX ADMIN — HEPARIN SODIUM (PORCINE) LOCK FLUSH IV SOLN 100 UNIT/ML 500 UNITS: 100 SOLUTION at 15:24

## 2018-01-01 RX ADMIN — Medication 10 ML: at 05:36

## 2018-01-01 RX ADMIN — OSELTAMIVIR PHOSPHATE 30 MG: 30 CAPSULE ORAL at 21:27

## 2018-01-01 RX ADMIN — ONDANSETRON 8 MG: 2 INJECTION INTRAMUSCULAR; INTRAVENOUS at 16:25

## 2018-01-01 RX ADMIN — VANCOMYCIN 1000 MG: 1 INJECTION, SOLUTION INTRAVENOUS at 22:58

## 2018-01-01 RX ADMIN — LOPERAMIDE HYDROCHLORIDE 2 MG: 2 CAPSULE ORAL at 13:56

## 2018-01-01 RX ADMIN — Medication 2 MG: at 20:48

## 2018-01-01 RX ADMIN — Medication 2 MG: at 19:44

## 2018-01-01 RX ADMIN — ALBUTEROL SULFATE 2.5 MG: 2.5 SOLUTION RESPIRATORY (INHALATION) at 16:04

## 2018-01-01 RX ADMIN — HEPARIN SODIUM 5000 UNITS: 5000 INJECTION, SOLUTION INTRAVENOUS; SUBCUTANEOUS at 08:42

## 2018-01-01 RX ADMIN — PIPERACILLIN SODIUM,TAZOBACTAM SODIUM 4.5 G: 4; .5 INJECTION, POWDER, FOR SOLUTION INTRAVENOUS at 05:06

## 2018-01-01 RX ADMIN — ALBUTEROL SULFATE 2.5 MG: 2.5 SOLUTION RESPIRATORY (INHALATION) at 16:42

## 2018-01-01 RX ADMIN — Medication 10 ML: at 21:43

## 2018-01-01 RX ADMIN — LOSARTAN POTASSIUM 50 MG: 50 TABLET ORAL at 08:57

## 2018-01-02 NOTE — PROGRESS NOTES
I spoke with Pretty Shankar regarding her [facility] insurance coverage. Ms. Miguel Bruno $ 6,12 OOP Max with $6,700 remaining. Ms. Miguel Bruno had concerns about  the cost of treatment. I informed her that their was a $5000 breast cancer addy that can assist with some of her OOP expenses. I also encouraged her to apply  for financial assistance through the hospital.    Next, I spoke with Ms. Villa regarding potential oral medication authorizations. I told her that if she ever had any problems getting her oral medications filled to give the  dedicated [address] [place] [place]  [place] a call. Most of the time, it is simply an authorization that needs to be done with the insurance company. Next, I spoke with Ms. Villa regarding enrolling with [employer] [facility]. I went over some of the services that [employer] offers and the enrollment process. Lastly, I gave Ms. Miguel Bruno a form with various resource organizations that could assist with specific needs (example:  transportation, lodging, preparing meals, home cleaning)                Faxed Patient Referral form to the [facility] at [phone]. Phone [phone]. Form scanned into chart. Faxed Physician's Statement to the [address] [place] [address] at [phone]. Phone [phone].

## 2018-01-06 NOTE — PROGRESS NOTES
TRANSFER - IN REPORT:    Verbal report received from [facility]) on Arva Rota  being received from IR(unit) for routine progression of care. Patient had port placed today. Report consisted of patients Situation, Background, Assessment and   Recommendations(SBAR). Information from the following report(s) Kardex, Procedure Summary and MAR was reviewed with the receiving nurse. Opportunity for questions and clarification was provided. Assessment completed upon patients arrival to unit and care assumed.

## 2018-01-06 NOTE — PROGRESS NOTES
TRANSFER - OUT REPORT:    Verbal report given to ERENDIRA Muro on [address]  being transferred to Infusion center  for routine progression of care       Report consisted of patients Situation, Background, Assessment and   Recommendations(SBAR). Information from the following report(s) Procedure Summary and MAR was reviewed with the receiving nurse. Lines:   Venous Access Device Bard Power Port 01/06/18 Upper chest (subclavicular area), left (Active)       Peripheral IV 01/06/18 Left Forearm (Active)   Site Assessment Clean, dry, & intact 1/6/2018  1:54 PM   Phlebitis Assessment 0 1/6/2018  1:54 PM   Infiltration Assessment 0 1/6/2018  1:54 PM   Dressing Status Clean, dry, & intact 1/6/2018  1:54 PM   Hub Color/Line Status Blue 1/6/2018  1:54 PM        Opportunity for questions and clarification was provided.

## 2018-01-06 NOTE — DISCHARGE INSTRUCTIONS
[address]  Department of Interventional Radiology  [facility]  [phone] Office  [phone] Fax  Implanted Port Discharge Instructions      General Instructions:   A port is like an implanted IV. They are usually ordered for patients who will be getting chemotherapy, but can also be used as an IV for long term antibiotics, large amounts of fluids, and/or blood products. Your blood can be drawn from your port for labs also. Those patients who do not have good veins find the ports convenient as they can get the IV they need with one stick. The port can be used long term, and the care is easy. The device is under the skin, and once the skin heals, care is minimal. All that is required is the nurse who accesses the port will need to flush it with heparinized saline after each use. Ports are usually placed in the chest wall, usually on the right side. But they can be place in the arms and in the abdomen. Home Care Instructions: If your port is in your arm, do not allow blood pressure or other IVs to be place in that arm. Do not allow bra straps or any clothing to rub the skin over the port. Do not bathe or swim until the skin has healed and if the port is accessed. Once it is healed, and when the port is not accessed, it is okay to bathe and swim. Restrict yourself to light activity for the first 5 days after getting the port put in, after that, resume normal activity slowly. You may resume your normal diet and medications. Follow-Up Instructions: Please see your oncologist, or whatever physician ordered the port as he/she has requested of you. Call If: You should call your Physician and/or the Radiology Nurse if you notice redness, pus, swelling, or pain from the area of your incision. Call if you should develop a fever. The nurses who access your port will know to call your doctor if the port does not seem to be working properly. You need to tell the nurses who use the port if you should have any pain or swelling at the site during an infusion. To Reach Us: If you have any questions about your procedure, please call the Interventional Radiology department at [phone]. After business hours (5pm) and weekends, call the answering service at [phone] and ask for the Radiologist on call to be paged. Interventional Radiology General Nurse Discharge    After general anesthesia or intravenous sedation, for 24 hours or while taking prescription Narcotics:  · Limit your activities  · Do not drive and operate hazardous machinery  · Do not make important personal or business decisions  · Do  not drink alcoholic beverages  · If you have not urinated within 8 hours after discharge, please contact your surgeon on call. * Please give a list of your current medications to your Primary Care Provider. * Please update this list whenever your medications are discontinued, doses are     changed, or new medications (including over-the-counter products) are added. * Please carry medication information at all times in case of emergency situations. These are general instructions for a healthy lifestyle:    No smoking/ No tobacco products/ Avoid exposure to second hand smoke  Surgeon General's Warning:  Quitting smoking now greatly reduces serious risk to your health. Obesity, smoking, and sedentary lifestyle greatly increases your risk for illness  A healthy diet, regular physical exercise & weight monitoring are important for maintaining a healthy lifestyle    You may be retaining fluid if you have a history of heart failure or if you experience any of the following symptoms:  Weight gain of 3 pounds or more overnight or 5 pounds in a week, increased swelling in our hands or feet or shortness of breath while lying flat in bed.   Please call your doctor as soon as you notice any of these symptoms; do not wait until your next office visit. Recognize signs and symptoms of STROKE:  F-face looks uneven    A-arms unable to move or move unevenly    S-speech slurred or non-existent    T-time-call 911 as soon as signs and symptoms begin-DO NOT go       Back to bed or wait to see if you get better-TIME IS BRAIN.         Patient Signature:  Date: 1/6/2018  Discharging Nurse: Jenna Joe RN

## 2018-01-06 NOTE — PROGRESS NOTES
Patient taken to IR department via W/C, by Ree Wilkerson RN, to have port placed by Dr. Roger Matta.

## 2018-01-06 NOTE — H&P
Department of Interventional Radiology  [phone]    History and Physical    Patient:  Brooklyn Canada MRN:  [id number]  SSN:  [id number]    YOB: 1940  Age:  69 y.o. Sex:  female      Primary Care Provider:  Jie Winston MD  Referring Physician:  Bee Mcmillan NP    Subjective:     Chief Complaint: Breast carcinoma, for chemotherapy needs port. History of the Present Illness: The patient is a 69 y.o. female who presents for chemotherapy; Nurses unable to obtain vascular access.      Past Medical History:   Diagnosis Date    Adverse effect of anesthesia     after hernia surgery bp dropped low and she went to ICU    Arthritis     hands; wrists; back; right knee    Breast cancer ([address]     Depression     Fibrocystic breast disease     bilateral    GERD (gastroesophageal reflux disease)     Headache(784.0)     previously on Topamax    HTN (hypertension), benign     Hypercholesteremia     Kidney stones      Past Surgical History:   Procedure Laterality Date    HX BREAST RECONSTRUCTION  10/11/2017    HX BREAST RECONSTRUCTION Bilateral 10/11/2017    BILATERAL BREAST RECONSTRUCTION WITH PLACEMENT TISSUE EXPANDERS AND ALLODERM performed by Kenya Boland MD at [address] [place] [address] HX CATARACT REMOVAL      bilateral    HX COLONOSCOPY  Oct 2010    Diverticula    HX HERNIA REPAIR  22/9821    umbilical hernia; done at [facility] in [place]    HX KNEE REPLACEMENT      left    HX KNEE REPLACEMENT  02/19/2009    total knee replacement (Left)     HX MASTECTOMY Bilateral 10/11/2017    BILATERAL BREAST MASTECTOMY SIMPLE ARTOURA ULTRA HIGH PROFILE BREAST IMPLANTS 700cc 13.5 cm 8.3cm [id number] X 3 /  MEDIUM HEIGHT 550cc 13.5 cm 11.7cm 7.4cm [id number] X 3  performed by Cedric Sampson DO at [address] [place] [address] HX SKIN BIOPSY  10/03/2017    on nose        Review of Systems:    Non contributary    Current Outpatient Prescriptions   Medication Sig    LORazepam (ATIVAN) 1 mg tablet Take 1 Tab by mouth every six (6) hours as needed for Anxiety. Max Daily Amount: 4 mg. Indications: anxiety, CANCER CHEMOTHERAPY-INDUCED NAUSEA AND VOMITING    dexamethasone (DECADRON) 4 mg tablet Take two (2) tabs twice daily the day before, day of and day after chemo.  prochlorperazine (COMPAZINE) 10 mg tablet Take 1 Tab by mouth every six (6) hours as needed.  ondansetron hcl (ZOFRAN, AS HYDROCHLORIDE,) 8 mg tablet Take 1 Tab by mouth every eight (8) hours as needed for Nausea.  albuterol (PROVENTIL HFA, VENTOLIN HFA, PROAIR HFA) 90 mcg/actuation inhaler Take 1-2 Puffs by inhalation every four (4) hours as needed for Wheezing or Shortness of Breath.  predniSONE (DELTASONE) 20 mg tablet Take 2 daily for 3 days, then 1 daily for 2 days    metoprolol succinate (TOPROL-XL) 50 mg XL tablet Take 1 Tab by mouth daily.  acetaminophen (TYLENOL) 325 mg tablet Take 325 mg by mouth every four (4) hours as needed for Pain.  oxyCODONE IR (ROXICODONE) 5 mg immediate release tablet Take 5 mg by mouth every six (6) hours as needed for Pain.  guaiFENesin-dextromethorphan SR (MUCINEX DM) 600-30 mg per tablet Take 1 Tab by mouth every twelve (12) hours as needed for Cough.  DOCUSATE CALCIUM (STOOL SOFTENER PO) Take  by mouth as needed.  meloxicam (MOBIC) 7.5 mg tablet Take 1-2 Tabs by mouth daily. (Patient taking differently: Take 7.5-15 mg by mouth as needed. Indications: OSTEOARTHRITIS)    losartan-hydroCHLOROthiazide (HYZAAR) 50-12.5 mg per tablet Take 1 Tab by mouth daily. (Patient taking differently: Take 1 Tab by mouth daily. Indications: hypertension)    Cetirizine (ZYRTEC) 10 mg cap Take 10 mg by mouth daily. Take / use AM day of surgery  per anesthesia protocols.  PSEUDOEPHEDRINE HCL (SUDAFED 12 HOUR PO) Take  by mouth as needed.  sertraline (ZOLOFT) 100 mg tablet Take 1 Tab by mouth daily. (Patient taking differently: Take 100 mg by mouth daily. Take / use AM day of surgery  per anesthesia protocols. Indications: ANXIETY WITH DEPRESSION)    atorvastatin (LIPITOR) 20 mg tablet Take 1 Tab by mouth daily. (Patient taking differently: Take 20 mg by mouth daily. Take / use AM day of surgery  per anesthesia protocols. Indications: hypercholesterolemia, hyperlipidemia)    ranitidine (ZANTAC) 150 mg tablet Take 150 mg by mouth as needed for Indigestion. Take / use AM day of surgery  per anesthesia protocols.  meclizine (ANTIVERT) 25 mg tablet Take 1 Tab by mouth every six (6) hours as needed for Dizziness. (Patient taking differently: Take 25 mg by mouth every six (6) hours as needed for Dizziness. Indications: VERTIGO)     No current facility-administered medications for this encounter.       Facility-Administered Medications Ordered in Other Encounters   Medication Dose Route Frequency    sodium chloride 0.9 % bolus infusion 500 mL  500 mL IntraVENous ONCE    ondansetron (ZOFRAN) injection 8 mg  8 mg IntraVENous ONCE    dexamethasone (DECADRON) injection 10 mg  10 mg IntraVENous ONCE    DOCEtaxel (TAXOTERE) 142 mg in dextrose 5% 250 mL chemo infusion  75 mg/m2 (Treatment Plan Recorded) IntraVENous ONCE    cyclophosphamide (CYTOXAN) 1,134 mg in 0.9% sodium chloride 250 mL chemo infusion  600 mg/m2 (Treatment Plan Recorded) IntraVENous ONCE    saline peripheral flush soln 10 mL  10 mL InterCATHeter PRN    lidocaine (XYLOCAINE) 20 mg/mL (2 %) injection  mg  1-20 mL SubCUTAneous Multiple    diphenhydrAMINE (BENADRYL) injection 25-50 mg  25-50 mg IntraVENous ONCE    fentaNYL citrate (PF) injection  mcg   mcg IntraVENous Multiple    midazolam (VERSED) injection 0.5-2 mg  0.5-2 mg IntraVENous Rad Multiple    heparin (PF) 2 units/ml in NS infusion 2,000 Units  1,000 mL Irrigation Rad Multiple    heparin (porcine) 100 unit/mL injection 500 Units  500 Units InterCATHeter ONCE    0.9% sodium chloride infusion  25 mL/hr IntraVENous ONCE        Allergies   Allergen Reactions    Dilaudid [Hydromorphone] Other (comments)     \"out of her mind\"    Sulfa (Sulfonamide Antibiotics) Itching    Tetracycline Nausea Only       Family History   Problem Relation Age of Onset    Other Brother      Myodisplasia syndrome     Heart Disease Brother     Hypertension Brother     Hypertension Mother     Diabetes Mother     Cancer Daughter      cervical     Social History   Substance Use Topics    Smoking status: Former Smoker     Types: Cigarettes     Quit date: 1/1/1992    Smokeless tobacco: Never Used    Alcohol use 0.0 oz/week     0 Standard drinks or equivalent per week      Comment: Occasional        Objective:       Physical Examination:    There were no vitals filed for this visit. There were no vitals taken for this visit.   HEART: regular rate and rhythm, S1, S2 normal, no murmur, click, rub or gallop  LUNG: clear to auscultation bilaterally, rales R base    Laboratory:     Lab Results   Component Value Date/Time    Sodium 141 01/05/2018 11:08 AM    Sodium 141 12/29/2017 12:00 AM    Potassium 3.5 01/05/2018 11:08 AM    Potassium 3.7 12/29/2017 12:00 AM    Chloride 107 01/05/2018 11:08 AM    Chloride 102 12/29/2017 12:00 AM    CO2 26 01/05/2018 11:08 AM    CO2 21 12/29/2017 12:00 AM    Anion gap 8 01/05/2018 11:08 AM    Anion gap 9 10/14/2017 06:15 AM    Glucose 85 01/05/2018 11:08 AM    Glucose 104 12/29/2017 12:00 AM    BUN 15 01/05/2018 11:08 AM    BUN 25 12/29/2017 12:00 AM    Creatinine 0.80 01/05/2018 11:08 AM    Creatinine 0.91 12/29/2017 12:00 AM    GFR est AA >60 01/05/2018 11:08 AM    GFR est AA 71 12/29/2017 12:00 AM    GFR est non-AA >60 01/05/2018 11:08 AM    GFR est non-AA 61 12/29/2017 12:00 AM    Calcium 9.0 01/05/2018 11:08 AM    Calcium 9.4 12/29/2017 12:00 AM    Albumin 3.3 01/05/2018 11:08 AM    Albumin 4.1 12/29/2017 12:00 AM    Protein, total 7.7 01/05/2018 11:08 AM    Protein, total 6.5 12/29/2017 12:00 AM    Globulin 4.4 01/05/2018 11:08 AM    A-G Ratio 0.8 01/05/2018 11:08 AM A-G Ratio 1.7 12/29/2017 12:00 AM    AST (SGOT) 32 01/05/2018 11:08 AM    AST (SGOT) 28 12/29/2017 12:00 AM    ALT (SGPT) 19 01/05/2018 11:08 AM    ALT (SGPT) 15 12/29/2017 12:00 AM     Lab Results   Component Value Date/Time    WBC 10.1 01/05/2018 11:08 AM    WBC 10.5 12/29/2017 12:00 AM    HGB 13.4 01/05/2018 11:08 AM    HGB 13.1 12/29/2017 12:00 AM    HCT 40.4 01/05/2018 11:08 AM    HCT 41.1 12/29/2017 12:00 AM    PLATELET 949 82/99/3120 11:08 AM    PLATELET 202 91/92/3571 12:00 AM     No results found for: APTT, PTP, INR    Assessment:     OK for IV port with IV sedation. Plan:     Planned Procedure:  Left IJV port. Risks, benefits, and alternatives reviewed with patient and she agrees to proceed with the procedure.       Signed By: Katia See MD     January 6, 2018

## 2018-01-06 NOTE — PROCEDURES
Interventional Radiology Brief Procedure Note    Patient: Keny Fields MRN: [id number]  SSN: [id number]    YOB: 1940  Age: 69 y.o. Sex: female      Date of Procedure: 1/6/2018    Pre-Procedure Diagnosis: breast CA    Post-Procedure Diagnosis: SAME    Procedure(s): Venous Chest Port Placement    Brief Description of Procedure: L IJV tip in RA    Performed By: Dmitriy Jeronimo MD     Assistants: None    Anesthesia: Moderate Sedation    Estimated Blood Loss: Less than 10ml    Specimens: None    Implants: Subcutaneous Port    Findings: OK to use    Complications: None    Recommendations: home after Chemo. Pt to keep skin site clean and dry for 48 Hr. Follow Up:  Oncology    Signed By: Dmitriy Jeronimo MD     January 6, 2018

## 2018-01-06 NOTE — PROGRESS NOTES
Patient restful in recliner, with daughter at bedside. Denies any pain at the present time. Denies nausea. Patient received her premeds at 1630. Report given to Oleg Castillo RN, a this time.

## 2018-01-06 NOTE — PROGRESS NOTES
After attempt at peripheral IV insertion and was unsuccessful, call placed to Vascular access team and spoke to [address] [place] [address]. Asked him if he would come and try to start an IV with the ultrasound. He came to attenpt this on patient, and was unsuccessful x4 attempts. Call placed to Rita Hester NP. Explained the situation to her. She gave an order to call the IR physician on call to see if a central line or port can be placed. Call was then made to IR physician on call, and Dr. Rocio Santana called back. He states that he will insert a port today, but that it would be this afternoon. He asked when patient had something to eat and drink last.  Explained to him that patient had a biscuit at 0830 and had a sip of Pepsi at 1000. He states that patient needs to be told to to be NPO, from this point forward, and this was done. This was explained to patient and her daughter. Patient is going to rest in room #518 with daughter, until IR ready for him. Instructed patient and daughter to call for any assistance needed.

## 2018-01-06 NOTE — IP AVS SNAPSHOT
[address] 
[phone] Patient: Sheeba Kwong MRN: [id number] AYM:[id number] About your hospitalization You were admitted on:  January 6, 2018 You last received care in the:  [facility] Radiology Specials You were discharged on:  January 6, 2018 Why you were hospitalized Your primary diagnosis was:  Not on File Follow-up Information None Your Scheduled Appointments Sunday January 07, 2018  3:00 PM EST Injection with FLR5 INF3 [facility] ([address] [place] [address]) 5th Floor Infusion [address] Kalani Gaitan [phone]  [facility] [address]  
[phone] For [facility] SURGICAL [facility] Floor [phone] ext. [phone] [facility] Friday January 26, 2018  8:30 AM EST  
LAB with [address]  
[address] [facility] OUTREACH INSURANCE [facility]) [address] 187 [facility]  
[phone] Friday January 26, 2018  9:00 AM EST Follow Up with Claudette Andrew, MD Elenore Conger Hematology and Oncology [facility]) [address] [facility] [address]  
[phone] Friday January 26, 2018 10:00 AM EST Chemo with Joelle Weston. [address] [facility] ([facility]) [address] Kalani Gaitan [phone] [facility] [address]  
[phone] [address] Friday February 16, 2018 10:00 AM EST  
LAB with [address]  
[address] [facility] OUTREACH INSURANCE [facility]) [address] 187 [facility]  
[phone] Friday February 16, 2018 10:30 AM EST Follow Up with SAMIA Lyon Hematology and Oncology [facility]) [address] [facility] [address]  
[phone] Friday February 16, 2018 11:15 AM EST Chemo with Leonel [facility] [address] [phone] [place] [address]  
[phone] [address] Discharge Orders None A check travis indicates which time of day the medication should be taken. My Medications ASK your doctor about these medications Instructions Each Dose to Equal  
 Morning Noon Evening Bedtime  
 acetaminophen 325 mg tablet Commonly known as:  TYLENOL Your last dose was: Your next dose is: Take 325 mg by mouth every four (4) hours as needed for Pain. 325 mg  
    
   
   
   
  
 albuterol 90 mcg/actuation inhaler Commonly known as:  PROVENTIL HFA, VENTOLIN HFA, PROAIR HFA Your last dose was: Your next dose is: Take 1-2 Puffs by inhalation every four (4) hours as needed for Wheezing or Shortness of Breath. 1-2 Puff  
    
   
   
   
  
 atorvastatin 20 mg tablet Commonly known as:  LIPITOR Your last dose was: Your next dose is: Take 1 Tab by mouth daily. 20 mg  
    
   
   
   
  
 dexamethasone 4 mg tablet Commonly known as:  DECADRON Your last dose was: Your next dose is: Take two (2) tabs twice daily the day before, day of and day after chemo. LORazepam 1 mg tablet Commonly known as:  ATIVAN Your last dose was: Your next dose is: Take 1 Tab by mouth every six (6) hours as needed for Anxiety. Max Daily Amount: 4 mg. Indications: anxiety, CANCER CHEMOTHERAPY-INDUCED NAUSEA AND VOMITING  
 1 mg  
    
   
   
   
  
 losartan-hydroCHLOROthiazide 50-12.5 mg per tablet Commonly known as:  HYZAAR Your last dose was: Your next dose is: Take 1 Tab by mouth daily. 1 Tab  
    
   
   
   
  
 meclizine 25 mg tablet Commonly known as:  ANTIVERT Your last dose was: Your next dose is: Take 1 Tab by mouth every six (6) hours as needed for Dizziness. 25 mg  
    
   
   
   
  
 meloxicam 7.5 mg tablet Commonly known as:  MOBIC Your last dose was: Your next dose is: Take 1-2 Tabs by mouth daily. 7.5-15 mg  
    
   
   
   
  
 metoprolol succinate 50 mg XL tablet Commonly known as:  TOPROL-XL Your last dose was: Your next dose is: Take 1 Tab by mouth daily. 50 mg  
    
   
   
   
  
 MUCINEX -30 mg per tablet Generic drug:  guaiFENesin-dextromethorphan SR Your last dose was: Your next dose is: Take 1 Tab by mouth every twelve (12) hours as needed for Cough. 1 Tab  
    
   
   
   
  
 ondansetron hcl 8 mg tablet Commonly known as:  ZOFRAN (AS HYDROCHLORIDE) Your last dose was: Your next dose is: Take 1 Tab by mouth every eight (8) hours as needed for Nausea. 8 mg  
    
   
   
   
  
 oxyCODONE IR 5 mg immediate release tablet Commonly known as:  María Loop Your last dose was: Your next dose is: Take 5 mg by mouth every six (6) hours as needed for Pain. 5 mg  
    
   
   
   
  
 predniSONE 20 mg tablet Commonly known as:  Tedra Pac Your last dose was: Your next dose is: Take 2 daily for 3 days, then 1 daily for 2 days  
     
   
   
   
  
 prochlorperazine 10 mg tablet Commonly known as:  COMPAZINE Your last dose was: Your next dose is: Take 1 Tab by mouth every six (6) hours as needed. 10 mg  
    
   
   
   
  
 sertraline 100 mg tablet Commonly known as:  ZOLOFT Your last dose was: Your next dose is: Take 1 Tab by mouth daily. 100 mg STOOL SOFTENER PO Your last dose was: Your next dose is: Take  by mouth as needed.   
     
   
   
   
  
 SUDAFED 12 HOUR PO  
   
 Your last dose was: Your next dose is: Take  by mouth as needed. ZANTAC 150 mg tablet Generic drug:  raNITIdine Your last dose was: Your next dose is: Take 150 mg by mouth as needed for Indigestion. Take / use AM day of surgery  per anesthesia protocols. 150 mg  
    
   
   
   
  
 ZyrTEC 10 mg Cap Generic drug:  Cetirizine Your last dose was: Your next dose is: Take 10 mg by mouth daily. Take / use AM day of surgery  per anesthesia protocols. 10 mg Discharge Instructions [address] Department of Interventional Radiology [facility] 
[phone] Office 
[phone] Fax Implanted [facility] Discharge Instructions General Instructions: 
 A port is like an implanted IV. They are usually ordered for patients who will be getting chemotherapy, but can also be used as an IV for long term antibiotics, large amounts of fluids, and/or blood products. Your blood can be drawn from your port for labs also. Those patients who do not have good veins find the ports convenient as they can get the IV they need with one stick. The port can be used long term, and the care is easy. The device is under the skin, and once the skin heals, care is minimal. All that is required is the nurse who accesses the port will need to flush it with heparinized saline after each use. Ports are usually placed in the chest wall, usually on the right side. But they can be place in the arms and in the abdomen. Home Care Instructions: If your port is in your arm, do not allow blood pressure or other IVs to be place in that arm. Do not allow bra straps or any clothing to rub the skin over the port. Do not bathe or swim until the skin has healed and if the port is accessed.  Once it is healed, and when the port is not accessed, it is okay to bathe and swim. Restrict yourself to light activity for the first 5 days after getting the port put in, after that, resume normal activity slowly. You may resume your normal diet and medications. Follow-Up Instructions: Please see your oncologist, or whatever physician ordered the port as he/she has requested of you. Call If: You should call your Physician and/or the Radiology Nurse if you notice redness, pus, swelling, or pain from the area of your incision. Call if you should develop a fever. The nurses who access your port will know to call your doctor if the port does not seem to be working properly. You need to tell the nurses who use the port if you should have any pain or swelling at the site during an infusion. To Reach Us: If you have any questions about your procedure, please call the Interventional Radiology department at [phone]. After business hours (5pm) and weekends, call the answering service at [phone] and ask for the Radiologist on call to be paged. Interventional Radiology General Nurse Discharge After general anesthesia or intravenous sedation, for 24 hours or while taking prescription Narcotics: · Limit your activities · Do not drive and operate hazardous machinery · Do not make important personal or business decisions · Do  not drink alcoholic beverages · If you have not urinated within 8 hours after discharge, please contact your surgeon on call. * Please give a list of your current medications to your Primary Care Provider. * Please update this list whenever your medications are discontinued, doses are 
   changed, or new medications (including over-the-counter products) are added. * Please carry medication information at all times in case of emergency situations. These are general instructions for a healthy lifestyle: No smoking/ No tobacco products/ Avoid exposure to second hand smoke Surgeon General's Warning:  Quitting smoking now greatly reduces serious risk to your health. Obesity, smoking, and sedentary lifestyle greatly increases your risk for illness A healthy diet, regular physical exercise & weight monitoring are important for maintaining a healthy lifestyle You may be retaining fluid if you have a history of heart failure or if you experience any of the following symptoms:  Weight gain of 3 pounds or more overnight or 5 pounds in a week, increased swelling in our hands or feet or shortness of breath while lying flat in bed. Please call your doctor as soon as you notice any of these symptoms; do not wait until your next office visit. Recognize signs and symptoms of STROKE: 
F-face looks uneven A-arms unable to move or move unevenly S-speech slurred or non-existent T-time-call 911 as soon as signs and symptoms begin-DO NOT go Back to bed or wait to see if you get better-TIME IS BRAIN. Patient Signature: 
Date: 1/6/2018 Discharging Nurse: Tunde Garcia RN Introducing [facility]! Dear Liang Delgado: 
Thank you for requesting a Shot Stats account. Our records indicate that you already have an active Shot Stats account. You can access your account anytime at [email or web address] Did you know that you can access your hospital and ER discharge instructions at any time in Shot Stats? You can also review all of your test results from your hospital stay or ER visit. Additional Information If you have questions, please visit the Frequently Asked Questions section of the Shot Stats website at [email or web address] Remember, Shot Stats is NOT to be used for urgent needs. For medical emergencies, dial 911. Now available from your iPhone and Android! Providers Seen During Your Hospitalization Provider Specialty Primary office phone Valerie Rushing NP Nurse Practitioner [phone] Your Primary Care Physician (PCP) Primary Care Physician Office Phone Office Fax Yris Palacios [phone] You are allergic to the following Allergen Reactions Dilaudid (Hydromorphone) Other (comments) \"out of her mind\" Sulfa (Sulfonamide Antibiotics) Itching Tetracycline Nausea Only Recent Documentation OB Status Smoking Status Postmenopausal Former Smoker Emergency Contacts Name Discharge Info Relation Home Work Mobile [facility] Diann CAREGIVER [3] Daughter [21] [phone] Patient Belongings The following personal items are in your possession at time of discharge: 
                             
 
  
  
 Please provide this summary of care documentation to your next provider. Signatures-by signing, you are acknowledging that this After Visit Summary has been reviewed with you and you have received a copy. Patient Signature:  ____________________________________________________________ Date:  ____________________________________________________________  
  
Syd Rosen Provider Signature:  ____________________________________________________________ Date:  ____________________________________________________________

## 2018-01-06 NOTE — PROGRESS NOTES
Problem: Knowledge Deficit  Goal: *Verbalizes understanding of procedures and medications  Outcome: Progressing Towards Goal  Patient here for day #1 of chemotherapy. She has received her chemotherapy education last week. Patient will have a port insertion today, due to inability for vascular access team to place a peripheral site. Reviewed the process and procedure with patient and she verbalizes understanding. Encouraged her to ask questions as they arise.

## 2018-01-07 NOTE — PROGRESS NOTES
D1C1 Taxotere and cytoxan completed. Patient tolerated well. Observed patient x 30 minutes (1st dose) no reactions. Port flushed and de-accessed. Any issues or concerns during appointment: None. Patient aware  will make Neulasta appointment for Monday since chemo completed late tonight.  will call patient on Monday with time and location for Neulasta injection. Discharged via wheelchair in stable condition accompanied by daughter.

## 2018-01-08 NOTE — PROGRESS NOTES
Pt arrived via wheelchair today at 1300, to receive Neulasta. Pt tolerated without difficulty. Patient discharged via wheelchair accompanied by friend. Instructed to notify physician of any problems, questions or concerns. Allowed opportunity for patient/family to ask questions. Verbalized understanding. Next appointment is Jan 26 at 1000 with Maicol Silvestre.

## 2018-01-22 PROBLEM — J18.9 PNEUMONIA: Status: ACTIVE | Noted: 2018-01-01

## 2018-01-22 PROBLEM — E86.0 DEHYDRATION: Status: ACTIVE | Noted: 2018-01-01

## 2018-01-22 NOTE — ED TRIAGE NOTES
Patient comes in via EMS for increased shortness of breath and hypotension while at the infusion center. 37R systolic on arrival. EMS gave 2.5 liters of fluid in route. Pressure 144/95 during triage.

## 2018-01-22 NOTE — PROGRESS NOTES
Arrived to the [facility]. 2.5 L of IVF and 20 meq of KCL completed. Patient tolerated well. Any issues or concerns during appointment: Patient was sent from the office with hypotension. After 2 liters of fluids her pressure was 84/39. NP notified and decision was made to take her to the ER. Transport picked the patient up at [phone]. Patient aware of next infusion appointment on 1/26 (date) at 56 (time). Discharged home.

## 2018-01-23 PROBLEM — J18.9 HCAP (HEALTHCARE-ASSOCIATED PNEUMONIA): Status: ACTIVE | Noted: 2018-01-01

## 2018-01-23 NOTE — PROGRESS NOTES
Progress Note    Patient: Sheeba Kwong MRN: [id number]  SSN: [id number]    YOB: 1940  Age: 69 y.o. Sex: female      Admit Date: 1/22/2018    LOS: 0 days     Subjective:     Pt continues to be dyspneic with movement and very weak. Objective:     Vitals:    01/23/18 0631 01/23/18 0759 01/23/18 1230 01/23/18 1256   BP: 109/54   100/51   Pulse: 75   72   Resp: 22   20   Temp: 98.3 °F (36.8 °C)   97.9 °F (36.6 °C)   SpO2: 95% 96% 98% 97%          Physical Exam:   General: elderly female lying in bed, NAD but uncomfortable  HEENT: NCAT, EOMI, OOP pink/moist   CV: RRR, no m/g/r   Lungs - crackles in all lung fields  Abd - soft, NT, ND   Ext - no edema   Neuro - CN II-XII intact, no focal deficits       Lab/Data Review:  Recent Results (from the past 24 hour(s))   EKG, 12 LEAD, INITIAL    Collection Time: 01/22/18  6:43 PM   Result Value Ref Range    Ventricular Rate 78 BPM    Atrial Rate 78 BPM    P-R Interval 196 ms    QRS Duration 92 ms    Q-T Interval 364 ms    QTC Calculation (Bezet) 414 ms    Calculated P Axis 59 degrees    Calculated R Axis 28 degrees    Calculated T Axis 28 degrees    Diagnosis       !! AGE AND GENDER SPECIFIC ECG ANALYSIS !!   Normal sinus rhythm  Low voltage QRS  Borderline ECG  When compared with ECG of 28-JAN-2008 12:27,  No significant change was found  Confirmed by ST PIOTR SALVADOR MD (), AJ ROBLEDO ([id number]) on 1/22/2018 8:59:14 PM     CBC WITH AUTOMATED DIFF    Collection Time: 01/22/18  6:48 PM   Result Value Ref Range    WBC 9.9 4.3 - 11.1 K/uL    RBC 3.47 (L) 4.05 - 5.25 M/uL    HGB 10.3 (L) 11.7 - 15.4 g/dL    HCT 30.9 (L) 35.8 - 46.3 %    MCV 89.0 79.6 - 97.8 FL    MCH 29.7 26.1 - 32.9 PG    MCHC 33.3 31.4 - 35.0 g/dL    RDW 14.8 (H) 11.9 - 14.6 %    PLATELET 274 679 - 958 K/uL    MPV 9.4 (L) 10.8 - 14.1 FL    DF AUTOMATED      NEUTROPHILS 77 43 - 78 %    LYMPHOCYTES 15 13 - 44 %    MONOCYTES 7 4.0 - 12.0 %    EOSINOPHILS 0 (L) 0.5 - 7.8 %    BASOPHILS 0 0.0 - 2.0 % IMMATURE GRANULOCYTES 1 0.0 - 5.0 %    ABS. NEUTROPHILS 7.6 1.7 - 8.2 K/UL    ABS. LYMPHOCYTES 1.4 0.5 - 4.6 K/UL    ABS. MONOCYTES 0.7 0.1 - 1.3 K/UL    ABS. EOSINOPHILS 0.0 0.0 - 0.8 K/UL    ABS. BASOPHILS 0.0 0.0 - 0.2 K/UL    ABS. IMM. GRANS. 0.1 0.0 - 0.5 K/UL   METABOLIC PANEL, COMPREHENSIVE    Collection Time: 01/22/18  6:48 PM   Result Value Ref Range    Sodium 144 136 - 145 mmol/L    Potassium 3.1 (L) 3.5 - 5.1 mmol/L    Chloride 111 (H) 98 - 107 mmol/L    CO2 22 21 - 32 mmol/L    Anion gap 11 7 - 16 mmol/L    Glucose 110 (H) 65 - 100 mg/dL    BUN 23 8 - 23 MG/DL    Creatinine 0.81 0.6 - 1.0 MG/DL    GFR est AA >60 >60 ml/min/1.73m2    GFR est non-AA >60 >60 ml/min/1.73m2    Calcium 7.4 (L) 8.3 - 10.4 MG/DL    Bilirubin, total 0.3 0.2 - 1.1 MG/DL    ALT (SGPT) 13 12 - 65 U/L    AST (SGOT) 26 15 - 37 U/L    Alk.  phosphatase 70 50 - 136 U/L    Protein, total 5.8 (L) 6.3 - 8.2 g/dL    Albumin 2.3 (L) 3.2 - 4.6 g/dL    Globulin 3.5 2.3 - 3.5 g/dL    A-G Ratio 0.7 (L) 1.2 - 3.5     BNP    Collection Time: 01/22/18  6:48 PM   Result Value Ref Range    BNP 39 pg/mL   CULTURE, BLOOD    Collection Time: 01/22/18  7:54 PM   Result Value Ref Range    Special Requests: MEDIAL PORT      Culture result: NO GROWTH AFTER 8 HOURS     CULTURE, BLOOD    Collection Time: 01/22/18  7:54 PM   Result Value Ref Range    Special Requests: MEDIAL PORT      Culture result: NO GROWTH AFTER 8 HOURS     INFLUENZA A & B AG (RAPID TEST)    Collection Time: 01/22/18  7:56 PM   Result Value Ref Range    Influenza A Ag NEGATIVE  NEG      Influenza B Ag NEGATIVE  NEG     PROCALCITONIN    Collection Time: 01/22/18  7:56 PM   Result Value Ref Range    Procalcitonin 0.1 ng/mL   MAGNESIUM    Collection Time: 01/22/18  7:56 PM   Result Value Ref Range    Magnesium 1.6 (L) 1.8 - 2.4 mg/dL   POC LACTIC ACID    Collection Time: 01/22/18  8:27 PM   Result Value Ref Range    Lactic Acid (POC) 1.2 0.5 - 1.9 mmol/L   URINALYSIS W/ RFLX MICROSCOPIC Collection Time: 01/22/18 10:49 PM   Result Value Ref Range    Color YELLOW      Appearance CLOUDY      Specific gravity 1.015 1.001 - 1.023      pH (UA) 5.5 5.0 - 9.0      Protein NEGATIVE  NEG mg/dL    Glucose NEGATIVE  mg/dL    Ketone NEGATIVE  NEG mg/dL    Bilirubin NEGATIVE  NEG      Blood NEGATIVE  NEG      Urobilinogen 0.2 0.2 - 1.0 EU/dL    Nitrites NEGATIVE  NEG      Leukocyte Esterase SMALL (A) NEG      WBC 10-20 0 /hpf    RBC 0-3 0 /hpf    Epithelial cells 0-3 0 /hpf    Bacteria 0 0 /hpf    Casts 0-3 0 /lpf   METABOLIC PANEL, COMPREHENSIVE    Collection Time: 01/23/18  4:21 AM   Result Value Ref Range    Sodium 144 136 - 145 mmol/L    Potassium 4.6 3.5 - 5.1 mmol/L    Chloride 113 (H) 98 - 107 mmol/L    CO2 22 21 - 32 mmol/L    Anion gap 9 7 - 16 mmol/L    Glucose 130 (H) 65 - 100 mg/dL    BUN 17 8 - 23 MG/DL    Creatinine 0.71 0.6 - 1.0 MG/DL    GFR est AA >60 >60 ml/min/1.73m2    GFR est non-AA >60 >60 ml/min/1.73m2    Calcium 7.7 (L) 8.3 - 10.4 MG/DL    Bilirubin, total 0.4 0.2 - 1.1 MG/DL    ALT (SGPT) 14 12 - 65 U/L    AST (SGOT) 26 15 - 37 U/L    Alk. phosphatase 79 50 - 136 U/L    Protein, total 5.9 (L) 6.3 - 8.2 g/dL    Albumin 2.3 (L) 3.2 - 4.6 g/dL    Globulin 3.6 (H) 2.3 - 3.5 g/dL    A-G Ratio 0.6 (L) 1.2 - 3.5     CBC WITH AUTOMATED DIFF    Collection Time: 01/23/18  4:21 AM   Result Value Ref Range    WBC 13.6 (H) 4.3 - 11.1 K/uL    RBC 3.48 (L) 4.05 - 5.25 M/uL    HGB 10.3 (L) 11.7 - 15.4 g/dL    HCT 31.1 (L) 35.8 - 46.3 %    MCV 89.4 79.6 - 97.8 FL    MCH 29.6 26.1 - 32.9 PG    MCHC 33.1 31.4 - 35.0 g/dL    RDW 15.0 (H) 11.9 - 14.6 %    PLATELET 382 010 - 689 K/uL    MPV 9.9 (L) 10.8 - 14.1 FL    DF AUTOMATED      NEUTROPHILS 94 (H) 43 - 78 %    LYMPHOCYTES 4 (L) 13 - 44 %    MONOCYTES 2 (L) 4.0 - 12.0 %    EOSINOPHILS 0 (L) 0.5 - 7.8 %    BASOPHILS 0 0.0 - 2.0 %    IMMATURE GRANULOCYTES 0 0.0 - 5.0 %    ABS. NEUTROPHILS 12.6 (H) 1.7 - 8.2 K/UL    ABS.  LYMPHOCYTES 0.6 0.5 - 4.6 K/UL ABS. MONOCYTES 0.3 0.1 - 1.3 K/UL    ABS. EOSINOPHILS 0.0 0.0 - 0.8 K/UL    ABS. BASOPHILS 0.0 0.0 - 0.2 K/UL    ABS. IMM. GRANS. 0.1 0.0 - 0.5 K/UL       Assessment:   Ms. Alfred Masterson is a 69 y.o. female with PMH of BCA admitted for HCAP and respiratory failure. Plan:   1. Acute respiratory failure - HCAP and still requiring 5L O2. Continue with abx, steroids, bronchodilators and O2.  2. HCAP - Continue vanc and Zosyn. Check S.pneumo and mycoplasma. 3. 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 She completed cycle 1 of TC with next dose planned for Friday. Oncology consulted. 4. Hypokalemia - Replete. 5. HTN - BP on the lower side so antihypertensives will be held. 6. DVT ppx - heparin SQ    DNR  Oncology to take over as primary tomorrow per NP.       Signed By: iNka Marks MD     January 23, 2018

## 2018-01-23 NOTE — ED PROVIDER NOTES
HPI Comments: Presents with complaint of low blood pressure. Patient states she's been feeling weak having right-sided back pain cough and chest congestion. She's been feeling short of breath. She called her oncologist and they told her to go to the infusion center for fluids because they assume she was dehydrated. After 2-1/2 L of fluid she was still hypotensive in the 80s. She completed the third liter. She denies any fevers. Last chemotherapy was proximally 3 weeks ago. She is scheduled for chemotherapy on Friday. She is not on home oxygen and is more comfortable on 2 L. Patient is a 69 y.o. female presenting with hypotension. The history is provided by the patient. Hypotension    This is a new problem. The current episode started more than 2 days ago. The problem has been gradually worsening. Associated symptoms include somnolence and weakness. Pertinent negatives include no confusion, no seizures, no unresponsiveness, no agitation, no delusions, no hallucinations, no self-injury, no violence, no tingling and no numbness. Mental status baseline is normal.  Her past medical history does not include COPD.         Past Medical History:   Diagnosis Date    Adverse effect of anesthesia     after hernia surgery bp dropped low and she went to ICU    Arthritis     hands; wrists; back; right knee    Breast cancer ([facility] [address]     Depression     Fibrocystic breast disease     bilateral    GERD (gastroesophageal reflux disease)     Headache(784.0)     previously on Topamax    HTN (hypertension), benign     Hypercholesteremia     Kidney stones        Past Surgical History:   Procedure Laterality Date    HX BREAST RECONSTRUCTION  10/11/2017    HX BREAST RECONSTRUCTION Bilateral 10/11/2017    BILATERAL BREAST RECONSTRUCTION WITH PLACEMENT TISSUE EXPANDERS AND ALLODERM performed by Dot Segura MD at [address] [place] [address] HX CATARACT REMOVAL      bilateral    HX COLONOSCOPY  Oct 2010    Diverticula    HX HERNIA REPAIR  23/6325    umbilical hernia; done at [facility] in [place]    HX KNEE REPLACEMENT      left    HX KNEE REPLACEMENT  02/19/2009    total knee replacement (Left)     HX MASTECTOMY Bilateral 10/11/2017    BILATERAL BREAST MASTECTOMY SIMPLE ARTOURA ULTRA HIGH PROFILE BREAST IMPLANTS 700cc 13.5 cm 8.3cm [id number] X 3 /  MEDIUM HEIGHT 550cc 13.5 cm 11.7cm 7.4cm [id number] X 3  performed by Tiana Lopez DO at [address] [place] [address] [facility] HX SKIN BIOPSY  10/03/2017    on nose    HX VASCULAR ACCESS           Family History:   Problem Relation Age of Onset    Other Brother      Myodisplasia syndrome     Heart Disease Brother     Hypertension Brother     Hypertension Mother     Diabetes Mother     Cancer Daughter      cervical       Social History     Social History    Marital status:      Spouse name: N/A    Number of children: N/A    Years of education: N/A     Occupational History     Retired     [address] [place] [employer] History Main Topics    Smoking status: Former Smoker     Types: Cigarettes     Quit date: 1/1/1992    Smokeless tobacco: Never Used    Alcohol use 0.0 oz/week     0 Standard drinks or equivalent per week      Comment: Occasional    Drug use: No    Sexual activity: Not Currently     Other Topics Concern    Not on file     Social History Narrative     June 2012         ALLERGIES: Dilaudid [hydromorphone]; Sulfa (sulfonamide antibiotics); and Tetracycline    Review of Systems   Constitutional: Negative for chills and fever. Respiratory: Positive for cough and shortness of breath. Cardiovascular: Positive for chest pain. Gastrointestinal: Positive for nausea. Negative for abdominal pain and vomiting. Musculoskeletal: Positive for back pain. Skin: Negative for rash and wound. Neurological: Positive for weakness. Negative for tingling, seizures and numbness. Psychiatric/Behavioral: Negative for agitation, confusion, hallucinations and self-injury.    All other systems reviewed and are negative. Vitals:    01/22/18 1850 01/22/18 1854   BP: 134/63    Pulse: 77    Resp: 25    Temp:  98.2 °F (36.8 °C)   SpO2: 94%             Physical Exam   Constitutional: She is oriented to person, place, and time. She appears well-developed and well-nourished. No distress. HENT:   Head: Normocephalic and atraumatic. Neck: Normal range of motion. Neck supple. Cardiovascular: Normal rate and regular rhythm. Pulmonary/Chest: Effort normal. No respiratory distress. She has wheezes. She has rales (at bases). Abdominal: Soft. She exhibits no distension. There is no tenderness. There is no rebound and no guarding. Musculoskeletal: Normal range of motion. She exhibits no edema. Neurological: She is alert and oriented to person, place, and time. No cranial nerve deficit. Skin: Skin is warm and dry. No rash noted. She is not diaphoretic. No erythema. Psychiatric: She has a normal mood and affect. Her behavior is normal.   Nursing note and vitals reviewed. MDM  Number of Diagnoses or Management Options  Diagnosis management comments: The patient has new infiltrates in the right and a focal opacity in the right midlung. She'll be treated for hospital-acquired pneumonia. She'll be admitted to the hospitalist to be transferred to the oncology service in the morning.   She has already received 3 L of fluid (more than 30ml/kg)       Amount and/or Complexity of Data Reviewed  Clinical lab tests: ordered and reviewed  Review and summarize past medical records: yes (Reviewed note from infusion ctr and oncologist.  )  Discuss the patient with other providers: yes  Independent visualization of images, tracings, or specimens: yes (NSR no ST elevation nl QRS)    Risk of Complications, Morbidity, and/or Mortality  Presenting problems: high  Diagnostic procedures: moderate  Management options: high    Patient Progress  Patient progress: stable    ED Course       Procedures

## 2018-01-23 NOTE — PROGRESS NOTES
Primary Nurse Janis Nicole RN and Don Askew RN performed a dual skin assessment on this patient No impairment noted  Morris score is 17

## 2018-01-23 NOTE — ED NOTES
TRANSFER - OUT REPORT:    Verbal report given to Jing(name) on Alfred Masterson  being transferred to 2nd floor(unit) for routine progression of care       Report consisted of patients Situation, Background, Assessment and   Recommendations(SBAR). Information from the following report(s) ED Summary was reviewed with the receiving nurse. Lines:   Venous Access Device Bard Power Port 01/06/18 Upper chest (subclavicular area), left (Active)   Central Line Being Utilized Yes 1/22/2018  2:30 PM   Criteria for Appropriate Use Long term IV/antibiotic administration 1/22/2018  2:30 PM   Site Assessment Clean, dry, & intact 1/22/2018  2:30 PM   Date of Last Dressing Change 01/22/18 1/22/2018  2:30 PM   Dressing Status New 1/22/2018  2:30 PM   Dressing Type Disk with Chlorhexadine gluconate (CHG); Transparent 1/22/2018  2:30 PM   Date Accessed (Medial Site) 01/22/18 1/22/2018  2:30 PM   Access Time (Medial Site) 1430 1/22/2018  2:30 PM   Access Needle Size (Site #1) 20 G 1/22/2018  2:30 PM   Access Needle Length (Medial Site) 0.75 inches 1/22/2018  2:30 PM   Positive Blood Return (Medial Site) Yes 1/22/2018  2:30 PM   Action Taken (Medial Site) Infusing 1/22/2018  6:00 PM        Opportunity for questions and clarification was provided.       Patient transported with:   O2 @ 5 liters

## 2018-01-23 NOTE — H&P
Hospitalist H&P Note     Admit Date:  2018  6:38 PM   Name:  Yessica Ayala   Age:  69 y.o.  :  1940   MRN:  [id number]   PCP:  Eddi Garcia MD  Treatment Team: Attending Provider: Chetna Hart MD; Primary Nurse: Rosalia Lr    HPI:     Ms. Wiliam Ellis is a 70 yo female who presented with weakness on a background of depression, GERD, HTN and HLD. She also has dyspnea. The dyspnea was presented for 2 months. She also reported cough for the past 2 weeks. She felt weakness and decreased PO intake for 5 days. She denies the following: dysuria, or chest pain. She reports diarrhea for the past 4 days. Due to weakness, she went to the infusion center where she was found to be hypotensive. CXR today showed a new focal opacity in the right midlung (influenza negative). 10 systems reviewed and negative except as noted in HPI.   Past Medical History:   Diagnosis Date    Adverse effect of anesthesia     after hernia surgery bp dropped low and she went to ICU    Arthritis     hands; wrists; back; right knee    Breast cancer ([address]     Depression     Fibrocystic breast disease     bilateral    GERD (gastroesophageal reflux disease)     Headache(784.0)     previously on Topamax    HTN (hypertension), benign     Hypercholesteremia     Kidney stones       Past Surgical History:   Procedure Laterality Date    HX BREAST RECONSTRUCTION  10/11/2017    HX BREAST RECONSTRUCTION Bilateral 10/11/2017    BILATERAL BREAST RECONSTRUCTION WITH PLACEMENT TISSUE EXPANDERS AND ALLODERM performed by Meryle Fire, MD at [address] [place] [address] HX CATARACT REMOVAL      bilateral    HX COLONOSCOPY  Oct 2010    Diverticula    HX HERNIA REPAIR      umbilical hernia; done at [facility] in [place]    HX KNEE REPLACEMENT      left    HX KNEE REPLACEMENT  2009    total knee replacement (Left)     HX MASTECTOMY Bilateral 10/11/2017    BILATERAL BREAST MASTECTOMY SIMPLE ARTOURA ULTRA HIGH PROFILE BREAST IMPLANTS 700cc 13.5 cm 8.3cm [id number] X 3 /  MEDIUM HEIGHT 550cc 13.5 cm 11.7cm 7.4cm [id number] X 3  performed by Danika Malloy DO at [address] [facility] HX SKIN BIOPSY  10/03/2017    on nose    HX VASCULAR ACCESS        Allergies   Allergen Reactions    Dilaudid [Hydromorphone] Other (comments)     \"out of her mind\"    Sulfa (Sulfonamide Antibiotics) Itching    Tetracycline Nausea Only      Social History   Substance Use Topics    Smoking status: Former Smoker     Types: Cigarettes     Quit date: 1/1/1992    Smokeless tobacco: Never Used    Alcohol use 0.0 oz/week     0 Standard drinks or equivalent per week      Comment: Occasional      Family History   Problem Relation Age of Onset    Other Brother      Myodisplasia syndrome     Heart Disease Brother     Hypertension Brother     Hypertension Mother     Diabetes Mother     Cancer Daughter      cervical      Immunization History   Administered Date(s) Administered    Influenza High Dose Vaccine PF 10/20/2014, 10/28/2016, 09/07/2017    Pneumococcal Conjugate (PCV-13) 08/17/2016    Pneumococcal Polysaccharide (PPSV-23) 09/07/2017     PTA Medications:  Prior to Admission Medications   Prescriptions Last Dose Informant Patient Reported? Taking? Cetirizine (ZYRTEC) 10 mg cap   Yes No   Sig: Take 10 mg by mouth daily. Take / use AM day of surgery  per anesthesia protocols. DOCUSATE CALCIUM (STOOL SOFTENER PO)   Yes No   Sig: Take  by mouth as needed. LORazepam (ATIVAN) 1 mg tablet   No No   Sig: Take 1 Tab by mouth every six (6) hours as needed for Anxiety. Max Daily Amount: 4 mg. Indications: anxiety, CANCER CHEMOTHERAPY-INDUCED NAUSEA AND VOMITING   PSEUDOEPHEDRINE HCL (SUDAFED 12 HOUR PO)   Yes No   Sig: Take  by mouth as needed. acetaminophen (TYLENOL) 325 mg tablet   Yes No   Sig: Take 325 mg by mouth every four (4) hours as needed for Pain.    albuterol (PROVENTIL HFA, VENTOLIN HFA, PROAIR HFA) 90 mcg/actuation inhaler   No No   Sig: Take 1-2 Puffs by inhalation every four (4) hours as needed for Wheezing or Shortness of Breath. atorvastatin (LIPITOR) 20 mg tablet   No No   Sig: Take 1 Tab by mouth daily. Patient taking differently: Take 20 mg by mouth daily. Take / use AM day of surgery  per anesthesia protocols. Indications: hypercholesterolemia, hyperlipidemia   dexamethasone (DECADRON) 4 mg tablet   No No   Sig: Take two (2) tabs twice daily the day before, day of and day after chemo. guaiFENesin-dextromethorphan SR (MUCINEX DM) 600-30 mg per tablet   Yes No   Sig: Take 1 Tab by mouth every twelve (12) hours as needed for Cough. losartan-hydroCHLOROthiazide (HYZAAR) 50-12.5 mg per tablet   No No   Sig: Take 1 Tab by mouth daily. Patient taking differently: Take 1 Tab by mouth daily. Indications: hypertension   meclizine (ANTIVERT) 25 mg tablet   No No   Sig: Take 1 Tab by mouth every six (6) hours as needed for Dizziness. Patient taking differently: Take 25 mg by mouth every six (6) hours as needed for Dizziness. Indications: VERTIGO   meloxicam (MOBIC) 7.5 mg tablet   No No   Sig: Take 1-2 Tabs by mouth daily. Patient taking differently: Take 7.5-15 mg by mouth as needed. Indications: OSTEOARTHRITIS   metoprolol succinate (TOPROL-XL) 50 mg XL tablet   No No   Sig: Take 1 Tab by mouth daily. ondansetron hcl (ZOFRAN, AS HYDROCHLORIDE,) 8 mg tablet   No No   Sig: Take 1 Tab by mouth every eight (8) hours as needed for Nausea. oxyCODONE IR (ROXICODONE) 5 mg immediate release tablet   Yes No   Sig: Take 5 mg by mouth every six (6) hours as needed for Pain. predniSONE (DELTASONE) 20 mg tablet   No No   Sig: Take 2 daily for 3 days, then 1 daily for 2 days   prochlorperazine (COMPAZINE) 10 mg tablet   No No   Sig: Take 1 Tab by mouth every six (6) hours as needed. ranitidine (ZANTAC) 150 mg tablet   Yes No   Sig: Take 150 mg by mouth as needed for Indigestion. Take / use AM day of surgery  per anesthesia protocols.    sertraline (ZOLOFT) 100 mg tablet   No No   Sig: Take 1 Tab by mouth daily. Patient taking differently: Take 100 mg by mouth daily. Take / use AM day of surgery  per anesthesia protocols. Indications: ANXIETY WITH DEPRESSION      Facility-Administered Medications Last Administration Doses Remaining   heparin (porcine) pf 500 Units 1/22/2018  1:16 PM 0          Review of Systems:  Gen: No weight loss  Eyes: no vision changes  ENT: no sore throat  Resp: +dyspnea, cough  CV: No cp  Abd:  no abd pain, no vomiting or diarrhea  : No incontinence, no hematuria or dysuria, no flank pain  MSK: no leg swelling  Neuro: No HA or dizziness, no weakness, numbness/tingling    Objective:   Patient Vitals for the past 24 hrs:   Temp Pulse Resp BP SpO2   01/22/18 1854 98.2 °F (36.8 °C) - - - -   01/22/18 1850 - 77 25 134/63 94 %     Oxygen Therapy  O2 Sat (%): 94 % (01/22/18 1850)  Pulse via Oximetry: 77 beats per minute (01/22/18 1850)  O2 Device: Nasal cannula (01/22/18 1850)  O2 Flow Rate (L/min): 2 l/min (01/22/18 1850)  No intake or output data in the 24 hours ending 01/22/18 2103    Physical Exam:  General:    Well nourished. Alert. Eyes:   Normal sclera. Extraocular movements intact. ENT:  Normocephalic, atraumatic. Moist mucous membranes  CV:   RRR. No murmur, rub, or gallop. Lungs:  +expiratory wheezing in bilateral posterior lobes, +right sided crackles  Abdomen: Soft, nontender, nondistended. Bowel sounds normal.   Extremities: Warm and dry. No cyanosis or edema. Neurologic: CN II-XII grossly intact. Sensation intact. Skin:     No rashes or jaundice. Psych:  Normal mood and affect. I reviewed the labs, imaging, EKGs, telemetry, and other studies done this admission.   Data Review:   Recent Results (from the past 24 hour(s))   CBC WITH AUTOMATED DIFF    Collection Time: 01/22/18  1:07 PM   Result Value Ref Range    WBC 14.8 (H) 4.3 - 11.1 K/uL    RBC 3.81 (L) 4.05 - 5.25 M/uL    HGB 11.5 (L) 11.7 - 15.4 g/dL HCT 33.8 (L) 35.8 - 46.3 %    MCV 88.7 79.6 - 97.8 FL    MCH 30.2 26.1 - 32.9 PG    MCHC 34.0 31.4 - 35.0 g/dL    RDW 14.4 11.9 - 14.6 %    PLATELET 002 232 - 269 K/uL    MPV 9.7 (L) 10.8 - 14.1 FL    ABSOLUTE NRBC 0.00 0.0 - 0.2 K/uL    DF AUTOMATED      NEUTROPHILS 86 (H) 43 - 78 %    LYMPHOCYTES 8 (L) 13 - 44 %    MONOCYTES 6 4.0 - 12.0 %    EOSINOPHILS 0 (L) 0.5 - 7.8 %    BASOPHILS 0 0.0 - 2.0 %    ABS. NEUTROPHILS 12.7 (H) 1.7 - 8.2 K/UL    ABS. LYMPHOCYTES 1.1 0.5 - 4.6 K/UL    ABS. MONOCYTES 0.9 0.1 - 1.3 K/UL    ABS. EOSINOPHILS 0.0 0.0 - 0.8 K/UL    ABS. BASOPHILS 0.1 0.0 - 0.2 K/UL   METABOLIC PANEL, COMPREHENSIVE    Collection Time: 01/22/18  1:07 PM   Result Value Ref Range    Sodium 139 136 - 145 mmol/L    Potassium 3.0 (L) 3.5 - 5.1 mmol/L    Chloride 103 98 - 107 mmol/L    CO2 24 21 - 32 mmol/L    Anion gap 12 7 - 16 mmol/L    Glucose 94 65 - 100 mg/dL    BUN 27 (H) 8 - 23 MG/DL    Creatinine 0.95 0.6 - 1.0 MG/DL    GFR est AA >60 >60 ml/min/1.73m2    GFR est non-AA >60 >60 ml/min/1.73m2    Calcium 9.1 8.3 - 10.4 MG/DL    Bilirubin, total 0.5 0.2 - 1.1 MG/DL    ALT (SGPT) 19 12 - 65 U/L    AST (SGOT) 32 15 - 37 U/L    Alk. phosphatase 83 50 - 136 U/L    Protein, total 7.2 6.3 - 8.2 g/dL    Albumin 3.0 (L) 3.2 - 4.6 g/dL    Globulin 4.2 (H) 2.3 - 3.5 g/dL    A-G Ratio 0.7 (L) 1.2 - 3.5     MAGNESIUM    Collection Time: 01/22/18  1:07 PM   Result Value Ref Range    Magnesium 1.6 (L) 1.8 - 2.4 mg/dL   EKG, 12 LEAD, INITIAL    Collection Time: 01/22/18  6:43 PM   Result Value Ref Range    Ventricular Rate 78 BPM    Atrial Rate 78 BPM    P-R Interval 196 ms    QRS Duration 92 ms    Q-T Interval 364 ms    QTC Calculation (Bezet) 414 ms    Calculated P Axis 59 degrees    Calculated R Axis 28 degrees    Calculated T Axis 28 degrees    Diagnosis       !! AGE AND GENDER SPECIFIC ECG ANALYSIS !!   Normal sinus rhythm  Low voltage QRS  Borderline ECG  When compared with ECG of 28-JAN-2008 12:27,  No significant change was found  Confirmed by ST PIOTR SALVADOR MD (), AJ ROBLEDO ([id number]) on 1/22/2018 8:59:14 PM     CBC WITH AUTOMATED DIFF    Collection Time: 01/22/18  6:48 PM   Result Value Ref Range    WBC 9.9 4.3 - 11.1 K/uL    RBC 3.47 (L) 4.05 - 5.25 M/uL    HGB 10.3 (L) 11.7 - 15.4 g/dL    HCT 30.9 (L) 35.8 - 46.3 %    MCV 89.0 79.6 - 97.8 FL    MCH 29.7 26.1 - 32.9 PG    MCHC 33.3 31.4 - 35.0 g/dL    RDW 14.8 (H) 11.9 - 14.6 %    PLATELET 131 094 - 660 K/uL    MPV 9.4 (L) 10.8 - 14.1 FL    DF AUTOMATED      NEUTROPHILS 77 43 - 78 %    LYMPHOCYTES 15 13 - 44 %    MONOCYTES 7 4.0 - 12.0 %    EOSINOPHILS 0 (L) 0.5 - 7.8 %    BASOPHILS 0 0.0 - 2.0 %    IMMATURE GRANULOCYTES 1 0.0 - 5.0 %    ABS. NEUTROPHILS 7.6 1.7 - 8.2 K/UL    ABS. LYMPHOCYTES 1.4 0.5 - 4.6 K/UL    ABS. MONOCYTES 0.7 0.1 - 1.3 K/UL    ABS. EOSINOPHILS 0.0 0.0 - 0.8 K/UL    ABS. BASOPHILS 0.0 0.0 - 0.2 K/UL    ABS. IMM. GRANS. 0.1 0.0 - 0.5 K/UL   METABOLIC PANEL, COMPREHENSIVE    Collection Time: 01/22/18  6:48 PM   Result Value Ref Range    Sodium 144 136 - 145 mmol/L    Potassium 3.1 (L) 3.5 - 5.1 mmol/L    Chloride 111 (H) 98 - 107 mmol/L    CO2 22 21 - 32 mmol/L    Anion gap 11 7 - 16 mmol/L    Glucose 110 (H) 65 - 100 mg/dL    BUN 23 8 - 23 MG/DL    Creatinine 0.81 0.6 - 1.0 MG/DL    GFR est AA >60 >60 ml/min/1.73m2    GFR est non-AA >60 >60 ml/min/1.73m2    Calcium 7.4 (L) 8.3 - 10.4 MG/DL    Bilirubin, total 0.3 0.2 - 1.1 MG/DL    ALT (SGPT) 13 12 - 65 U/L    AST (SGOT) 26 15 - 37 U/L    Alk.  phosphatase 70 50 - 136 U/L    Protein, total 5.8 (L) 6.3 - 8.2 g/dL    Albumin 2.3 (L) 3.2 - 4.6 g/dL    Globulin 3.5 2.3 - 3.5 g/dL    A-G Ratio 0.7 (L) 1.2 - 3.5     BNP    Collection Time: 01/22/18  6:48 PM   Result Value Ref Range    BNP 39 pg/mL   INFLUENZA A & B AG (RAPID TEST)    Collection Time: 01/22/18  7:56 PM   Result Value Ref Range    Influenza A Ag NEGATIVE  NEG      Influenza B Ag NEGATIVE  NEG     MAGNESIUM    Collection Time: 01/22/18 7:56 PM   Result Value Ref Range    Magnesium 1.6 (L) 1.8 - 2.4 mg/dL   POC LACTIC ACID    Collection Time: 01/22/18  8:27 PM   Result Value Ref Range    Lactic Acid (POC) 1.2 0.5 - 1.9 mmol/L       All Micro Results     Procedure Component Value Units Date/Time    INFLUENZA A & B AG (RAPID TEST) [[id number]] Collected:  01/22/18 1956    Order Status:  Completed Specimen:  Nasopharyngeal from Nasal washing Updated:  01/22/18 2038     Influenza A Ag NEGATIVE          NEGATIVE FOR THE PRESENCE OF INFLUENZA A ANTIGEN  INFECTION DUE TO INFLUENZA A CANNOT BE RULED OUT. BECAUSE THE ANTIGEN PRESENT IN THE SAMPLE MAY BE BELOW  THE DETECTION LIMIT OF THE TEST. A NEGATIVE TEST IS PRESUMPTIVE AND IT IS RECOMMENDED THAT THESE RESULTS BE CONFIRMED BY VIRAL CULTURE OR AN FDA-CLEARED INFLUENZA A AND B MOLECULAR ASSAY. Influenza B Ag NEGATIVE          NEGATIVE FOR THE PRESENCE OF INFLUENZA B ANTIGEN  INFECTION DUE TO INFLUENZA B CANNOT BE RULED OUT. BECAUSE THE ANTIGEN PRESENT IN THE SAMPLE MAY BE BELOW  THE DETECTION LIMIT OF THE TEST. A NEGATIVE TEST IS PRESUMPTIVE AND IT IS RECOMMENDED THAT THESE RESULTS BE CONFIRMED BY VIRAL CULTURE OR AN FDA-CLEARED INFLUENZA A AND B MOLECULAR ASSAY. CULTURE, BLOOD [[id number]] Collected:  01/22/18 1954    Order Status:  Completed Specimen:  Blood from Blood Updated:  01/22/18 2021    CULTURE, BLOOD [[id number]] Collected:  01/22/18 1954    Order Status:  Completed Specimen:  Blood from Blood Updated:  01/22/18 2020          Other Studies:  Xr Chest Port    Result Date: 1/22/2018  History: Shortness of breath, breast cancer Exam: portable chest Comparison: 10/12/2017 Findings: There is coarsening of the interstitial lung markings. There is new focal increased opacity seen peripherally within the right midlung. No pneumothorax. No change in the appearance of the mediastinal contour or osseous structures. There is a new port overlying the left chest wall.  Impressions: New focal opacity seen within the right midlung. Assessment and Plan:     Hospital Problems as of 1/22/2018  Date Reviewed: 1/22/2018          Codes Class Noted - Resolved POA    Pneumonia ICD-10-CM: J18.9  ICD-9-CM: 486  1/22/2018 - Present Unknown        S/P bilateral mastectomy ICD-10-CM: Z90.13  ICD-9-CM: V45.71  10/12/2017 - Present Yes        Breast cancer ([address] ICD-10-CM: C50.919  ICD-9-CM: 174.9  10/11/2017 - Present Yes        HTN (hypertension), benign ICD-10-CM: I10  ICD-9-CM: 401.1  Unknown - Present Yes        Recurrent major depressive disorder, in remission ([address] ICD-10-CM: F33.40  ICD-9-CM: 296.35  Unknown - Present Yes              PLAN:  Sepsis secondary to pneumonia: CXR: + new focal opacity in the right midlung, Continue vancomycin and zosyn (day 1), Continue IV fluids  Acute COPD exacerbation: DuoNebs Q4H, Prednisone 40mg daily (day 1)  GERD: Continue Protonix daily  Anxiety: Continue Ativan BID PRN  HTN: Continue Hyzaar 50-12.5mg daily  Breast cancer:S/p bilateral mastectomy and R ALND/L SLNBx. R breast [id number], L breast [id number].   Depression: Continue Zoloft   Outpatient management  DVT ppx:  heparin  Anticipated DC needs:  <2 midnights, PT/OT pending  Code status:  Full  Risk:  high    Signed:  Ngozi Barlow MD

## 2018-01-23 NOTE — PROGRESS NOTES
Pharmacokinetic Consult to Pharmacist    Adrián Davalosjailyn is a 69 y.o. female being treated for HAP with vancomycin and zosyn. Lab Results   Component Value Date/Time    BUN 23 01/22/2018 06:48 PM    Creatinine 0.81 01/22/2018 06:48 PM    WBC 9.9 01/22/2018 06:48 PM    Procalcitonin 0.1 01/22/2018 07:56 PM    Lactic Acid (POC) 1.2 01/22/2018 08:27 PM      Estimated Creatinine Clearance: 56.5 mL/min (based on Cr of 0.81). CULTURES:  1/22 BCx: pending   Flu negative    Day 1 of vancomycin. Goal trough is 15-20. Vancomycin dose initiated at 1.5g load, then 1g q 12h. Will continue to follow patient.       Thank you,  Evelyn Bassett, PharmD  Clinical Pharmacist  [phone]

## 2018-01-23 NOTE — ED NOTES
Morris scale and Fall scale completed. Patient resting in bed with O2 via NC in place without complaint.

## 2018-01-24 NOTE — PROGRESS NOTES
Initial visit by  to convey care and concern and encourage patient that  services are available if desired. No needs were voiced during the visit. Provided business card for future reference.      Camille Beltran 69  Board Certified

## 2018-01-24 NOTE — PROGRESS NOTES
Spoke to Ms. Villa in room 214 about discharge planning. She lives in a [place] [id number] in [facility] side), with no steps if entered through the garage. She states she has a life-port, and has received one chemo treatment. Her daughter is Ms. Aidee Boggs of [place], [place].  Ms. Kim Quinn is on supplemental oxygen at 3 lpm [place], and states she has not been using supplemental oxygen at home. Discussed home with home health and paid caregivers (as she said she could use some housekeeping help at her townhouse) versus short-term rehab at a skilled nursing facility (list provided to her). Her first choice would be [facility]. Will see how she progresses with therapy and then re-assess for discharge planning. Ms. Kim Quinn has [employer], so a pre-cert would be required for STR at [facility].

## 2018-01-24 NOTE — PROGRESS NOTES
Pt continues with nonproductive cough but the cough beginning to sound like the congestion is starting to break up. Has had multiple BMs today (charted). The last one was soft and not liquid.

## 2018-01-24 NOTE — CONSULTS
[facility]        Inpatient Hematology / Oncology Consult Note    Reason for Consult:  Pneumonia  HCAP (healthcare-associated pneumonia)  Referring Physician:  Bianca Carpenter MD    History of Present Illness:  Ms. Tiarra Almanza is a 69 y.o. female admitted on 1/22/2018 with a primary diagnosis HCAP. She was seen at the cancer center with severe fatigue, dizziness, weight loss since completing her last round of chemo which was two weeks ago (cycle one TC 1/6/2018). After she received hydration at [facility] center she still had low bp so she was sent to ED. CXR showed HCAP. She was placed on vanc/zosyn. BCNTD. Her counts are stable. ANC 12.6. We are consulted to resume care of our patient. Review of Systems:  Constitutional Denies fever, chills. Positive for  weight loss, appetite changes, fatigue,. HEENT Denies trauma, blurry vision, hearing loss, ear pain, nosebleeds, sore throat, neck pain    Skin Denies lesions or rashes. Lungs Positive for  dyspnea, cough. Cardiovascular Denies chest pain, palpitations, or lower extremity edema. Neuro Denies headaches, visual changes or ataxia. Denies dizziness, tingling, tremors, sensory change, speech change, focal weakness or headaches. MSK Denies back pain, arthralgias, myalgias. Psychiatric/Behavioral  The patient is not nervous/anxious.          Allergies   Allergen Reactions    Dilaudid [Hydromorphone] Other (comments)     \"out of her mind\"    Sulfa (Sulfonamide Antibiotics) Itching    Tetracycline Nausea Only     Past Medical History:   Diagnosis Date    Adverse effect of anesthesia     after hernia surgery bp dropped low and she went to ICU    Arthritis     hands; wrists; back; right knee    Breast cancer ([address]     Depression     Fibrocystic breast disease     bilateral    GERD (gastroesophageal reflux disease)     Headache(784.0)     previously on Topamax    HTN (hypertension), benign     Hypercholesteremia     Kidney stones Past Surgical History:   Procedure Laterality Date    HX BREAST RECONSTRUCTION  10/11/2017    HX BREAST RECONSTRUCTION Bilateral 10/11/2017    BILATERAL BREAST RECONSTRUCTION WITH PLACEMENT TISSUE EXPANDERS AND ALLODERM performed by Geraldine Olmstead MD at [address] HX CATARACT REMOVAL      bilateral    HX COLONOSCOPY  Oct 2010    Diverticula    HX HERNIA REPAIR  68/2484    umbilical hernia; done at [facility] in [place]    HX KNEE REPLACEMENT      left    HX KNEE REPLACEMENT  02/19/2009    total knee replacement (Left)     HX MASTECTOMY Bilateral 10/11/2017    BILATERAL BREAST MASTECTOMY SIMPLE ARTOURA ULTRA HIGH PROFILE BREAST IMPLANTS 700cc 13.5 cm 8.3cm [id number] X 3 /  MEDIUM HEIGHT 550cc 13.5 cm 11.7cm 7.4cm [id number] X 3  performed by Zackary Cope DO at [address] HX SKIN BIOPSY  10/03/2017    on nose    HX VASCULAR ACCESS       Family History   Problem Relation Age of Onset    Other Brother      Myodisplasia syndrome     Heart Disease Brother     Hypertension Brother     Hypertension Mother     Diabetes Mother     Cancer Daughter      cervical     Social History     Social History    Marital status:      Spouse name: N/A    Number of children: N/A    Years of education: N/A     Occupational History     Retired     [employer] History Main Topics    Smoking status: Former Smoker     Types: Cigarettes     Quit date: 1/1/1992    Smokeless tobacco: Never Used    Alcohol use 0.0 oz/week     0 Standard drinks or equivalent per week      Comment: Occasional    Drug use: No    Sexual activity: Not Currently     Other Topics Concern    Not on file     Social History Narrative     June 2012     Current Facility-Administered Medications   Medication Dose Route Frequency Provider Last Rate Last Dose    lip protectant (BLISTEX) ointment   Topical PRN Denia Collins MD        piperacillin-tazobactam (ZOSYN) 4.5 g in 0.9% sodium chloride (MBP/ADV) 100 mL  4.5 g IntraVENous Q8H Garret Mccauley MD 25 mL/hr at 18 1106 4.5 g at 18 1106    Vancomycin Trough Reminder   Other Lakia Martinez MD        vancomycin (VANCOCIN) 1,000 mg in 0.9% sodium chloride 250 mL IVPB  1,000 mg IntraVENous Q12H Garret Mccauley  mL/hr at 18 0201 1,000 mg at 18 0201    potassium chloride (K-DUR, KLOR-CON) SR tablet 40 mEq  40 mEq Oral DAILY Garret Mccauley MD   40 mEq at 18 0847    atorvastatin (LIPITOR) tablet 20 mg  20 mg Oral DAILY Garret Mccauley MD   20 mg at 18 0847    LORazepam (ATIVAN) tablet 1 mg  1 mg Oral BID PRN Garret Mccauley MD        metoprolol succinate (TOPROL-XL) XL tablet 50 mg  50 mg Oral DAILY Garret Mccauley MD   50 mg at 18 0847    prochlorperazine (COMPAZINE) tablet 10 mg  10 mg Oral Q6H PRN Garret Mccauley MD        sertraline (ZOLOFT) tablet 100 mg  100 mg Oral DAILY Garret Mccauley MD   100 mg at 18 0846    sodium chloride (NS) flush 5-10 mL  5-10 mL IntraVENous Q8H Garret Mccauley MD   10 mL at 18 0529    sodium chloride (NS) flush 5-10 mL  5-10 mL IntraVENous PRN Garret Mccauley MD        predniSONE (DELTASONE) tablet 40 mg  40 mg Oral DAILY WITH BREAKFAST Garret Mccauley MD   40 mg at 18 0846    albuterol-ipratropium (DUO-NEB) 2.5 MG-0.5 MG/3 ML  3 mL Nebulization Q4H RT Garret Mccauley MD   3 mL at 18 1124    heparin (porcine) injection 5,000 Units  5,000 Units SubCUTAneous Q12H Garret Mccauley MD   5,000 Units at 18 0847     Facility-Administered Medications Ordered in Other Encounters   Medication Dose Route Frequency Provider Last Rate Last Dose    sodium chloride (NS) flush 10 mL  10 mL IntraVENous Emigdio Oseguera MD           OBJECTIVE:  Patient Vitals for the past 8 hrs:   BP Temp Pulse Resp SpO2   18 1126 - - - - 93 %   18 1105 136/65 98.2 °F (36.8 °C) (!) 101 19 97 %   18 0812 - - - - 94 %   18 0725 125/58 98.1 °F (36.7 °C) 88 18 94 %     Temp (24hrs), Av.9 °F (36.6 °C), Min:97.4 °F (36.3 °C), Max:98.2 °F (36.8 °C)    01/24 0701 - 01/24 1900  In: 18 [P.O.:480]  Out: 350 [Urine:350]    Physical Exam:  Constitutional: Well developed, well nourished female in no acute distress, sitting comfortably in the hospital bed. HEENT: Normocephalic and atraumatic. Oropharynx is clear, mucous membranes are moist. Extraocular muscles are intact. Sclerae anicteric. Skin Warm and dry. No bruising and no rash noted. No erythema. No pallor. Respiratory Bilateral rhonchi and crackles   CVS Normal rate, regular rhythm and normal S1 and S2. No murmurs, gallops, or rubs. Abdomen Soft, nontender and nondistended, normoactive bowel sounds. Neuro Grossly nonfocal with no obvious sensory or motor deficits. MSK Normal range of motion in general.  No edema and no tenderness. Psych Appropriate mood and affect. Labs:    Recent Results (from the past 24 hour(s))   CBC WITH AUTOMATED DIFF    Collection Time: 01/24/18  9:02 AM   Result Value Ref Range    WBC 18.0 (H) 4.3 - 11.1 K/uL    RBC 3.71 (L) 4.05 - 5.25 M/uL    HGB 11.1 (L) 11.7 - 15.4 g/dL    HCT 33.8 (L) 35.8 - 46.3 %    MCV 91.1 79.6 - 97.8 FL    MCH 29.9 26.1 - 32.9 PG    MCHC 32.8 31.4 - 35.0 g/dL    RDW 15.3 (H) 11.9 - 14.6 %    PLATELET 007 985 - 638 K/uL    MPV 9.5 (L) 10.8 - 14.1 FL    DF AUTOMATED      NEUTROPHILS 86 (H) 43 - 78 %    LYMPHOCYTES 7 (L) 13 - 44 %    MONOCYTES 7 4.0 - 12.0 %    EOSINOPHILS 0 (L) 0.5 - 7.8 %    BASOPHILS 0 0.0 - 2.0 %    IMMATURE GRANULOCYTES 0 0.0 - 5.0 %    ABS. NEUTROPHILS 15.2 (H) 1.7 - 8.2 K/UL    ABS. LYMPHOCYTES 1.3 0.5 - 4.6 K/UL    ABS. MONOCYTES 1.3 0.1 - 1.3 K/UL    ABS. EOSINOPHILS 0.0 0.0 - 0.8 K/UL    ABS. BASOPHILS 0.0 0.0 - 0.2 K/UL    ABS. IMM.  GRANS. 0.1 0.0 - 0.5 K/UL   METABOLIC PANEL, BASIC    Collection Time: 01/24/18  9:02 AM   Result Value Ref Range    Sodium 146 (H) 136 - 145 mmol/L    Potassium 3.2 (L) 3.5 - 5.1 mmol/L    Chloride 114 (H) 98 - 107 mmol/L    CO2 20 (L) 21 - 32 mmol/L    Anion gap 12 7 - 16 mmol/L    Glucose 98 65 - 100 mg/dL    BUN 15 8 - 23 MG/DL    Creatinine 0.90 0.6 - 1.0 MG/DL    GFR est AA >60 >60 ml/min/1.73m2    GFR est non-AA >60 >60 ml/min/1.73m2    Calcium 8.9 8.3 - 10.4 MG/DL       Imaging:  [unfilled]    ASSESSMENT:  Problem List  Date Reviewed: 1/22/2018          Codes Class Noted    HCAP (healthcare-associated pneumonia) ICD-10-CM: J18.9  ICD-9-CM: 686  1/23/2018        Dehydration ICD-10-CM: E86.0  ICD-9-CM: 276.51  1/22/2018        * (Principal)Pneumonia ICD-10-CM: J18.9  ICD-9-CM: 947  1/22/2018        S/P bilateral mastectomy ICD-10-CM: Z90.13  ICD-9-CM: V45.71  10/12/2017        Breast cancer ([facility] ICD-10-CM: C50.919  ICD-9-CM: 174.9  10/11/2017        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 [facility]) ICD-10-CM: C50.011, C50.012  ICD-9-CM: 174.0  9/19/2017        Chronic midline low back pain without sciatica ICD-10-CM: M54.5, G89.29  ICD-9-CM: 724.2, 338.29  8/17/2016        Allergic rhinitis ICD-10-CM: J30.9  ICD-9-CM: 477.9  Unknown        HTN (hypertension), benign ICD-10-CM: I10  ICD-9-CM: 401.1  Unknown        Hypercholesteremia ICD-10-CM: E78.00  ICD-9-CM: 272.0  Unknown        Headache(784.0) ICD-10-CM: R51  ICD-9-CM: 784.0  Unknown        Recurrent major depressive disorder, in remission ([facility] ICD-10-CM: F33.40  ICD-9-CM: 296.35  Unknown                RECOMMENDATIONS:  HCAP  1/24 Continue vanc/zosyn. BCNTD. Incentive spirometer    Stage IIB left breast cancer and IIIB right breast cancer  1/24 sp Cycle one TC. Next Cycle planned for 1/27/2018    Consult PT/OT/CM      Lab studies and imaging studies  were personally reviewed. Pertinent old records were reviewed. Thank you for allowing us to participate in the care of Ms. Villa. We will gladly take over as primary for this patient.           Nino Sandhu NP   [facility]  [address] [place] [address]  Office : [phone]  Fax : [phone]         Attending Addendum:  I personally evaluated the patient with Yoselin Rodriguez N.P.,  and agree with the assessment, findings and plan as documented. Appears stable, we will transfer her to our service.               Vic Kirby MD  [address]  Office : [phone]  Fax : [phone]

## 2018-01-24 NOTE — PROGRESS NOTES
Pt has experienced several loose stools today which she states is normal for her. She states she takes immodium at home. Disha Ulrich   Spoke with hospitalist. Cesar Clemons ordered

## 2018-01-24 NOTE — PROGRESS NOTES
Pharmacokinetic Consult to Pharmacist    Amanda Cabrerazita is a 69 y.o. female being treated for HAP with vancomycin and zosyn. Lab Results   Component Value Date/Time    BUN 15 01/24/2018 09:02 AM    Creatinine 0.90 01/24/2018 09:02 AM    WBC 18.0 01/24/2018 09:02 AM    Procalcitonin 0.1 01/22/2018 07:56 PM    Lactic Acid (POC) 1.2 01/22/2018 08:27 PM      CrCl cannot be calculated (Unknown ideal weight.). CULTURES:  1/22    Blood X 2   No growth day 2, pending      Flu screen   Negative      Mycoplasma Ab  Pending      Strep pneumo urine Ag Pending    Lab Results   Component Value Date/Time    Vancomycin,trough 16.5 01/24/2018 12:53 PM         Day 2 of vancomycin. Goal trough is 15-20. Vancomycin trough resulted within goal at 16.5, so continue current dose of 1000 mg Q12H. Next trough in 2 to 3 days or as clinically indicated. Will continue to follow patient.       Thank you,  Ángela Alexandra, PharmD  Clinical Pharmacist  [phone]

## 2018-01-24 NOTE — PROGRESS NOTES
Problem: Mobility Impaired (Adult and Pediatric)  Goal: *Acute Goals and Plan of Care (Insert Text)  Goals:  (1.)Ms. Tiarra Almanza will move from supine to sit and sit to supine , scoot up and down and roll side to side with INDEPENDENT within 5 day(s). (2.)Ms. Tiarra Almanza will transfer from bed to chair and chair to bed with MODIFIED INDEPENDENCE using the least restrictive device within 5 day(s). (3.)Ms. Tiarra Almanza will ambulate with MODIFIED INDEPENDENCE for  feet with the least restrictive device within 5 day(s). (4.)Ms Tiarra Almanza will increase her activity tolerance in supine, sitting and standing to >20 minutes with O2 sats remaining in the 90s within 5 day(s)        PHYSICAL THERAPY: Initial Assessment, Treatment Day: Day of Assessment, PM 1/24/2018  INPATIENT: Hospital Day: 3  Payor: Mame Abraham / Plan: [facility] / Product Type: Medicare /      NAME/AGE/GENDER: Amanda Serrano is a 69 y.o. female   PRIMARY DIAGNOSIS: Pneumonia  HCAP (healthcare-associated pneumonia) Pneumonia Pneumonia        ICD-10: Treatment Diagnosis:   · Generalized Muscle Weakness (M62.81)  · Difficulty in walking, Not elsewhere classified (R26.2)  · Dizziness and Giddiness (R42)   Precaution/Allergies:  Dilaudid [hydromorphone]; Sulfa (sulfonamide antibiotics); and Tetracycline      ASSESSMENT:     Ms. Tiarra Almanza is a 69year old WF with an admitting diagnosis of Pneumonia. She presents sitting up in the bed visiting with a friend and she needed a little encouragement to participate in PT. She reports she has been tired and she does not feel she can do much in regard to mobility. She is on 3L of O2 with her resting O2 sats at 90%. She states she lives alone in a 1 level home with a level entry. She uses a r/walker at home for her mobility most of the time. Her neighbors and children provide her support as needed. She participated in BLE AROM exercises in supine with good BLE movement.   Supine to sit was SBA with good sitting balance on the edge of the bed. Sit to stand was CGA with use of slight bed elevation. She stood with the r/walker with good stability then stepped in place 10 steps before becoming very SOB and needing to sit. Her O2 sats after standing and stepping dropped to 84% so her O2 was increased and she was instructed to perform recovery breathing where her O2 sats returned to 90% within 1-2 minutes. She was assisted back to supine with CGA and positioned for comfort. Ms. Carloz Dutton appears to be functioning below his baseline level of function and would benefit from continues skilled PT to maximize her function. She was pleasant and cooperative to work with. This section established at most recent assessment   PROBLEM LIST (Impairments causing functional limitations):  1. Decreased Strength  2. Decreased ADL/Functional Activities  3. Decreased Transfer Abilities  4. Decreased Ambulation Ability/Technique  5. Decreased Balance  6. Decreased Activity Tolerance  7. Increased Fatigue  8. Increased Shortness of Breath   INTERVENTIONS PLANNED: (Benefits and precautions of physical therapy have been discussed with the patient.)  1. Bed Mobility  2. Gait Training  3. Therapeutic Activites  4. Therapeutic Exercise/Strengthening  5. Transfer Training     TREATMENT PLAN: Frequency/Duration: 3 times a week for duration of hospital stay  Rehabilitation Potential For Stated Goals: Good     RECOMMENDED REHABILITATION/EQUIPMENT: (at time of discharge pending progress): Due to the probability of continued deficits (see above) this patient will likely need continued skilled physical therapy after discharge. Possible HH at time of discharge since she lives alone. Equipment:    None at this time              HISTORY:   History of Present Injury/Illness (Reason for Referral):  Ms. Carloz Dutton is a 68 yo female who presented with weakness on a background of depression, GERD, HTN and HLD. She also has dyspnea.  The dyspnea was presented for 2 months. She also reported cough for the past 2 weeks. She felt weakness and decreased PO intake for 5 days. She denies the following: dysuria, or chest pain. She reports diarrhea for the past 4 days. Due to weakness, she went to the infusion center where she was found to be hypotensive. CXR today showed a new focal opacity in the right midlung (influenza negative). Past Medical History/Comorbidities:   Ms. Abida Wills  has a past medical history of Adverse effect of anesthesia; Arthritis; Breast cancer ([address] Depression; Fibrocystic breast disease; GERD (gastroesophageal reflux disease); Headache(784.0); HTN (hypertension), benign; Hypercholesteremia; and Kidney stones. Ms. Abida Wills  has a past surgical history that includes hx colonoscopy (Oct 2010); hx cataract removal; hx knee replacement; hx hernia repair (03/2011); hx knee replacement (02/19/2009); hx breast reconstruction (10/11/2017); hx skin biopsy (10/03/2017); hx mastectomy (Bilateral, 10/11/2017); hx breast reconstruction (Bilateral, 10/11/2017); and hx vascular access. Social History/Living Environment:   Home Environment: Private residence  # Steps to Enter: 0  One/Two Story Residence: One story  Living Alone: Yes  Support Systems: Child(frank)  Patient Expects to be Discharged to[de-identified] Private residence  Current DME Used/Available at Home: Walker, rolling  Prior Level of Function/Work/Activity:  Patient reports she has a r/walker that she uses most of the time at home for her mobility. She states she lives alone but has good neighbor and family support.       Number of Personal Factors/Comorbidities that affect the Plan of Care: 1-2: MODERATE COMPLEXITY   EXAMINATION:   Most Recent Physical Functioning:   Gross Assessment:  AROM: Within functional limits  PROM: Within functional limits  Strength: Generally decreased, functional  Coordination: Generally decreased, functional  Tone: Normal  Sensation: Intact               Posture:  Posture (WDL): Within defined limits  Balance:  Sitting: Intact  Standing: Impaired  Standing - Static: Fair;Constant support  Standing - Dynamic : Fair Bed Mobility:  Rolling: Stand-by asssistance  Supine to Sit: Stand-by asssistance  Sit to Supine: Stand-by asssistance  Scooting: Stand-by asssistance  Wheelchair Mobility:     Transfers:  Sit to Stand: Contact guard assistance  Stand to Sit: Contact guard assistance  Gait:     Base of Support: Narrowed  Speed/Amber: Slow;Shuffled  Step Length: Left shortened;Right shortened  Gait Abnormalities: Decreased step clearance  Distance (ft): 10 Feet (ft) (10 steps in place at the bedside)  Assistive Device: Walker, rolling  Ambulation - Level of Assistance: Contact guard assistance  Interventions: Safety awareness training;Manual cues      Body Structures Involved:  1. Lungs  2. Metabolic  3. Endocrine Body Functions Affected:  1. Respiratory  2. Metobolic/Endocrine Activities and Participation Affected:  1. General Tasks and Demands  2. Mobility   Number of elements that affect the Plan of Care: 3: MODERATE COMPLEXITY   CLINICAL PRESENTATION:   Presentation: Stable and uncomplicated: LOW COMPLEXITY   CLINICAL DECISION MAKING:   [employer] AM-PAC 6 Clicks   Basic Mobility Inpatient Short Form  How much difficulty does the patient currently have. .. Unable A Lot A Little None   1. Turning over in bed (including adjusting bedclothes, sheets and blankets)? [] 1   [] 2   [] 3   [x] 4   2. Sitting down on and standing up from a chair with arms ( e.g., wheelchair, bedside commode, etc.)   [] 1   [] 2   [x] 3   [] 4   3. Moving from lying on back to sitting on the side of the bed? [] 1   [] 2   [] 3   [x] 4   How much help from another person does the patient currently need. .. Total A Lot A Little None   4. Moving to and from a bed to a chair (including a wheelchair)? [] 1   [] 2   [x] 3   [] 4   5. Need to walk in hospital room? [] 1   [] 2   [x] 3   [] 4   6.   Climbing 3-5 steps with a railing? [] 1   [] 2   [x] 3   [] 4   © 2007, Trustees of [address] [place] [address] [address] [address], under license to [employer]. All rights reserved      Score:  Initial: 20 Most Recent: X (Date: -- )    Interpretation of Tool:  Represents activities that are increasingly more difficult (i.e. Bed mobility, Transfers, Gait). Score 24 23 22-20 19-15 14-10 9-7 6     Modifier CH CI CJ CK CL CM CN      ? Mobility - Walking and Moving Around:     - CURRENT STATUS: CJ - 20%-39% impaired, limited or restricted    - GOAL STATUS: CI - 1%-19% impaired, limited or restricted    - D/C STATUS:  ---------------To be determined---------------  Payor: Mame Abraham / Plan: [employer] / Product Type: Medicare /      Medical Necessity:     · Patient is expected to demonstrate progress in strength, balance and functional technique to increase independence with standing, transfers and gait with a r/walker. Reason for Services/Other Comments:  · Patient continues to require skilled intervention due to medical complications. Use of outcome tool(s) and clinical judgement create a POC that gives a: Clear prediction of patient's progress: LOW COMPLEXITY            TREATMENT:   (In addition to Assessment/Re-Assessment sessions the following treatments were rendered)   Pre-treatment Symptoms/Complaints:  Patient had no complaints of pain in therapy today just fatigue and SOB  Pain: Initial:   Pain Intensity 1: 0  Post Session:  0/10     Assessment/Reassessment only, no treatment provided today    Braces/Orthotics/Lines/Etc:   · O2 Device: Nasal cannula 3L with resting O2 sats at 90%. Treatment/Session Assessment:    · Response to Treatment:  Patient participated well but fatigues easily and becomes anxious when she has SOB. Increased O2 to 4L for activity since her O2 sats dropped to 84% with sitting and standing tasks.   · Interdisciplinary Collaboration:   o Physical Therapist  o Registered Nurse  · After treatment position/precautions:   o Supine in bed  o Bed in low position  o Call light within reach  o Nurse at bedside   · Compliance with Program/Exercises: Will assess as treatment progresses. · Recommendations/Intent for next treatment session: \"Next visit will focus on advancements to more challenging activities\".   Total Treatment Duration:  PT Patient Time In/Time Out  Time In: 1407  Time Out: [address] [facility]

## 2018-01-24 NOTE — PROGRESS NOTES
Hospitalist Progress Note     Admit Date:  2018  6:38 PM   Name:  Haider Casey   Age:  69 y.o.  :  1940   MRN:  [id number]   PCP:  Piyush Somers MD  Treatment Team: Attending Provider: Lenny Quintana MD; Utilization Review: Zeinab Monge RN; Consulting Provider: Ivory Rodriguez MD; Care Manager: Jasmin Moody RN; Utilization Review: Helen Chauhan RN    Subjective: The patient is a 70 y/o F with HTN, Dyslipidemia, GERD, Breast CA s/p b/l mastectomy, is currently managed for Pneumonia and COPD exacerbation. The patient still feels unwell and reports dry cough and SOB, notably on exertion. She is currently on 3 L of O2 by NC, saturating 95%. Objective:   Patient Vitals for the past 24 hrs:   Temp Pulse Resp BP SpO2   18 0812 - - - - 94 %   18 0725 98.1 °F (36.7 °C) 88 18 125/58 94 %   18 0400 - - - - 91 %   18 0350 97.8 °F (36.6 °C) 88 18 117/63 91 %   18 2350 - - - - 91 %   18 2300 98.2 °F (36.8 °C) 88 18 105/57 95 %   18 2101 - - - - 93 %   18 1900 97.8 °F (36.6 °C) 83 18 126/67 94 %   18 1500 97.4 °F (36.3 °C) 77 18 103/58 95 %   18 1256 97.9 °F (36.6 °C) 72 20 100/51 97 %   18 1230 - - - - 98 %     Oxygen Therapy  O2 Sat (%): 94 % (18)  Pulse via Oximetry: 88 beats per minute (18)  O2 Device: Nasal cannula (18)  O2 Flow Rate (L/min): 3 l/min (18 08)  FIO2 (%): 36 % (18 0500)    Intake/Output Summary (Last 24 hours) at 18 0948  Last data filed at 18 0746   Gross per 24 hour   Intake              573 ml   Output              950 ml   Net             -377 ml         General:    Well nourished. Alert. CV:   RRR. No murmur, rub, or gallop. Lungs:   Good air entry. Bilateral rhonchi and crackles, notably in the right and left middle lung fields. Abdomen:   Soft, nontender, nondistended. Extremities: Warm and dry. No cyanosis or edema.    Skin: No rashes or jaundice. Data Review:  I have reviewed all labs, meds, telemetry events, and studies from the last 24 hours. Recent Results (from the past 24 hour(s))   CBC WITH AUTOMATED DIFF    Collection Time: 01/24/18  9:02 AM   Result Value Ref Range    WBC 18.0 (H) 4.3 - 11.1 K/uL    RBC 3.71 (L) 4.05 - 5.25 M/uL    HGB 11.1 (L) 11.7 - 15.4 g/dL    HCT 33.8 (L) 35.8 - 46.3 %    MCV 91.1 79.6 - 97.8 FL    MCH 29.9 26.1 - 32.9 PG    MCHC 32.8 31.4 - 35.0 g/dL    RDW 15.3 (H) 11.9 - 14.6 %    PLATELET 621 748 - 800 K/uL    MPV 9.5 (L) 10.8 - 14.1 FL    DF AUTOMATED      NEUTROPHILS 86 (H) 43 - 78 %    LYMPHOCYTES 7 (L) 13 - 44 %    MONOCYTES 7 4.0 - 12.0 %    EOSINOPHILS 0 (L) 0.5 - 7.8 %    BASOPHILS 0 0.0 - 2.0 %    IMMATURE GRANULOCYTES 0 0.0 - 5.0 %    ABS. NEUTROPHILS 15.2 (H) 1.7 - 8.2 K/UL    ABS. LYMPHOCYTES 1.3 0.5 - 4.6 K/UL    ABS. MONOCYTES 1.3 0.1 - 1.3 K/UL    ABS. EOSINOPHILS 0.0 0.0 - 0.8 K/UL    ABS. BASOPHILS 0.0 0.0 - 0.2 K/UL    ABS. IMM. GRANS. 0.1 0.0 - 0.5 K/UL        All Micro Results     Procedure Component Value Units Date/Time    CULTURE, BLOOD [[id number]] Collected:  01/22/18 1954    Order Status:  Completed Specimen:  Blood from Blood Updated:  01/24/18 0651     Special Requests: MEDIAL PORT        Culture result: NO GROWTH 2 DAYS       CULTURE, BLOOD [[id number]] Collected:  01/22/18 1954    Order Status:  Completed Specimen:  Blood from Blood Updated:  01/24/18 0651     Special Requests: MEDIAL PORT        Culture result: NO GROWTH 2 DAYS       S. Randolph Bookman ONLY [[id number]] Collected:  01/23/18 1500    Order Status:  No result     ALBERTO Tapia, UR/CSF [[id number]] Collected:  01/23/18 1441    Order Status:  Canceled Specimen:  Other Updated:  01/23/18 1451    MYCOPLASMA AB, IGG/IGM [[id number]] Collected:  01/23/18 1442    Order Status:  Completed Specimen:  Serum Updated:  01/23/18 1450    INFLUENZA A & B AG (RAPID TEST) [[id number]] Collected:  01/22/18 1956    Order Status:  Completed Specimen:  Nasopharyngeal from Nasal washing Updated:  01/22/18 2038     Influenza A Ag NEGATIVE          NEGATIVE FOR THE PRESENCE OF INFLUENZA A ANTIGEN  INFECTION DUE TO INFLUENZA A CANNOT BE RULED OUT. BECAUSE THE ANTIGEN PRESENT IN THE SAMPLE MAY BE BELOW  THE DETECTION LIMIT OF THE TEST. A NEGATIVE TEST IS PRESUMPTIVE AND IT IS RECOMMENDED THAT THESE RESULTS BE CONFIRMED BY VIRAL CULTURE OR AN FDA-CLEARED INFLUENZA A AND B MOLECULAR ASSAY. Influenza B Ag NEGATIVE          NEGATIVE FOR THE PRESENCE OF INFLUENZA B ANTIGEN  INFECTION DUE TO INFLUENZA B CANNOT BE RULED OUT. BECAUSE THE ANTIGEN PRESENT IN THE SAMPLE MAY BE BELOW  THE DETECTION LIMIT OF THE TEST. A NEGATIVE TEST IS PRESUMPTIVE AND IT IS RECOMMENDED THAT THESE RESULTS BE CONFIRMED BY VIRAL CULTURE OR AN FDA-CLEARED INFLUENZA A AND B MOLECULAR ASSAY.                Current Meds:  Current Facility-Administered Medications   Medication Dose Route Frequency    lip protectant (BLISTEX) ointment   Topical PRN    piperacillin-tazobactam (ZOSYN) 4.5 g in 0.9% sodium chloride (MBP/ADV) 100 mL  4.5 g IntraVENous Q8H    Vancomycin Trough Reminder   Other ONCE    vancomycin (VANCOCIN) 1,000 mg in 0.9% sodium chloride 250 mL IVPB  1,000 mg IntraVENous Q12H    potassium chloride (K-DUR, KLOR-CON) SR tablet 40 mEq  40 mEq Oral DAILY    atorvastatin (LIPITOR) tablet 20 mg  20 mg Oral DAILY    LORazepam (ATIVAN) tablet 1 mg  1 mg Oral BID PRN    metoprolol succinate (TOPROL-XL) XL tablet 50 mg  50 mg Oral DAILY    prochlorperazine (COMPAZINE) tablet 10 mg  10 mg Oral Q6H PRN    sertraline (ZOLOFT) tablet 100 mg  100 mg Oral DAILY    sodium chloride (NS) flush 5-10 mL  5-10 mL IntraVENous Q8H    sodium chloride (NS) flush 5-10 mL  5-10 mL IntraVENous PRN    predniSONE (DELTASONE) tablet 40 mg  40 mg Oral DAILY WITH BREAKFAST    albuterol-ipratropium (DUO-NEB) 2.5 MG-0.5 MG/3 ML  3 mL Nebulization Q4H RT    heparin (porcine) injection 5,000 Units  5,000 Units SubCUTAneous Q12H     Facility-Administered Medications Ordered in Other Encounters   Medication Dose Route Frequency    sodium chloride (NS) flush 10 mL  10 mL IntraVENous Q8H       Other Studies (last 24 hours):  No results found. Assessment and Plan:     Hospital Problems as of 1/24/2018  Date Reviewed: 1/22/2018          Codes Class Noted - Resolved POA    HCAP (healthcare-associated pneumonia) ICD-10-CM: J18.9  ICD-9-CM: 486  1/23/2018 - Present Unknown        Pneumonia ICD-10-CM: J18.9  ICD-9-CM: 486  1/22/2018 - Present Unknown        S/P bilateral mastectomy ICD-10-CM: Z90.13  ICD-9-CM: V45.71  10/12/2017 - Present Yes        Breast cancer ([facility] ICD-10-CM: C50.919  ICD-9-CM: 174.9  10/11/2017 - Present Yes        HTN (hypertension), benign ICD-10-CM: I10  ICD-9-CM: 401.1  Unknown - Present Yes        Recurrent major depressive disorder, in remission ([facility] ICD-10-CM: F33.40  ICD-9-CM: 296.35  Unknown - Present Yes              1 - Right Middle Lobe Pneumonia. The patient still feels SOB, notably on exertion. Continue current antibiotics. Continue O2 supplementation and wean O2 as clinically appropriate. Consider Pulmonary consultation if no significant clinical improvement. 2 - COPD exacerbation. No wheezing heard at the time of my examination today. Continue breathing treatments with Duoneb and oral Prednisone. 3 - Breast CA s/p b/l mastectomy. Stable. She should be seen by her Oncologist today. 4 - HTN, controlled. Continue current antihypertensive meds. 5 - Dyslipidemia, stable. Continue statins. 6 - DVT Prophylaxis with Heparin SQ.  7 - Disposition: Possible d/c home within the next 48-72 hours pending clinical improvement. Signed:   Francisco Cam MD

## 2018-01-25 PROBLEM — I10 HTN (HYPERTENSION), BENIGN: Chronic | Status: ACTIVE | Noted: 2018-01-01

## 2018-01-25 PROBLEM — F33.40 RECURRENT MAJOR DEPRESSIVE DISORDER, IN REMISSION (HCC): Chronic | Status: ACTIVE | Noted: 2018-01-01

## 2018-01-25 PROBLEM — R91.8 PULMONARY INFILTRATES: Status: ACTIVE | Noted: 2018-01-01

## 2018-01-25 PROBLEM — Z90.13 S/P BILATERAL MASTECTOMY: Chronic | Status: ACTIVE | Noted: 2018-01-01

## 2018-01-25 PROBLEM — J96.01 ACUTE RESPIRATORY FAILURE WITH HYPOXIA (HCC): Status: ACTIVE | Noted: 2018-01-01

## 2018-01-25 PROBLEM — C50.919 BREAST CANCER (HCC): Chronic | Status: ACTIVE | Noted: 2018-01-01

## 2018-01-25 PROBLEM — A49.3 INFECTION DUE TO MYCOPLASMA: Status: ACTIVE | Noted: 2018-01-01

## 2018-01-25 NOTE — PROGRESS NOTES
Hospitalist Progress Note     Admit Date:  2018  6:38 PM   Name:  Keny Fields   Age:  69 y.o.  :  1940   MRN:  [id number]   PCP:  Shaniqua Booth MD  Treatment Team: Attending Provider: Elwin Alpers, MD; Utilization Review: Farrukh Murphy RN; Consulting Provider: Elwin Alpers, MD; Care Manager: Kezia Cooper RN; Utilization Review: Princess Yvette RN    Subjective: The patient's care was taken over by Oncology. She still c/o significant SOB on exertion and dry cough. Her NC was bumped up to 6 L. A Pulmonary consultation is strongly advised. Objective:   Patient Vitals for the past 24 hrs:   Temp Pulse Resp BP SpO2   18 1052 - 98 20 - 94 %   18 0749 - - - - 90 %   18 0705 99 °F (37.2 °C) (!) 101 18 128/72 90 %   18 0419 - - - - 91 %   18 0400 98.3 °F (36.8 °C) (!) 103 18 130/73 90 %   18 0020 - 96 - - 97 %   18 0016 - - - - 92 %   18 2312 - - - - (!) 87 %   18 2245 97.9 °F (36.6 °C) 98 18 122/62 91 %   18 2016 - - - - 90 %   18 1900 97.6 °F (36.4 °C) 89 18 127/61 91 %   18 1613 - - - - 92 %   18 1515 98.2 °F (36.8 °C) 92 19 147/72 95 %   18 1126 - - - - 93 %     Oxygen Therapy  O2 Sat (%): 94 % (18 1052)  Pulse via Oximetry: 98 beats per minute (18 0749)  O2 Device: Hi flow nasal cannula; Heated;Humidifier (18 1052)  O2 Flow Rate (L/min): 40 l/min (18 1052)  FIO2 (%): 40 % (18 1052)    Intake/Output Summary (Last 24 hours) at 18 1114  Last data filed at 18 0944   Gross per 24 hour   Intake             1435 ml   Output              200 ml   Net             1235 ml         General:    Well nourished. Alert. CV:   RRR. No murmur, rub, or gallop. Lungs:   Good air entry. Bilateral rhonchi and crackles, notably in the right and left middle lung fields  Abdomen:   Soft, nontender, nondistended. Extremities: Warm and dry. No cyanosis or edema. Skin:     No rashes or jaundice. Data Review:  I have reviewed all labs, meds, telemetry events, and studies from the last 24 hours. Recent Results (from the past 24 hour(s))   VANCOMYCIN, TROUGH    Collection Time: 01/24/18 12:53 PM   Result Value Ref Range    Vancomycin,trough 16.5 5 - 20 ug/mL   CBC WITH AUTOMATED DIFF    Collection Time: 01/25/18  5:24 AM   Result Value Ref Range    WBC 15.0 (H) 4.3 - 11.1 K/uL    RBC 3.54 (L) 4.05 - 5.25 M/uL    HGB 10.4 (L) 11.7 - 15.4 g/dL    HCT 31.4 (L) 35.8 - 46.3 %    MCV 88.7 79.6 - 97.8 FL    MCH 29.4 26.1 - 32.9 PG    MCHC 33.1 31.4 - 35.0 g/dL    RDW 15.2 (H) 11.9 - 14.6 %    PLATELET 791 985 - 193 K/uL    MPV 9.4 (L) 10.8 - 14.1 FL    DF AUTOMATED      NEUTROPHILS 84 (H) 43 - 78 %    LYMPHOCYTES 7 (L) 13 - 44 %    MONOCYTES 9 4.0 - 12.0 %    EOSINOPHILS 0 (L) 0.5 - 7.8 %    BASOPHILS 0 0.0 - 2.0 %    IMMATURE GRANULOCYTES 0 0.0 - 5.0 %    ABS. NEUTROPHILS 12.5 (H) 1.7 - 8.2 K/UL    ABS. LYMPHOCYTES 1.1 0.5 - 4.6 K/UL    ABS. MONOCYTES 1.3 0.1 - 1.3 K/UL    ABS. EOSINOPHILS 0.0 0.0 - 0.8 K/UL    ABS. BASOPHILS 0.0 0.0 - 0.2 K/UL    ABS. IMM.  GRANS. 0.1 0.0 - 0.5 K/UL   METABOLIC PANEL, BASIC    Collection Time: 01/25/18  5:24 AM   Result Value Ref Range    Sodium 145 136 - 145 mmol/L    Potassium 3.4 (L) 3.5 - 5.1 mmol/L    Chloride 112 (H) 98 - 107 mmol/L    CO2 23 21 - 32 mmol/L    Anion gap 10 7 - 16 mmol/L    Glucose 103 (H) 65 - 100 mg/dL    BUN 10 8 - 23 MG/DL    Creatinine 0.73 0.6 - 1.0 MG/DL    GFR est AA >60 >60 ml/min/1.73m2    GFR est non-AA >60 >60 ml/min/1.73m2    Calcium 8.8 8.3 - 10.4 MG/DL        All Micro Results     Procedure Component Value Units Date/Time    CULTURE, BLOOD [[id number]] Collected:  01/22/18 1954    Order Status:  Completed Specimen:  Blood from Blood Updated:  01/25/18 0616     Special Requests: MEDIAL PORT        Culture result: NO GROWTH 3 DAYS       CULTURE, BLOOD [[id number]] Collected:  01/22/18 1954    Order Status: Completed Specimen:  Blood from Blood Updated:  01/25/18 0616     Special Requests: MEDIAL PORT        Culture result: NO GROWTH 3 DAYS       MYCOPLASMA AB, IGG/IGM [[id number]]  (Abnormal) Collected:  01/23/18 1442    Order Status:  Completed Specimen:  Serum from Serum Updated:  01/24/18 2335     M. pneumoniae Ab, IgG 214 (H) U/mL       (NOTE)                              Negative:           <100                              Indeterminate: 100 - 320                              Positive:           >320  The reference interval established is intended as a  baseline only. Values >100 may indicate a recent  infection with Mycoplasma pneumoniae and need to be  confirmed either by a positive IgM result and/or an  additional specimen drawn 2-4 weeks later showing a  significant increase in antibody levels. M. pneumoniae Ab, IgM <770 U/mL       (NOTE)                              Negative            <770  Clinically significant amount of M. pneumoniae antibody  not detected. Low Positive   770 - 12  M. pneumoniae specific IgM presumptively detected. It  is recommended that another sample be collected 1-2  weeks later to assure reactivity. Positive            >950  Highly significant amount of M. pneumoniae specific  IgM antibody detected. Performed At: [address] [id number]  Gabriela Alvarenga MD [id number]         ALBERTO Harris ONLY [[id number]] Collected:  01/23/18 1500    Order Status:  No result     ALBERTO Loera, UR/CSF [[id number]] Collected:  01/23/18 1441    Order Status:  Canceled Specimen:  Other Updated:  01/23/18 1451    INFLUENZA A & B AG (RAPID TEST) [[id number]] Collected:  01/22/18 1956    Order Status:  Completed Specimen:  Nasopharyngeal from Nasal washing Updated:  01/22/18 2038     Influenza A Ag NEGATIVE          NEGATIVE FOR THE PRESENCE OF INFLUENZA A ANTIGEN  INFECTION DUE TO INFLUENZA A CANNOT BE RULED OUT. BECAUSE THE ANTIGEN PRESENT IN THE SAMPLE MAY BE BELOW  THE DETECTION LIMIT OF THE TEST. A NEGATIVE TEST IS PRESUMPTIVE AND IT IS RECOMMENDED THAT THESE RESULTS BE CONFIRMED BY VIRAL CULTURE OR AN FDA-CLEARED INFLUENZA A AND B MOLECULAR ASSAY. Influenza B Ag NEGATIVE          NEGATIVE FOR THE PRESENCE OF INFLUENZA B ANTIGEN  INFECTION DUE TO INFLUENZA B CANNOT BE RULED OUT. BECAUSE THE ANTIGEN PRESENT IN THE SAMPLE MAY BE BELOW  THE DETECTION LIMIT OF THE TEST. A NEGATIVE TEST IS PRESUMPTIVE AND IT IS RECOMMENDED THAT THESE RESULTS BE CONFIRMED BY VIRAL CULTURE OR AN FDA-CLEARED INFLUENZA A AND B MOLECULAR ASSAY.                Current Meds:  Current Facility-Administered Medications   Medication Dose Route Frequency    sodium chloride (OCEAN) 0.65 % nasal spray 2 Spray  2 Spray Both Nostrils Q2H PRN    HYDROcodone-homatropine (HYCODAN) 5-1.5 mg/5 mL (5 mL) syrup 5 mL  5 mL Oral Q4H PRN    lip protectant (BLISTEX) ointment   Topical PRN    piperacillin-tazobactam (ZOSYN) 4.5 g in 0.9% sodium chloride (MBP/ADV) 100 mL  4.5 g IntraVENous Q8H    alteplase (CATHFLO) 2 mg in sterile water (preservative free) 2 mL injection  2 mg InterCATHeter PRN    loperamide (IMODIUM) capsule 2 mg  2 mg Oral Q6H PRN    tuberculin injection 5 Units  5 Units IntraDERMal ONCE    vancomycin (VANCOCIN) 1,000 mg in 0.9% sodium chloride 250 mL IVPB  1,000 mg IntraVENous Q12H    potassium chloride (K-DUR, KLOR-CON) SR tablet 40 mEq  40 mEq Oral DAILY    atorvastatin (LIPITOR) tablet 20 mg  20 mg Oral DAILY    LORazepam (ATIVAN) tablet 1 mg  1 mg Oral BID PRN    metoprolol succinate (TOPROL-XL) XL tablet 50 mg  50 mg Oral DAILY    prochlorperazine (COMPAZINE) tablet 10 mg  10 mg Oral Q6H PRN    sertraline (ZOLOFT) tablet 100 mg  100 mg Oral DAILY    sodium chloride (NS) flush 5-10 mL  5-10 mL IntraVENous Q8H    sodium chloride (NS) flush 5-10 mL  5-10 mL IntraVENous PRN    predniSONE (DELTASONE) tablet 40 mg  40 mg Oral DAILY WITH BREAKFAST    albuterol-ipratropium (DUO-NEB) 2.5 MG-0.5 MG/3 ML  3 mL Nebulization Q4H RT    heparin (porcine) injection 5,000 Units  5,000 Units SubCUTAneous Q12H     Facility-Administered Medications Ordered in Other Encounters   Medication Dose Route Frequency    sodium chloride (NS) flush 10 mL  10 mL IntraVENous Q8H       Other Studies (last 24 hours):  Xr Chest Sngl V    Result Date: 1/25/2018  AP portable chest INDICATION: Shortness of breath COMPARISON: 1/22/2018 FINDINGS: No change in cardiomegaly. Bilateral patchy and diffuse reticulonodular interstitial changes with patchy alveolar densities bilaterally without significant change. No change position of left-sided Port-A-Cath catheter. Negative for pneumothorax. IMPRESSION: No significant change      Assessment and Plan:     Hospital Problems as of 1/25/2018  Date Reviewed: 1/22/2018          Codes Class Noted - Resolved POA    HCAP (healthcare-associated pneumonia) ICD-10-CM: J18.9  ICD-9-CM: 486  1/23/2018 - Present Unknown        * (Principal)Pneumonia ICD-10-CM: J18.9  ICD-9-CM: 486  1/22/2018 - Present Yes        S/P bilateral mastectomy ICD-10-CM: Z90.13  ICD-9-CM: V45.71  10/12/2017 - Present Yes        Breast cancer ([facility] ICD-10-CM: C50.919  ICD-9-CM: 174.9  10/11/2017 - Present Yes        HTN (hypertension), benign ICD-10-CM: I10  ICD-9-CM: 401.1  Unknown - Present Yes        Recurrent major depressive disorder, in remission ([facility] ICD-10-CM: F33.40  ICD-9-CM: 296.35  Unknown - Present Yes              1 - Right Middle Lobe Pneumonia. The patient still feels SOB, notably on exertion. Continue current antibiotics. Continue O2 supplementation and wean O2 as clinically appropriate. Care will now be assumed by her Oncologist. Pulmonary consultation strongly advised because of lack of clinical improvement. 2 - COPD exacerbation. No significant wheezing heard at the time of my examination today. Continue breathing treatments with Duoneb and oral Prednisone. 3 - Breast CA s/p b/l mastectomy. Stable. She is now under the care of Oncology. 4 - HTN, controlled. Continue current antihypertensive meds. 5 - Dyslipidemia, stable. Continue statins. 6 - DVT Prophylaxis with Heparin SQ.  7 - Disposition: the patient is not improving. Care will now be assumed by Oncology. A Pulmonary consultation should strongly be considered. Signed:   Wiley Portillo MD

## 2018-01-25 NOTE — PROGRESS NOTES
OT orders received, chart reviewed, evaluation attempted. Patient uninterested in participation in therapy and is frustrated by her breathing difficulty. Requested therapist leave her alone until she can get breathing better controlled. O2 sats 88% on high flow NC. RN notified. Will check back as schedule permits, patient is agreeable and stable to participate.    Philip Avina, OTR/L

## 2018-01-25 NOTE — PROGRESS NOTES
[facility]        Inpatient Hematology / Oncology Progress Note      Admission Date: 2018  6:38 PM  Reason for Admission/Hospital Course: Pneumonia  HCAP (healthcare-associated pneumonia)      24 Hour Events:  Afebrile  BCNTD  Oxygen demands increased  Pulmonary consulted      ROS:  Constitutional: negative for fever, chills. Positive for weakness, malaise, fatigue. CV:  negative for chest pain, palpitations, edema. Respiratory: Positive for dyspnea, cough  GI: negative for nausea, abdominal pain, diarrhea. 10 point review of systems is otherwise negative with the exception of the elements mentioned above in the HPI. Allergies   Allergen Reactions    Dilaudid [Hydromorphone] Other (comments)     \"out of her mind\"    Sulfa (Sulfonamide Antibiotics) Itching    Tetracycline Nausea Only       OBJECTIVE:  Patient Vitals for the past 8 hrs:   BP Temp Pulse Resp SpO2   18 0749 - - - - 90 %   18 0705 128/72 99 °F (37.2 °C) (!) 101 18 90 %   18 0419 - - - - 91 %   18 0400 130/73 98.3 °F (36.8 °C) (!) 103 18 90 %     Temp (24hrs), Av.2 °F (36.8 °C), Min:97.6 °F (36.4 °C), Max:99 °F (37.2 °C)         Physical Exam:  Constitutional: Well developed, well nourished female in no acute distress, sitting comfortably in the hospital bed. HEENT: Normocephalic and atraumatic. Oropharynx is clear, mucous membranes are moist.Extraocular muscles are intact. Sclerae anicteric. Skin Warm and dry. No bruising and no rash noted. No erythema. No pallor. Respiratory  Bilateral rhonchi and crackles, notably in the right and left middle lung fields. On 10 humidified oxygen via nc. CVS Normal rate, regular rhythm and normal S1 and S2. No murmurs, gallops, or rubs. Abdomen Soft, nontender and nondistended, normoactive bowel sounds. No palpable mass. No hepatosplenomegaly. Neuro Grossly nonfocal with no obvious sensory or motor deficits.    MSK Normal range of motion in general.  No edema and no tenderness. Psych Appropriate mood and affect.         Labs:    Recent Labs      01/25/18   0524 01/24/18   0902  01/23/18 0421   WBC  15.0*  18.0*  13.6*   RBC  3.54*  3.71*  3.48*   HGB  10.4*  11.1*  10.3*   HCT  31.4*  33.8*  31.1*   MCV  88.7  91.1  89.4   MCH  29.4  29.9  29.6   MCHC  33.1  32.8  33.1   RDW  15.2*  15.3*  15.0*   PLT  234  248  202   GRANS  84*  86*  94*   LYMPH  7*  7*  4*   MONOS  9  7  2*   EOS  0*  0*  0*   BASOS  0  0  0   IG  0  0  0   DF  AUTOMATED  AUTOMATED  AUTOMATED   ANEU  12.5*  15.2*  12.6*   ABL  1.1  1.3  0.6   ABM  1.3  1.3  0.3   TRISHA  0.0  0.0  0.0   ABB  0.0  0.0  0.0   AIG  0.1  0.1  0.1      Recent Labs      01/25/18   0524 01/24/18   0902  01/23/18   0421 01/22/18   1956  01/22/18   1848  01/22/18   1307   NA  145  146*  144   --   144  139   K  3.4*  3.2*  4.6   --   3.1*  3.0*   CL  112*  114*  113*   --   111*  103   CO2  23  20*  22   --   22  24   AGAP  10  12  9   --   11  12   GLU  103*  98  130*   --   110*  94   BUN  10  15  17   --   23  27*   CREA  0.73  0.90  0.71   --   0.81  0.95   GFRAA  >60  >60  >60   --   >60  >60   GFRNA  >60  >60  >60   --   >60  >60   CA  8.8  8.9  7.7*   --   7.4*  9.1   SGOT   --    --   26   --   26  32   AP   --    --   79   --   70  83   TP   --    --   5.9*   --   5.8*  7.2   ALB   --    --   2.3*   --   2.3*  3.0*   GLOB   --    --   3.6*   --   3.5  4.2*   AGRAT   --    --   0.6*   --   0.7*  0.7*   MG   --    --    --   1.6*   --   1.6*         Imaging:      ASSESSMENT:    Problem List  Date Reviewed: 1/22/2018          Codes Class Noted    HCAP (healthcare-associated pneumonia) ICD-10-CM: J18.9  ICD-9-CM: 486  1/23/2018        Dehydration ICD-10-CM: E86.0  ICD-9-CM: 276.51  1/22/2018        * (Principal)Pneumonia ICD-10-CM: J18.9  ICD-9-CM: 625  1/22/2018        S/P bilateral mastectomy ICD-10-CM: Z90.13  ICD-9-CM: V45.71  10/12/2017        Breast cancer ([facility] ICD-10-CM: C50.919  ICD-9-CM: 174.9  10/11/2017        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 [facility]) ICD-10-CM: C50.011, C50.012  ICD-9-CM: 174.0  9/19/2017        Chronic midline low back pain without sciatica ICD-10-CM: M54.5, G89.29  ICD-9-CM: 724.2, 338.29  8/17/2016        Allergic rhinitis ICD-10-CM: J30.9  ICD-9-CM: 477.9  Unknown        HTN (hypertension), benign ICD-10-CM: I10  ICD-9-CM: 401.1  Unknown        Hypercholesteremia ICD-10-CM: E78.00  ICD-9-CM: 272.0  Unknown        Headache(784.0) ICD-10-CM: R51  ICD-9-CM: 784.0  Unknown        Recurrent major depressive disorder, in remission ([facility] ICD-10-CM: F33.40  ICD-9-CM: 296.35  Unknown                PLAN:    HCAP  1/24 Continue vanc/zosyn. BCNTD. Incentive spirometer  1/25 Increased oxygen demands. Consulted pulm.      Stage IIB left breast cancer and IIIB right breast cancer  1/24 sp Cycle one TC. Next Cycle planned for 1/27/2018. Currently on hold.      Consult PT/OT/CM              Dwight Barros NP   [facility]  [address] [place] [address]  Office : [phone]  Fax : [phone]       Attending Addendum:  I personally evaluated the patient with Dwight Barros, N.P.,  and agree with the assessment, findings and plan as documented. We will ask Pulmonary to assist us.               Kari Reis MD  [address] [place] [address]  Office : [phone]  Fax : [phone]

## 2018-01-25 NOTE — CONSULTS
CONSULT NOTE    Adrián Beasley    1/25/2018    Date of Admission:  1/22/2018    The patient's chart is reviewed and the patient is discussed with the staff. Subjective:     Patient is a 69 y.o.  female seen and evaluated at the request of Dr. Wallie Goodpasture. She was referred to the infusion center for hypotension and received 2L but BP remained low. Was transported to the ER and admitted by hospitalist.  Was placed on O2 per NC and received IVFs in transport with . CXR with right infiltrate and placed on antibiotics. La was 1.2, WBC 14.8, K+ 3.0 and blood cultures drawn showing no growth at 3 days. She is currently undergoing treatment for breast cancer and received Taxotere plus Cytoxan 3 weeks ago. She had bilateral mastectomy 10/11/17 by Dr. Diogo Plaza operatively states she was treated for bronchitis with antibiotics, steroids, Advair and rescue inhaler. Symptoms improved but symptoms recurred and was again seen with treatment prescribed. Has had cough and right side back discomfort prior to admission. During hospitalization has developed worsening hypoxia with exertional dyspnea and now requiring 10L NC to maintain sat . Is comfortable at rest, trying to use incentive spirometry and has occasional non productive cough. Had elevated mycoplasma pneumoniae  We have been asked to see for acute respiratory failure with hypoxia.       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 Prior to Admission Medications   Prescriptions Last Dose Informant Patient Reported? Taking? Cetirizine (ZYRTEC) 10 mg cap 1/23/2018 at Unknown time  Yes Yes   Sig: Take 10 mg by mouth daily. Take / use AM day of surgery  per anesthesia protocols. DOCUSATE CALCIUM (STOOL SOFTENER PO) 1/16/2018 at Unknown time  Yes Yes   Sig: Take  by mouth as needed. LORazepam (ATIVAN) 1 mg tablet Unknown at Unknown time  No No   Sig: Take 1 Tab by mouth every six (6) hours as needed for Anxiety. Max Daily Amount: 4 mg. Indications: anxiety, CANCER CHEMOTHERAPY-INDUCED NAUSEA AND VOMITING   PSEUDOEPHEDRINE HCL (SUDAFED 12 HOUR PO) 1/23/2018 at Unknown time  Yes Yes   Sig: Take  by mouth as needed. acetaminophen (TYLENOL) 325 mg tablet Unknown at Unknown time  Yes No   Sig: Take 325 mg by mouth every four (4) hours as needed for Pain. albuterol (PROVENTIL HFA, VENTOLIN HFA, PROAIR HFA) 90 mcg/actuation inhaler 1/23/2018 at Unknown time  No Yes   Sig: Take 1-2 Puffs by inhalation every four (4) hours as needed for Wheezing or Shortness of Breath. atorvastatin (LIPITOR) 20 mg tablet 1/22/2018 at Unknown time  No Yes   Sig: Take 1 Tab by mouth daily. Patient taking differently: Take 20 mg by mouth daily. Take / use AM day of surgery  per anesthesia protocols. Indications: hypercholesterolemia, hyperlipidemia   dexamethasone (DECADRON) 4 mg tablet Unknown at Unknown time  No No   Sig: Take two (2) tabs twice daily the day before, day of and day after chemo.    guaiFENesin-dextromethorphan SR [facility] [place] [facility]\"" DM) 600-30 mg per tablet 1/23/2018 at Unknown time  Yes Yes   Sig: Take 1 Tab by mouth every twelve (12) hours as needed for Cough. losartan-hydroCHLOROthiazide (HYZAAR) 50-12.5 mg per tablet 1/23/2018 at Unknown time  No Yes   Sig: Take 1 Tab by mouth daily. Patient taking differently: Take 1 Tab by mouth daily. Indications: hypertension   meclizine (ANTIVERT) 25 mg tablet Not Taking at Unknown time  No No   Sig: Take 1 Tab by mouth every six (6) hours as needed for Dizziness. Patient taking differently: Take 25 mg by mouth every six (6) hours as needed for Dizziness. Indications: VERTIGO   meloxicam (MOBIC) 7.5 mg tablet 1/23/2018 at Unknown time  No Yes   Sig: Take 1-2 Tabs by mouth daily. Patient taking differently: Take 7.5-15 mg by mouth as needed. Indications: OSTEOARTHRITIS   metoprolol succinate (TOPROL-XL) 50 mg XL tablet 1/23/2018 at Unknown time  No Yes   Sig: Take 1 Tab by mouth daily. ondansetron hcl (ZOFRAN, AS HYDROCHLORIDE,) 8 mg tablet 1/23/2018 at Unknown time  No Yes   Sig: Take 1 Tab by mouth every eight (8) hours as needed for Nausea. oxyCODONE IR (ROXICODONE) 5 mg immediate release tablet 1/23/2018 at Unknown time  Yes Yes   Sig: Take 5 mg by mouth every six (6) hours as needed for Pain. predniSONE (DELTASONE) 20 mg tablet Unknown at Unknown time  No No   Sig: Take 2 daily for 3 days, then 1 daily for 2 days   prochlorperazine (COMPAZINE) 10 mg tablet Not Taking at Unknown time  No No   Sig: Take 1 Tab by mouth every six (6) hours as needed. ranitidine (ZANTAC) 150 mg tablet 1/23/2018 at Unknown time  Yes Yes   Sig: Take 150 mg by mouth as needed for Indigestion. Take / use AM day of surgery  per anesthesia protocols. sertraline (ZOLOFT) 100 mg tablet 1/23/2018 at Unknown time  No Yes   Sig: Take 1 Tab by mouth daily. Patient taking differently: Take 100 mg by mouth daily. Take / use AM day of surgery  per anesthesia protocols.   Indications: ANXIETY WITH DEPRESSION      Facility-Administered Medications Last Administration Doses Remaining   heparin (porcine) pf 500 Units 1/22/2018  1:16 PM 0          Past Medical History:   Diagnosis Date    Adverse effect of anesthesia     after hernia surgery bp dropped low and she went to ICU    Arthritis     hands; wrists; back; right knee    Breast cancer ([address]     Depression     Fibrocystic breast disease     bilateral    GERD (gastroesophageal reflux disease)     Headache(784.0)     previously on Topamax    HTN (hypertension), benign     Hypercholesteremia     Kidney stones      Past Surgical History:   Procedure Laterality Date    HX BREAST RECONSTRUCTION  10/11/2017    HX BREAST RECONSTRUCTION Bilateral 10/11/2017    BILATERAL BREAST RECONSTRUCTION WITH PLACEMENT TISSUE EXPANDERS AND ALLODERM performed by Tk Gutierrez MD at [address] HX CATARACT REMOVAL      bilateral    HX COLONOSCOPY  Oct 2010    Diverticula    HX HERNIA REPAIR  21/7625    umbilical hernia; done at [facility] in [place]    HX KNEE REPLACEMENT      left    HX KNEE REPLACEMENT  02/19/2009    total knee replacement (Left)     HX MASTECTOMY Bilateral 10/11/2017    BILATERAL BREAST MASTECTOMY SIMPLE ARTOURA ULTRA HIGH PROFILE BREAST IMPLANTS 700cc 13.5 cm 8.3cm [id number] X 3 /  MEDIUM HEIGHT 550cc 13.5 cm 11.7cm 7.4cm [id number] X 3  performed by Tiana Lopez DO at [address] HX SKIN BIOPSY  10/03/2017    on nose    HX VASCULAR ACCESS       Social History     Social History    Marital status:      Spouse name: N/A    Number of children: N/A    Years of education: N/A     Occupational History     Retired     [address] [place] History Main Topics    Smoking status: Former Smoker     Types: Cigarettes     Quit date: 1/1/1992    Smokeless tobacco: Never Used    Alcohol use 0.0 oz/week     0 Standard drinks or equivalent per week      Comment: Occasional    Drug use: No    Sexual activity: Not Currently     Other Topics Concern    Not on file     Social History Narrative     June 2012     Family History   Problem Relation Age of Onset    Other Brother      Myodisplasia syndrome     Heart Disease Brother     Hypertension Brother     Hypertension Mother     Diabetes Mother     Cancer Daughter      cervical     Allergies   Allergen Reactions    Dilaudid [Hydromorphone] Other (comments)     \"out of her mind\"    Sulfa (Sulfonamide Antibiotics) Itching    Tetracycline Nausea Only       Current Facility-Administered Medications   Medication Dose Route Frequency    sodium chloride (OCEAN) 0.65 % nasal spray 2 Spray  2 Spray Both Nostrils Q2H PRN    HYDROcodone-homatropine (HYCODAN) 5-1.5 mg/5 mL (5 mL) syrup 5 mL  5 mL Oral Q4H PRN    [START ON 1/26/2018] azithromycin (ZITHROMAX) 500 mg in 0.9% sodium chloride 250 mL IVPB  500 mg IntraVENous DAILY    lip protectant (BLISTEX) ointment   Topical PRN    piperacillin-tazobactam (ZOSYN) 4.5 g in 0.9% sodium chloride (MBP/ADV) 100 mL  4.5 g IntraVENous Q8H    alteplase (CATHFLO) 2 mg in sterile water (preservative free) 2 mL injection  2 mg InterCATHeter PRN    loperamide (IMODIUM) capsule 2 mg  2 mg Oral Q6H PRN    tuberculin injection 5 Units  5 Units IntraDERMal ONCE    vancomycin (VANCOCIN) 1,000 mg in 0.9% sodium chloride 250 mL IVPB  1,000 mg IntraVENous Q12H    potassium chloride (K-DUR, KLOR-CON) SR tablet 40 mEq  40 mEq Oral DAILY    atorvastatin (LIPITOR) tablet 20 mg  20 mg Oral DAILY    LORazepam (ATIVAN) tablet 1 mg  1 mg Oral BID PRN    metoprolol succinate (TOPROL-XL) XL tablet 50 mg  50 mg Oral DAILY    prochlorperazine (COMPAZINE) tablet 10 mg  10 mg Oral Q6H PRN    sertraline (ZOLOFT) tablet 100 mg  100 mg Oral DAILY    sodium chloride (NS) flush 5-10 mL  5-10 mL IntraVENous Q8H    sodium chloride (NS) flush 5-10 mL  5-10 mL IntraVENous PRN    predniSONE (DELTASONE) tablet 40 mg  40 mg Oral DAILY WITH BREAKFAST    albuterol-ipratropium (DUO-NEB) 2.5 MG-0.5 MG/3 ML  3 mL Nebulization Q4H RT    heparin (porcine) injection 5,000 Units  5,000 Units SubCUTAneous Q12H     Facility-Administered Medications Ordered in Other Encounters   Medication Dose Route Frequency    sodium chloride (NS) flush 10 mL  10 mL IntraVENous Q8H         Objective:     Vitals:    01/25/18 1052 01/25/18 1105 01/25/18 1236 01/25/18 1525   BP:  142/73  127/74   Pulse: 98 100  (!) 101   Resp: 20 18 18   Temp:  99.1 °F (37.3 °C)  99.3 °F (37.4 °C)   SpO2: 94% 90% 94% 95%       PHYSICAL EXAM     Constitutional:  the patient is well developed and in no acute distress, NC 10L, sat 92%  EENMT:  Sclera clear, pupils equal, oral mucosa moist  Respiratory: anterior and posterior crackles, no wheezing, dry cough  Cardiovascular:  RRR without M,G,R  Gastrointestinal: soft and non-tender; with positive bowel sounds. Musculoskeletal: warm without cyanosis. There is no lower leg edema. Skin:  no jaundice or rashes, no wounds   Neurologic: no gross neuro deficits     Psychiatric:  alert and oriented x 3    Lines:  Left chest port    CXR 1/25/18:        Recent Labs      01/25/18   0524  01/24/18   0902  01/23/18   0421  01/22/18   1848   WBC  15.0*  18.0*  13.6*  9.9   HGB  10.4*  11.1*  10.3*  10.3*   HCT  31.4*  33.8*  31.1*  30.9*   PLT  234  248  202  187     Recent Labs      01/25/18   0524 01/24/18   0902  01/23/18   0421  01/22/18   1956  01/22/18   1848   NA  145  146*  144   --   144   K  3.4*  3.2*  4.6   --   3.1*   CL  112*  114*  113*   --   111*   GLU  103*  98  130*   --   110*   CO2  23  20*  22   --   22   BUN  10  15  17   --   23   CREA  0.73  0.90  0.71   --   0.81   MG   --    --    --   1.6*   --    CA  8.8  8.9  7.7*   --   7.4*   ALB   --    --   2.3*   --   2.3*   TBILI   --    --   0.4   --   0.3   ALT   --    --   14   --   13   SGOT   --    --   26   --   26     No results for input(s): PH, PCO2, PO2, HCO3 in the last 72 hours. No results for input(s): LCAD, LAC in the last 72 hours.     Assessment: (Medical Decision Making)     Hospital Problems  Date Reviewed: 1/25/2018          Codes Class Noted POA    Acute respiratory failure with hypoxia [facility]) ICD-10-CM: J96.01  ICD-9-CM: 518.81  1/25/2018 No    Worsening hypoxemia with bilateral infiltrates    Infection due to mycoplasma- likely remote ICD-10-CM: A49.3  ICD-9-CM: 041.81  1/25/2018 Yes    IgM normal but IgG high suggests remote infection.      Pulmonary infiltrates ICD-10-CM: R91.8  ICD-9-CM: 793.19  1/25/2018 Unknown    DDx includes cardiogenic pulmonary edema, NC pulmonary edema ARDS, pulmonary drug toxicity, atypical pneumonia, AIP, acute eosinophilic pneumonia    HTN (hypertension), benign (Chronic) ICD-10-CM: I10  ICD-9-CM: 401.1  1/23/2018 Yes        Recurrent major depressive disorder, in remission [facility] (Chronic) ICD-10-CM: F33.40  ICD-9-CM: 296.35  1/23/2018 Yes        Breast cancer ([facility] (Chronic) ICD-10-CM: C50.919  ICD-9-CM: 174.9  1/23/2018 Yes        S/P bilateral mastectomy (Chronic) ICD-10-CM: Z90.13  ICD-9-CM: V45.71  1/23/2018 Yes    Overview Signed 1/25/2018  2:39 PM by Rafael Sue NP     10/11/17  BILATERAL BREAST MASTECTOMY SIMPLE ARTOURA ULTRA HIGH PROFILE BREAST IMPLANTS 700cc 13.5 cm 8.3cm [id number] X 3 /  MEDIUM HEIGHT 550cc 13.5 cm 11.7cm 7.4cm [id number] X 3   BILATERAL SENTINEL NODE BIOPSY WITH LYMPHATIC MAPPING  RIGHT AXILLARY DISSECTION  BILATERAL BREAST RECONSTRUCTION WITH PLACEMENT TISSUE EXPANDERS AND ALLODERM             HCAP (healthcare-associated pneumonia) ICD-10-CM: J18.9  ICD-9-CM: 488  1/23/2018 Yes              Plan:  (Medical Decision Making)     --Zosyn, Vancomycin--Add Azithromycin   --Prednisone 40mg daily- this should clear eosinophils if AEP  --Oncology chemotherapy with:  Taxotere plus Cytoxan due tomorrow- consider HOLDING  --Check BNP, CRP and Procal  --Strict I&Os  --NPO after midnight for possible bronch in AM  --Is DNR but states if she should have complications during the bronch is agreeable to intubation. More than 50% of the time documented was spent in face-to-face contact with the patient and in the care of the patient on the floor/unit where the patient is located. Thank you very much for this referral.  We appreciate the opportunity to participate in this patient's care. Will follow along with above stated plan. Irma Marte NP    Lungs: marked crackles bilaterally  Heart:  RRR with no Murmur/Rubs/Gallops    Additional Comments:  Chest exam worrisome for cardiogenic pulmonary edema but broad DDx possible. Will check BNP, CRP, and PCT and plan for bronch in AM if condition allows. I have spoken with and examined the patient. I agree with the above assessment and plan as documented.     Garrett Adkins MD

## 2018-01-25 NOTE — PROGRESS NOTES
Dispo update:  Met with MsJavier Elieser Quintero, Dr. Al Meneses, and Ms. Kesha Renee NP (oncology) in room 214. Oncology to take over as primary, and place a pulmonary consult. Chemo on hold for now (had been planned for 1/27). Ms. Elieser Quintero still wants to go the [facility], but will hold off sending a referral until she is more medically stable.

## 2018-01-25 NOTE — CONSULTS
CONSULT NOTE    Solitario Carrasquillo    1/25/2018    Date of Admission:  1/22/2018    The patient's chart is reviewed and the patient is discussed with the staff. Subjective:     Patient is a 69 y.o.  female seen and evaluated at the request of Dr. Marcial Girard. She was referred to the infusion center for hypotension and received 2L but BP remained low. Was transported to the ER and admitted by hospitalist.  Was placed on O2 per NC and received IVFs in transport with . CXR with right infiltrate and placed on antibiotics. La was 1.2, WBC 14.8, K+ 3.0 and blood cultures drawn showing no growth at 3 days. She is currently undergoing treatment for breast cancer and received Taxotere plus Cytoxan 3 weeks ago. She had bilateral mastectomy 10/11/17 by Dr. Julio Pod operatively states she was treated for bronchitis with antibiotics, steroids, Advair and rescue inhaler. Symptoms improved but symptoms recurred and was again seen with treatment prescribed. Has had cough and right side back discomfort prior to admission. During hospitalization has developed worsening hypoxia with exertional dyspnea and now requiring 10L NC to maintain sat . Is comfortable at rest, trying to use incentive spirometry and has occasional non productive cough. Had elevated mycoplasma pneumoniae  We have been asked to see for acute respiratory failure with hypoxia.       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 Prior to Admission Medications   Prescriptions Last Dose Informant Patient Reported? Taking? Cetirizine (ZYRTEC) 10 mg cap 1/23/2018 at Unknown time  Yes Yes   Sig: Take 10 mg by mouth daily. Take / use AM day of surgery  per anesthesia protocols. DOCUSATE CALCIUM (STOOL SOFTENER PO) 1/16/2018 at Unknown time  Yes Yes   Sig: Take  by mouth as needed. LORazepam (ATIVAN) 1 mg tablet Unknown at Unknown time  No No   Sig: Take 1 Tab by mouth every six (6) hours as needed for Anxiety. Max Daily Amount: 4 mg. Indications: anxiety, CANCER CHEMOTHERAPY-INDUCED NAUSEA AND VOMITING   PSEUDOEPHEDRINE HCL (SUDAFED 12 HOUR PO) 1/23/2018 at Unknown time  Yes Yes   Sig: Take  by mouth as needed. acetaminophen (TYLENOL) 325 mg tablet Unknown at Unknown time  Yes No   Sig: Take 325 mg by mouth every four (4) hours as needed for Pain. albuterol (PROVENTIL HFA, VENTOLIN HFA, PROAIR HFA) 90 mcg/actuation inhaler 1/23/2018 at Unknown time  No Yes   Sig: Take 1-2 Puffs by inhalation every four (4) hours as needed for Wheezing or Shortness of Breath. atorvastatin (LIPITOR) 20 mg tablet 1/22/2018 at Unknown time  No Yes   Sig: Take 1 Tab by mouth daily. Patient taking differently: Take 20 mg by mouth daily. Take / use AM day of surgery  per anesthesia protocols. Indications: hypercholesterolemia, hyperlipidemia   dexamethasone (DECADRON) 4 mg tablet Unknown at Unknown time  No No   Sig: Take two (2) tabs twice daily the day before, day of and day after chemo.    guaiFENesin-dextromethorphan SR (MUCINEX DM) 600-30 mg per tablet 1/23/2018 at Unknown time  Yes Yes   Sig: Take 1 Tab by mouth every twelve (12) hours as needed for Cough. losartan-hydroCHLOROthiazide (HYZAAR) 50-12.5 mg per tablet 1/23/2018 at Unknown time  No Yes   Sig: Take 1 Tab by mouth daily. Patient taking differently: Take 1 Tab by mouth daily. Indications: hypertension   meclizine (ANTIVERT) 25 mg tablet Not Taking at Unknown time  No No   Sig: Take 1 Tab by mouth every six (6) hours as needed for Dizziness. Patient taking differently: Take 25 mg by mouth every six (6) hours as needed for Dizziness. Indications: VERTIGO   meloxicam (MOBIC) 7.5 mg tablet 1/23/2018 at Unknown time  No Yes   Sig: Take 1-2 Tabs by mouth daily. Patient taking differently: Take 7.5-15 mg by mouth as needed. Indications: OSTEOARTHRITIS   metoprolol succinate (TOPROL-XL) 50 mg XL tablet 1/23/2018 at Unknown time  No Yes   Sig: Take 1 Tab by mouth daily. ondansetron hcl (ZOFRAN, AS HYDROCHLORIDE,) 8 mg tablet 1/23/2018 at Unknown time  No Yes   Sig: Take 1 Tab by mouth every eight (8) hours as needed for Nausea. oxyCODONE IR (ROXICODONE) 5 mg immediate release tablet 1/23/2018 at Unknown time  Yes Yes   Sig: Take 5 mg by mouth every six (6) hours as needed for Pain. predniSONE (DELTASONE) 20 mg tablet Unknown at Unknown time  No No   Sig: Take 2 daily for 3 days, then 1 daily for 2 days   prochlorperazine (COMPAZINE) 10 mg tablet Not Taking at Unknown time  No No   Sig: Take 1 Tab by mouth every six (6) hours as needed. ranitidine (ZANTAC) 150 mg tablet 1/23/2018 at Unknown time  Yes Yes   Sig: Take 150 mg by mouth as needed for Indigestion. Take / use AM day of surgery  per anesthesia protocols. sertraline (ZOLOFT) 100 mg tablet 1/23/2018 at Unknown time  No Yes   Sig: Take 1 Tab by mouth daily. Patient taking differently: Take 100 mg by mouth daily. Take / use AM day of surgery  per anesthesia protocols.   Indications: ANXIETY WITH DEPRESSION      Facility-Administered Medications Last Administration Doses Remaining heparin (porcine) pf 500 Units 1/22/2018  1:16 PM 0          Past Medical History:   Diagnosis Date    Adverse effect of anesthesia     after hernia surgery bp dropped low and she went to ICU    Arthritis     hands; wrists; back; right knee    Breast cancer ([address]     Depression     Fibrocystic breast disease     bilateral    GERD (gastroesophageal reflux disease)     Headache(784.0)     previously on Topamax    HTN (hypertension), benign     Hypercholesteremia     Kidney stones      Past Surgical History:   Procedure Laterality Date    HX BREAST RECONSTRUCTION  10/11/2017    HX BREAST RECONSTRUCTION Bilateral 10/11/2017    BILATERAL BREAST RECONSTRUCTION WITH PLACEMENT TISSUE EXPANDERS AND ALLODERM performed by Mariaa Barillas MD at [address] HX CATARACT REMOVAL      bilateral    HX COLONOSCOPY  Oct 2010    Diverticula    HX HERNIA REPAIR  41/7310    umbilical hernia; done at [facility] in [place]    HX KNEE REPLACEMENT      left    HX KNEE REPLACEMENT  02/19/2009    total knee replacement (Left)     HX MASTECTOMY Bilateral 10/11/2017    BILATERAL BREAST MASTECTOMY SIMPLE ARTOURA ULTRA HIGH PROFILE BREAST IMPLANTS 700cc 13.5 cm 8.3cm [id number] X 3 /  MEDIUM HEIGHT 550cc 13.5 cm 11.7cm 7.4cm [id number] X 3  performed by Mary Lange DO at [address] HX SKIN BIOPSY  10/03/2017    on nose    HX VASCULAR ACCESS       Social History     Social History    Marital status:      Spouse name: N/A    Number of children: N/A    Years of education: N/A     Occupational History     Retired     [address] [place] History Main Topics    Smoking status: Former Smoker     Types: Cigarettes     Quit date: 1/1/1992    Smokeless tobacco: Never Used    Alcohol use 0.0 oz/week     0 Standard drinks or equivalent per week      Comment: Occasional    Drug use: No    Sexual activity: Not Currently     Other Topics Concern    Not on file     Social History Narrative     June 2012 Family History   Problem Relation Age of Onset    Other Brother      Myodisplasia syndrome     Heart Disease Brother     Hypertension Brother     Hypertension Mother     Diabetes Mother     Cancer Daughter      cervical     Allergies   Allergen Reactions    Dilaudid [Hydromorphone] Other (comments)     \"out of her mind\"    Sulfa (Sulfonamide Antibiotics) Itching    Tetracycline Nausea Only       Current Facility-Administered Medications   Medication Dose Route Frequency    sodium chloride (OCEAN) 0.65 % nasal spray 2 Spray  2 Spray Both Nostrils Q2H PRN    HYDROcodone-homatropine (HYCODAN) 5-1.5 mg/5 mL (5 mL) syrup 5 mL  5 mL Oral Q4H PRN    lip protectant (BLISTEX) ointment   Topical PRN    piperacillin-tazobactam (ZOSYN) 4.5 g in 0.9% sodium chloride (MBP/ADV) 100 mL  4.5 g IntraVENous Q8H    alteplase (CATHFLO) 2 mg in sterile water (preservative free) 2 mL injection  2 mg InterCATHeter PRN    loperamide (IMODIUM) capsule 2 mg  2 mg Oral Q6H PRN    tuberculin injection 5 Units  5 Units IntraDERMal ONCE    vancomycin (VANCOCIN) 1,000 mg in 0.9% sodium chloride 250 mL IVPB  1,000 mg IntraVENous Q12H    potassium chloride (K-DUR, KLOR-CON) SR tablet 40 mEq  40 mEq Oral DAILY    atorvastatin (LIPITOR) tablet 20 mg  20 mg Oral DAILY    LORazepam (ATIVAN) tablet 1 mg  1 mg Oral BID PRN    metoprolol succinate (TOPROL-XL) XL tablet 50 mg  50 mg Oral DAILY    prochlorperazine (COMPAZINE) tablet 10 mg  10 mg Oral Q6H PRN    sertraline (ZOLOFT) tablet 100 mg  100 mg Oral DAILY    sodium chloride (NS) flush 5-10 mL  5-10 mL IntraVENous Q8H    sodium chloride (NS) flush 5-10 mL  5-10 mL IntraVENous PRN    predniSONE (DELTASONE) tablet 40 mg  40 mg Oral DAILY WITH BREAKFAST    albuterol-ipratropium (DUO-NEB) 2.5 MG-0.5 MG/3 ML  3 mL Nebulization Q4H RT    heparin (porcine) injection 5,000 Units  5,000 Units SubCUTAneous Q12H     Facility-Administered Medications Ordered in Other Encounters Medication Dose Route Frequency    sodium chloride (NS) flush 10 mL  10 mL IntraVENous Q8H         Objective:     Vitals:    01/25/18 0749 01/25/18 1052 01/25/18 1105 01/25/18 1236   BP:   142/73    Pulse:  98 100    Resp:  20 18    Temp:   99.1 °F (37.3 °C)    SpO2: 90% 94% 90% 94%       PHYSICAL EXAM     Constitutional:  the patient is well developed and in no acute distress, NC 10L, sat 92%  EENMT:  Sclera clear, pupils equal, oral mucosa moist  Respiratory: anterior and posterior crackles, no wheezing, dry cough  Cardiovascular:  RRR without M,G,R  Gastrointestinal: soft and non-tender; with positive bowel sounds. Musculoskeletal: warm without cyanosis. There is no lower leg edema. Skin:  no jaundice or rashes, no wounds   Neurologic: no gross neuro deficits     Psychiatric:  alert and oriented x 3    Lines:  Left chest port    CXR 1/25/18:        Recent Labs      01/25/18   0524  01/24/18   0902  01/23/18   0421  01/22/18   1848   WBC  15.0*  18.0*  13.6*  9.9   HGB  10.4*  11.1*  10.3*  10.3*   HCT  31.4*  33.8*  31.1*  30.9*   PLT  234  248  202  187     Recent Labs      01/25/18   0524  01/24/18   0902  01/23/18   0421  01/22/18   1956  01/22/18   1848   NA  145  146*  144   --   144   K  3.4*  3.2*  4.6   --   3.1*   CL  112*  114*  113*   --   111*   GLU  103*  98  130*   --   110*   CO2  23  20*  22   --   22   BUN  10  15  17   --   23   CREA  0.73  0.90  0.71   --   0.81   MG   --    --    --   1.6*   --    CA  8.8  8.9  7.7*   --   7.4*   ALB   --    --   2.3*   --   2.3*   TBILI   --    --   0.4   --   0.3   ALT   --    --   14   --   13   SGOT   --    --   26   --   26     No results for input(s): PH, PCO2, PO2, HCO3 in the last 72 hours. No results for input(s): LCAD, LAC in the last 72 hours.     Assessment:  (Medical Decision Making)     Hospital Problems  Date Reviewed: 1/25/2018          Codes Class Noted POA    HCAP (healthcare-associated pneumonia) ICD-10-CM: J18.9  ICD-9-CM: 565 1/23/2018 Yes        Acute respiratory failure with hypoxia [facility]) ICD-10-CM: J96.01  ICD-9-CM: 518.81  1/25/2018 No        Infection due to mycoplasma- likely remote ICD-10-CM: A49.3  ICD-9-CM: 041.81  1/25/2018 Unknown        HTN (hypertension), benign (Chronic) ICD-10-CM: I10  ICD-9-CM: 401.1  1/23/2018 Yes        Recurrent major depressive disorder, in remission ([facility] (Chronic) ICD-10-CM: F33.40  ICD-9-CM: 296.35  1/23/2018 Yes        Breast cancer ([facility] (Chronic) ICD-10-CM: C50.919  ICD-9-CM: 174.9  1/23/2018 Yes        S/P bilateral mastectomy (Chronic) ICD-10-CM: Z90.13  ICD-9-CM: V45.71  1/23/2018 Yes    Overview Signed 1/25/2018  2:39 PM by Polly Del Toro NP     10/11/17  BILATERAL BREAST MASTECTOMY SIMPLE ARTOURA ULTRA HIGH PROFILE BREAST IMPLANTS 700cc 13.5 cm 8.3cm [id number] X 3 /  MEDIUM HEIGHT 550cc 13.5 cm 11.7cm 7.4cm [id number] X 3   BILATERAL SENTINEL NODE BIOPSY WITH LYMPHATIC MAPPING  RIGHT AXILLARY DISSECTION  BILATERAL BREAST RECONSTRUCTION WITH PLACEMENT TISSUE EXPANDERS AND ALLODERM                   Plan:  (Medical Decision Making)     --Zosyn, Vancomycin--Add Azithromycin   --Prednisone 40mg daily  --Oncology chemotherapy with:  Taxotere plus Cytoxan due tomorrow  --Check BNP, CRP and Procal  --Strict I&Os  --NPO after midnight for possible bronch in AM  --Is DNR but states if she should have complications during the bronch is agreeable to intubation. More than 50% of the time documented was spent in face-to-face contact with the patient and in the care of the patient on the floor/unit where the patient is located. Thank you very much for this referral.  We appreciate the opportunity to participate in this patient's care. Will follow along with above stated plan.     Polly Del Toro NP

## 2018-01-25 NOTE — PROGRESS NOTES
Problem: Mobility Impaired (Adult and Pediatric)  Goal: *Acute Goals and Plan of Care (Insert Text)  Goals:  (1.)Ms. Kimmie De La Rosa will move from supine to sit and sit to supine , scoot up and down and roll side to side with INDEPENDENT within 5 day(s). (2.)Ms. Kimmie De La Rosa will transfer from bed to chair and chair to bed with MODIFIED INDEPENDENCE using the least restrictive device within 5 day(s). (3.)Ms. Kimmie De La Rosa will ambulate with MODIFIED INDEPENDENCE for  feet with the least restrictive device within 5 day(s). (4.)Ms Kimmie De La Rosa will increase her activity tolerance in supine, sitting and standing to >20 minutes with O2 sats remaining in the 90s within 5 day(s)        PHYSICAL THERAPY: Daily Note, Treatment Day: 1st, AM 1/25/2018  INPATIENT: Hospital Day: 4  Payor: Akiko Avilez / Plan: Kellie Navarro / Product Type: Medicare /      NAME/AGE/GENDER: Joceline London is a 69 y.o. female   PRIMARY DIAGNOSIS: Pneumonia  HCAP (healthcare-associated pneumonia) Pneumonia Pneumonia        ICD-10: Treatment Diagnosis:   · Generalized Muscle Weakness (M62.81)  · Difficulty in walking, Not elsewhere classified (R26.2)  · Dizziness and Giddiness (R42)   Precaution/Allergies:  Dilaudid [hydromorphone]; Sulfa (sulfonamide antibiotics); and Tetracycline      ASSESSMENT:     Ms. Kimmie De La Rosa presents sitting up in the recliner chair agreeable to have therapy but stating she cannot walk. She is on 6L of O2 with her resting sitting O2 sat at 90%. She participated in BLE AROM strength exercises in sitting. She has good sitting balance and sit to stand is at CGA to SBA from the recliner. Her standing balance is fair to good using the r/walker. She attempted to step in place 7 steps using the r/walker but becomes very SOB with O2 sats dropping quickly to 83% with minimal exertion. She is SBA for stand to sit and she then performed recovery breathing and her O2 sats return to 90% within 1 minute.   She is physically capable of more independent mobility but is limited by de--sating so quickly with any exertion activity. She is pleasant and cooperative and is very in tune to her body and how much to push herself. This section established at most recent assessment   PROBLEM LIST (Impairments causing functional limitations):  1. Decreased Strength  2. Decreased ADL/Functional Activities  3. Decreased Transfer Abilities  4. Decreased Ambulation Ability/Technique  5. Decreased Balance  6. Decreased Activity Tolerance  7. Increased Fatigue  8. Increased Shortness of Breath   INTERVENTIONS PLANNED: (Benefits and precautions of physical therapy have been discussed with the patient.)  1. Bed Mobility  2. Gait Training  3. Therapeutic Activites  4. Therapeutic Exercise/Strengthening  5. Transfer Training     TREATMENT PLAN: Frequency/Duration: 3 times a week for duration of hospital stay  Rehabilitation Potential For Stated Goals: Good     RECOMMENDED REHABILITATION/EQUIPMENT: (at time of discharge pending progress): Due to the probability of continued deficits (see above) this patient will likely need continued skilled physical therapy after discharge. Possible HH at time of discharge since she lives alone. Equipment:    None at this time              HISTORY:   History of Present Injury/Illness (Reason for Referral):  Ms. Wiliam Ellis is a 70 yo female who presented with weakness on a background of depression, GERD, HTN and HLD. She also has dyspnea. The dyspnea was presented for 2 months. She also reported cough for the past 2 weeks. She felt weakness and decreased PO intake for 5 days. She denies the following: dysuria, or chest pain. She reports diarrhea for the past 4 days. Due to weakness, she went to the infusion center where she was found to be hypotensive. CXR today showed a new focal opacity in the right midlung (influenza negative).   Past Medical History/Comorbidities:   Ms. Wiliam Ellis  has a past medical history of Adverse effect of anesthesia; Arthritis; Breast cancer ([facility] Depression; Fibrocystic breast disease; GERD (gastroesophageal reflux disease); Headache(784.0); HTN (hypertension), benign; Hypercholesteremia; and Kidney stones. Ms. Erma Ochoa  has a past surgical history that includes hx colonoscopy (Oct 2010); hx cataract removal; hx knee replacement; hx hernia repair (03/2011); hx knee replacement (02/19/2009); hx breast reconstruction (10/11/2017); hx skin biopsy (10/03/2017); hx mastectomy (Bilateral, 10/11/2017); hx breast reconstruction (Bilateral, 10/11/2017); and hx vascular access. Social History/Living Environment:   Home Environment: Private residence  # Steps to Enter: 0  One/Two Story Residence: One story  Living Alone: Yes  Support Systems: Child(frank)  Patient Expects to be Discharged to[de-identified] Private residence  Current DME Used/Available at Home: Walker, rolling  Prior Level of Function/Work/Activity:  Patient reports she has a r/walker that she uses most of the time at home for her mobility. She states she lives alone but has good neighbor and family support. Number of Personal Factors/Comorbidities that affect the Plan of Care: 1-2: MODERATE COMPLEXITY   EXAMINATION:   Most Recent Physical Functioning:   Gross Assessment:  AROM: Within functional limits  PROM: Within functional limits  Strength: Generally decreased, functional  Coordination: Generally decreased, functional  Tone: Normal  Sensation: Intact               Posture:  Posture (WDL): Within defined limits  Balance:  Sitting: Intact  Standing: Impaired  Standing - Static: Fair;Constant support  Standing - Dynamic : Fair Bed Mobility:     Wheelchair Mobility:     Transfers:  Sit to Stand: Stand-by asssistance;Contact guard assistance  Stand to Sit: Stand-by asssistance  Gait:     Base of Support: Narrowed  Speed/Amber: Shuffled; Slow  Step Length: Left shortened;Right shortened  Gait Abnormalities: Decreased step clearance  Distance (ft): 7 Feet (ft) (steppping in place with the r/walker)  Assistive Device: Walker, rolling  Ambulation - Level of Assistance: Contact guard assistance  Interventions: Safety awareness training      Body Structures Involved:  1. Lungs  2. Metabolic  3. Endocrine Body Functions Affected:  1. Respiratory  2. Metobolic/Endocrine Activities and Participation Affected:  1. General Tasks and Demands  2. Mobility   Number of elements that affect the Plan of Care: 3: MODERATE COMPLEXITY   CLINICAL PRESENTATION:   Presentation: Stable and uncomplicated: LOW COMPLEXITY   CLINICAL DECISION MAKIN[address] [place] [address] AM-PAC 6 Clicks   Basic Mobility Inpatient Short Form  How much difficulty does the patient currently have. .. Unable A Lot A Little None   1. Turning over in bed (including adjusting bedclothes, sheets and blankets)? [] 1   [] 2   [] 3   [x] 4   2. Sitting down on and standing up from a chair with arms ( e.g., wheelchair, bedside commode, etc.)   [] 1   [] 2   [x] 3   [] 4   3. Moving from lying on back to sitting on the side of the bed? [] 1   [] 2   [] 3   [x] 4   How much help from another person does the patient currently need. .. Total A Lot A Little None   4. Moving to and from a bed to a chair (including a wheelchair)? [] 1   [] 2   [x] 3   [] 4   5. Need to walk in hospital room? [] 1   [] 2   [x] 3   [] 4   6. Climbing 3-5 steps with a railing? [] 1   [] 2   [x] 3   [] 4   © , Trustees of [address] [place], [place] [address], under license to [employer]. All rights reserved      Score:  Initial: 20 Most Recent: X (Date: -- )    Interpretation of Tool:  Represents activities that are increasingly more difficult (i.e. Bed mobility, Transfers, Gait). Score 24 23 22-20 19-15 14-10 9-7 6     Modifier CH CI CJ CK CL CM CN      ?  Mobility - Walking and Moving Around:     - CURRENT STATUS: CJ - 20%-39% impaired, limited or restricted    - GOAL STATUS: CI - 1%-19% impaired, limited or restricted    - D/C STATUS:  ---------------To be determined---------------  Payor: Mahogany Johnson / Plan: [employer] / Product Type: Medicare /      Medical Necessity:     · Patient is expected to demonstrate progress in strength, balance and functional technique to increase independence with standing, transfers and gait with a r/walker. Reason for Services/Other Comments:  · Patient continues to require skilled intervention due to medical complications. Use of outcome tool(s) and clinical judgement create a POC that gives a: Clear prediction of patient's progress: LOW COMPLEXITY            TREATMENT:   (In addition to Assessment/Re-Assessment sessions the following treatments were rendered)   Pre-treatment Symptoms/Complaints:  Patient had no complaints of pain just tired and still have trouble with coughing. Pain: Initial:   Pain Intensity 1: 0  Post Session:  0/10     Therapeutic Activity: (    27 minutes): Therapeutic activities including chair transfers, sit to stand, standing balance and stepping in place to improve mobility, strength and balance. Required minimal Safety awareness training to promote static and dynamic balance in standing. Patient performed BLE AROM strength exercises. Date:  1/25/18 Date:   Date:     Activity/Exercise Parameters Parameters Parameters   Ankle pumps seated  2 x 10     Heel slides in recliner 2 x 10     Hip abduction in recliner 2 x 10     SLR from recliner 2 x 10     Knee extension seated 2 x 10     Hip flexion/marching seated 2 x 10             races/Orthotics/Lines/Etc:   · O2 Device: Nasal cannula 6L with resting O2 sats at 90% seated in the recliner  Treatment/Session Assessment:    · Response to Treatment:  Patient participated well but fatigues easily and becomes anxious when she has SOB. O2 was at 6L today with sats dropping quickly to 83% with exertion activities.    · Interdisciplinary Collaboration:   o Physical Therapist  o Registered Nurse  · After treatment position/precautions:   o Up in chair  o Bed/Chair-wheels locked  o Call light within reach  o RN notified   · Compliance with Program/Exercises: Will assess as treatment progresses. · Recommendations/Intent for next treatment session: \"Next visit will focus on advancements to more challenging activities\".   Total Treatment Duration:  PT Patient Time In/Time Out  Time In: 0935  Time Out: [address]

## 2018-01-26 PROBLEM — R79.89 ELEVATED BRAIN NATRIURETIC PEPTIDE (BNP) LEVEL: Status: ACTIVE | Noted: 2018-01-01

## 2018-01-26 NOTE — PROGRESS NOTES
BNP elevated at 251 while PCT normal at 0.1. CRP significantly elevated. Will diurese and intensify steroid treatment at least for a few doses to see if improvement in oxygenation can be achieved. Pt apparently desaturated to 60% just taking oxygen off transiently suggesting she is not a candidate for bronchoscopy at this point unless intubated. Tenuous situation since ICU beds are full.      Montserrat Velez MD

## 2018-01-26 NOTE — PROGRESS NOTES
Spoke with Tonya Harry, pharmacist regarding diluting pt's antibiotics in D5W per MD order. Primary RN informed that pt's medications will be reviewed.

## 2018-01-26 NOTE — PROGRESS NOTES
Problem: Mobility Impaired (Adult and Pediatric)  Goal: *Acute Goals and Plan of Care (Insert Text)  Goals:  (1.)Ms. Abida Wills will move from supine to sit and sit to supine , scoot up and down and roll side to side with INDEPENDENT within 5 day(s). (2.)Ms. Abida Wills will transfer from bed to chair and chair to bed with MODIFIED INDEPENDENCE using the least restrictive device within 5 day(s). (3.)Ms. Abida Wills will ambulate with MODIFIED INDEPENDENCE for  feet with the least restrictive device within 5 day(s). (4.)Ms Abida Wills will increase her activity tolerance in supine, sitting and standing to >20 minutes with O2 sats remaining in the 90s within 5 day(s)        PHYSICAL THERAPY: Daily Note, Treatment Day: 2nd, PM 1/26/2018  INPATIENT: Hospital Day: 5  Payor: Wendy Leo / Plan: Bunny Linea / Product Type: Medicare /      NAME/AGE/GENDER: Jose Alberto Polk is a 69 y.o. female   PRIMARY DIAGNOSIS: Pneumonia  HCAP (healthcare-associated pneumonia) Acute respiratory failure with hypoxia ([facility] Acute respiratory failure with hypoxia ([facility]        ICD-10: Treatment Diagnosis:   · Generalized Muscle Weakness (M62.81)  · Difficulty in walking, Not elsewhere classified (R26.2)  · Dizziness and Giddiness (R42)   Precaution/Allergies:  Dilaudid [hydromorphone]; Sulfa (sulfonamide antibiotics); and Tetracycline      ASSESSMENT:     Ms. Abida Wills presents sitting up in the bed and agreeable to attempt mobility. Patient's O2 has been low and patient is currently on 8L O2. Patient requested to use [facility] and needed it urgently. Patient pulled BSC in front of her causing her to turn fully around instead of performing a stand pivot. Patient's O2 dropped to 68%; thus instructed in deep breathing techniques and increased O2 to 10L with eventual increase to 90%. Patient would get short of breath and O2 stats would drop to 80% with minimal activity while sitting.   Patient returned to bed and was able to perform exercises without difficulty. Patient is motivated to participate, but limited by O2 needs. This section established at most recent assessment   PROBLEM LIST (Impairments causing functional limitations):  1. Decreased Strength  2. Decreased ADL/Functional Activities  3. Decreased Transfer Abilities  4. Decreased Ambulation Ability/Technique  5. Decreased Balance  6. Decreased Activity Tolerance  7. Increased Fatigue  8. Increased Shortness of Breath   INTERVENTIONS PLANNED: (Benefits and precautions of physical therapy have been discussed with the patient.)  1. Bed Mobility  2. Gait Training  3. Therapeutic Activites  4. Therapeutic Exercise/Strengthening  5. Transfer Training     TREATMENT PLAN: Frequency/Duration: 3 times a week for duration of hospital stay  Rehabilitation Potential For Stated Goals: Good     RECOMMENDED REHABILITATION/EQUIPMENT: (at time of discharge pending progress): Due to the probability of continued deficits (see above) this patient will likely need continued skilled physical therapy after discharge. Possible HH at time of discharge since she lives alone. Equipment:    None at this time              HISTORY:   History of Present Injury/Illness (Reason for Referral):  Ms. Wilner Villegas is a 68 yo female who presented with weakness on a background of depression, GERD, HTN and HLD. She also has dyspnea. The dyspnea was presented for 2 months. She also reported cough for the past 2 weeks. She felt weakness and decreased PO intake for 5 days. She denies the following: dysuria, or chest pain. She reports diarrhea for the past 4 days. Due to weakness, she went to the infusion center where she was found to be hypotensive. CXR today showed a new focal opacity in the right midlung (influenza negative). Past Medical History/Comorbidities:   Ms. Wilner Villegas  has a past medical history of Adverse effect of anesthesia; Arthritis; Breast cancer ([address]  Depression; Fibrocystic breast disease; GERD (gastroesophageal reflux disease); Headache(784.0); HTN (hypertension), benign; Hypercholesteremia; and Kidney stones. Ms. Letitia Crooks  has a past surgical history that includes hx colonoscopy (Oct 2010); hx cataract removal; hx knee replacement; hx hernia repair (03/2011); hx knee replacement (02/19/2009); hx breast reconstruction (10/11/2017); hx skin biopsy (10/03/2017); hx mastectomy (Bilateral, 10/11/2017); hx breast reconstruction (Bilateral, 10/11/2017); and hx vascular access. Social History/Living Environment:   Home Environment: Private residence  # Steps to Enter: 0  One/Two Story Residence: One story  Living Alone: Yes  Support Systems: Child(frank)  Patient Expects to be Discharged to[de-identified] Private residence  Current DME Used/Available at Home: Walker, rolling (built-in shower bench)  Tub or Shower Type: Shower  Prior Level of Function/Work/Activity:  Patient reports she has a r/walker that she uses most of the time at home for her mobility. She states she lives alone but has good neighbor and family support. Number of Personal Factors/Comorbidities that affect the Plan of Care: 1-2: MODERATE COMPLEXITY   EXAMINATION:   Most Recent Physical Functioning:   Gross Assessment:                  Posture:     Balance:  Sitting: Impaired  Sitting - Static: Good (unsupported)  Sitting - Dynamic: Fair (occasional)  Standing: Impaired  Standing - Static: Constant support; Fair  Standing - Dynamic : Fair Bed Mobility:  Rolling: Contact guard assistance  Supine to Sit: Contact guard assistance  Wheelchair Mobility:     Transfers:  Sit to Stand: Contact guard assistance  Stand to Sit: Contact guard assistance  Gait:     Base of Support: Widened  Speed/Amber: Slow  Distance (ft): 1 Feet (ft)  Assistive Device:  (hand held assist)  Ambulation - Level of Assistance: Contact guard assistance  Interventions: Safety awareness training      Body Structures Involved:  1. Lungs  2. Metabolic  3.  Endocrine Body Functions Affected:  1. Respiratory  2. Metobolic/Endocrine Activities and Participation Affected:  1. General Tasks and Demands  2. Mobility   Number of elements that affect the Plan of Care: 3: MODERATE COMPLEXITY   CLINICAL PRESENTATION:   Presentation: Stable and uncomplicated: LOW COMPLEXITY   CLINICAL DECISION MAKIN[address] [place] [id number] AM-PAC 6 Clicks   Basic Mobility Inpatient Short Form  How much difficulty does the patient currently have. .. Unable A Lot A Little None   1. Turning over in bed (including adjusting bedclothes, sheets and blankets)? [] 1   [] 2   [] 3   [x] 4   2. Sitting down on and standing up from a chair with arms ( e.g., wheelchair, bedside commode, etc.)   [] 1   [] 2   [x] 3   [] 4   3. Moving from lying on back to sitting on the side of the bed? [] 1   [] 2   [] 3   [x] 4   How much help from another person does the patient currently need. .. Total A Lot A Little None   4. Moving to and from a bed to a chair (including a wheelchair)? [] 1   [] 2   [x] 3   [] 4   5. Need to walk in hospital room? [] 1   [] 2   [x] 3   [] 4   6. Climbing 3-5 steps with a railing? [] 1   [] 2   [x] 3   [] 4   © , Trustees of [address] [place] [id number], under license to [email or web address]. All rights reserved      Score:  Initial: 20 Most Recent: X (Date: -- )    Interpretation of Tool:  Represents activities that are increasingly more difficult (i.e. Bed mobility, Transfers, Gait). Score 24 23 22-20 19-15 14-10 9-7 6     Modifier CH CI CJ CK CL CM CN      ?  Mobility - Walking and Moving Around:     - CURRENT STATUS: CJ - 20%-39% impaired, limited or restricted    - GOAL STATUS: CI - 1%-19% impaired, limited or restricted    - D/C STATUS:  ---------------To be determined---------------  Payor: Cookie Prescott / Plan: Nicoltete Knutson / Product Type: Medicare /      Medical Necessity:     · Patient is expected to demonstrate progress in strength, balance and functional technique to increase independence with standing, transfers and gait with a r/walker. Reason for Services/Other Comments:  · Patient continues to require skilled intervention due to medical complications. Use of outcome tool(s) and clinical judgement create a POC that gives a: Clear prediction of patient's progress: LOW COMPLEXITY            TREATMENT:   (In addition to Assessment/Re-Assessment sessions the following treatments were rendered)   Pre-treatment Symptoms/Complaints:  Patient had no complaints of pain just tired and still have trouble with coughing. Pain: Initial:   Pain Intensity 1: 2  Pain Intervention(s) 1: Ambulation/Increased Activity, Nurse notified, Repositioned  Post Session:  0/10     Therapeutic Activity: (    38 minutes): Therapeutic activities including chair transfers, sit to stand, standing balance and stepping in place to improve mobility, strength and balance. Required minimal Safety awareness training to promote static and dynamic balance in standing. Patient performed BLE AROM strength exercises. Date:  1/25/18 Date:  1/26/18 Date:     Activity/Exercise Parameters Parameters Parameters   Ankle pumps seated  2 x 10 X 10    Heel slides in recliner 2 x 10 x10    Hip abduction in recliner 2 x 10 X 10    SLR from recliner 2 x 10 X 10    Knee extension seated 2 x 10     Hip flexion/marching seated 2 x 10             races/Orthotics/Lines/Etc:   · O2 Device: Nasal cannula 8L with resting O2 sats at 90% supine in bed  Treatment/Session Assessment:    · Response to Treatment:  Patient participated well but fatigues easily and becomes anxious when she has SOB. O2 was at 6L today with sats dropping quickly to 83% with exertion activities. · Interdisciplinary Collaboration:   o Physical Therapist  o Registered Nurse  · After treatment position/precautions:   o Up in chair  o Bed/Chair-wheels locked  o Call light within reach  o RN notified   · Compliance with Program/Exercises:  Will assess as treatment progresses. · Recommendations/Intent for next treatment session: \"Next visit will focus on advancements to more challenging activities\".   Total Treatment Duration:  PT Patient Time In/Time Out  Time In: 1693  Time Out: 3[address] [facility]

## 2018-01-26 NOTE — PROGRESS NOTES
Hernán Ahuja  Admission Date: 1/22/2018             Daily Progress Note: 1/26/2018    The patient's chart is reviewed and the patient is discussed with the staff. 70 yo WF with hx of breast CA s/p first round of chemo with Taxotere and Cytoxan in early January now with pulmonary infiltrates and hypoxemia. Subjective:     Now on 8L HFNC. Now afebrile. Lasix and steroids started last night for high BNP and CRP. Some purulent sputum mixed with blood which may be coming from nose. Desats to 80 with speech.     Current Facility-Administered Medications   Medication Dose Route Frequency    sodium chloride (OCEAN) 0.65 % nasal spray 2 Spray  2 Spray Both Nostrils Q2H PRN    HYDROcodone-homatropine (HYCODAN) 5-1.5 mg/5 mL (5 mL) syrup 5 mL  5 mL Oral Q4H PRN    azithromycin (ZITHROMAX) 500 mg in 0.9% sodium chloride 250 mL IVPB  500 mg IntraVENous DAILY    methylPREDNISolone (PF) (SOLU-MEDROL) injection 80 mg  80 mg IntraVENous Q8H    lip protectant (BLISTEX) ointment   Topical PRN    piperacillin-tazobactam (ZOSYN) 4.5 g in 0.9% sodium chloride (MBP/ADV) 100 mL  4.5 g IntraVENous Q8H    alteplase (CATHFLO) 2 mg in sterile water (preservative free) 2 mL injection  2 mg InterCATHeter PRN    loperamide (IMODIUM) capsule 2 mg  2 mg Oral Q6H PRN    vancomycin (VANCOCIN) 1,000 mg in 0.9% sodium chloride 250 mL IVPB  1,000 mg IntraVENous Q12H    potassium chloride (K-DUR, KLOR-CON) SR tablet 40 mEq  40 mEq Oral DAILY    atorvastatin (LIPITOR) tablet 20 mg  20 mg Oral DAILY    LORazepam (ATIVAN) tablet 1 mg  1 mg Oral BID PRN    metoprolol succinate (TOPROL-XL) XL tablet 50 mg  50 mg Oral DAILY    prochlorperazine (COMPAZINE) tablet 10 mg  10 mg Oral Q6H PRN    sertraline (ZOLOFT) tablet 100 mg  100 mg Oral DAILY    sodium chloride (NS) flush 5-10 mL  5-10 mL IntraVENous Q8H    sodium chloride (NS) flush 5-10 mL  5-10 mL IntraVENous PRN    albuterol-ipratropium (DUO-NEB) 2.5 MG-0.5 MG/3 ML  3 mL Nebulization Q4H RT    heparin (porcine) injection 5,000 Units  5,000 Units SubCUTAneous Q12H       Review of Systems    Constitutional: negative for fever, chills, sweats  Cardiovascular: negative for chest pain, palpitations, syncope, edema  Gastrointestinal:  negative for dysphagia, reflux, vomiting, diarrhea, abdominal pain, or melena  Neurologic:  negative for focal weakness, numbness, headache    Objective:     Vitals:    01/26/18 0334 01/26/18 0348 01/26/18 0458 01/26/18 0722   BP:  129/71  121/62   Pulse:  88  95   Resp:  19  20   Temp:  97.9 °F (36.6 °C)  97.9 °F (36.6 °C)   SpO2: 94% 94%  93%   Weight:   150 lb 14.4 oz (68.4 kg)      Intake and Output:   01/24 1901 - 01/26 0700  In: 1550.8 [P.O.:490; I.V.:1060.8]  Out: 3650 [Urine:3650]       Physical Exam:   Constitution:  the patient is well developed and in mild distress  EENMT:  Sclera clear, pupils equal, oral mucosa moist  Respiratory: bilateral crackles but a little less prominent. Cardiovascular:  RRR without M,G,R  Gastrointestinal: soft and non-tender; with positive bowel sounds. Musculoskeletal: warm without cyanosis. There is trace lower leg edema. Skin:  no jaundice or rashes  Neurologic: no gross neuro deficits     Psychiatric:  alert and oriented x 3    CXR: bilateral infiltrates but appear less confluent    LAB  No results for input(s): GLUCPOC in the last 72 hours. No lab exists for component: [place]   Recent Labs      01/26/18   0435  01/25/18   0524  01/24/18   0902   WBC  14.1*  15.0*  18.0*   HGB  11.2*  10.4*  11.1*   HCT  33.3*  31.4*  33.8*   PLT  281  234  248     Recent Labs      01/26/18   0435  01/25/18   0524  01/24/18   0902   NA  146*  145  146*   K  3.1*  3.4*  3.2*   CL  107  112*  114*   CO2  28  23  20*   GLU  137*  103*  98   BUN  15  10  15   CREA  0.80  0.73  0.90   CA  8.9  8.8  8.9     No results for input(s): PH, PCO2, PO2, HCO3 in the last 72 hours.   No results for input(s): LCAD, LAC in the last 72 hours. Assessment:  (Medical Decision Making)     Hospital Problems  Date Reviewed: 1/25/2018          Codes Class Noted POA    Elevated brain natriuretic peptide (BNP) level ICD-10-CM: R79.89  ICD-9-CM: 790.99  1/26/2018 Unknown    Getting lasix    * (Principal)Acute respiratory failure with hypoxia (HCC) ICD-10-CM: J96.01  ICD-9-CM: 518.81  1/25/2018 No    Ongoing; very borderline and not a candidate for bronch this AM as this would result in need for mechanical ventilation. No beds in ICU. Infection due to mycoplasma- likely remote ICD-10-CM: A49.3  ICD-9-CM: 041.81  1/25/2018 Yes        Pulmonary infiltrates ICD-10-CM: R91.8  ICD-9-CM: 793.19  1/25/2018 Unknown    Has high CRP and BNP. HTN (hypertension), benign (Chronic) ICD-10-CM: I10  ICD-9-CM: 401.1  1/23/2018 Yes        Recurrent major depressive disorder, [facility] (Chronic) ICD-10-CM: F33.40  ICD-9-CM: 296.35  1/23/2018 Yes        Breast cancer ([facility] (Chronic) ICD-10-CM: C50.919  ICD-9-CM: 174.9  1/23/2018 Yes        S/P bilateral mastectomy (Chronic) ICD-10-CM: Z90.13  ICD-9-CM: V45.71  1/23/2018 Yes    Overview Signed 1/25/2018  2:39 PM by Anoop Pelayo NP     10/11/17  BILATERAL BREAST MASTECTOMY SIMPLE ARTOURA ULTRA HIGH PROFILE BREAST IMPLANTS 700cc 13.5 cm 8.3cm [id number] X 3 /  MEDIUM HEIGHT 550cc 13.5 cm 11.7cm 7.4cm [id number] X 3   BILATERAL SENTINEL NODE BIOPSY WITH LYMPHATIC MAPPING  RIGHT AXILLARY DISSECTION  BILATERAL BREAST RECONSTRUCTION WITH PLACEMENT TISSUE EXPANDERS AND ALLODERM             HCAP (healthcare-associated pneumonia) ICD-10-CM: J18.9  ICD-9-CM: 543  1/23/2018 Yes    Purulent sputum. On Triple antibiotics. Plan:  (Medical Decision Making)     Increase FIO2 to 12L. Lasix this AM x 1. Continue steroids. Check CXR. Try on CPAP to see if she can tolerate and allow drop in FIO2. Nasal saline. Replete K; check Mg. Dilute ATB in D5W    Critically ill: E/M 31 minutes.   --    More than 50% of the time documented was spent in face-to-face contact with the patient and in the care of the patient on the floor/unit where the patient is located.     Stephen Arce MD

## 2018-01-26 NOTE — PROGRESS NOTES
Problem: Self Care Deficits Care Plan (Adult)  Goal: *Acute Goals and Plan of Care (Insert Text)  1. Patient will complete lower body bathing and dressing with minimal assistance and adaptive equipment as needed. 2. Patient will tolerate exercises sitting edge of bed with minimal cues for 15 minutes to increase strength for ADL/functional transfers. 3. Patient will tolerate 25 minutes of OT treatment with 2-3 rest breaks to increase activity tolerance for ADLs. 4. Patient will tolerate transfers up to the chair/BSC with O2 saturation levels above 90% to improve activity tolerance for functional transfers. Timeframe: 7 visits       OCCUPATIONAL THERAPY: Initial Assessment 1/26/2018  INPATIENT: Hospital Day: 5  Payor: Keith Mcgill / Plan: [email or web address] / Product Type: Medicare /      NAME/AGE/GENDER: Cynthia Livingston is a 69 y.o. female   PRIMARY DIAGNOSIS:  Pneumonia  HCAP (healthcare-associated pneumonia) Acute respiratory failure with hypoxia ([address] Acute respiratory failure with hypoxia ([address]        ICD-10: Treatment Diagnosis:    · Generalized Muscle Weakness (M62.81)  · Other lack of cordination (R27.8)   Precautions/Allergies:     Dilaudid [hydromorphone]; Sulfa (sulfonamide antibiotics); and Tetracycline      ASSESSMENT:     Ms. Mercy Goetz presents to the hospital with pneumonia. Pt has hx of breast cancer and mastectomy. Pt has been significantly limited with activity out of bed due to O2 levels dropping significantly with standing. Pt's nurse agreeable to activity edge of bed. Pt participated in transferring to edge of bed with CGA. Pt able to tolerate sitting edge of bed for ~10 minutes while participating in light ADL including washing her face and upper body. Pt's O2 levels remained in the 90's with activity edge of bed. Pt reports that she needs to have a bowel movement. Pt returned to supine and participated in rolling side to side with moderate assistance for bed pan.  Pt needed total assistance for bowel hygiene. Pt has limited ROM in the L shoulder with hx of L shoulder pain. Pt currently having breathing issues with O2 sats dropping with activity out of bed. OT plan is to start with ADL edge of bed and progress to activity out of bed. Pt participated well with activity this am and was pleasant to work with! Pt is currently functioning below baseline for ADL/functional transfers and will benefit from OT services to address stated goals and plan of care. This section established at most recent assessment   PROBLEM LIST (Impairments causing functional limitations):  1. Decreased Strength  2. Decreased ADL/Functional Activities  3. Decreased Transfer Abilities  4. Decreased Ambulation Ability/Technique  5. Decreased Balance  6. Decreased Pacing Skills  7. Increased Shortness of Breath  8. Decreased Flexibility/Joint Mobility  9. Decreased [place] with Home Exercise Program  10. Decreased Cognition   INTERVENTIONS PLANNED: (Benefits and precautions of occupational therapy have been discussed with the patient.)  1. Activities of daily living training  2. Adaptive equipment training  3. Balance training  4. Clothing management  5. Donning&doffing training  6. Neuromuscular re-eduation  7. Therapeutic activity  8. Therapeutic exercise     TREATMENT PLAN: Frequency/Duration: Follow patient 3 times per week to address above goals. Rehabilitation Potential For Stated Goals: Good     RECOMMENDED REHABILITATION/EQUIPMENT: (at time of discharge pending progress): Due to the probability of continued deficits (see above) this patient will likely need continued skilled occupational therapy after discharge. Equipment:    TBD              OCCUPATIONAL PROFILE AND HISTORY:   History of Present Injury/Illness (Reason for Referral):  See H&P   Past Medical History/Comorbidities:   Ms. Tiarra Almanza  has a past medical history of Adverse effect of anesthesia; Arthritis; Breast cancer ([address]  Depression; Fibrocystic breast disease; GERD (gastroesophageal reflux disease); Headache(784.0); HTN (hypertension), benign; Hypercholesteremia; and Kidney stones. Ms. Layne Benton  has a past surgical history that includes hx colonoscopy (Oct 2010); hx cataract removal; hx knee replacement; hx hernia repair (03/2011); hx knee replacement (02/19/2009); hx breast reconstruction (10/11/2017); hx skin biopsy (10/03/2017); hx mastectomy (Bilateral, 10/11/2017); hx breast reconstruction (Bilateral, 10/11/2017); and hx vascular access. Social History/Living Environment:   Home Environment: Private residence  # Steps to Enter: 0  One/Two Story Residence: One story  Living Alone: Yes  Support Systems: Child(frank)  Patient Expects to be Discharged to[de-identified] Private residence  Current DME Used/Available at Home: Walker, rolling (built-in shower bench)  Tub or Shower Type: Shower  Prior Level of Function/Work/Activity:  Pt lives as home alone with her dog. Pt reports she was completing ADL/functional mobility with modified independence. Pt reports she has a daughter locally. Personal Factors:          Past/Current Experience:  Hx of breast cancer with mastectomy; undergoing chemo treatment        Other factors that influence how disability is experienced by the patient:  Multiple co-morbidities   Number of Personal Factors/Comorbidities that affect the Plan of Care: Expanded review of therapy/medical records (1-2):  MODERATE COMPLEXITY   ASSESSMENT OF OCCUPATIONAL PERFORMANCE[de-identified]   Activities of Daily Living:           Basic ADLs (From Assessment) Complex ADLs (From Assessment)   Basic ADL  Feeding: Supervision  Oral Facial Hygiene/Grooming: Stand-by assistance  Bathing: Moderate assistance  Upper Body Dressing: Moderate assistance  Lower Body Dressing: Total assistance  Toileting: Total assistance Instrumental ADL  Meal Preparation: Total assistance  Homemaking:  Total assistance   Grooming/Bathing/Dressing Activities of Daily Living     Cognitive Retraining  Safety/Judgement: Awareness of environment                       Bed/Mat Mobility  Rolling: Moderate assistance  Supine to Sit: Contact guard assistance  Sit to Supine: Contact guard assistance  Sit to Stand:  (unable to tolerate at this time)  Scooting: Contact guard assistance       Most Recent Physical Functioning:   Gross Assessment:                  Posture:  Posture (WDL): Within defined limits  Balance:  Sitting: Impaired  Sitting - Static: Good (unsupported)  Sitting - Dynamic: Fair (occasional)  Standing:  (unable to tolerate) Bed Mobility:  Rolling: Moderate assistance  Supine to Sit: Contact guard assistance  Sit to Supine: Contact guard assistance  Scooting: Contact guard assistance  Wheelchair Mobility:     Transfers:  Sit to Stand:  (unable to tolerate at this time)              Patient Vitals for the past 6 hrs:   BP SpO2 O2 Flow Rate (L/min) Pulse   18 0722 121/62 93 % 8 l/min 95       Mental Status  Neurologic State: Alert  Orientation Level: Oriented X4  Cognition: Appropriate decision making, Appropriate for age attention/concentration, Follows commands  Perception: Appears intact  Perseveration: No perseveration noted  Safety/Judgement: Awareness of environment                          Physical Skills Involved:  1. Range of Motion  2. Balance  3. Strength  4. Activity Tolerance  5. Skin Integrity Cognitive Skills Affected (resulting in the inability to perform in a timely and safe manner):  1. [facility] Psychosocial Skills Affected:  1. Habits/Routines  2. Environmental Adaptation   Number of elements that affect the Plan of Care: 5+:  HIGH COMPLEXITY   CLINICAL DECISION MAKIN [facility] [address] [id number] AM-PAC 6 Clicks   Daily Activity Inpatient Short Form  How much help from another person does the patient currently need. .. Total A Lot A Little None   1. Putting on and taking off regular lower body clothing? [x] 1   [] 2   [] 3   [] 4   2.   Bathing (including washing, rinsing, drying)? [] 1   [x] 2   [] 3   [] 4   3. Toileting, which includes using toilet, bedpan or urinal?   [x] 1   [] 2   [] 3   [] 4   4. Putting on and taking off regular upper body clothing? [] 1   [x] 2   [] 3   [] 4   5. Taking care of personal grooming such as brushing teeth? [] 1   [] 2   [x] 3   [] 4   6. Eating meals? [] 1   [] 2   [x] 3   [] 4   © 2007, Trustees of [address] [place] [address] [address] [address], under license to [employer]. All rights reserved      Score:  Initial: 12 Most Recent: X (Date: -- )    Interpretation of Tool:  Represents activities that are increasingly more difficult (i.e. Bed mobility, Transfers, Gait). Score 24 23 22-20 19-15 14-10 9-7 6     Modifier CH CI CJ CK CL CM CN      ? Self Care:     - CURRENT STATUS: CL - 60%-79% impaired, limited or restricted    - GOAL STATUS: CK - 40%-59% impaired, limited or restricted    - D/C STATUS:  ---------------To be determined---------------  Payor: Anna Emmanuel / Plan: [employer] / Product Type: Medicare /      Medical Necessity:     · Patient demonstrates good rehab potential due to higher previous functional level. Reason for Services/Other Comments:  · Patient continues to require skilled intervention due to decreased independence and activity tolerance for ADL/functional transfers. Use of outcome tool(s) and clinical judgement create a POC that gives a: MODERATE COMPLEXITY         TREATMENT:   (In addition to Assessment/Re-Assessment sessions the following treatments were rendered)     Pre-treatment Symptoms/Complaints:    Pain: Initial:   Pain Intensity 1: 0  Post Session:  0/10     Self Care: (10): Procedure(s) (per grid) utilized to improve and/or restore self-care/home management as related to dressing, bathing, toileting and grooming. Required moderate to maximal visual, verbal, manual and tactile cueing to facilitate activities of daily living skills.     Braces/Orthotics/Lines/Etc:   · IV  · langley catheter  · O2 Device: Hi flow nasal cannula  Treatment/Session Assessment:    · Response to Treatment:  Pt tolerated well from sitting/supine position with o2 sats in 90's  · Interdisciplinary Collaboration:   o Occupational Therapist  o Registered Nurse  o Certified Nursing Assistant/Patient Care Technician  · After treatment position/precautions:   o Supine in bed  o Bed/Chair-wheels locked  o Bed in low position  o Call light within reach  o RN notified   · Compliance with Program/Exercises: Will assess as treatment progresses. · Recommendations/Intent for next treatment session: \"Next visit will focus on advancements to more challenging activities and reduction in assistance provided\".   Total Treatment Duration:  OT Patient Time In/Time Out  Time In: 0930  Time Out: [address], OT

## 2018-01-27 PROBLEM — E87.6 HYPOKALEMIA: Status: ACTIVE | Noted: 2018-01-01

## 2018-01-27 PROBLEM — J11.1 INFLUENZA: Status: ACTIVE | Noted: 2018-01-01

## 2018-01-27 NOTE — PROGRESS NOTES
Problem: Falls - Risk of  Goal: *Absence of Falls  Document Anurag Fall Risk and appropriate interventions in the flowsheet.    Outcome: Progressing Towards Goal  Fall Risk Interventions:  Mobility Interventions: Communicate number of staff needed for ambulation/transfer, Patient to call before getting OOB         Medication Interventions: Patient to call before getting OOB, Teach patient to arise slowly    Elimination Interventions: Call light in reach, Patient to call for help with toileting needs    History of Falls Interventions: Consult care management for discharge planning, Door open when patient unattended

## 2018-01-27 NOTE — PROGRESS NOTES
END OF SHIFT NOTE:    INTAKE/OUTPUT  01/26 0701 - 01/27 0700  In: 56 [P.O.:180; I.V.:1528]  Out: 5718 [Urine:1650]  Voiding: NO  Catheter: YES  Drain:              Flatus: Patient does have flatus present. Stool:  0 occurrences. Characteristics:  Stool Assessment  Stool Color: Brown  Stool Appearance: Loose  Stool Amount: Small  Stool Source/Status: Rectum    Emesis: 0 occurrences. Characteristics:        VITAL SIGNS  Patient Vitals for the past 12 hrs:   Temp Pulse Resp BP SpO2   01/27/18 0509 - - - - 92 %   01/27/18 0400 97.3 °F (36.3 °C) 77 22 155/70 92 %   01/27/18 0020 - - - - 91 %   01/26/18 2300 98.5 °F (36.9 °C) 76 22 121/74 91 %   01/26/18 2006 - - - - 91 %   01/26/18 1929 97.8 °F (36.6 °C) 90 20 112/67 91 %       Pain Assessment  Pain Intensity 1: 0 (01/26/18 1731)     Pain Intervention(s) 1: Ambulation/Increased Activity, Nurse notified, Repositioned  Patient Stated Pain Goal: 0    Ambulating  No    Shift report will be given to oncoming nurse at the bedside.     Emily Matute RN

## 2018-01-27 NOTE — PROGRESS NOTES
[facility]        Inpatient Hematology / Oncology Progress Note      Admission Date: 2018  6:38 PM  Reason for Admission/Hospital Course: Pneumonia  HCAP (healthcare-associated pneumonia)      24 Hour Events:  Afebrile. BCNTD. Less desaturation with talking today. Influenza positive last night - now on Tamiflu. ROS:  Constitutional: negative for fever, chills. Positive for weakness, malaise, fatigue. CV:  negative for chest pain, palpitations, edema. Respiratory: Positive for dyspnea, cough. GI: negative for nausea, abdominal pain, diarrhea. 10 point review of systems is otherwise negative with the exception of the elements mentioned above in the HPI. Allergies   Allergen Reactions    Dilaudid [Hydromorphone] Other (comments)     \"out of her mind\"    Sulfa (Sulfonamide Antibiotics) Itching    Tetracycline Nausea Only       OBJECTIVE:  Patient Vitals for the past 8 hrs:   BP Temp Pulse Resp SpO2   18 1146 183/72 97.8 °F (36.6 °C) 73 20 96 %   18 1105 - - - - 94 %   18 0739 163/74 98.6 °F (37 °C) 72 22 92 %   18 0721 - - - - 90 %     Temp (24hrs), Av °F (36.7 °C), Min:97.3 °F (36.3 °C), Max:98.6 °F (37 °C)     0701 -  1900  In: -   Out: 26 [Urine:460]    Physical Exam:  Constitutional: Frail appearing female in no acute distress, sitting comfortably in the hospital bed. HEENT: Normocephalic and atraumatic. Oropharynx is clear, mucous membranes are moist. Extraocular muscles are intact. Sclerae anicteric. Skin Warm and dry.  + bruising to upper extremities bilaterally. No rash noted. No erythema. No pallor. Respiratory Bilateral rhonchi and crackles, notably in the right and left middle lung fields. Continues on oxygen via face mask. CVS Normal rate, regular rhythm and normal S1 and S2. No murmurs, gallops, or rubs. Abdomen Soft, nontender and nondistended, normoactive bowel sounds. No palpable mass.   No hepatosplenomegaly. Neuro Grossly nonfocal with no obvious sensory or motor deficits. MSK Normal range of motion in general.  No edema and no tenderness. Psych Appropriate mood and affect. Labs:      Recent Labs      01/27/18   0533  01/26/18   0435  01/25/18   0524   WBC  12.6*  14.1*  15.0*   RBC  3.52*  3.78*  3.54*   HGB  10.3*  11.2*  10.4*   HCT  31.1*  33.3*  31.4*   MCV  88.4  88.1  88.7   MCH  29.3  29.6  29.4   MCHC  33.1  33.6  33.1   RDW  15.5*  15.5*  15.2*   PLT  252  281  234   GRANS  88*  90*  84*   LYMPH  6*  5*  7*   MONOS  6  4  9   EOS  0*  0*  0*   BASOS  0  0  0   IG  0  1  0   DF  AUTOMATED  AUTOMATED  AUTOMATED   ANEU  11.0*  12.6*  12.5*   ABL  0.7  0.8  1.1   ABM  0.8  0.6  1.3   TRISHA  0.0  0.0  0.0   ABB  0.0  0.0  0.0   AIG  0.0  0.1  0.1        Recent Labs      01/27/18   0533  01/26/18   0435  01/25/18   0524   NA  142  146*  145   K  3.0*  3.1*  3.4*   CL  105  107  112*   CO2  26  28  23   AGAP  11  11  10   GLU  209*  137*  103*   BUN  32*  15  10   CREA  1.25*  0.80  0.73   GFRAA  54*  >60  >60   GFRNA  44*  >60  >60   CA  8.4  8.9  8.8   MG  2.1  1.5*   --          Imaging:  XR CHEST PORT [[id number]] Collected: 01/26/18 0816      Order Status: Completed Updated: 01/26/18 0835     Narrative:       Chest portable    CLINICAL INDICATION: Respiratory failure, dyspnea, follow-up infiltrates,  hypertension, bilateral mastectomy and history of breast cancer    COMPARISON: 1/25/2018    TECHNIQUE: single AP portable view chest at 8:23 AM upright     FINDINGS: The left portacatheter is stable. The mediastinal and hilar contours  are stable. There is no pneumothorax or focal dense consolidation. Reticular and  patchy opacities bilaterally, slightly more prominent in the upper lobes, are  similar to prior. No evidence of enlarging pleural effusion.  Surgical changes  involving bilateral breasts are again noted with probable implants.         Impression:       IMPRESSION: No significant change involving diffuse lung infiltrates.       XR CHEST Luis Goes [[id number]] Collected: 01/25/18 0125     Order Status: Completed Updated: 01/25/18 0128     Narrative:       AP portable chest    INDICATION: Shortness of breath    COMPARISON: 1/22/2018    FINDINGS: No change in cardiomegaly. Bilateral patchy and diffuse  reticulonodular interstitial changes with patchy alveolar densities bilaterally  without significant change. No change position of left-sided Port-A-Cath  catheter. Negative for pneumothorax.       Impression:       IMPRESSION: No significant change     XR CHEST PORT [[id number]] Collected: 01/22/18 1907     Order Status: Completed Updated: 01/22/18 1914     Narrative:       History: Shortness of breath, breast cancer    Exam: portable chest    Comparison: 10/12/2017     Findings: There is coarsening of the interstitial lung markings. There is new  focal increased opacity seen peripherally within the right midlung. No  pneumothorax. No change in the appearance of the mediastinal contour or osseous  structures. There is a new port overlying the left chest wall. Impressions: New focal opacity seen within the right midlung.           ASSESSMENT:    Problem List  Date Reviewed: 1/25/2018          Codes Class Noted    Influenza ICD-10-CM: J11.1  ICD-9-CM: 487.1  1/27/2018        Hypokalemia ICD-10-CM: E87.6  ICD-9-CM: 276.8  1/27/2018        Elevated brain natriuretic peptide (BNP) level ICD-10-CM: R79.89  ICD-9-CM: 790.99  1/26/2018        * (Principal)Acute respiratory failure with hypoxia (HCC) ICD-10-CM: J96.01  ICD-9-CM: 518.81  1/25/2018        Infection due to mycoplasma- likely remote ICD-10-CM: A49.3  ICD-9-CM: 041.81  1/25/2018        Pulmonary infiltrates ICD-10-CM: R91.8  ICD-9-CM: 793.19  1/25/2018        HTN (hypertension), benign (Chronic) ICD-10-CM: I10  ICD-9-CM: 401.1  1/23/2018        Recurrent major depressive disorder, in remission ([facility] [address] (Chronic) ICD-10-CM: F33.40  ICD-9-CM: 296.35  1/23/2018        Breast cancer ([facility] [address] (Chronic) ICD-10-CM: C50.919  ICD-9-CM: 174.9  1/23/2018        S/P bilateral mastectomy (Chronic) ICD-10-CM: Z90.13  ICD-9-CM: V45.71  1/23/2018    Overview Signed 1/25/2018  2:39 PM by Kathleen Aiken NP     10/11/17  BILATERAL BREAST MASTECTOMY SIMPLE ARTOURA ULTRA HIGH PROFILE BREAST IMPLANTS 700cc 13.5 cm 8.3cm [id number] X 3 /  MEDIUM HEIGHT 550cc 13.5 cm 11.7cm 7.4cm [id number] X 3   BILATERAL SENTINEL NODE BIOPSY WITH LYMPHATIC MAPPING  RIGHT AXILLARY DISSECTION  BILATERAL BREAST RECONSTRUCTION WITH PLACEMENT TISSUE EXPANDERS AND ALLODERM             HCAP (healthcare-associated pneumonia) ICD-10-CM: J18.9  ICD-9-CM: 616  1/23/2018        Dehydration ICD-10-CM: E86.0  ICD-9-CM: 276.51  1/22/2018        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 BCNTD. Incentive spirometer  1/25 Increased oxygen demands. Consulted pulm. 1/26 Pulmonary added azith and lasix. Considered thoracentesis but patient desats when off of oxygen. She would need to be intubated for thoracentesis. Conservative measures for now including recommending cpap which patient refused but is now willing to try. Transfer to 5th floor when bed available. 1/27 BC-NGTD. Continues Vancomycin, Zosyn, and Azithromycin. Less desaturation with talking/moving. Influenza  1/26 Added Tamiflu.      Stage IIB left breast cancer and IIIB right breast cancer  1/24 sp Cycle one TC. Next Cycle planned for 1/27/2018. Currently on hold. Hypokalemia  1/27 Replete with 40 meq Lasix. Consult PT/OT/CM                Gladys Leon NP   [facility]  [id number] [place] [address]  Office : [phone]  Fax : [phone]       Attending Addendum:  I personally evaluated the patient with Soni Winters NJavierP.,  and agree with the assessment, findings and plan as documented. Continue Tamiflu.               Arti Kulkarni MD  [address] [place] [address]  Office : [phone]  Fax : [phone]

## 2018-01-27 NOTE — PROGRESS NOTES
Called to bedside by RN stating pt's SpO2 was in the low 80's. Notified RN to place pt on NRB until arrival of RT. Pt found on 10L/min HFNC with SpO2 of 63%. Placed pt on NRB. Pt's SpO2 slowly increased to 97%. MD made aware of oxygenation changes. RN aware pt now on NRB.

## 2018-01-27 NOTE — PROGRESS NOTES
TRANSFER - IN REPORT:    Verbal report received from Amairani Townsend RN on [facility]  being received from 2nd Floor for routine progression of care      Report consisted of patients Situation, Background, Assessment and   Recommendations(SBAR). Information from the following report(s) SBAR, Kardex, [facility] and Recent Results was reviewed with the receiving nurse. Opportunity for questions and clarification was provided. **Flu swab to be completed and resulted as negative before patient can be transferred, transferring RN aware, verbalized agreement. Assessment to be completed upon patients arrival to unit and care assumed.

## 2018-01-27 NOTE — PROGRESS NOTES
Frank Verdin  Admission Date: 1/22/2018             Daily Progress Note: 1/27/2018    The patient's chart is reviewed and the patient is discussed with the staff. 70 yo WF with hx of breast CA s/p first round of chemo with Taxotere and Cytoxan in early January now with pulmonary infiltrates and hypoxemia. Subjective:     Now on 8L HFNC after going up to 12L yesterday. Still afebrile. Tested + for influenza B yesterday. Now on Tamiflu at modified dose. Looks better. Much less desaturation this AM with speaking.      Current Facility-Administered Medications   Medication Dose Route Frequency    oseltamivir (TAMIFLU) capsule 30 mg  30 mg Oral Q12H    potassium chloride 40 mEq IVPB  40 mEq IntraVENous ONCE    magnesium sulfate 4 g/100 mL IVPB  4 g IntraVENous ONCE    0.9% sodium chloride infusion 1,000 mL  1,000 mL IntraVENous ONCE    dextrose 5% - 0.45% NaCl with KCl 10 mEq/L infusion  50 mL/hr IntraVENous CONTINUOUS    Vancomycin trough reminder   Other ONCE    azithromycin 500 mg in dextrose 5% 250 mL IVPB  500 mg IntraVENous DAILY    piperacillin-tazobactam (ZOSYN) 4.5 g in dextrose 5% 50 mL IVPB  4.5 g IntraVENous Q8H    vancomycin (VANCOCIN) 1,000 mg in dextrose 5% 250 mL IVPB  1,000 mg IntraVENous Q12H    sodium chloride (OCEAN) 0.65 % nasal spray 2 Spray  2 Spray Both Nostrils Q2H PRN    HYDROcodone-homatropine (HYCODAN) 5-1.5 mg/5 mL (5 mL) syrup 5 mL  5 mL Oral Q4H PRN    methylPREDNISolone (PF) (SOLU-MEDROL) injection 80 mg  80 mg IntraVENous Q8H    lip protectant (BLISTEX) ointment   Topical PRN    alteplase (CATHFLO) 2 mg in sterile water (preservative free) 2 mL injection  2 mg InterCATHeter PRN    loperamide (IMODIUM) capsule 2 mg  2 mg Oral Q6H PRN    potassium chloride (K-DUR, KLOR-CON) SR tablet 40 mEq  40 mEq Oral DAILY    atorvastatin (LIPITOR) tablet 20 mg  20 mg Oral DAILY    LORazepam (ATIVAN) tablet 1 mg  1 mg Oral BID PRN    metoprolol succinate (TOPROL-XL) XL tablet 50 mg  50 mg Oral DAILY    prochlorperazine (COMPAZINE) tablet 10 mg  10 mg Oral Q6H PRN    sertraline (ZOLOFT) tablet 100 mg  100 mg Oral DAILY    sodium chloride (NS) flush 5-10 mL  5-10 mL IntraVENous Q8H    sodium chloride (NS) flush 5-10 mL  5-10 mL IntraVENous PRN    albuterol-ipratropium (DUO-NEB) 2.5 MG-0.5 MG/3 ML  3 mL Nebulization Q4H RT    heparin (porcine) injection 5,000 Units  5,000 Units SubCUTAneous Q12H       Review of Systems    Constitutional: negative for fever, chills, sweats  Cardiovascular: negative for chest pain, palpitations, syncope, edema  Gastrointestinal:  negative for dysphagia, reflux, vomiting, diarrhea, abdominal pain, or melena  Neurologic:  negative for focal weakness, numbness, headache    Objective:     Vitals:    01/27/18 0400 01/27/18 0509 01/27/18 0721 01/27/18 0739   BP: 155/70   163/74   Pulse: 77   72   Resp: 22   22   Temp: 97.3 °F (36.3 °C)   98.6 °F (37 °C)   SpO2: 92% 92% 90% 92%   Weight:         Intake and Output:   01/25 1901 - 01/27 0700  In: 2360 [P.O.:180; I.V.:2180]  Out: 4050 [Urine:4050]  01/27 0701 - 01/27 1900  In: -   Out: 160 [Urine:160]    Physical Exam:   Constitution:  the patient is well developed and in mild distress  EENMT:  Sclera clear, pupils equal, oral mucosa moist  Respiratory: bilateral crackles persist  Cardiovascular:  RRR without M,G,R  Gastrointestinal: soft and non-tender; with positive bowel sounds. Musculoskeletal: warm without cyanosis. There is trace lower leg edema. Skin:  no jaundice or rashes  Neurologic: no gross neuro deficits     Psychiatric:  alert and oriented x 3    CXR: bilateral infiltrates but appear less confluent    LAB  No results for input(s): GLUCPOC in the last 72 hours.     No lab exists for component: [place]   Recent Labs      01/27/18   0533  01/26/18   0435  01/25/18   0524  01/24/18   0902   WBC  12.6*  14.1*  15.0*  18.0*   HGB  10.3*  11.2*  10.4*  11.1*   HCT  31.1*  33.3*  31.4* 33.8*   PLT  252  281  234  248     Recent Labs      01/27/18   0533  01/26/18   0435  01/25/18   0524   NA  142  146*  145   K  3.0*  3.1*  3.4*   CL  105  107  112*   CO2  26  28  23   GLU  209*  137*  103*   BUN  32*  15  10   CREA  1.25*  0.80  0.73   MG   --   1.5*   --    CA  8.4  8.9  8.8     No results for input(s): PH, PCO2, PO2, HCO3 in the last 72 hours. No results for input(s): LCAD, LAC in the last 72 hours. Assessment:  (Medical Decision Making)     Hospital Problems  Date Reviewed: 1/25/2018          Codes Class Noted POA    Influenza B ICD-10-CM: J11.1  ICD-9-CM: 487.1  1/27/2018 Unknown    Now on Tamiflu; initial swab was negative. Hypokalemia ICD-10-CM: E87.6  ICD-9-CM: 276.8  1/27/2018 Unknown    Replete    Elevated brain natriuretic peptide (BNP) level ICD-10-CM: R79.89  ICD-9-CM: 790.99  1/26/2018 Unknown        * (Principal)Acute respiratory failure with hypoxia (HCC) ICD-10-CM: J96.01  ICD-9-CM: 518.81  1/25/2018 No        Infection due to mycoplasma- likely remote ICD-10-CM: A49.3  ICD-9-CM: 041.81  1/25/2018 Yes        Pulmonary infiltrates ICD-10-CM: R91.8  ICD-9-CM: 793.19  1/25/2018 Unknown    Probably due to influenza. Cannot rule out drug toxity.     HTN (hypertension), benign (Chronic) ICD-10-CM: I10  ICD-9-CM: 401.1  1/23/2018 Yes        Recurrent major depressive disorder, in remission ([facility] (Chronic) ICD-10-CM: F33.40  ICD-9-CM: 296.35  1/23/2018 Yes        [facility] (Chronic) ICD-10-CM: C50.919  ICD-9-CM: 174.9  1/23/2018 Yes    Per oncology    S/P bilateral mastectomy (Chronic) ICD-10-CM: Z90.13  ICD-9-CM: V45.71  1/23/2018 Yes    Overview Signed 1/25/2018  2:39 PM by Dario Crum NP     10/11/17  BILATERAL BREAST MASTECTOMY SIMPLE ARTOURA ULTRA HIGH PROFILE BREAST IMPLANTS 700cc 13.5 cm 8.3cm [id number] X 3 /  MEDIUM HEIGHT 550cc 13.5 cm 11.7cm 7.4cm [id number] X 3   BILATERAL SENTINEL NODE BIOPSY WITH LYMPHATIC MAPPING  RIGHT AXILLARY DISSECTION  BILATERAL BREAST RECONSTRUCTION WITH PLACEMENT TISSUE EXPANDERS AND ALLODERM             HCAP (healthcare-associated pneumonia) ICD-10-CM: J18.9  ICD-9-CM: 569  1/23/2018 Yes    Has had purulent sputum so may have element of secondary bacterial infection. Plan:  (Medical Decision Making)     Wean oxygen as tolerated  Lasix this AM x 1. Wean steroids. Generally no role for them with influenza alone  Check CXR in AM.  Nasal saline. Replete K. Mucinex for mucus clearance. Duoneb >> albuterol. --    More than 50% of the time documented was spent in face-to-face contact with the patient and in the care of the patient on the floor/unit where the patient is located.     Angelique Padilla MD

## 2018-01-27 NOTE — PROGRESS NOTES
Pharmacokinetic Consult to Pharmacist    Diaz Jordany is a 69 y.o. female being treated for HAP with vancomycin and pip/tazo. Weight: 68.4 kg (150 lb 14.4 oz)  Lab Results   Component Value Date/Time    BUN 32 01/27/2018 05:33 AM    Creatinine 1.25 01/27/2018 05:33 AM    WBC 12.6 01/27/2018 05:33 AM    Procalcitonin 0.1 01/25/2018 04:37 PM    Lactic Acid (POC) 1.2 01/22/2018 08:27 PM      Estimated Creatinine Clearance: 35.5 mL/min (based on Cr of 1.25). CULTURES:  1/22    Blood X 2   NG, final      Flu screen   Positive            Lab Results   Component Value Date/Time    Vancomycin,trough 26.9 01/27/2018 04:26 PM         Day 5 of vancomycin. Goal trough is 15-20. Creatinine up to 1.25 today. Dose was held this afternoon to check a level, which was high. Will adjust dose to 1000 mg IV q18h starting tonight to allow level to come down. Follow creatinine. Further levels will be ordered as clinically indicated. Pharmacy will continue to follow. Please call with any questions.     Thank you,  Layne Camilo, PharmD  Clinical Pharmacist  [phone]

## 2018-01-28 NOTE — PROGRESS NOTES
Frank Verdin  Admission Date: 1/22/2018             Daily Progress Note: 1/28/2018    The patient's chart is reviewed and the patient is discussed with the staff. 68 yo WF with hx of breast CA s/p first round of chemo with Taxotere and Cytoxan in early January now with pulmonary infiltrates and hypoxemia. Subjective:     Currently on 60% Optiflow with 50L flow. Desats with talking. Sputum getting lighter. Discussed DNR status and at this point she would want mechanical ventilation if needed. Watery stool and incontinent with any movement.      Current Facility-Administered Medications   Medication Dose Route Frequency    oseltamivir (TAMIFLU) capsule 30 mg  30 mg Oral Q12H    potassium chloride (KLOR-CON) tablet 40 mEq  40 mEq Oral BID    methylPREDNISolone (PF) (SOLU-MEDROL) injection 80 mg  80 mg IntraVENous Q12H    albuterol (PROVENTIL VENTOLIN) nebulizer solution 2.5 mg  2.5 mg Nebulization QID RT    guaiFENesin ER (MUCINEX) tablet 1,200 mg  1,200 mg Oral BID    piperacillin-tazobactam (ZOSYN) 4.5 g in 0.9% sodium chloride (MBP/ADV) 100 mL  4.5 g IntraVENous Q8H    vancomycin (VANCOCIN) 1,000 mg in dextrose 5% 250 mL IVPB  1,000 mg IntraVENous Q18H    dextrose 5% - 0.45% NaCl with KCl 10 mEq/L infusion  50 mL/hr IntraVENous CONTINUOUS    azithromycin 500 mg in dextrose 5% 250 mL IVPB  500 mg IntraVENous DAILY    sodium chloride (OCEAN) 0.65 % nasal spray 2 Spray  2 Spray Both Nostrils Q2H PRN    HYDROcodone-homatropine (HYCODAN) 5-1.5 mg/5 mL (5 mL) syrup 5 mL  5 mL Oral Q4H PRN    lip protectant (BLISTEX) ointment   Topical PRN    alteplase (CATHFLO) 2 mg in sterile water (preservative free) 2 mL injection  2 mg InterCATHeter PRN    loperamide (IMODIUM) capsule 2 mg  2 mg Oral Q6H PRN    atorvastatin (LIPITOR) tablet 20 mg  20 mg Oral DAILY    LORazepam (ATIVAN) tablet 1 mg  1 mg Oral BID PRN    metoprolol succinate (TOPROL-XL) XL tablet 50 mg  50 mg Oral DAILY    prochlorperazine (COMPAZINE) tablet 10 mg  10 mg Oral Q6H PRN    sertraline (ZOLOFT) tablet 100 mg  100 mg Oral DAILY    sodium chloride (NS) flush 5-10 mL  5-10 mL IntraVENous Q8H    sodium chloride (NS) flush 5-10 mL  5-10 mL IntraVENous PRN    heparin (porcine) injection 5,000 Units  5,000 Units SubCUTAneous Q12H       Review of Systems    Constitutional: negative for fever, chills, sweats  Cardiovascular: negative for chest pain, palpitations, syncope, edema  Gastrointestinal:  negative for dysphagia, reflux, vomiting, diarrhea, abdominal pain, or melena  Neurologic:  negative for focal weakness, numbness, headache    Objective:     Vitals:    01/27/18 2300 01/28/18 0300 01/28/18 0620 01/28/18 0726   BP: 120/57 135/60  131/65   Pulse: 70 75  76   Resp: 20 20  16   Temp: 98.5 °F (36.9 °C) 98.2 °F (36.8 °C)  97.7 °F (36.5 °C)   SpO2: 94% 94% 92% 90%   Weight:         Intake and Output:   01/26 1901 - 01/28 0700  In: 3546.6 [I.V.:3546.6]  Out: 2010 [Urine:2010]       Physical Exam:   Constitution:  the patient is well developed and in mild distress on Optiflow  EENMT:  Sclera clear, pupils equal, oral mucosa moist  Respiratory: bilateral crackles persist  Cardiovascular:  RRR without M,G,R  Gastrointestinal: soft and non-tender; with positive bowel sounds. Musculoskeletal: warm without cyanosis. There is trace lower leg edema. Skin:  no jaundice or rashes  Neurologic: no gross neuro deficits     Psychiatric:  alert and oriented x 3    CXR:     1/28:          LAB  No results for input(s): GLUCPOC in the last 72 hours.     No lab exists for component: [address]      01/28/18 0544  01/27/18   0533 01/26/18   0435   WBC  13.8*  12.6*  14.1*   HGB  10.6*  10.3*  11.2*   HCT  32.4*  31.1*  33.3*   PLT  287  252  281     Recent Labs      01/28/18   0544  01/27/18   0533  01/26/18   0435   NA  146*  142  146*   K  3.8  3.0*  3.1*   CL  112*  105  107   CO2  25  26  28   GLU  83  209*  137*   BUN  28*  32* 15   CREA  0.91  1.25*  0.80   MG  2.5*  2.1  1.5*   CA  8.2*  8.4  8.9     No results for input(s): PH, PCO2, PO2, HCO3 in the last 72 hours. No results for input(s): LCAD, LAC in the last 72 hours. Assessment:  (Medical Decision Making)     Hospital Problems  Date Reviewed: 1/25/2018          Codes Class Noted POA    Influenza B ICD-10-CM: J11.1  ICD-9-CM: 487.1  1/27/2018 Unknown    Now on Tamiflu; initial swab was negative. Hypokalemia ICD-10-CM: E87.6  ICD-9-CM: 276.8  1/27/2018 Unknown    Repleted    Elevated brain natriuretic peptide (BNP) level ICD-10-CM: R79.89  ICD-9-CM: 790.99  1/26/2018 Unknown        * (Principal)Acute respiratory failure with hypoxia (HCC) ICD-10-CM: J96.01  ICD-9-CM: 518.81  1/25/2018 No        Infection due to mycoplasma- likely remote ICD-10-CM: A49.3  ICD-9-CM: 041.81  1/25/2018 Yes        Pulmonary infiltrates ICD-10-CM: R91.8  ICD-9-CM: 793.19  1/25/2018 Unknown    Probably due to influenza. Cannot rule out drug toxity.     HTN (hypertension), benign (Chronic) ICD-10-CM: I10  ICD-9-CM: 401.1  1/23/2018 Yes        Rec[facility] (Chronic) ICD-10-CM: F33.40  ICD-9-CM: 296.35  1/23/2018 Yes        Breast cancer ([facility] (Chronic) ICD-10-CM: C50.919  ICD-9-CM: 174.9  1/23/2018 Yes    Per oncology    S/P bilateral mastectomy (Chronic) ICD-10-CM: Z90.13  ICD-9-CM: V45.71  1/23/2018 Yes    Overview Signed 1/25/2018  2:39 PM by Paulina Pierce NP     10/11/17  BILATERAL BREAST MASTECTOMY SIMPLE ARTOURA ULTRA HIGH PROFILE BREAST IMPLANTS 700cc 13.5 cm 8.3cm [id number] X 3 /  MEDIUM HEIGHT 550cc 13.5 cm 11.7cm 7.4cm [id number] X 3   BILATERAL SENTINEL NODE BIOPSY WITH LYMPHATIC MAPPING  RIGHT AXILLARY DISSECTION  BILATERAL BREAST RECONSTRUCTION WITH PLACEMENT TISSUE EXPANDERS AND ALLODERM             HCAP (healthcare-associated pneumonia) ICD-10-CM: J18.9  ICD-9-CM: 372  1/23/2018 Yes    Has had purulent sputum so may have element of secondary bacterial infection. Plan:  (Medical Decision Making)     Wean oxygen as tolerated. Lasix this AM x 1. Continue tamiflu D3 and ABx D5  Check CXR in AM.  Nasal saline. Stool for c. Diff. Recheck CRP and consider weaning steroids if lower. --    More than 50% of the time documented was spent in face-to-face contact with the patient and in the care of the patient on the floor/unit where the patient is located.     Vj Herrera MD

## 2018-01-28 NOTE — PROGRESS NOTES
Patient did attempt to wear CPAP 8, could not tolerate the pressure blowing into face. Refused to wear.

## 2018-01-28 NOTE — PROGRESS NOTES
END OF SHIFT NOTE:    INTAKE/OUTPUT  01/27 0701 - 01/28 0700  In: 2018.6 [I.V.:2018.6]  Out: 1360 [Urine:1360]  Voiding: NO  Catheter: YES  Drain:              Flatus: Patient does have flatus present. Stool:  3 occurrences. Characteristics:  Stool Assessment  Stool Color: Brown  Stool Appearance: Loose  Stool Amount: Large  Stool Source/Status: Rectum    Emesis: 0 occurrences. Characteristics:        VITAL SIGNS  Patient Vitals for the past 12 hrs:   Temp Pulse Resp BP SpO2   01/28/18 0620 - - - - 92 %   01/28/18 0300 98.2 °F (36.8 °C) 75 20 135/60 94 %   01/27/18 2300 98.5 °F (36.9 °C) 70 20 120/57 94 %   01/27/18 2100 - - - - 92 %       Pain Assessment  Pain Intensity 1: 0 (01/26/18 1731)     Pain Intervention(s) 1: Ambulation/Increased Activity, Nurse notified, Repositioned  Patient Stated Pain Goal: 0    Ambulating  No (pt desats quickly)     Shift report given to oncoming nurse at the bedside.     Cristina Najera, RN

## 2018-01-28 NOTE — PROGRESS NOTES
Patient on airvo, saturating between 83-88%. desats with talking and activity to 78%. Alert and oriented. HOB to 39. Will continue to monitor.

## 2018-01-28 NOTE — PROGRESS NOTES
END OF SHIFT NOTE:    INTAKE/OUTPUT  01/27 0701 - 01/28 0700  In: 2018.6 [I.V.:2018.6]  Out: 1360 [Urine:1360]  Voiding: NO  Catheter: YES  Drain:              Flatus: Patient does have flatus present. Stool:  5 occurrences. Characteristics:  Stool Assessment  Stool Color: Brown  Stool Appearance: Loose  Stool Amount: Medium  Stool Source/Status: Rectum    Emesis: 0 occurrences. Characteristics:        VITAL SIGNS  Patient Vitals for the past 12 hrs:   Temp Pulse Resp BP SpO2   01/28/18 1604 - - - - 94 %   01/28/18 1546 98.4 °F (36.9 °C) 65 16 171/73 93 %   01/28/18 1135 97.8 °F (36.6 °C) 66 16 149/77 93 %   01/28/18 1117 - - - - 94 %   01/28/18 0726 97.7 °F (36.5 °C) 76 16 131/65 90 %       Pain Assessment  Pain Intensity 1: 0 (01/26/18 1731)     Pain Intervention(s) 1: Ambulation/Increased Activity, Nurse notified, Repositioned  Patient Stated Pain Goal: 0    Ambulating  No    Shift report given to oncoming nurse at the bedside.     Eloy Hopson RN

## 2018-01-28 NOTE — PROGRESS NOTES
Problem: Falls - Risk of  Goal: *Absence of Falls  Document Anurag Fall Risk and appropriate interventions in the flowsheet.    Outcome: Progressing Towards Goal  Fall Risk Interventions:  Mobility Interventions: Communicate number of staff needed for ambulation/transfer, Patient to call before getting OOB         Medication Interventions: Patient to call before getting OOB, Teach patient to arise slowly    Elimination Interventions: Call light in reach, Patient to call for help with toileting needs    History of Falls Interventions: Consult care management for discharge planning, Evaluate medications/consider consulting pharmacy

## 2018-01-28 NOTE — PROGRESS NOTES
[facility] Hematology & Oncology        Inpatient Hematology / Oncology Progress Note      Admission Date: 2018  6:38 PM  Reason for Admission/Hospital Course: Pneumonia  HCAP (healthcare-associated pneumonia)      24 Hour Events:  Afebrile. BCNTD. On Airvo. Respiratory status not much improved. Continues on antibiotics and Tamiflu. New diarrhea. ROS:  Constitutional: negative for fever, chills. Positive for weakness, malaise, fatigue. CV:  negative for chest pain, palpitations, edema. Respiratory: Positive for dyspnea, cough. GI: negative for nausea, abdominal pain. + diarrhea. 10 point review of systems is otherwise negative with the exception of the elements mentioned above in the HPI. Allergies   Allergen Reactions    Dilaudid [Hydromorphone] Other (comments)     \"out of her mind\"    Sulfa (Sulfonamide Antibiotics) Itching    Tetracycline Nausea Only       OBJECTIVE:  Patient Vitals for the past 8 hrs:   BP Temp Pulse Resp SpO2   18 1135 149/77 97.8 °F (36.6 °C) 66 16 93 %   18 1117 - - - - 94 %     Temp (24hrs), Av °F (36.7 °C), Min:97.7 °F (36.5 °C), Max:98.5 °F (36.9 °C)     0701 -  1900  In: -   Out: 300 [Urine:300]    Physical Exam:  Constitutional: Frail appearing female in no acute distress, sitting comfortably in the hospital bed. HEENT: Normocephalic and atraumatic. Oropharynx is clear, mucous membranes are moist. Extraocular muscles are intact. Sclerae anicteric. Skin Warm and dry.  + bruising to upper extremities bilaterally. No rash noted. No erythema. No pallor. Respiratory Bilateral rhonchi and crackles, notably in the right and left middle lung fields. Continues on oxygen via face mask. CVS Normal rate, regular rhythm and normal S1 and S2. No murmurs, gallops, or rubs. Abdomen Soft, nontender and nondistended, normoactive bowel sounds. No palpable mass. No hepatosplenomegaly.    Neuro Grossly nonfocal with no obvious sensory or motor deficits. MSK Normal range of motion in general.  No edema and no tenderness. Psych Appropriate mood and affect. Labs:      Recent Labs      01/28/18   0544  01/27/18   0533  01/26/18   0435   WBC  13.8*  12.6*  14.1*   RBC  3.60*  3.52*  3.78*   HGB  10.6*  10.3*  11.2*   HCT  32.4*  31.1*  33.3*   MCV  90.0  88.4  88.1   MCH  29.4  29.3  29.6   MCHC  32.7  33.1  33.6   RDW  15.6*  15.5*  15.5*   PLT  287  252  281   GRANS  85*  88*  90*   LYMPH  7*  6*  5*   MONOS  7  6  4   EOS  0*  0*  0*   BASOS  0  0  0   IG  1  0  1   DF  AUTOMATED  AUTOMATED  AUTOMATED   ANEU  11.8*  11.0*  12.6*   ABL  1.0  0.7  0.8   ABM  1.0  0.8  0.6   TRISHA  0.0  0.0  0.0   ABB  0.0  0.0  0.0   AIG  0.1  0.0  0.1        Recent Labs      01/28/18   0544  01/27/18   0533  01/26/18   0435   NA  146*  142  146*   K  3.8  3.0*  3.1*   CL  112*  105  107   CO2  25  26  28   AGAP  9  11  11   GLU  83  209*  137*   BUN  28*  32*  15   CREA  0.91  1.25*  0.80   GFRAA  >60  54*  >60   GFRNA  >60  44*  >60   CA  8.2*  8.4  8.9   MG  2.5*  2.1  1.5*         Imaging:  XR CHEST PORT [[id number]] Collected: 01/26/18 0816      Order Status: Completed Updated: 01/26/18 0835     Narrative:       Chest portable    CLINICAL INDICATION: Respiratory failure, dyspnea, follow-up infiltrates,  hypertension, bilateral mastectomy and history of breast cancer    COMPARISON: 1/25/2018    TECHNIQUE: single AP portable view chest at 8:23 AM upright     FINDINGS: The left portacatheter is stable. The mediastinal and hilar contours  are stable. There is no pneumothorax or focal dense consolidation. Reticular and  patchy opacities bilaterally, slightly more prominent in the upper lobes, are  similar to prior. No evidence of enlarging pleural effusion.  Surgical changes  involving bilateral breasts are again noted with probable implants.         Impression:       IMPRESSION: No significant change involving diffuse lung infiltrates.       XR CHEST Bernabe Sanchez [[id number]] Collected: 01/25/18 0125     Order Status: Completed Updated: 01/25/18 0128     Narrative:       AP portable chest    INDICATION: Shortness of breath    COMPARISON: 1/22/2018    FINDINGS: No change in cardiomegaly. Bilateral patchy and diffuse  reticulonodular interstitial changes with patchy alveolar densities bilaterally  without significant change. No change position of left-sided Port-A-Cath  catheter. Negative for pneumothorax.       Impression:       IMPRESSION: No significant change     XR CHEST PORT [[id number]] Collected: 01/22/18 1907     Order Status: Completed Updated: 01/22/18 1914     Narrative:       History: Shortness of breath, breast cancer    Exam: portable chest    Comparison: 10/12/2017     Findings: There is coarsening of the interstitial lung markings. There is new  focal increased opacity seen peripherally within the right midlung. No  pneumothorax. No change in the appearance of the mediastinal contour or osseous  structures. There is a new port overlying the left chest wall. Impressions: New focal opacity seen within the right midlung.           ASSESSMENT:    Problem List  Date Reviewed: 1/25/2018          Codes Class Noted    Influenza ICD-10-CM: J11.1  ICD-9-CM: 487.1  1/27/2018        Hypokalemia ICD-10-CM: E87.6  ICD-9-CM: 276.8  1/27/2018        Elevated brain natriuretic peptide (BNP) level ICD-10-CM: R79.89  ICD-9-CM: 790.99  1/26/2018        * (Principal)Acute respiratory failure with hypoxia (HCC) ICD-10-CM: J96.01  ICD-9-CM: 518.81  1/25/2018        Infection due to mycoplasma- likely remote ICD-10-CM: A49.3  ICD-9-CM: 041.81  1/25/2018        Pulmonary infiltrates ICD-10-CM: R91.8  ICD-9-CM: 793.19  1/25/2018        HTN (hypertension), benign (Chronic) ICD-10-CM: I10  ICD-9-CM: 401.1  1/23/2018        Recurrent major depressive disorder, in remission ([facility] (Chronic) ICD-10-CM: F33.40  ICD-9-CM: 296.35  1/23/2018        Breast cancer ([facility] (Chronic) ICD-10-CM: C50.919  ICD-9-CM: 174.9  1/23/2018        S/P bilateral mastectomy (Chronic) ICD-10-CM: Z90.13  ICD-9-CM: V45.71  1/23/2018    Overview Signed 1/25/2018  2:39 PM by Lupe Laurent NP     10/11/17  BILATERAL BREAST MASTECTOMY SIMPLE ARTOURA ULTRA HIGH PROFILE BREAST IMPLANTS 700cc 13.5 cm 8.3cm [id number] X 3 /  MEDIUM HEIGHT 550cc 13.5 cm 11.7cm 7.4cm [id number] X 3   BILATERAL SENTINEL NODE BIOPSY WITH LYMPHATIC MAPPING  RIGHT AXILLARY DISSECTION  BILATERAL BREAST RECONSTRUCTION WITH PLACEMENT TISSUE EXPANDERS AND ALLODERM             HCAP (healthcare-associated pneumonia) ICD-10-CM: J18.9  ICD-9-CM: 241  1/23/2018        Dehydration ICD-10-CM: E86.0  ICD-9-CM: 276.51  1/22/2018        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 BCNTD. Incentive spirometer  1/25 Increased oxygen demands. Consulted pulm. 1/26 Pulmonary added azith and lasix. Considered thoracentesis but patient desats when off of oxygen. She would need to be intubated for thoracentesis. Conservative measures for now including recommending cpap which patient refused but is now willing to try. Transfer to 5th floor when bed available. 1/28 BC-NGTD. Continues Vancomycin, Zosyn, and Azithromycin. Respiratory status not much improved. Pulmonary following. Influenza  1/26 Added Tamiflu.      Stage IIB left breast cancer and IIIB right breast cancer  1/24 sp Cycle one TC. Next Cycle planned for 1/27/2018. Currently on hold. Hypokalemia  1/27 Replete with 40 meq Lasix. Diarrhea  1/28 C-diff negative. Utilize Imodium as needed. Consult PT/OT/CM                Palmer Santos, NP   [facility] Hematology & Oncology  [address] [place] [address]  Office : [phone]  Fax : [phone]           Attending Addendum:  I personally evaluated the patient with Angie Rouse N.P.,  and agree with the assessment, findings and plan as documented. Continue ABX and Tamiflu.               Karen Layne MD  [facility]  [address] [address]  Office : [phone]  Fax : [phone]

## 2018-01-28 NOTE — PROGRESS NOTES
Patient SPO2 is 86-89% on 12L HFNC, Patient refused opti-flow due to noise. Placed patient on airvo 50L 60%, SPO2 94%.

## 2018-01-29 PROBLEM — J80 ARDS (ADULT RESPIRATORY DISTRESS SYNDROME) (HCC): Status: ACTIVE | Noted: 2018-01-01

## 2018-01-29 PROBLEM — A49.3 INFECTION DUE TO MYCOPLASMA: Status: RESOLVED | Noted: 2018-01-01 | Resolved: 2018-01-01

## 2018-01-29 NOTE — PROGRESS NOTES
Problem: Falls - Risk of  Goal: *Absence of Falls  Document Anurag Fall Risk and appropriate interventions in the flowsheet.    Outcome: Progressing Towards Goal  Fall Risk Interventions:  Mobility Interventions: Communicate number of staff needed for ambulation/transfer, Patient to call before getting OOB         Medication Interventions: Patient to call before getting OOB, Teach patient to arise slowly    Elimination Interventions: Call light in reach, Patient to call for help with toileting needs    History of Falls Interventions: Door open when patient unattended

## 2018-01-29 NOTE — PROGRESS NOTES
Called to room by nursing students. Alarm going alerting of low O2 sat. O2 sat in low 80s. Worked with pt. Encouraged slow purse lipped breathing. O2 sat eventually increased to 88%. RT called. Air Vo had been decreased by small amount because of good O2 sats this AM but unable to get above 88% at this time. RT called and notified.   Will be up to increase settings on machine

## 2018-01-29 NOTE — PROGRESS NOTES
END OF SHIFT NOTE:    INTAKE/OUTPUT  01/28 0701 - 01/29 0700  In: 6769 [I.V.:2792]  Out: 1500 [Urine:1500]  Voiding: NO  Catheter: YES  Drain:              Flatus: Patient does have flatus present. Stool:  0 occurrences. Characteristics:  Stool Assessment  Stool Color: Brown  Stool Appearance: Loose  Stool Amount: Medium  Stool Source/Status: Rectum    Emesis: 0 occurrences. Characteristics:        VITAL SIGNS  Patient Vitals for the past 12 hrs:   Temp Pulse Resp BP SpO2   01/29/18 0300 97.9 °F (36.6 °C) 74 16 152/74 93 %   01/28/18 2300 97.9 °F (36.6 °C) 70 16 151/69 95 %   01/28/18 2110 - - - - 94 %   01/28/18 1934 97.7 °F (36.5 °C) 67 16 173/75 96 %       Pain Assessment  Pain Intensity 1: 0 (01/26/18 1731)     Pain Intervention(s) 1: Ambulation/Increased Activity, Nurse notified, Repositioned  Patient Stated Pain Goal: 0    Ambulating  No (desats with exertion)    Shift report given to oncoming nurse at the bedside.     Starlin Mohs, RN

## 2018-01-29 NOTE — PROGRESS NOTES
[facility]        Inpatient Hematology / Oncology Progress Note      Admission Date: 2018  6:38 PM  Reason for Admission/Hospital Course: Pneumonia  HCAP (healthcare-associated pneumonia)      24 Hour Events:  Afebrile, VSS  On Optiflow  Continues on antibiotics and Tamiflu    ROS:  Constitutional: negative for fever, chills. Positive for weakness, malaise, fatigue. CV:  negative for chest pain, palpitations, edema. Respiratory: Positive for dyspnea, cough. GI: negative for nausea, abdominal pain. + diarrhea. 10 point review of systems is otherwise negative with the exception of the elements mentioned above in the HPI. Allergies   Allergen Reactions    Dilaudid [Hydromorphone] Other (comments)     \"out of her mind\"    Sulfa (Sulfonamide Antibiotics) Itching    Tetracycline Nausea Only       OBJECTIVE:  Patient Vitals for the past 8 hrs:   BP Temp Pulse Resp SpO2   18 1138 158/79 97.3 °F (36.3 °C) 73 16 97 %   18 1129 - - - - 92 %   18 0749 175/71 97.6 °F (36.4 °C) 78 16 93 %   18 0731 - - - - 92 %     Temp (24hrs), Av.8 °F (36.6 °C), Min:97.3 °F (36.3 °C), Max:98.4 °F (36.9 °C)     0701 -  1900  In: 360 [P.O.:360]  Out: 600 [Urine:600]    Physical Exam:  Constitutional: Frail appearing female in no acute distress, sitting comfortably in the hospital bed. HEENT: Normocephalic and atraumatic. Oropharynx is clear, mucous membranes are moist. Extraocular muscles are intact. Sclerae anicteric. Skin Warm and dry.  + bruising to upper extremities bilaterally. No rash noted. No erythema. No pallor. Respiratory Bilateral rhonchi and crackles, notably in the right and left middle lung fields. On Optiflow   CVS Normal rate, regular rhythm and normal S1 and S2. No murmurs, gallops, or rubs. Abdomen Soft, nontender and nondistended, normoactive bowel sounds. No palpable mass. No hepatosplenomegaly.    Neuro Grossly nonfocal with no obvious sensory or motor deficits. MSK Normal range of motion in general.  No edema and no tenderness. Psych Appropriate mood and affect. Labs:      Recent Labs      01/29/18 0443  01/28/18   0544  01/27/18   0533   WBC  11.6*  13.8*  12.6*   RBC  3.32*  3.60*  3.52*   HGB  9.8*  10.6*  10.3*   HCT  30.0*  32.4*  31.1*   MCV  90.4  90.0  88.4   MCH  29.5  29.4  29.3   MCHC  32.7  32.7  33.1   RDW  15.8*  15.6*  15.5*   PLT  259  287  252   GRANS  87*  85*  88*   LYMPH  5*  7*  6*   MONOS  7  7  6   EOS  0*  0*  0*   BASOS  0  0  0   IG  1  1  0   DF  AUTOMATED  AUTOMATED  AUTOMATED   ANEU  10.1*  11.8*  11.0*   ABL  0.6  1.0  0.7   ABM  0.8  1.0  0.8   TRISHA  0.0  0.0  0.0   ABB  0.0  0.0  0.0   AIG  0.1  0.1  0.0        Recent Labs      01/29/18 0443 01/28/18   0544  01/27/18   0533   NA  143  146*  142   K  4.1  3.8  3.0*   CL  111*  112*  105   CO2  23  25  26   AGAP  9  9  11   GLU  199*  83  209*   BUN  20  28*  32*   CREA  0.87  0.91  1.25*   GFRAA  >60  >60  54*   GFRNA  >60  >60  44*   CA  8.2*  8.2*  8.4   SGOT  32   --    --    AP  95   --    --    TP  5.7*   --    --    ALB  2.0*   --    --    GLOB  3.7*   --    --    AGRAT  0.5*   --    --    MG  2.3  2.5*  2.1         Imaging:  XR CHEST PORT [[id number]] Collected: 01/26/18 0816      Order Status: Completed Updated: 01/26/18 0835     Narrative:       Chest portable    CLINICAL INDICATION: Respiratory failure, dyspnea, follow-up infiltrates,  hypertension, bilateral mastectomy and history of breast cancer    COMPARISON: 1/25/2018    TECHNIQUE: single AP portable view chest at 8:23 AM upright     FINDINGS: The left portacatheter is stable. The mediastinal and hilar contours  are stable. There is no pneumothorax or focal dense consolidation. Reticular and  patchy opacities bilaterally, slightly more prominent in the upper lobes, are  similar to prior. No evidence of enlarging pleural effusion.  Surgical changes  involving bilateral breasts are again noted with probable implants.         Impression:       IMPRESSION: No significant change involving diffuse lung infiltrates.       XR CHEST Myles Heap [[id number]] Collected: 01/25/18 0125     Order Status: Completed Updated: 01/25/18 0128     Narrative:       AP portable chest    INDICATION: Shortness of breath    COMPARISON: 1/22/2018    FINDINGS: No change in cardiomegaly. Bilateral patchy and diffuse  reticulonodular interstitial changes with patchy alveolar densities bilaterally  without significant change. No change position of left-sided Port-A-Cath  catheter. Negative for pneumothorax.       Impression:       IMPRESSION: No significant change     XR CHEST PORT [[id number]] Collected: 01/22/18 1907     Order Status: Completed Updated: 01/22/18 1914     Narrative:       History: Shortness of breath, breast cancer    Exam: portable chest    Comparison: 10/12/2017     Findings: There is coarsening of the interstitial lung markings. There is new  focal increased opacity seen peripherally within the right midlung. No  pneumothorax. No change in the appearance of the mediastinal contour or osseous  structures. There is a new port overlying the left chest wall. Impressions: New focal opacity seen within the right midlung.           ASSESSMENT:    Problem List  Date Reviewed: 1/25/2018          Codes Class Noted    Influenza ICD-10-CM: J11.1  ICD-9-CM: 487.1  1/27/2018        Hypokalemia ICD-10-CM: E87.6  ICD-9-CM: 276.8  1/27/2018        Elevated brain natriuretic peptide (BNP) level ICD-10-CM: R79.89  ICD-9-CM: 790.99  1/26/2018        * (Principal)Acute respiratory failure with hypoxia (HCC) ICD-10-CM: J96.01  ICD-9-CM: 518.81  1/25/2018        Infection due to mycoplasma- likely remote ICD-10-CM: A49.3  ICD-9-CM: 041.81  1/25/2018        Pulmonary infiltrates ICD-10-CM: R91.8  ICD-9-CM: 793.19  1/25/2018        HTN (hypertension), benign (Chronic) ICD-10-CM: I10  ICD-9-CM: 401.1  1/23/2018 Recurrent major depressive disorder, in remission ([facility] (Chronic) ICD-10-CM: F33.40  ICD-9-CM: 296.35  1/23/2018        Breast cancer ([facility] (Chronic) ICD-10-CM: C50.919  ICD-9-CM: 174.9  1/23/2018        S/P bilateral mastectomy (Chronic) ICD-10-CM: Z90.13  ICD-9-CM: V45.71  1/23/2018    Overview Signed 1/25/2018  2:39 PM by Jacqueline Crisostomo NP     10/11/17  BILATERAL BREAST MASTECTOMY SIMPLE ARTOURA ULTRA HIGH PROFILE BREAST IMPLANTS 700cc 13.5 cm 8.3cm [id number] X 3 /  MEDIUM HEIGHT 550cc 13.5 cm 11.7cm 7.4cm [id number] X 3   BILATERAL SENTINEL NODE BIOPSY WITH LYMPHATIC MAPPING  RIGHT AXILLARY DISSECTION  BILATERAL BREAST RECONSTRUCTION WITH PLACEMENT TISSUE EXPANDERS AND ALLODERM             HCAP (healthcare-associated pneumonia) ICD-10-CM: J18.9  ICD-9-CM: 806  1/23/2018        Dehydration ICD-10-CM: E86.0  ICD-9-CM: 276.51  1/22/2018        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 BCNTD. Incentive spirometer  1/25 Increased oxygen demands. Consulted pulm. 1/26 Pulmonary added azith and lasix. Considered thoracentesis but patient desats when off of oxygen. She would need to be intubated for thoracentesis. Conservative measures for now including recommending cpap which patient refused but is now willing to try. Transfer to 5th floor when bed available. 1/29 BC-NGTD. Continues Vancomycin, Zosyn, and Azithromycin. Respiratory status not much improved. Pulmonary following.      Influenza  1/26 Added Tamiflu.      Stage IIB left breast cancer and IIIB right breast cancer  1/24 sp Cycle one TC. Next Cycle planned for 1/27/2018. Currently on hold. Hypokalemia  1/27 Replete with 40 meq K+  1/29 K+ up to 4.1    Diarrhea  1/28 C-diff negative. Utilize Imodium as needed. Consult PT/OT/CM                Dar[employer] Records, [facility]  [address] [place] [address]  Office : [phone]  Fax : [phone]     Attending Addendum:  I personally evaluated the patient with [employer].,  and agree with the assessment, findings and plan as documented. Appears stable, heart regular without murmur, lungs clear, abdomen benign. Continue braod spectrum abx, pulmonology following. Holding further chemo for now.                Lucius Mendoza MD  [place] [id number] [address]  Office : [phone]  Fax : [phone]

## 2018-01-29 NOTE — PROGRESS NOTES
Problem: Mobility Impaired (Adult and Pediatric)  Goal: *Acute Goals and Plan of Care (Insert Text)  Goals:  (1.)Ms. Ronnell Granados will move from supine to sit and sit to supine , scoot up and down and roll side to side with INDEPENDENT within 5 day(s). (2.)Ms. Ronnell Granados will transfer from bed to chair and chair to bed with MODIFIED INDEPENDENCE using the least restrictive device within 5 day(s). (3.)Ms. Ronnell Granados will ambulate with MODIFIED INDEPENDENCE for  feet with the least restrictive device within 5 day(s). (4.)Ms Ronnell Granados will increase her activity tolerance in supine, sitting and standing to >20 minutes with O2 sats remaining in the 90s within 5 day(s)         Physical Therapy Note:    RN stated to hold PT this am secondary to a decrease in her O2 saturation. Patient is currently on Airvo. Will attempt to see patient later as time and schedule permits. Thank you.      Paul Seen, PTA

## 2018-01-29 NOTE — PROGRESS NOTES
Haider Casey  Admission Date: 1/22/2018             Daily Progress Note: 1/29/2018    The patient's chart is reviewed and the patient is discussed with the staff. Admitted with dyspnea and cough. CXR with bilateral infiltrates. Abx started for ? Pneumonia. Blood cultures negative. On Taxotere and Cytoxan for breast cancer (has received 1 cycle). Tested positive for Influenza B - on Tamiflu. CRP elevated, steroids added for suspected inflammatory process. Significant hypoxia - on optiflow. Subjective:     Does not feel any better. Weak, short of breath. Asking someone to call her daughter to update her.      Current Facility-Administered Medications   Medication Dose Route Frequency    loperamide (IMODIUM) capsule 2 mg  2 mg Oral Q4H PRN    oseltamivir (TAMIFLU) capsule 30 mg  30 mg Oral Q12H    methylPREDNISolone (PF) (SOLU-MEDROL) injection 80 mg  80 mg IntraVENous Q12H    albuterol (PROVENTIL VENTOLIN) nebulizer solution 2.5 mg  2.5 mg Nebulization QID RT    guaiFENesin ER (MUCINEX) tablet 1,200 mg  1,200 mg Oral BID    piperacillin-tazobactam (ZOSYN) 4.5 g in 0.9% sodium chloride (MBP/ADV) 100 mL  4.5 g IntraVENous Q8H    vancomycin (VANCOCIN) 1,000 mg in dextrose 5% 250 mL IVPB  1,000 mg IntraVENous Q18H    dextrose 5% - 0.45% NaCl with KCl 10 mEq/L infusion  50 mL/hr IntraVENous CONTINUOUS    azithromycin 500 mg in dextrose 5% 250 mL IVPB  500 mg IntraVENous DAILY    sodium chloride (OCEAN) 0.65 % nasal spray 2 Spray  2 Spray Both Nostrils Q2H PRN    HYDROcodone-homatropine (HYCODAN) 5-1.5 mg/5 mL (5 mL) syrup 5 mL  5 mL Oral Q4H PRN    lip protectant (BLISTEX) ointment   Topical PRN    alteplase (CATHFLO) 2 mg in sterile water (preservative free) 2 mL injection  2 mg InterCATHeter PRN    atorvastatin (LIPITOR) tablet 20 mg  20 mg Oral DAILY    LORazepam (ATIVAN) tablet 1 mg  1 mg Oral BID PRN    metoprolol succinate (TOPROL-XL) XL tablet 50 mg  50 mg Oral DAILY    prochlorperazine (COMPAZINE) tablet 10 mg  10 mg Oral Q6H PRN    sertraline (ZOLOFT) tablet 100 mg  100 mg Oral DAILY    sodium chloride (NS) flush 5-10 mL  5-10 mL IntraVENous Q8H    sodium chloride (NS) flush 5-10 mL  5-10 mL IntraVENous PRN    heparin (porcine) injection 5,000 Units  5,000 Units SubCUTAneous Q12H         Objective:     Vitals:    01/29/18 0300 01/29/18 0400 01/29/18 0731 01/29/18 0749   BP: 152/74   175/71   Pulse: 74   78   Resp: 16   16   Temp: 97.9 °F (36.6 °C)   97.6 °F (36.4 °C)   SpO2: 93%  92% 93%   Weight:  160 lb (72.6 kg)       Intake and Output:   01/27 1901 - 01/29 0700  In: 4212.6 [I.V.:4212.6]  Out: 1900 [Urine:1900]  01/29 0701 - 01/29 1900  In: -   Out: 600 [Urine:600]    Physical Exam:   Constitutional:  the patient is well developed and in no acute distress  HEENT:  Sclera clear, pupils equal, oral mucosa moist  Lungs: rales from posterior. Wearing optiflow - 50 liter flow. Cardiovascular:  RRR without M,G,R  Abd/GI: soft and non-tender; with positive bowel sounds. Ext: warm without cyanosis. There is no lower leg edema. Musculoskeletal: moves all four extremities with equal strength  Skin:  no jaundice or rashes, no wounds   Neuro: no gross neuro deficits   Musculoskeletal: can't ambulate - too weak. No deformity  Psychiatric: Calm.      ROS:  Poor appetite, weak, short of breath  Lines: port, langley    CHEST XRAY:       LAB  Recent Labs      01/29/18   0443  01/28/18   0544  01/27/18   0533   WBC  11.6*  13.8*  12.6*   HGB  9.8*  10.6*  10.3*   HCT  30.0*  32.4*  31.1*   PLT  259  287  252     Recent Labs      01/29/18   0443  01/28/18   0544  01/27/18   0533   NA  143  146*  142   K  4.1  3.8  3.0*   CL  111*  112*  105   CO2  23  25  26   GLU  199*  83  209*   BUN  20  28*  32*   CREA  0.87  0.91  1.25*   MG  2.3  2.5*  2.1   ALB  2.0*   --    --    SGOT  32   --    --          Assessment:  (Medical Decision Making)     Hospital Problems  Date Reviewed: 1/25/2018 Codes Class Noted POA    Influenza ICD-10-CM: J11.1  ICD-9-CM: 487.1  1/27/2018 Unknown    Type B    Hypokalemia ICD-10-CM: E87.6  ICD-9-CM: 276.8  1/27/2018 Unknown    corrected    Elevated brain natriuretic peptide (BNP) level ICD-10-CM: R79.89  ICD-9-CM: 790.99  1/26/2018 Unknown    251 down to 238    * (Principal)Acute respiratory failure with hypoxia ([facility] ICD-10-CM: J96.01  ICD-9-CM: 518.81  1/25/2018 No    Remains on optiflox    Infection due to mycoplasma- likely remote ICD-10-CM: A49.3  ICD-9-CM: 041.81  1/25/2018 Yes    noted    Pulmonary infiltrates ICD-10-CM: R91.8  ICD-9-CM: 793.19  1/25/2018 Unknown    Bilateral - associated with flu, hypoxia    HTN (hypertension), benign (Chronic) ICD-10-CM: I10  ICD-9-CM: 401.1  1/23/2018 Yes    Fair control    Recurrent major depressive disorder, in remission ([facility] (Chronic) ICD-10-CM: F33.40  ICD-9-CM: 296.35  1/23/2018 Yes    stable    Breast cancer (HCC) (Chronic) ICD-10-CM: C50.919  ICD-9-CM: 174.9  1/23/2018 Yes    S/p first cycle of taxotere and cytoxan    S/P bilateral mastectomy (Chronic) ICD-10-CM: Z90.13  ICD-9-CM: V45.71  1/23/2018 Yes    Overview Signed 1/25/2018  2:39 PM by Stephie Klein NP     10/11/17  BILATERAL BREAST MASTECTOMY SIMPLE ARTOURA ULTRA HIGH PROFILE BREAST IMPLANTS 700cc 13.5 cm 8.3cm [id number] X 3 /  MEDIUM HEIGHT 550cc 13.5 cm 11.7cm 7.4cm [id number] X 3   BILATERAL SENTINEL NODE BIOPSY WITH LYMPHATIC MAPPING  RIGHT AXILLARY DISSECTION  BILATERAL BREAST RECONSTRUCTION WITH PLACEMENT TISSUE EXPANDERS AND ALLODERM             HCAP (healthcare-associated pneumonia) ICD-10-CM: J18.9  ICD-9-CM: 949  1/23/2018 Yes    Blood cultures neg - ? viral          Plan:  (Medical Decision Making)   1. Day 6 vanc and zosyn and day 4 zithromax. Positive mycoplasma study. WBC has decreased but patient clinically not improved. PCT 0.1. CXR still with infiltrates and requiring significant oxygen support.  On steroids with elevated CRP which has improved but CXR without change.  - no response to lasix. Do we need to consider CT/bronch? 2. Day 4 tamiflu  3. Chemo on hold for now. Oncology following    Apoorva Ramey NP    More than 50% of time documented was spent face-to-face contact with the patient and in the care of the patient on the floor/unit where the patient is located. Lungs:  60% FiO2. Heart:  RRR with no Murmur/Rubs/Gallops    Additional Comments:    Patient with ongoing B infiltrates, meets criteria for ARDS. Flu B positive. No improvement with tamiflu, abx, and steroids thusfar. Discussed with her that with lack of improvement could consider broch for BAL. This would be helpful if this was a handful of infectious processes causing her issues that we have treatments for (PJP and CMV pneumonia for example) but unhelpful if these tests were negative. High risk for needing intubation during/after procedure. After much discussion patient is ok with proceeding. Checked with bronch lab and opening 10am Wednesday. Discussed separate with pt's daughter by phone. Answered all questions. Will plan for bronch Wednesday morning. I have spoken with and examined the patient. I agree with the above assessment and plan as documented.     Latricia Hamman, MD

## 2018-01-30 NOTE — PROGRESS NOTES
Madai Rivas  Admission Date: 1/22/2018             Daily Progress Note: 1/30/2018    The patient's chart is reviewed and the patient is discussed with the staff. Admitted with dyspnea and cough. CXR with bilateral infiltrates. Abx started for ? Pneumonia. Blood cultures negative. On Taxotere and Cytoxan for breast cancer (has received 1 cycle). Tested positive for Influenza B - on Tamiflu. CRP elevated, steroids added for suspected inflammatory process. Significant hypoxia - on optiflow. No improvement so bronchoscopy planned 1/31. Subjective:     Having anterior chest wall pain - worse with inhalation. Poor appetite. Questions about the plan.      Current Facility-Administered Medications   Medication Dose Route Frequency    acetaminophen (TYLENOL) tablet 650 mg  650 mg Oral Q6H PRN    loperamide (IMODIUM) capsule 2 mg  2 mg Oral Q4H PRN    oseltamivir (TAMIFLU) capsule 30 mg  30 mg Oral Q12H    methylPREDNISolone (PF) (SOLU-MEDROL) injection 80 mg  80 mg IntraVENous Q12H    albuterol (PROVENTIL VENTOLIN) nebulizer solution 2.5 mg  2.5 mg Nebulization QID RT    guaiFENesin ER (MUCINEX) tablet 1,200 mg  1,200 mg Oral BID    piperacillin-tazobactam (ZOSYN) 4.5 g in 0.9% sodium chloride (MBP/ADV) 100 mL  4.5 g IntraVENous Q8H    vancomycin (VANCOCIN) 1,000 mg in dextrose 5% 250 mL IVPB  1,000 mg IntraVENous Q18H    dextrose 5% - 0.45% NaCl with KCl 10 mEq/L infusion  50 mL/hr IntraVENous CONTINUOUS    azithromycin 500 mg in dextrose 5% 250 mL IVPB  500 mg IntraVENous DAILY    sodium chloride (OCEAN) 0.65 % nasal spray 2 Spray  2 Spray Both Nostrils Q2H PRN    HYDROcodone-homatropine (HYCODAN) 5-1.5 mg/5 mL (5 mL) syrup 5 mL  5 mL Oral Q4H PRN    lip protectant (BLISTEX) ointment   Topical PRN    alteplase (CATHFLO) 2 mg in sterile water (preservative free) 2 mL injection  2 mg InterCATHeter PRN    atorvastatin (LIPITOR) tablet 20 mg  20 mg Oral DAILY    LORazepam (ATIVAN) tablet 1 mg  1 mg Oral BID PRN    metoprolol succinate (TOPROL-XL) XL tablet 50 mg  50 mg Oral DAILY    prochlorperazine (COMPAZINE) tablet 10 mg  10 mg Oral Q6H PRN    sertraline (ZOLOFT) tablet 100 mg  100 mg Oral DAILY    sodium chloride (NS) flush 5-10 mL  5-10 mL IntraVENous Q8H    sodium chloride (NS) flush 5-10 mL  5-10 mL IntraVENous PRN    heparin (porcine) injection 5,000 Units  5,000 Units SubCUTAneous Q12H         Objective:     Vitals:    01/30/18 0300 01/30/18 0508 01/30/18 0732 01/30/18 0740   BP: 173/71   178/76   Pulse: 71   81   Resp: 16   20   Temp: 98.1 °F (36.7 °C)   97.9 °F (36.6 °C)   SpO2: 91%  93% 93%   Weight:  169 lb 4.8 oz (76.8 kg)       Intake and Output:   01/28 1901 - 01/30 0700  In: 9228 [P.O.:360; I.V.:4734]  Out: 9191 [UFYCS:5374]  01/30 0701 - 01/30 1900  In: 474 [I.V.:474]  Out: 600 [Urine:600]    Physical Exam:   Constitutional:  the patient is well developed and in no acute distress  HEENT:  Sclera clear, pupils equal, oral mucosa moist  Lungs: a few crackles from anterior and posterior - squeeks as well. Oxygen via optiflow - 65%  Cardiovascular:  RRR without M,G,R  Abd/GI: soft and non-tender; with positive bowel sounds. Ext: warm without cyanosis. There is no lower leg edema. Musculoskeletal: moves all four extremities with equal strength  Skin:  no jaundice or rashes, no wounds   Neuro: no gross neuro deficits   Musculoskeletal: can't ambulate - too weak and hypoxic. No deformity  Psychiatric: Calm. ROS: poor appetite, weak, short of breath.  Chest pain with breathing  Lines: port, langley    CHEST XRAY: none today    LAB  Recent Labs      01/30/18   0444  01/29/18   0443  01/28/18   0544   WBC  15.2*  11.6*  13.8*   HGB  10.8*  9.8*  10.6*   HCT  32.7*  30.0*  32.4*   PLT  285  259  287     Recent Labs      01/30/18   0444  01/29/18   0443  01/28/18   0544   NA  146*  143  146*   K  3.9  4.1  3.8   CL  112*  111*  112*   CO2  26  23  25   GLU  87  199*  83   BUN 16  20  28*   CREA  0.79  0.87  0.91   MG  2.0  2.3  2.5*   ALB   --   2.0*   --    SGOT   --   32   --          Assessment:  (Medical Decision Making)     Hospital Problems  Date Reviewed: 1/25/2018          Codes Class Noted POA    ARDS (adult respiratory distress syndrome) ([facility] ICD-10-CM: V91  ICD-9-CM: 518.52  1/29/2018 Unknown    Suspect - post flu    Influenza ICD-10-CM: J11.1  ICD-9-CM: 487.1  1/27/2018 Unknown    Day 5 tamiflu    Hypokalemia ICD-10-CM: E87.6  ICD-9-CM: 276.8  1/27/2018 Unknown    resolved    Elevated brain natriuretic peptide (BNP) level ICD-10-CM: R79.89  ICD-9-CM: 790.99  1/26/2018 Unknown    No response to lasix    * (Principal)Acute respiratory failure with hypoxia (HCC) ICD-10-CM: J96.01  ICD-9-CM: 518.81  1/25/2018 No    Remains on high flow oxygen    Pulmonary infiltrates ICD-10-CM: R91.8  ICD-9-CM: 793.19  1/25/2018 Unknown    Persistent - ?  Post flu, infectious, inflammatory - no improvement yet    HTN (hypertension), benign (Chronic) ICD-10-CM: I10  ICD-9-CM: 401.1  1/23/2018 Yes    Blood pressure up today    Recurrent major depressive disorder, in remission ([facility] (Chronic) ICD-10-CM: F33.40  ICD-9-CM: 296.35  1/23/2018 Yes    She is frustrated at present but denies depression    Breast cancer ([facility] (Chronic) ICD-10-CM: C50.919  ICD-9-CM: 174.9  1/23/2018 Yes    Has received one cycle of chemo - on hold for now    S/P bilateral mastectomy (Chronic) ICD-10-CM: Z90.13  ICD-9-CM: V45.71  1/23/2018 Yes    Overview Signed 1/25/2018  2:39 PM by Elieser Queen NP     10/11/17  BILATERAL BREAST MASTECTOMY SIMPLE ARTOURA ULTRA HIGH PROFILE BREAST IMPLANTS 700cc 13.5 cm 8.3cm [id number] X 3 /  MEDIUM HEIGHT 550cc 13.5 cm 11.7cm 7.4cm [id number] X 3   BILATERAL SENTINEL NODE BIOPSY WITH LYMPHATIC MAPPING  RIGHT AXILLARY DISSECTION  BILATERAL BREAST RECONSTRUCTION WITH PLACEMENT TISSUE EXPANDERS AND ALLODERM         As above    HCAP (healthcare-associated pneumonia) ICD-10-CM: J18.9  ICD-9-CM: 486  1/23/2018 Yes    Only positive culture is mycobacterium and doubt significance          Plan:  (Medical Decision Making)     1. Day 7 vanc and zosyn and day 5 zithromax. Positive mycoplasma study (doubt significant). CXR with persistent infiltrates and hypoxia. On steroids with elevated CRP which has improved but CXR without change.  - no response to lasix. bronch planned for tomorrow. Have discussed with patient and her daughter. Continue abx and steroids as ordered for now - bronch washings for study tomorrow  2. Day 5/5 tamiflu - isolation can be stopped on Thursday  3. Chemo on hold for now. Oncology following  4. Hypertensive today - restart Cozaar from home - hold hctz  5. Low rate IV fluids with poor po intake. Order supplements    Radha Tapia NP    More than 50% of time documented was spent face-to-face contact with the patient and in the care of the patient on the floor/unit where the patient is located. Lungs:  B inspiratory crackles throughout. Heart:  RRR with no Murmur/Rubs/Gallops    Additional Comments:    Patient with ongoing hypoxia. Charted as being on 35% FiO2 but really on 65%. Cont with plan to bronch with BAL tomorrow. High risk with possible intubation required. Patient and daughter are aware. I have spoken with and examined the patient. I agree with the above assessment and plan as documented.     Juliane Frankel, MD

## 2018-01-30 NOTE — PROGRESS NOTES
Problem: Mobility Impaired (Adult and Pediatric)  Goal: *Acute Goals and Plan of Care (Insert Text)  Goals:  (1.)Ms. Abida Wills will move from supine to sit and sit to supine , scoot up and down and roll side to side with INDEPENDENT within 5 day(s). (2.)Ms. Abida Wills will transfer from bed to chair and chair to bed with MODIFIED INDEPENDENCE using the least restrictive device within 5 day(s). (3.)Ms. Abida Wills will ambulate with MODIFIED INDEPENDENCE for  feet with the least restrictive device within 5 day(s). (4.)Ms Abida Wills will increase her activity tolerance in supine, sitting and standing to >20 minutes with O2 sats remaining in the 90s within 5 day(s)         Patient is having respiratory difficulties. She is on opti-flow. Patient declined minimal treatment this morning. Will check on patient later as time and schedule permits. Thank you.      Kelli Saavedra, PTA

## 2018-01-30 NOTE — PROGRESS NOTES
Problem: Nutrition Deficit  Goal: *Optimize nutritional status  Nutrition  Reason for assessment: LOS and received nutrition consult for general nutrition management and supplements  Assessment:   Diet order(s): Cardiac  Food/Nutrition Patient History:  Pt reports decline in appetite to 25-50% of usual after she had breast reconstruction surgery in November. She says she was told to limit her activity and she thinks that impacted her appetite. She was drinking at least 1 supplement but sometimes 2 a day, either Boost or premier protien drink. She is willing to try Ensure Enlive here. Her appetite is poor but she makes herself eat because she knows she has to. In the past 3-4 days, she has been able to eat about 505 of her meals. She is having the difficulty with eating meats but partly because she receives meats she does not like. Denies nausea. Stated -177#. She thinks current weight is inaccurate or must inlcude fluid as she last weighed 165# PTA. Anthropometrics: Height 5'3\",  Weight: 76.8 kg (169 lb 4.8 oz), Weight Source: Bed, Body mass index is 29.99 kg/(m^2). BMI class of normal weight. Edema: None noted  Weight hx per EMR ( Based on connect care functionality, RD cannot know if these weight are actual versus stated):  WT / BMI WEIGHT   1/22/2018 160 lb 11.2 oz   1/8/2018 177 lb   1/6/2018 170 lb   1/5/2018 170 lb   12/29/2017 170 lb   12/29/2017 170 lb 9.6 oz   12/13/2017 171 lb   11/8/2017 177 lb   10/11/2017 181 lb   10/4/2017 181 lb 2 oz   9/19/2017 184 lb   9/7/2017 182 lb 12.8 oz   8/30/2017 184 lb   8/16/2017 184 lb   8/2/2017 185 lb   7/26/2017 185 lb     8.2% change in wt within 4 months c/w clinically insignificant change. Macronutrient needs:  EER:  5532-1532 kcal /day (20-25 kcal/kg ABW)  EPR:  63-78 grams protein/day (1.2-1.5 grams/kg IBW)  Intake/Comparative Standards: Per RD meal rounds: 25% of lunch (75% of carrots, 50% of potatoes).  Average intake for past 7 day(s)/9 recorded meal(s): 53%. This potentially meets ~50-75% of kcal and ~50-75% of protein needs    Nutrition Diagnosis: Inadequate oral intake r/t decreased ability to consume sufficient oral intake as evidenced by decreased appetite, intake and weight loss and use of nutritional supplement as above    Intervention:  Meals and snacks: Continue current diet. Nutrition Supplement Therapy: Ensure Enlive tid. Provided pt with one to taste at lunch and she did like it. Discharge nutrition plan: Too soon to determine. Potential need for continue nutritional supplement once to twice a day.     Gisella Haq, [address], [address] [place] [address]

## 2018-01-30 NOTE — PROGRESS NOTES
OT Note:    OT attempted to see patient for therapy today. Pt did not feel up to participating at this time. She is to have Bronch tomorrow. Will re-attempt to see patient at a later date/time.     Thanks,  Kaur Clay

## 2018-01-30 NOTE — DISCHARGE INSTRUCTIONS
NUTRITION       Continue Oral Nutrition Supplement (ONS) at discharge. Recommend Ensure Enlive or a comparable/similar product Once daily to twice for 30 days unless otherwise directed by your Primary Care Physician.      Giovanni Camacho RD, LD

## 2018-01-30 NOTE — PROGRESS NOTES
[facility]        Inpatient Hematology / Oncology Progress Note      Admission Date: 2018  6:38 PM  Reason for Admission/Hospital Course: Pulmonary infiltrates on CXR [R91.8]      24 Hour Events:  Afebrile, VSS  On Optiflow  Continues on antibiotics and Tamiflu  Bronch planned for tomorrow AM    ROS:  Constitutional: negative for fever, chills. Positive for weakness, malaise, fatigue. CV:  negative for chest pain, palpitations, edema. Respiratory: Positive for dyspnea, cough. GI: negative for nausea, abdominal pain. + diarrhea. 10 point review of systems is otherwise negative with the exception of the elements mentioned above in the HPI. Allergies   Allergen Reactions    Dilaudid [Hydromorphone] Other (comments)     \"out of her mind\"    Sulfa (Sulfonamide Antibiotics) Itching    Tetracycline Nausea Only       OBJECTIVE:  Patient Vitals for the past 8 hrs:   BP Temp Pulse Resp SpO2 Weight   18 0740 178/76 97.9 °F (36.6 °C) 81 20 93 % -   18 0732 - - - - 93 % -   18 0508 - - - - - 169 lb 4.8 oz (76.8 kg)   18 0300 173/71 98.1 °F (36.7 °C) 71 16 91 % -   18 0220 - - - - 94 % -     Temp (24hrs), Av.8 °F (36.6 °C), Min:97.3 °F (36.3 °C), Max:98.2 °F (36.8 °C)     0701 -  1900  In: 474 [I.V.:474]  Out: 600 [Urine:600]    Physical Exam:  Constitutional: Frail appearing female in no acute distress, sitting comfortably in the hospital bed. HEENT: Normocephalic and atraumatic. Oropharynx is clear, mucous membranes are moist. Extraocular muscles are intact. Sclerae anicteric. Skin Warm and dry.  + bruising to upper extremities bilaterally. No rash noted. No erythema. No pallor. Respiratory Bilateral rhonchi and crackles, notably in the right and left middle lung fields. On Optiflow   CVS Normal rate, regular rhythm and normal S1 and S2. No murmurs, gallops, or rubs.    Abdomen Soft, nontender and nondistended, normoactive bowel sounds. No palpable mass. No hepatosplenomegaly. Neuro Grossly nonfocal with no obvious sensory or motor deficits. MSK Normal range of motion in general.  No edema and no tenderness. Psych Appropriate mood and affect. Labs:      Recent Labs      01/30/18 0444 01/29/18 0443  01/28/18   0544   WBC  15.2*  11.6*  13.8*   RBC  3.61*  3.32*  3.60*   HGB  10.8*  9.8*  10.6*   HCT  32.7*  30.0*  32.4*   MCV  90.6  90.4  90.0   MCH  29.9  29.5  29.4   MCHC  33.0  32.7  32.7   RDW  15.7*  15.8*  15.6*   PLT  285  259  287   GRANS  85*  87*  85*   LYMPH  7*  5*  7*   MONOS  6  7  7   EOS  1  0*  0*   BASOS  0  0  0   IG  1  1  1   DF  AUTOMATED  AUTOMATED  AUTOMATED   ANEU  12.9*  10.1*  11.8*   ABL  1.1  0.6  1.0   ABM  1.0  0.8  1.0   TRISHA  0.1  0.0  0.0   ABB  0.0  0.0  0.0   AIG  0.1  0.1  0.1        Recent Labs      01/30/18 0444 01/29/18 0443 01/28/18   0544   NA  146*  143  146*   K  3.9  4.1  3.8   CL  112*  111*  112*   CO2  26  23  25   AGAP  8  9  9   GLU  87  199*  83   BUN  16  20  28*   CREA  0.79  0.87  0.91   GFRAA  >60  >60  >60   GFRNA  >60  >60  >60   CA  8.4  8.2*  8.2*   SGOT   --   32   --    AP   --   95   --    TP   --   5.7*   --    ALB   --   2.0*   --    GLOB   --   3.7*   --    AGRAT   --   0.5*   --    MG  2.0  2.3  2.5*         Imaging:  XR CHEST PORT [[id number]] Collected: 01/26/18 0816      Order Status: Completed Updated: 01/26/18 0835     Narrative:       Chest portable    CLINICAL INDICATION: Respiratory failure, dyspnea, follow-up infiltrates,  hypertension, bilateral mastectomy and history of breast cancer    COMPARISON: 1/25/2018    TECHNIQUE: single AP portable view chest at 8:23 AM upright     FINDINGS: The left portacatheter is stable. The mediastinal and hilar contours  are stable. There is no pneumothorax or focal dense consolidation. Reticular and  patchy opacities bilaterally, slightly more prominent in the upper lobes, are  similar to prior.  No evidence of enlarging pleural effusion. Surgical changes  involving bilateral breasts are again noted with probable implants.         Impression:       IMPRESSION: No significant change involving diffuse lung infiltrates.       XR CHEST SNGL V [[id number]] Collected: 01/25/18 0125     Order Status: Completed Updated: 01/25/18 0128     Narrative:       AP portable chest    INDICATION: Shortness of breath    COMPARISON: 1/22/2018    FINDINGS: No change in cardiomegaly. Bilateral patchy and diffuse  reticulonodular interstitial changes with patchy alveolar densities bilaterally  without significant change. No change position of left-sided Port-A-Cath  catheter. Negative for pneumothorax.       Impression:       IMPRESSION: No significant change     XR CHEST PORT [[id number]] Collected: 01/22/18 1907     Order Status: Completed Updated: 01/22/18 1914     Narrative:       History: Shortness of breath, breast cancer    Exam: portable chest    Comparison: 10/12/2017     Findings: There is coarsening of the interstitial lung markings. There is new  focal increased opacity seen peripherally within the right midlung. No  pneumothorax. No change in the appearance of the mediastinal contour or osseous  structures. There is a new port overlying the left chest wall. Impressions: New focal opacity seen within the right midlung.           ASSESSMENT:    Problem List  Date Reviewed: 1/25/2018          Codes Class Noted    ARDS (adult respiratory distress syndrome) ([facility] ICD-10-CM: P70  ICD-9-CM: 518.52  1/29/2018        Influenza ICD-10-CM: J11.1  ICD-9-CM: 487.1  1/27/2018        Hypokalemia ICD-10-CM: E87.6  ICD-9-CM: 276.8  1/27/2018        Elevated brain natriuretic peptide (BNP) level ICD-10-CM: R79.89  ICD-9-CM: 790.99  1/26/2018        * (Principal)Acute respiratory failure with hypoxia (HCC) ICD-10-CM: J96.01  ICD-9-CM: 518.81  1/25/2018        Pulmonary infiltrates ICD-10-CM: R91.8  ICD-9-CM: 793.19  1/25/2018        HTN (hypertension), benign (Chronic) ICD-10-CM: I10  ICD-9-CM: 401.1  1/23/2018        Recurrent major depressive disorder, in remission ([facility] (Chronic) ICD-10-CM: F33.40  ICD-9-CM: 296.35  1/23/2018        Breast cancer ([facility] (Chronic) ICD-10-CM: C50.919  ICD-9-CM: 174.9  1/23/2018        S/P bilateral mastectomy (Chronic) ICD-10-CM: Z90.13  ICD-9-CM: V45.71  1/23/2018    Overview Signed 1/25/2018  2:39 PM by Tab Pagan, NP     10/11/17  BILATERAL BREAST MASTECTOMY SIMPLE ARTOURA ULTRA HIGH PROFILE BREAST IMPLANTS 700cc 13.5 cm 8.3cm [id number] X 3 /  MEDIUM HEIGHT 550cc 13.5 cm 11.7cm 7.4cm [id number] X 3   BILATERAL SENTINEL NODE BIOPSY WITH LYMPHATIC MAPPING  RIGHT AXILLARY DISSECTION  BILATERAL BREAST RECONSTRUCTION WITH PLACEMENT TISSUE EXPANDERS AND ALLODERM             HCAP (healthcare-associated pneumonia) ICD-10-CM: J18.9  ICD-9-CM: 151  1/23/2018        Dehydration ICD-10-CM: E86.0  ICD-9-CM: 276.51  1/22/2018        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 BCNTD. Incentive spirometer  1/25 Increased oxygen demands. Consulted pulm. 1/26 Pulmonary added azith and lasix. Considered thoracentesis but patient desats when off of oxygen. She would need to be intubated for thoracentesis. Conservative measures for now including recommending cpap which patient refused but is now willing to try. Transfer to 5th floor when bed available. 1/29 BC-NGTD. Continues Vancomycin, Zosyn, and Azithromycin. Respiratory status not much improved. Pulmonary following. 1/30 +mycoplasma study. Remains afebrile. Pt not improving despite tamiflu, antibx, and steroids. Pulm planning on bronch tomorrow AM.    Influenza  1/26 Added Tamiflu.      Stage IIB left breast cancer and IIIB right breast cancer  1/24 sp Cycle one TC. Next Cycle planned for 1/27/2018. Currently on hold. Hypokalemia  1/27 Replete with 40 meq K+  1/29 K+ up to 4.1    Diarrhea  1/28 C-diff negative. Utilize Imodium as needed. Continue home meds  Consult PT/OT/CM  Heparin for DVT prophylaxis              ZANE Bello [facility]  [address] [place] [address]  Office : [phone]  Fax : [phone]       Attending Addendum:  I personally evaluated the patient with Marco A Cheung N.P.,  and agree with the assessment, findings and plan as documented. Appears stable, heart regular without murmur, lungs clear, abdomen benign. Plan for bronchoscopy. Holding further cancer directed therapy.                Shelia Recinos MD  [facility]  [address] [place] [address]  Office : [phone]  Fax : [phone]

## 2018-01-31 NOTE — PROCEDURES
PROCEDURE    Bronchoscopy with airway inspection/cleanout/BAL. INDICATION   Persistent pulmonary infiltrates of unclear cause. EQUIPMENT:  Olympus Q 180 Bronchoscope    ANESTHESIA    Concious sedation with: Fentanyl 50 mcg IV; Versed 2mg IV; Lidocaine 200 mg to tracheo-bronchial tree and vocal cords. IMAGING:  CXR 1/30/18        AIRWAY INSPECTION    After obtaining informed consent, orally with use of a bite block, an Olympus Q 180 Bronchoscope was introduced into the trachea without complication. RIGHT    LOCATION NORM/ABNORM DESCRIPTION   Larynx NL    VOCAL CORDS NL    TRACHEA NL    SEEMA NL    RMSB NL    RUL NL    BI NL    RML NL    RLL NL    SUP SEGM RLL NL    MED BASAL NL    ANTERIOR BASAL NL    LATERAL BASAL NL    POSTERIOR BASAL NL               LEFT    LOCATION NORM/ABNORM DESCRIPTION   LMSB NL    LUCILA NL    LINGULA NL    LLL NL    SUPERIOR SEG LLL NL    DARIO-MEDIAL LLL NL    LATERAL LLL NL    POSTERIOR LLL NL      Small amounts of mucus plugging in airways. Cleared with saline. The following samples were obtained:    BAL: RML    Samples were sent for:  Cell count, diff, culture, atypical pneumonia, legionella, CD4:CD8, CMV pcr, EBV PCR, HSV PCR, PJP PCR, respiratory viral pcr, VZV pcr, cytology. The procedure was completed without complication and the patient tolerated the procedure well. EBL: None    Recommendations:  Monitor patient's sats coming off NRB and 100% optiflow used for procedure. Depending on O2 needs will either return to floor or to ICU.      Zohra Pope MD

## 2018-01-31 NOTE — INTERVAL H&P NOTE
H&P Update:  Barry Gould was seen and examined. History and physical has been reviewed. The patient has been examined.  There have been no significant clinical changes since the completion of the originally dated History and Physical.    Signed By: Bran Mohan MD     January 31, 2018 11:22 AM

## 2018-01-31 NOTE — PROGRESS NOTES
TRANSFER - IN REPORT:    Verbal report received from Blane KRISHNA on Yessica Kennedyph  being received from 214 for ordered procedure bronchoscopy. Report consisted of patients Situation, Background, Assessment and   Recommendations(SBAR). Information from the following report(s) SBAR and Procedure Summary was reviewed with the receiving nurse. Opportunity for questions and clarification was provided. Assessment completed upon patients arrival to unit and care assumed.

## 2018-01-31 NOTE — PROGRESS NOTES
[facility]        Inpatient Hematology / Oncology Progress Note      Admission Date: 2018  6:38 PM  Reason for Admission/Hospital Course: Pulmonary infiltrates on CXR [R91.8]      24 Hour Events:  Afebrile, VSS  On Optiflow  Continues on antibiotics and Tamiflu  Bronch this AM - studies pending  Family at bedside    ROS:  Constitutional: negative for fever, chills. Positive for weakness, malaise, fatigue. CV:  +pleuritic chest pain with deep inspiration. negative for palpitations, edema. Respiratory: Positive for dyspnea, cough. GI: negative for nausea, abdominal pain. + diarrhea. 10 point review of systems is otherwise negative with the exception of the elements mentioned above in the HPI. Allergies   Allergen Reactions    Dilaudid [Hydromorphone] Other (comments)     \"out of her mind\"    Sulfa (Sulfonamide Antibiotics) Itching    Tetracycline Nausea Only       OBJECTIVE:  Patient Vitals for the past 8 hrs:   BP Temp Pulse Resp SpO2   18 0832 - - - - 93 %   18 0705 142/68 98.4 °F (36.9 °C) 74 18 94 %   18 0435 - - - - 93 %   18 0300 158/72 97.8 °F (36.6 °C) 76 20 94 %     Temp (24hrs), Av.8 °F (36.6 °C), Min:97.3 °F (36.3 °C), Max:98.4 °F (36.9 °C)     0701 -  1900  In: 234 [I.V.:234]  Out: -     Physical Exam:  Constitutional: Frail appearing female in no acute distress, sitting comfortably in the hospital bed. HEENT: Normocephalic and atraumatic. Oropharynx is clear, mucous membranes are moist. Extraocular muscles are intact. Sclerae anicteric. Skin Warm and dry.  + bruising to upper extremities bilaterally. No rash noted. No erythema. No pallor. Respiratory Anterior lung sounds diminished bilaterally. On Optiflow   CVS Normal rate, regular rhythm and normal S1 and S2. No murmurs, gallops, or rubs. Abdomen Soft, nontender and nondistended, normoactive bowel sounds. No palpable mass. No hepatosplenomegaly. Neuro Grossly nonfocal with no obvious sensory or motor deficits. MSK Normal range of motion in general.  No edema and no tenderness. Psych Appropriate mood and affect. Labs:      Recent Labs      01/31/18 0527 01/30/18 0444 01/29/18 0443   WBC  15.7*  15.2*  11.6*   RBC  3.68*  3.61*  3.32*   HGB  10.7*  10.8*  9.8*   HCT  32.7*  32.7*  30.0*   MCV  88.9  90.6  90.4   MCH  29.1  29.9  29.5   MCHC  32.7  33.0  32.7   RDW  15.6*  15.7*  15.8*   PLT  298  285  259   GRANS  89*  85*  87*   LYMPH  4*  7*  5*   MONOS  6  6  7   EOS  0*  1  0*   BASOS  0  0  0   IG  1  1  1   DF  AUTOMATED  AUTOMATED  AUTOMATED   ANEU  13.9*  12.9*  10.1*   ABL  0.7  1.1  0.6   ABM  0.9  1.0  0.8   TRISHA  0.0  0.1  0.0   ABB  0.0  0.0  0.0   AIG  0.2  0.1  0.1        Recent Labs      01/31/18 0527 01/30/18 0444 01/29/18 0443   NA  145  146*  143   K  3.9  3.9  4.1   CL  109*  112*  111*   CO2  28  26  23   AGAP  8  8  9   GLU  127*  87  199*   BUN  20  16  20   CREA  0.89  0.79  0.87   GFRAA  >60  >60  >60   GFRNA  >60  >60  >60   CA  8.5  8.4  8.2*   SGOT   --    --   32   AP   --    --   95   TP   --    --   5.7*   ALB   --    --   2.0*   GLOB   --    --   3.7*   AGRAT   --    --   0.5*   MG  2.0  2.0  2.3         Imaging:  XR CHEST PORT [[id number]] Collected: 01/26/18 0816      Order Status: Completed Updated: 01/26/18 0835     Narrative:       Chest portable    CLINICAL INDICATION: Respiratory failure, dyspnea, follow-up infiltrates,  hypertension, bilateral mastectomy and history of breast cancer    COMPARISON: 1/25/2018    TECHNIQUE: single AP portable view chest at 8:23 AM upright     FINDINGS: The left portacatheter is stable. The mediastinal and hilar contours  are stable. There is no pneumothorax or focal dense consolidation. Reticular and  patchy opacities bilaterally, slightly more prominent in the upper lobes, are  similar to prior. No evidence of enlarging pleural effusion.  Surgical changes  involving bilateral breasts are again noted with probable implants.         Impression:       IMPRESSION: No significant change involving diffuse lung infiltrates.       XR CHEST SNGL V [[id number]] Collected: 01/25/18 0125     Order Status: Completed Updated: 01/25/18 0128     Narrative:       AP portable chest    INDICATION: Shortness of breath    COMPARISON: 1/22/2018    FINDINGS: No change in cardiomegaly. Bilateral patchy and diffuse  reticulonodular interstitial changes with patchy alveolar densities bilaterally  without significant change. No change position of left-sided Port-A-Cath  catheter. Negative for pneumothorax.       Impression:       IMPRESSION: No significant change     XR CHEST PORT [[id number]] Collected: 01/22/18 1907     Order Status: Completed Updated: 01/22/18 1914     Narrative:       History: Shortness of breath, breast cancer    Exam: portable chest    Comparison: 10/12/2017     Findings: There is coarsening of the interstitial lung markings. There is new  focal increased opacity seen peripherally within the right midlung. No  pneumothorax. No change in the appearance of the mediastinal contour or osseous  structures. There is a new port overlying the left chest wall. Impressions: New focal opacity seen within the right midlung.           ASSESSMENT:    Problem List  Date Reviewed: 1/25/2018          Codes Class Noted    ARDS (adult respiratory distress syndrome) ([facility] ICD-10-CM: V21  ICD-9-CM: 518.52  1/29/2018        Influenza ICD-10-CM: J11.1  ICD-9-CM: 487.1  1/27/2018        Hypokalemia ICD-10-CM: E87.6  ICD-9-CM: 276.8  1/27/2018        Elevated brain natriuretic peptide (BNP) level ICD-10-CM: R79.89  ICD-9-CM: 790.99  1/26/2018        * (Principal)Acute respiratory failure with hypoxia (HCC) ICD-10-CM: J96.01  ICD-9-CM: 518.81  1/25/2018        Pulmonary infiltrates ICD-10-CM: R91.8  ICD-9-CM: 793.19  1/25/2018        HTN (hypertension), benign (Chronic) ICD-10-CM: I10  ICD-9-CM: 401.1  1/23/2018        Recurrent major depressive disorder, in remission ([facility] (Chronic) ICD-10-CM: F33.40  ICD-9-CM: 296.35  1/23/2018        Breast cancer ([facility] (Chronic) ICD-10-CM: C50.919  ICD-9-CM: 174.9  1/23/2018        S/P bilateral mastectomy (Chronic) ICD-10-CM: Z90.13  ICD-9-CM: V45.71  1/23/2018    Overview Signed 1/25/2018  2:39 PM by Elayne Kirby NP     10/11/17  BILATERAL BREAST MASTECTOMY SIMPLE ARTOURA ULTRA HIGH PROFILE BREAST IMPLANTS 700cc 13.5 cm 8.3cm [id number] X 3 /  MEDIUM HEIGHT 550cc 13.5 cm 11.7cm 7.4cm [id number] X 3   BILATERAL SENTINEL NODE BIOPSY WITH LYMPHATIC MAPPING  RIGHT AXILLARY DISSECTION  BILATERAL BREAST RECONSTRUCTION WITH PLACEMENT TISSUE EXPANDERS AND ALLODERM             HCAP (healthcare-associated pneumonia) ICD-10-CM: J18.9  ICD-9-CM: 886  1/23/2018        Dehydration ICD-10-CM: E86.0  ICD-9-CM: 276.51  1/22/2018        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 BCNTD. Incentive spirometer  1/25 Increased oxygen demands. Consulted pulm. 1/26 Pulmonary added azith and lasix. Considered thoracentesis but patient desats when off of oxygen. She would need to be intubated for thoracentesis. Conservative measures for now including recommending cpap which patient refused but is now willing to try. Transfer to 5th floor when bed available. 1/29 BC-NGTD. Continues Vancomycin, Zosyn, and Azithromycin. Respiratory status not much improved. Pulmonary following. 1/30 +mycoplasma study. Remains afebrile. Pt not improving despite tamiflu, antibx, and steroids. Pulm planning on bronch tomorrow AM.  1/31 Bronch this AM - studies pending. Pt states she feels less dyspneic s/p bronch, but still having pleuritic chest pain with deep inspiration. Influenza  1/26 Added Tamiflu.      Stage IIB left breast cancer and IIIB right breast cancer  1/24 sp Cycle one TC. Next Cycle planned for 1/27/2018. Currently on hold. Hypokalemia  1/27 Replete with 40 meq K+  1/29 K+ up to 4.1    Diarrhea  1/28 C-diff negative. Utilize Imodium as needed. Continue home meds  Consult PT/OT/CM  Heparin for DVT prophylaxis              Elvis Aguila NP   [facility]  [address] [address]  Office : [phone]  Fax : [phone]       Attending Addendum:  I discussed with Elvis Aguila N.P.,  and agree with the assessment, findings and plan as documented.                 Victor M Mariscal MD  [facility]  [address] [address]  Office : [phone]  Fax : [phone]

## 2018-01-31 NOTE — PROGRESS NOTES
Patient placed on bedpan and became SOB with exertion. Patient O2 dropped to 81%, with increased HR to 100-105. Patient instructed on appropriate breathing techniques. Patient on high flow NC at 45%. Resp notified. Patient later given PRN anxiety med and O2 sats irasema to 98%, HR WNL. Patient agreed to feeling anxious at time due to becoming a little SOB on exertion while being placed on bedpan. Continuing to monitor patient.

## 2018-01-31 NOTE — PROGRESS NOTES
Barbera Najjar  Admission Date: 1/22/2018             Daily Progress Note: 1/31/2018    The patient's chart is reviewed and the patient is discussed with the staff. Admitted with dyspnea and cough. CXR with bilateral infiltrates. Abx started for ? Pneumonia. Blood cultures negative. On Taxotere and Cytoxan for breast cancer (has received 1 cycle). Tested positive for Influenza B - on Tamiflu. CRP elevated, steroids added for suspected inflammatory process. Significant hypoxia - on optiflow. No improvement so bronchoscopy planned 1/31. Subjective:   Patient seen in [address] [place] [address] lab prior to planned bronch. Patient arrived on 60% FiO2. Increased to 100% before bronch. No clinical changes.     Current Facility-Administered Medications   Medication Dose Route Frequency    lidocaine (XYLOCAINE) 4 % (40 mg/mL) topical solution   Topical ENDO ONCE    lidocaine (XYLOCAINE) 2 % jelly   Mucous Membrane ENDO ONCE    midazolam (VERSED) injection 0.25-5 mg  0.25-5 mg IntraVENous Multiple    fentaNYL citrate (PF) injection 25 mcg  25 mcg IntraVENous Multiple    naloxone (NARCAN) injection 0.4 mg  0.4 mg IntraVENous Multiple    flumazenil (ROMAZICON) 0.1 mg/mL injection 0.2 mg  0.2 mg IntraVENous Multiple    HYDROcodone-acetaminophen (NORCO) 7.5-325 mg per tablet 1 Tab  1 Tab Oral Q6H PRN    losartan (COZAAR) tablet 50 mg  50 mg Oral DAILY    hydrALAZINE (APRESOLINE) 20 mg/mL injection 20 mg  20 mg IntraVENous Q6H PRN    acetaminophen (TYLENOL) tablet 650 mg  650 mg Oral Q6H PRN    loperamide (IMODIUM) capsule 2 mg  2 mg Oral Q4H PRN    methylPREDNISolone (PF) (SOLU-MEDROL) injection 80 mg  80 mg IntraVENous Q12H    albuterol (PROVENTIL VENTOLIN) nebulizer solution 2.5 mg  2.5 mg Nebulization QID RT    guaiFENesin ER (MUCINEX) tablet 1,200 mg  1,200 mg Oral BID    piperacillin-tazobactam (ZOSYN) 4.5 g in 0.9% sodium chloride (MBP/ADV) 100 mL  4.5 g IntraVENous Q8H    vancomycin (VANCOCIN) 1,000 mg in dextrose 5% 250 mL IVPB  1,000 mg IntraVENous Q18H    dextrose 5% - 0.45% NaCl with KCl 10 mEq/L infusion  50 mL/hr IntraVENous CONTINUOUS    azithromycin 500 mg in dextrose 5% 250 mL IVPB  500 mg IntraVENous DAILY    sodium chloride (OCEAN) 0.65 % nasal spray 2 Spray  2 Spray Both Nostrils Q2H PRN    HYDROcodone-homatropine (HYCODAN) 5-1.5 mg/5 mL (5 mL) syrup 5 mL  5 mL Oral Q4H PRN    lip protectant (BLISTEX) ointment   Topical PRN    alteplase (CATHFLO) 2 mg in sterile water (preservative free) 2 mL injection  2 mg InterCATHeter PRN    atorvastatin (LIPITOR) tablet 20 mg  20 mg Oral DAILY    LORazepam (ATIVAN) tablet 1 mg  1 mg Oral BID PRN    metoprolol succinate (TOPROL-XL) XL tablet 50 mg  50 mg Oral DAILY    prochlorperazine (COMPAZINE) tablet 10 mg  10 mg Oral Q6H PRN    sertraline (ZOLOFT) tablet 100 mg  100 mg Oral DAILY    sodium chloride (NS) flush 5-10 mL  5-10 mL IntraVENous Q8H    sodium chloride (NS) flush 5-10 mL  5-10 mL IntraVENous PRN    heparin (porcine) injection 5,000 Units  5,000 Units SubCUTAneous Q12H     Objective:     Vitals:    01/31/18 0435 01/31/18 0705 01/31/18 0832 01/31/18 1042   BP:  142/68  195/87   Pulse:  74  81   Resp:  18  18   Temp:  98.4 °F (36.9 °C)     SpO2: 93% 94% 93% 99%   Weight:         Intake and Output:   01/29 1901 - 01/31 0700  In: 7835 [P.O.:240; I.V.:2508]  Out: 4500 [Urine:4500]  01/31 0701 - 01/31 1900  In: 234 [I.V.:234]  Out: -     Physical Exam:   Constitutional:  the patient is well developed and in no acute distress  HEENT:  Sclera clear, pupils equal, oral mucosa moist  Lungs: B anterior inspiratory crackles. Cardiovascular:  RRR without M,G,R  Abd/GI: soft and non-tender; with positive bowel sounds. Ext: warm without cyanosis. There is no lower leg edema.   Musculoskeletal: moves all four extremities with equal strength  Skin:  no jaundice or rashes, no wounds   Neuro: no gross neuro deficits   Musculoskeletal: can't ambulate - too weak and hypoxic. No deformity  Psychiatric: Calm. ROS: poor appetite, weak, short of breath. Chest pain with breathing  Lines: port, langley    CHEST XRAY:  1/30/18  Impression:  No significant interval change. LAB  Recent Labs      01/31/18   0527  01/30/18   0444  01/29/18   0443   WBC  15.7*  15.2*  11.6*   HGB  10.7*  10.8*  9.8*   HCT  32.7*  32.7*  30.0*   PLT  298  285  259     Recent Labs      01/31/18   0527  01/30/18   0444  01/29/18   0443   NA  145  146*  143   K  3.9  3.9  4.1   CL  109*  112*  111*   CO2  28  26  23   GLU  127*  87  199*   BUN  20  16  20   CREA  0.89  0.79  0.87   MG  2.0  2.0  2.3   ALB   --    --   2.0*   SGOT   --    --   32         Assessment:  (Medical Decision Making)     Hospital Problems  Date Reviewed: 1/25/2018          Codes Class Noted POA    ARDS (adult respiratory distress syndrome) ([facility] ICD-10-CM: J80  ICD-9-CM: 518.52  1/29/2018 Unknown    Suspect - post flu    Influenza ICD-10-CM: J11.1  ICD-9-CM: 487.1  1/27/2018 Unknown    Completed 5 days tamiflu    Hypokalemia ICD-10-CM: E87.6  ICD-9-CM: 276.8  1/27/2018 Unknown    resolved    Elevated brain natriuretic peptide (BNP) level ICD-10-CM: R79.89  ICD-9-CM: 790.99  1/26/2018 Unknown    No response to lasix    * (Principal)Acute respiratory failure with hypoxia (HCC) ICD-10-CM: J96.01  ICD-9-CM: 518.81  1/25/2018 No    Remains on high flow oxygen    Pulmonary infiltrates ICD-10-CM: R91.8  ICD-9-CM: 793.19  1/25/2018 Unknown    Persistent - ?  Post flu, infectious, inflammatory - no improvement yet    HTN (hypertension), benign (Chronic) ICD-10-CM: I10  ICD-9-CM: 401.1  1/23/2018 Yes    Blood pressure up today    Recurrent major depressive disorder, in remission (HCC) (Chronic) ICD-10-CM: F33.40  ICD-9-CM: 296.35  1/23/2018 Yes    She is frustrated at present but denies depression    Breast cancer ([facility] (Chronic) ICD-10-CM: C50.919  ICD-9-CM: 174.9  1/23/2018 Yes    Has received one cycle of chemo - on hold for now    S/P bilateral mastectomy (Chronic) ICD-10-CM: Z90.13  ICD-9-CM: V45.71  1/23/2018 Yes    Overview Signed 1/25/2018  2:39 PM by Lashonda Wilson NP     10/11/17  BILATERAL BREAST MASTECTOMY SIMPLE ARTOURA ULTRA HIGH PROFILE BREAST IMPLANTS 700cc 13.5 cm 8.3cm [id number] X 3 /  MEDIUM HEIGHT 550cc 13.5 cm 11.7cm 7.4cm [id number] X 3   BILATERAL SENTINEL NODE BIOPSY WITH LYMPHATIC MAPPING  RIGHT AXILLARY DISSECTION  BILATERAL BREAST RECONSTRUCTION WITH PLACEMENT TISSUE EXPANDERS AND ALLODERM         As above    HCAP (healthcare-associated pneumonia) ICD-10-CM: J18.9  ICD-9-CM: 218  1/23/2018 Yes    Only positive culture is mycobacterium and doubt significance        Patient with B infiltrates and severe hypoxic respiratory failure. Flu + and may all be coming from that. But also with recent chemo that could be contributing factor. No response to nearly 10 days of therapy with abx, tamiflu, and steroids. Bronch high risk but last thing we could try to help guide treatment. Plan:  (Medical Decision Making)   -proceed with bronch for BAL with broad infectious workup, specifically PJP and CMV. - Day 8 vanc and zosyn and day 6 zithromax.   -on steroids with elevated CRP which has improved but CXR without change. - - no response to lasix.  -completed Day 5/5 tamiflu - isolation can be stopped on Thursday  -Chemo on hold for now. Oncology following      Dylon Nelson MD    More than 50% of time documented was spent face-to-face contact with the patient and in the care of the patient on the floor/unit where the patient is located.

## 2018-01-31 NOTE — PROGRESS NOTES
TRANSFER - OUT REPORT:    Verbal report given to Blane KRISHNA on Georganna Brunner  being transferred to  for routine post - op bronchoscopy. Report consisted of patients Situation, Background, Assessment and   Recommendations(SBAR). Information from the following report(s) SBAR, Procedure Summary, Recent Results and Cardiac Rhythm NSR was reviewed with the receiving nurse. Lines:   Venous Access Device [employer] Port 01/06/18 Upper chest (subclavicular area), left (Active)   Central Line Being Utilized Yes 1/31/2018 10:00 AM   Criteria for Appropriate Use Long term IV/antibiotic administration 1/31/2018 10:00 AM   Site Assessment Clean, dry, & intact 1/31/2018 10:00 AM   Date of Last Dressing Change 01/29/18 1/30/2018  3:38 PM   Dressing Status Clean, dry, & intact 1/31/2018 10:00 AM   Dressing Type Disk with Chlorhexadine gluconate (CHG) 1/31/2018 10:00 AM   Action Taken Other (comment) 1/24/2018  2:20 PM   Date Accessed (Medial Site) 01/29/18 1/30/2018  3:38 PM   Access Time (Medial Site) 1430 1/22/2018  2:30 PM   Access Needle Size (Site #1) 20 G 1/30/2018  3:38 PM   Access Needle Length (Medial Site) 0.75 inches 1/30/2018  3:38 PM   Positive Blood Return (Medial Site) Yes 1/30/2018  3:38 PM   Action Taken (Medial Site) Infusing 1/31/2018 10:00 AM    Opportunity for questions and clarification was provided.       Patient transported with:   O2 @ 15 liters  60% FIo2 HFNC

## 2018-01-31 NOTE — PROGRESS NOTES
Problem: Mobility Impaired (Adult and Pediatric)  Goal: *Acute Goals and Plan of Care (Insert Text)  Goals:  (1.)Ms. Amparo Garcia will move from supine to sit and sit to supine , scoot up and down and roll side to side with INDEPENDENT within 5 day(s). (2.)Ms. Amparo Garcia will transfer from bed to chair and chair to bed with MODIFIED INDEPENDENCE using the least restrictive device within 5 day(s). (3.)Ms. Amparo Garcia will ambulate with MODIFIED INDEPENDENCE for  feet with the least restrictive device within 5 day(s). (4.)Ms Amparo Garcia will increase her activity tolerance in supine, sitting and standing to >20 minutes with O2 sats remaining in the 90s within 5 day(s)         Physical Therapy Note:    Patient not seen this am secondary to going off floor for a bronchoscopy. Will attempt to see patient later day/time as schedule permits. Thank you.      Heriberto Mckenzie, PTA

## 2018-01-31 NOTE — H&P (VIEW-ONLY)
CONSULT NOTE    Amanda Serrano    1/25/2018    Date of Admission:  1/22/2018    The patient's chart is reviewed and the patient is discussed with the staff. Subjective:     Patient is a 69 y.o.  female seen and evaluated at the request of Dr. Maria De Jesus Hernandez. She was referred to the infusion center for hypotension and received 2L but BP remained low. Was transported to the ER and admitted by hospitalist.  Was placed on O2 per NC and received IVFs in transport with . CXR with right infiltrate and placed on antibiotics. La was 1.2, WBC 14.8, K+ 3.0 and blood cultures drawn showing no growth at 3 days. She is currently undergoing treatment for breast cancer and received Taxotere plus Cytoxan 3 weeks ago. She had bilateral mastectomy 10/11/17 by Dr. Halle Schroeder operatively states she was treated for bronchitis with antibiotics, steroids, Advair and rescue inhaler. Symptoms improved but symptoms recurred and was again seen with treatment prescribed. Has had cough and right side back discomfort prior to admission. During hospitalization has developed worsening hypoxia with exertional dyspnea and now requiring 10L NC to maintain sat . Is comfortable at rest, trying to use incentive spirometry and has occasional non productive cough. Had elevated mycoplasma pneumoniae  We have been asked to see for acute respiratory failure with hypoxia.       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 C50.919    S/P bilateral mastectomy Z90.13    Dehydration E86.0    HCAP (healthcare-associated pneumonia) J18.9    Acute respiratory failure with hypoxia (HCC) J96.01    Infection due to mycoplasma- likely remote A49.3    Pulmonary infiltrates R91.8       Home DME company none. Prior to Admission Medications   Prescriptions Last Dose Informant Patient Reported? Taking? Cetirizine (ZYRTEC) 10 mg cap 1/23/2018 at Unknown time  Yes Yes   Sig: Take 10 mg by mouth daily. Take / use AM day of surgery  per anesthesia protocols. DOCUSATE CALCIUM (STOOL SOFTENER PO) 1/16/2018 at Unknown time  Yes Yes   Sig: Take  by mouth as needed. LORazepam (ATIVAN) 1 mg tablet Unknown at Unknown time  No No   Sig: Take 1 Tab by mouth every six (6) hours as needed for Anxiety. Max Daily Amount: 4 mg. Indications: anxiety, CANCER CHEMOTHERAPY-INDUCED NAUSEA AND VOMITING   PSEUDOEPHEDRINE HCL (SUDAFED 12 HOUR PO) 1/23/2018 at Unknown time  Yes Yes   Sig: Take  by mouth as needed. acetaminophen (TYLENOL) 325 mg tablet Unknown at Unknown time  Yes No   Sig: Take 325 mg by mouth every four (4) hours as needed for Pain. albuterol (PROVENTIL HFA, VENTOLIN HFA, PROAIR HFA) 90 mcg/actuation inhaler 1/23/2018 at Unknown time  No Yes   Sig: Take 1-2 Puffs by inhalation every four (4) hours as needed for Wheezing or Shortness of Breath. atorvastatin (LIPITOR) 20 mg tablet 1/22/2018 at Unknown time  No Yes   Sig: Take 1 Tab by mouth daily. Patient taking differently: Take 20 mg by mouth daily. Take / use AM day of surgery  per anesthesia protocols. Indications: hypercholesterolemia, hyperlipidemia   dexamethasone (DECADRON) 4 mg tablet Unknown at Unknown time  No No   Sig: Take two (2) tabs twice daily the day before, day of and day after chemo.    guaiFENesin-dextromethorphan SR [facility] [place] DM) 600-30 mg per tablet 1/23/2018 at Unknown time  Yes Yes   Sig: Take 1 Tab by mouth every twelve (12) hours as needed for Cough. losartan-hydroCHLOROthiazide (HYZAAR) 50-12.5 mg per tablet 1/23/2018 at Unknown time  No Yes   Sig: Take 1 Tab by mouth daily. Patient taking differently: Take 1 Tab by mouth daily. Indications: hypertension   meclizine (ANTIVERT) 25 mg tablet Not Taking at Unknown time  No No   Sig: Take 1 Tab by mouth every six (6) hours as needed for Dizziness. Patient taking differently: Take 25 mg by mouth every six (6) hours as needed for Dizziness. Indications: VERTIGO   meloxicam (MOBIC) 7.5 mg tablet 1/23/2018 at Unknown time  No Yes   Sig: Take 1-2 Tabs by mouth daily. Patient taking differently: Take 7.5-15 mg by mouth as needed. Indications: OSTEOARTHRITIS   metoprolol succinate (TOPROL-XL) 50 mg XL tablet 1/23/2018 at Unknown time  No Yes   Sig: Take 1 Tab by mouth daily. ondansetron hcl (ZOFRAN, AS HYDROCHLORIDE,) 8 mg tablet 1/23/2018 at Unknown time  No Yes   Sig: Take 1 Tab by mouth every eight (8) hours as needed for Nausea. oxyCODONE IR (ROXICODONE) 5 mg immediate release tablet 1/23/2018 at Unknown time  Yes Yes   Sig: Take 5 mg by mouth every six (6) hours as needed for Pain. predniSONE (DELTASONE) 20 mg tablet Unknown at Unknown time  No No   Sig: Take 2 daily for 3 days, then 1 daily for 2 days   prochlorperazine (COMPAZINE) 10 mg tablet Not Taking at Unknown time  No No   Sig: Take 1 Tab by mouth every six (6) hours as needed. ranitidine (ZANTAC) 150 mg tablet 1/23/2018 at Unknown time  Yes Yes   Sig: Take 150 mg by mouth as needed for Indigestion. Take / use AM day of surgery  per anesthesia protocols. sertraline (ZOLOFT) 100 mg tablet 1/23/2018 at Unknown time  No Yes   Sig: Take 1 Tab by mouth daily. Patient taking differently: Take 100 mg by mouth daily. Take / use AM day of surgery  per anesthesia protocols.   Indications: ANXIETY WITH DEPRESSION      Facility-Administered Medications Last Administration Doses Remaining   heparin (porcine) pf 500 Units 1/22/2018  1:16 PM 0          Past Medical History:   Diagnosis Date    Adverse effect of anesthesia     after hernia surgery bp dropped low and she went to ICU    Arthritis     hands; wrists; back; right knee    Breast cancer ([address]     Depression     Fibrocystic breast disease     bilateral    GERD (gastroesophageal reflux disease)     Headache(784.0)     previously on Topamax    HTN (hypertension), benign     Hypercholesteremia     Kidney stones      Past Surgical History:   Procedure Laterality Date    HX BREAST RECONSTRUCTION  10/11/2017    HX BREAST RECONSTRUCTION Bilateral 10/11/2017    BILATERAL BREAST RECONSTRUCTION WITH PLACEMENT TISSUE EXPANDERS AND ALLODERM performed by Jerilyn Lee MD at [facility] [address] HX CATARACT REMOVAL      bilateral    HX COLONOSCOPY  Oct 2010    Diverticula    HX HERNIA REPAIR  33/5866    umbilical hernia; done at [facility] in [place]    HX KNEE REPLACEMENT      left    HX KNEE REPLACEMENT  02/19/2009    total knee replacement (Left)     HX MASTECTOMY Bilateral 10/11/2017    BILATERAL BREAST MASTECTOMY SIMPLE ARTOURA ULTRA HIGH PROFILE BREAST IMPLANTS 700cc 13.5 cm 8.3cm [id number] X 3 /  MEDIUM HEIGHT 550cc 13.5 cm 11.7cm 7.4cm [id number] X 3  performed by Henok Flores DO at [address] HX SKIN BIOPSY  10/03/2017    on nose    HX VASCULAR ACCESS       Social History     Social History    Marital status:      Spouse name: N/A    Number of children: N/A    Years of education: N/A     Occupational History     Retired     [address] [place] History Main Topics    Smoking status: Former Smoker     Types: Cigarettes     Quit date: 1/1/1992    Smokeless tobacco: Never Used    Alcohol use 0.0 oz/week     0 Standard drinks or equivalent per week      Comment: Occasional    Drug use: No    Sexual activity: Not Currently     Other Topics Concern    Not on file     Social History Narrative     June 2012     Family History   Problem Relation Age of Onset    Other Brother      Myodisplasia syndrome     Heart Disease Brother     Hypertension Brother     Hypertension Mother     Diabetes Mother     Cancer Daughter      cervical     Allergies   Allergen Reactions    Dilaudid [Hydromorphone] Other (comments)     \"out of her mind\"    Sulfa (Sulfonamide Antibiotics) Itching    Tetracycline Nausea Only       Current Facility-Administered Medications   Medication Dose Route Frequency    sodium chloride (OCEAN) 0.65 % nasal spray 2 Spray  2 Spray Both Nostrils Q2H PRN    HYDROcodone-homatropine (HYCODAN) 5-1.5 mg/5 mL (5 mL) syrup 5 mL  5 mL Oral Q4H PRN    [START ON 1/26/2018] azithromycin (ZITHROMAX) 500 mg in 0.9% sodium chloride 250 mL IVPB  500 mg IntraVENous DAILY    lip protectant (BLISTEX) ointment   Topical PRN    piperacillin-tazobactam (ZOSYN) 4.5 g in 0.9% sodium chloride (MBP/ADV) 100 mL  4.5 g IntraVENous Q8H    alteplase (CATHFLO) 2 mg in sterile water (preservative free) 2 mL injection  2 mg InterCATHeter PRN    loperamide (IMODIUM) capsule 2 mg  2 mg Oral Q6H PRN    tuberculin injection 5 Units  5 Units IntraDERMal ONCE    vancomycin (VANCOCIN) 1,000 mg in 0.9% sodium chloride 250 mL IVPB  1,000 mg IntraVENous Q12H    potassium chloride (K-DUR, KLOR-CON) SR tablet 40 mEq  40 mEq Oral DAILY    atorvastatin (LIPITOR) tablet 20 mg  20 mg Oral DAILY    LORazepam (ATIVAN) tablet 1 mg  1 mg Oral BID PRN    metoprolol succinate (TOPROL-XL) XL tablet 50 mg  50 mg Oral DAILY    prochlorperazine (COMPAZINE) tablet 10 mg  10 mg Oral Q6H PRN    sertraline (ZOLOFT) tablet 100 mg  100 mg Oral DAILY    sodium chloride (NS) flush 5-10 mL  5-10 mL IntraVENous Q8H    sodium chloride (NS) flush 5-10 mL  5-10 mL IntraVENous PRN    predniSONE (DELTASONE) tablet 40 mg  40 mg Oral DAILY WITH BREAKFAST    albuterol-ipratropium (DUO-NEB) 2.5 MG-0.5 MG/3 ML  3 mL Nebulization Q4H RT    heparin (porcine) injection 5,000 Units  5,000 Units SubCUTAneous Q12H     Facility-Administered Medications Ordered in Other Encounters   Medication Dose Route Frequency    sodium chloride (NS) flush 10 mL  10 mL IntraVENous Q8H         Objective:     Vitals:    01/25/18 1052 01/25/18 1105 01/25/18 1236 01/25/18 1525   BP:  142/73  127/74   Pulse: 98 100  (!) 101   Resp: 20 18 18   Temp:  99.1 °F (37.3 °C)  99.3 °F (37.4 °C)   SpO2: 94% 90% 94% 95%       PHYSICAL EXAM     Constitutional:  the patient is well developed and in no acute distress, NC 10L, sat 92%  EENMT:  Sclera clear, pupils equal, oral mucosa moist  Respiratory: anterior and posterior crackles, no wheezing, dry cough  Cardiovascular:  RRR without M,G,R  Gastrointestinal: soft and non-tender; with positive bowel sounds. Musculoskeletal: warm without cyanosis. There is no lower leg edema. Skin:  no jaundice or rashes, no wounds   Neurologic: no gross neuro deficits     Psychiatric:  alert and oriented x 3    Lines:  Left chest port    CXR 1/25/18:        Recent Labs      01/25/18   0524  01/24/18   0902  01/23/18   0421  01/22/18   1848   WBC  15.0*  18.0*  13.6*  9.9   HGB  10.4*  11.1*  10.3*  10.3*   HCT  31.4*  33.8*  31.1*  30.9*   PLT  234  248  202  187     Recent Labs      01/25/18   0524 01/24/18   0902  01/23/18   0421  01/22/18   1956  01/22/18   1848   NA  145  146*  144   --   144   K  3.4*  3.2*  4.6   --   3.1*   CL  112*  114*  113*   --   111*   GLU  103*  98  130*   --   110*   CO2  23  20*  22   --   22   BUN  10  15  17   --   23   CREA  0.73  0.90  0.71   --   0.81   MG   --    --    --   1.6*   --    CA  8.8  8.9  7.7*   --   7.4*   ALB   --    --   2.3*   --   2.3*   TBILI   --    --   0.4   --   0.3   ALT   --    --   14   --   13   SGOT   --    --   26   --   26     No results for input(s): PH, PCO2, PO2, HCO3 in the last 72 hours. No results for input(s): LCAD, LAC in the last 72 hours.     Assessment: (Medical Decision Making)     Hospital Problems  Date Reviewed: 1/25/2018          Codes Class Noted POA    Acute respiratory failure with hypoxia [facility]) ICD-10-CM: J96.01  ICD-9-CM: 518.81  1/25/2018 No    Worsening hypoxemia with bilateral infiltrates    Infection due to mycoplasma- likely remote ICD-10-CM: A49.3  ICD-9-CM: 041.81  1/25/2018 Yes    IgM normal but IgG high suggests remote infection.      Pulmonary infiltrates ICD-10-CM: R91.8  ICD-9-CM: 793.19  1/25/2018 Unknown    DDx includes cardiogenic pulmonary edema, NC pulmonary edema ARDS, pulmonary drug toxicity, atypical pneumonia, AIP, acute eosinophilic pneumonia    HTN (hypertension), benign (Chronic) ICD-10-CM: I10  ICD-9-CM: 401.1  1/23/2018 Yes        Recurrent major depressive disorder, in remission ([facility] (Chronic) ICD-10-CM: F33.40  ICD-9-CM: 296.35  1/23/2018 Yes        Breast cancer ([facility] (Chronic) ICD-10-CM: C50.919  ICD-9-CM: 174.9  1/23/2018 Yes        S/P bilateral mastectomy (Chronic) ICD-10-CM: Z90.13  ICD-9-CM: V45.71  1/23/2018 Yes    Overview Signed 1/25/2018  2:39 PM by Elieser Queen NP     10/11/17  BILATERAL BREAST MASTECTOMY SIMPLE ARTOURA ULTRA HIGH PROFILE BREAST IMPLANTS 700cc 13.5 cm 8.3cm [id number] X 3 /  MEDIUM HEIGHT 550cc 13.5 cm 11.7cm 7.4cm [id number] X 3   BILATERAL SENTINEL NODE BIOPSY WITH LYMPHATIC MAPPING  RIGHT AXILLARY DISSECTION  BILATERAL BREAST RECONSTRUCTION WITH PLACEMENT TISSUE EXPANDERS AND ALLODERM             HCAP (healthcare-associated pneumonia) ICD-10-CM: J18.9  ICD-9-CM: 295  1/23/2018 Yes              Plan:  (Medical Decision Making)     --Zosyn, Vancomycin--Add Azithromycin   --Prednisone 40mg daily- this should clear eosinophils if AEP  --Oncology chemotherapy with:  Taxotere plus Cytoxan due tomorrow- consider HOLDING  --Check BNP, CRP and Procal  --Strict I&Os  --NPO after midnight for possible bronch in AM  --Is DNR but states if she should have complications during the bronch is agreeable to intubation. More than 50% of the time documented was spent in face-to-face contact with the patient and in the care of the patient on the floor/unit where the patient is located. Thank you very much for this referral.  We appreciate the opportunity to participate in this patient's care. Will follow along with above stated plan. Arias Mcdaniel NP    Lungs: marked crackles bilaterally  Heart:  RRR with no Murmur/Rubs/Gallops    Additional Comments:  Chest exam worrisome for cardiogenic pulmonary edema but broad DDx possible. Will check BNP, CRP, and PCT and plan for bronch in AM if condition allows. I have spoken with and examined the patient. I agree with the above assessment and plan as documented.     Shila Lanier MD

## 2018-02-01 NOTE — PROGRESS NOTES
Pharmacokinetic Consult to Pharmacist    Alfred Masterson is a 69 y.o. female being treated for HAP with vancomycin and pip/tazo. Weight: 76.8 kg (169 lb 4.8 oz)  Lab Results   Component Value Date/Time    BUN 20 01/31/2018 05:27 AM    Creatinine 0.89 01/31/2018 05:27 AM    WBC 15.7 01/31/2018 05:27 AM    Procalcitonin 0.1 01/25/2018 04:37 PM    Lactic Acid (POC) 1.2 01/22/2018 08:27 PM      Estimated Creatinine Clearance: 52.8 mL/min (based on Cr of 0.89). CULTURES:  1/22    Blood X 2   NG, final      Flu screen   Positive            Lab Results   Component Value Date/Time    Vancomycin,trough 17.0 01/31/2018 06:20 PM         Day 9 of vancomycin. Goal trough is 15-20. Trough is therapeutic. Continue vancomycin 1000 mg IV q18h. Further levels will be ordered as clinically indicated. Pharmacy will continue to follow. Please call with any questions.     Thank you,  Dg Coppola, PharmD  Clinical Pharmacist  [phone]

## 2018-02-01 NOTE — PROGRESS NOTES
Frank Verdin  Admission Date: 1/22/2018             Daily Progress Note: 2/1/2018    The patient's chart is reviewed and the patient is discussed with the staff. Admitted with dyspnea and cough. CXR with bilateral infiltrates. Abx started for ? Pneumonia.  Blood cultures negative. On Taxotere and Cytoxan for breast cancer (has received 1 cycle). Tested positive for Influenza B - on Tamiflu.  CRP elevated, steroids added for suspected inflammatory process. Significant hypoxia - on optiflow.  No improvement so bronchoscopy performed 1/31. Subjective:      Questions about what to expect - driving, PT, home? Daughter updated by phone. Weak, poor appetite, does not like the optiflow (uncomfortable) but understands need.      Current Facility-Administered Medications   Medication Dose Route Frequency    HYDROcodone-acetaminophen (NORCO) 7.5-325 mg per tablet 1 Tab  1 Tab Oral Q6H PRN    losartan (COZAAR) tablet 50 mg  50 mg Oral DAILY    hydrALAZINE (APRESOLINE) 20 mg/mL injection 20 mg  20 mg IntraVENous Q6H PRN    acetaminophen (TYLENOL) tablet 650 mg  650 mg Oral Q6H PRN    loperamide (IMODIUM) capsule 2 mg  2 mg Oral Q4H PRN    methylPREDNISolone (PF) (SOLU-MEDROL) injection 80 mg  80 mg IntraVENous Q12H    albuterol (PROVENTIL VENTOLIN) nebulizer solution 2.5 mg  2.5 mg Nebulization QID RT    guaiFENesin ER (MUCINEX) tablet 1,200 mg  1,200 mg Oral BID    piperacillin-tazobactam (ZOSYN) 4.5 g in 0.9% sodium chloride (MBP/ADV) 100 mL  4.5 g IntraVENous Q8H    vancomycin (VANCOCIN) 1,000 mg in dextrose 5% 250 mL IVPB  1,000 mg IntraVENous Q18H    dextrose 5% - 0.45% NaCl with KCl 10 mEq/L infusion  50 mL/hr IntraVENous CONTINUOUS    azithromycin 500 mg in dextrose 5% 250 mL IVPB  500 mg IntraVENous DAILY    sodium chloride (OCEAN) 0.65 % nasal spray 2 Spray  2 Spray Both Nostrils Q2H PRN    HYDROcodone-homatropine (HYCODAN) 5-1.5 mg/5 mL (5 mL) syrup 5 mL  5 mL Oral Q4H PRN    lip protectant (BLISTEX) ointment   Topical PRN    alteplase (CATHFLO) 2 mg in sterile water (preservative free) 2 mL injection  2 mg InterCATHeter PRN    atorvastatin (LIPITOR) tablet 20 mg  20 mg Oral DAILY    LORazepam (ATIVAN) tablet 1 mg  1 mg Oral BID PRN    metoprolol succinate (TOPROL-XL) XL tablet 50 mg  50 mg Oral DAILY    prochlorperazine (COMPAZINE) tablet 10 mg  10 mg Oral Q6H PRN    sertraline (ZOLOFT) tablet 100 mg  100 mg Oral DAILY    sodium chloride (NS) flush 5-10 mL  5-10 mL IntraVENous Q8H    sodium chloride (NS) flush 5-10 mL  5-10 mL IntraVENous PRN    heparin (porcine) injection 5,000 Units  5,000 Units SubCUTAneous Q12H         Objective:     Vitals:    01/31/18 2242 02/01/18 0345 02/01/18 0705 02/01/18 0732   BP: 113/64 162/73 146/75    Pulse: 88 77 82    Resp: 18 18 18    Temp: 98.2 °F (36.8 °C) 97.7 °F (36.5 °C) 98.6 °F (37 °C)    SpO2: 93% 93% 91% 94%   Weight:         Intake and Output:   01/30 1901 - 02/01 0700  In: 2957 [P.O.:120; I.V.:2837]  Out: 3700 [Urine:3700]  02/01 0701 - 02/01 1900  In: 356 [P.O.:120; I.V.:236]  Out: 400 [Urine:400]    Physical Exam:   Constitutional:  the patient is well developed and in no acute distress  HEENT:  Sclera clear, pupils equal, oral mucosa moist  Lungs: clear bilaterally. Wearing optiflow device - 75% flow today  Cardiovascular:  RRR without M,G,R  Abd/GI: soft and non-tender; with positive bowel sounds. Ext: warm without cyanosis. There is no lower leg edema. Musculoskeletal: moves all four extremities with equal strength  Skin:  no jaundice or rashes, no wounds   Neuro: no gross neuro deficits   Musculoskeletal: can't ambulate - too short of breath, weak. No deformity  Psychiatric: Calm.      ROS:  Poor appetite, weak, short of breath   Lines: peripheral IV    CHEST XRAY: none today    LAB  Recent Labs      02/01/18   0601  01/31/18   0527  01/30/18   0444   WBC  15.1*  15.7*  15.2*   HGB  10.8*  10.7*  10.8*   HCT  32.1*  32.7* 32.7*   PLT  281  298  285     Recent Labs      02/01/18   0601  01/31/18   0527  01/30/18   0444   NA  143  145  146*   K  3.5  3.9  3.9   CL  108*  109*  112*   CO2  27  28  26   GLU  137*  127*  87   BUN  20  20  16   CREA  0.88  0.89  0.79   MG  2.0  2.0  2.0       Assessment:  (Medical Decision Making)     Hospital Problems  Date Reviewed: 1/25/2018          Codes Class Noted POA    ARDS (adult respiratory distress syndrome) ([facility] ICD-10-CM: N32  ICD-9-CM: 518.52  1/29/2018 Unknown    Remains on high flow oxygen support - suspect secondary to recent flu    Influenza ICD-10-CM: J11.1  ICD-9-CM: 487.1  1/27/2018 Unknown    Has completed tamiflu    Hypokalemia ICD-10-CM: E87.6  ICD-9-CM: 276.8  1/27/2018 Unknown    resolved    Elevated brain natriuretic peptide (BNP) level ICD-10-CM: R79.89  ICD-9-CM: 790.99  1/26/2018 Unknown    Lab Results   Component Value Date/Time     01/29/2018 04:43 AM     01/25/2018 04:16 PM    BNP 39 01/22/2018 06:48 PM         * (Principal)Acute respiratory failure with hypoxia (HCC) ICD-10-CM: J96.01  ICD-9-CM: 518.81  1/25/2018 No    Remains on high flow oxygen    Pulmonary infiltrates ICD-10-CM: R91.8  ICD-9-CM: 793.19  1/25/2018 Unknown    Bronch yesterday - results pending    HTN (hypertension), benign (Chronic) ICD-10-CM: I10  ICD-9-CM: 401.1  1/23/2018 Yes    controlled    Recurrent major depressive disorder, in remission (HCC) (Chronic) ICD-10-CM: F33.40  ICD-9-CM: 296.35  1/23/2018 Yes    On zoloft    Breast cancer ([facility] (Chronic) ICD-10-CM: C50.919  ICD-9-CM: 174.9  1/23/2018 Yes    Has had 1 cycle of chemo - on hold for now    S/P bilateral mastectomy (Chronic) ICD-10-CM: Z90.13  ICD-9-CM: V45.71  1/23/2018 Yes    Overview Signed 1/25/2018  2:39 PM by Priyank Carvalho NP     10/11/17  BILATERAL BREAST MASTECTOMY SIMPLE ARTOURA ULTRA HIGH PROFILE BREAST IMPLANTS 700cc 13.5 cm 8.3cm [id number] X 3 /  MEDIUM HEIGHT 550cc 13.5 cm 11.7cm 7.4cm [id number] X 3   BILATERAL SENTINEL NODE BIOPSY WITH LYMPHATIC MAPPING  RIGHT AXILLARY DISSECTION  BILATERAL BREAST RECONSTRUCTION WITH PLACEMENT TISSUE EXPANDERS AND ALLODERM         For cancer    HCAP (healthcare-associated pneumonia) ICD-10-CM: J18.9  ICD-9-CM: 857  1/23/2018 Yes    Remains on abx          Plan:  (Medical Decision Making)   1. Day 9 vanc and zosyn, day 6 zithromax - cultures from yesterday pending - plan to stop abx tomorrow  2. Continue steroids. 60% neutrophils in washings  3. Needs PT but the hypoxia will limit activity  4. High flow oxygen support - will consult [facility] - suspect slow recovery  5. Await results from washings done yesterday  6. Chemo on hold for now  7. Leave langley for now with hypoxia and debiltity    Creiman Dela Cruz, NP    More than 50% of time documented was spent face-to-face contact with the patient and in the care of the patient on the floor/unit where the patient is located. Lungs:  Clear anteriorly  Heart:  RRR with no Murmur/Rubs/Gallops    Additional Comments: Will stop ABX after today. Cont. Steroids and wean O2 as tolerated. May need LTAC    I have spoken with and examined the patient. I agree with the above assessment and plan as documented.     Dominique Olson MD

## 2018-02-01 NOTE — PROGRESS NOTES
ISAAC spoke with Bernadette Knox NP, about d/c planning. She is waiting to review bronch results. ISAAC spoke with Jessy Mayo NP, who requested a referral to [facility]. ISAAC sent referral to Kami Osman with [facility] who is working on it.

## 2018-02-01 NOTE — PROGRESS NOTES
[facility] Hematology & Oncology        Inpatient Hematology / Oncology Progress Note      Admission Date: 2018  6:38 PM  Reason for Admission/Hospital Course: Pulmonary infiltrates on CXR [R91.8]      24 Hour Events:  Afebrile, VSS  On Optiflow  Continues on antibiotics and Tamiflu  Bronch with BAL performed yesterday - studies pending    ROS:  Constitutional: negative for fever, chills. Positive for weakness, malaise, fatigue. CV:  +pleuritic chest pain with deep inspiration. negative for palpitations, edema. Respiratory: Positive for dyspnea, cough. GI: negative for nausea, abdominal pain. + diarrhea. 10 point review of systems is otherwise negative with the exception of the elements mentioned above in the HPI. Allergies   Allergen Reactions    Dilaudid [Hydromorphone] Other (comments)     \"out of her mind\"    Sulfa (Sulfonamide Antibiotics) Itching    Tetracycline Nausea Only       OBJECTIVE:  Patient Vitals for the past 8 hrs:   BP Temp Pulse Resp SpO2   18 1113 - - - - 95 %   18 1100 140/77 98.1 °F (36.7 °C) 80 19 93 %   18 0732 - - - - 94 %   18 0705 146/75 98.6 °F (37 °C) 82 18 91 %     Temp (24hrs), Av.1 °F (36.7 °C), Min:97.4 °F (36.3 °C), Max:98.6 °F (37 °C)     0701 -  1900  In: 356 [P.O.:120; I.V.:236]  Out: 700 [Urine:700]    Physical Exam:  Constitutional: Frail appearing female in no acute distress, sitting comfortably in the hospital bed. HEENT: Normocephalic and atraumatic. Oropharynx is clear, mucous membranes are moist. Extraocular muscles are intact. Sclerae anicteric. Skin Warm and dry.  + bruising to upper extremities bilaterally. No rash noted. No erythema. No pallor. Respiratory Anterior lung sounds diminished bilaterally. On Optiflow   CVS Normal rate, regular rhythm and normal S1 and S2. No murmurs, gallops, or rubs. Abdomen Soft, nontender and nondistended, normoactive bowel sounds. No palpable mass.   No hepatosplenomegaly. Neuro Grossly nonfocal with no obvious sensory or motor deficits. MSK Normal range of motion in general.  No edema and no tenderness. Psych Appropriate mood and affect. Labs:      Recent Labs      02/01/18   0601  01/31/18   0527  01/30/18   0444   WBC  15.1*  15.7*  15.2*   RBC  3.62*  3.68*  3.61*   HGB  10.8*  10.7*  10.8*   HCT  32.1*  32.7*  32.7*   MCV  88.7  88.9  90.6   MCH  29.8  29.1  29.9   MCHC  33.6  32.7  33.0   RDW  16.0*  15.6*  15.7*   PLT  281  298  285   GRANS  89*  89*  85*   LYMPH  4*  4*  7*   MONOS  6  6  6   EOS  0*  0*  1   BASOS  0  0  0   IG  1  1  1   DF  AUTOMATED  AUTOMATED  AUTOMATED   ANEU  13.4*  13.9*  12.9*   ABL  0.6  0.7  1.1   ABM  1.0  0.9  1.0   TRISHA  0.0  0.0  0.1   ABB  0.0  0.0  0.0   AIG  0.1  0.2  0.1        Recent Labs      02/01/18   0601  01/31/18   0527  01/30/18   0444   NA  143  145  146*   K  3.5  3.9  3.9   CL  108*  109*  112*   CO2  27  28  26   AGAP  8  8  8   GLU  137*  127*  87   BUN  20  20  16   CREA  0.88  0.89  0.79   GFRAA  >60  >60  >60   GFRNA  >60  >60  >60   CA  8.4  8.5  8.4   MG  2.0  2.0  2.0         Imaging:  XR CHEST PORT [[id number]] Collected: 01/26/18 0816      Order Status: Completed Updated: 01/26/18 0835     Narrative:       Chest portable    CLINICAL INDICATION: Respiratory failure, dyspnea, follow-up infiltrates,  hypertension, bilateral mastectomy and history of breast cancer    COMPARISON: 1/25/2018    TECHNIQUE: single AP portable view chest at 8:23 AM upright     FINDINGS: The left portacatheter is stable. The mediastinal and hilar contours  are stable. There is no pneumothorax or focal dense consolidation. Reticular and  patchy opacities bilaterally, slightly more prominent in the upper lobes, are  similar to prior. No evidence of enlarging pleural effusion.  Surgical changes  involving bilateral breasts are again noted with probable implants.         Impression:       IMPRESSION: No significant change involving diffuse lung infiltrates.       XR CHEST Billy Santos [[id number]] Collected: 01/25/18 0125     Order Status: Completed Updated: 01/25/18 0128     Narrative:       AP portable chest    INDICATION: Shortness of breath    COMPARISON: 1/22/2018    FINDINGS: No change in cardiomegaly. Bilateral patchy and diffuse  reticulonodular interstitial changes with patchy alveolar densities bilaterally  without significant change. No change position of left-sided Port-A-Cath  catheter. Negative for pneumothorax.       Impression:       IMPRESSION: No significant change     XR CHEST PORT [[id number]] Collected: 01/22/18 1907     Order Status: Completed Updated: 01/22/18 1914     Narrative:       History: Shortness of breath, breast cancer    Exam: portable chest    Comparison: 10/12/2017     Findings: There is coarsening of the interstitial lung markings. There is new  focal increased opacity seen peripherally within the right midlung. No  pneumothorax. No change in the appearance of the mediastinal contour or osseous  structures. There is a new port overlying the left chest wall. Impressions: New focal opacity seen within the right midlung.           ASSESSMENT:    Problem List  Date Reviewed: 1/25/2018          Codes Class Noted    ARDS (adult respiratory distress syndrome) ([facility] [address] ICD-10-CM: A60  ICD-9-CM: 518.52  1/29/2018        Influenza ICD-10-CM: J11.1  ICD-9-CM: 487.1  1/27/2018        Hypokalemia ICD-10-CM: E87.6  ICD-9-CM: 276.8  1/27/2018        Elevated brain natriuretic peptide (BNP) level ICD-10-CM: R79.89  ICD-9-CM: 790.99  1/26/2018        * (Principal)Acute respiratory failure with hypoxia (HCC) ICD-10-CM: J96.01  ICD-9-CM: 518.81  1/25/2018        Pulmonary infiltrates ICD-10-CM: R91.8  ICD-9-CM: 793.19  1/25/2018        HTN (hypertension), benign (Chronic) ICD-10-CM: I10  ICD-9-CM: 401.1  1/23/2018        Recurrent major depressive disorder, in remission (HCC) (Chronic) ICD-10-CM: F33.40  ICD-9-CM: 296.35 1/23/2018        Breast cancer ([facility] [address] (Chronic) ICD-10-CM: C50.919  ICD-9-CM: 174.9  1/23/2018        S/P bilateral mastectomy (Chronic) ICD-10-CM: Z90.13  ICD-9-CM: V45.71  1/23/2018    Overview Signed 1/25/2018  2:39 PM by Chino Daniels NP     10/11/17  BILATERAL BREAST MASTECTOMY SIMPLE ARTOURA ULTRA HIGH PROFILE BREAST IMPLANTS 700cc 13.5 cm 8.3cm [id number] X 3 /  MEDIUM HEIGHT 550cc 13.5 cm 11.7cm 7.4cm [id number] X 3   BILATERAL SENTINEL NODE BIOPSY WITH LYMPHATIC MAPPING  RIGHT AXILLARY DISSECTION  BILATERAL BREAST RECONSTRUCTION WITH PLACEMENT TISSUE EXPANDERS AND ALLODERM             HCAP (healthcare-associated pneumonia) ICD-10-CM: J18.9  ICD-9-CM: 308  1/23/2018        Dehydration ICD-10-CM: E86.0  ICD-9-CM: 276.51  1/22/2018        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 BCNTD. Incentive spirometer  1/25 Increased oxygen demands. Consulted pulm. 1/26 Pulmonary added azith and lasix. Considered thoracentesis but patient desats when off of oxygen. She would need to be intubated for thoracentesis. Conservative measures for now including recommending cpap which patient refused but is now willing to try. Transfer to 5th floor when bed available. 1/29 BC-NGTD. Continues Vancomycin, Zosyn, and Azithromycin. Respiratory status not much improved. Pulmonary following. 1/30 +mycoplasma study. Remains afebrile. Pt not improving despite tamiflu, antibx, and steroids. Pulm planning on bronch tomorrow AM.  1/31 Bronch this AM - studies pending.   Pt states she feels less dyspneic s/p bronch, but still having pleuritic chest pain with deep inspiration. 2/1 Studies from bronch pending. Pt remains on Optiflow. Continues on broad spectrum antibx. Pulm following. Influenza  1/26 Added Tamiflu.      Stage IIB left breast cancer and IIIB right breast cancer  1/24 sp Cycle one TC. Next Cycle planned for 1/27/2018. Currently on hold. Hypokalemia  1/27 Replete with 40 meq K+  1/29 K+ up to 4.1    Diarrhea  1/28 C-diff negative. Utilize Imodium as needed. Continue home meds  Consult PT/OT/CM  Heparin for DVT prophylaxis    Disposition: TBD. Pt still requiring O2 via optiflow. Ashlee Haas NP   [facility] Hematology & Oncology  [address] [place] [address]  Office : [phone]  Fax : [phone]           Attending Addendum:  I personally evaluated the patient with Ashlee Haas N.PJavier,  and agree with the assessment, findings and plan as documented. Appears stable, heart regular without murmur, lungs clear, abdomen benign. F/u bronchoscopy results. Continue supportive care including abx.              Joy Flores MD  [address]  Office : [phone]  Fax : [phone]

## 2018-02-02 NOTE — PROGRESS NOTES
Problem: Mobility Impaired (Adult and Pediatric)  Goal: *Acute Goals and Plan of Care (Insert Text)  Goals:  (1.)Ms. Tatianna Yanes will move from supine to sit and sit to supine , scoot up and down and roll side to side with INDEPENDENT within 5 day(s). (2.)Ms. Tatianna Yanes will transfer from bed to chair and chair to bed with MODIFIED INDEPENDENCE using the least restrictive device within 5 day(s). (3.)Ms. Tatianna Yanes will ambulate with MODIFIED INDEPENDENCE for  feet with the least restrictive device within 5 day(s). (4.)Ms Tatianna Yanes will increase her activity tolerance in supine, sitting and standing to >20 minutes with O2 sats remaining in the 90s within 5 day(s)        PHYSICAL THERAPY: Daily Note, Treatment Day: 3rd, AM 2/2/2018  INPATIENT: Hospital Day: 12  Payor: Latia Tinoco / Plan: [employer] / Product Type: Medicare /      NAME/AGE/GENDER: Adrián Beasley is a 69 y.o. female   PRIMARY DIAGNOSIS: Pulmonary infiltrates on CXR [R91.8] Acute respiratory failure with hypoxia (HCC) Acute respiratory failure with hypoxia (HCC)  Procedure(s) (LRB):  BRONCHOSCOPY  **HAVE RED BOX HANDY** (N/A)  BRONCHIAL ALVEOLAR LAVAGE (N/A)  2 Days Post-Op  ICD-10: Treatment Diagnosis:   · Generalized Muscle Weakness (M62.81)  · Difficulty in walking, Not elsewhere classified (R26.2)  · Dizziness and Giddiness (R42)   Precaution/Allergies:  Dilaudid [hydromorphone]; Sulfa (sulfonamide antibiotics); and Tetracycline      ASSESSMENT:     Ms. Tatianna Yanes presents sitting up in the bed and agreeable to attempt mobility with non-rebreather. Patient continues to be very limited with mobility due to O2 saturation. Respiratory therapist came in prior to mobility and agreed that using the non-rebreather mask would be the best option for mobility. Patient sat up and O2 dropped to 85%, patient then stood and completed a half pivot to recliner chair. Luckily the arm rest was back and the bed was flush with the recliner.   Patient's O2 dropped to 77% and slowly improved back to 90% over a 10 minute span. Patient was positioned with needs in reach and RN aware. Patient is motivated to participate, but limited by O2 needs. This section established at most recent assessment   PROBLEM LIST (Impairments causing functional limitations):  1. Decreased Strength  2. Decreased ADL/Functional Activities  3. Decreased Transfer Abilities  4. Decreased Ambulation Ability/Technique  5. Decreased Balance  6. Decreased Activity Tolerance  7. Increased Fatigue  8. Increased Shortness of Breath   INTERVENTIONS PLANNED: (Benefits and precautions of physical therapy have been discussed with the patient.)  1. Bed Mobility  2. Gait Training  3. Therapeutic Activites  4. Therapeutic Exercise/Strengthening  5. Transfer Training     TREATMENT PLAN: Frequency/Duration: 3 times a week for duration of hospital stay  Rehabilitation Potential For Stated Goals: Good     RECOMMENDED REHABILITATION/EQUIPMENT: (at time of discharge pending progress): Due to the probability of continued deficits (see above) this patient will likely need continued skilled physical therapy after discharge. Possible HH at time of discharge since she lives alone. Equipment:    None at this time              HISTORY:   History of Present Injury/Illness (Reason for Referral):  Ms. Romy Dickey is a 70 yo female who presented with weakness on a background of depression, GERD, HTN and HLD. She also has dyspnea. The dyspnea was presented for 2 months. She also reported cough for the past 2 weeks. She felt weakness and decreased PO intake for 5 days. She denies the following: dysuria, or chest pain. She reports diarrhea for the past 4 days. Due to weakness, she went to the infusion center where she was found to be hypotensive. CXR today showed a new focal opacity in the right midlung (influenza negative).   Past Medical History/Comorbidities:   Ms. Romy Dickey  has a past medical history of Adverse effect of anesthesia; Arthritis; Breast cancer ([facility] [address] Depression; Fibrocystic breast disease; GERD (gastroesophageal reflux disease); Headache(784.0); HTN (hypertension), benign; Hypercholesteremia; and Kidney stones. Ms. Eleazar Melgar  has a past surgical history that includes hx colonoscopy (Oct 2010); hx cataract removal; hx knee replacement; hx hernia repair (03/2011); hx knee replacement (02/19/2009); hx breast reconstruction (10/11/2017); hx skin biopsy (10/03/2017); hx mastectomy (Bilateral, 10/11/2017); hx breast reconstruction (Bilateral, 10/11/2017); and hx vascular access. Social History/Living Environment:   Home Environment: Private residence  # Steps to Enter: 0  One/Two Story Residence: One story  Living Alone: Yes  Support Systems: Child(frank)  Patient Expects to be Discharged to[de-identified] Private residence  Current DME Used/Available at Home: Walker, rolling (built-in shower bench)  Tub or Shower Type: Shower  Prior Level of Function/Work/Activity:  Patient reports she has a r/walker that she uses most of the time at home for her mobility. She states she lives alone but has good neighbor and family support. Number of Personal Factors/Comorbidities that affect the Plan of Care: 1-2: MODERATE COMPLEXITY   EXAMINATION:   Most Recent Physical Functioning:   Gross Assessment:                  Posture:     Balance:  Sitting: Impaired  Sitting - Static: Fair (occasional)  Sitting - Dynamic: Fair (occasional)  Standing: Impaired  Standing - Static: Poor  Standing - Dynamic : Poor Bed Mobility:  Rolling: Minimum assistance  Supine to Sit: Minimum assistance  Wheelchair Mobility:     Transfers:  Sit to Stand: Minimum assistance; Moderate assistance  Stand to Sit: Minimum assistance; Moderate assistance  Gait:     Base of Support: Widened  Speed/Amber: Slow  Distance (ft): 1 Feet (ft)  Assistive Device:  (hand held assist)  Ambulation - Level of Assistance:  Moderate assistance  Interventions: Safety awareness training;Verbal cues      Body Structures Involved:  1. Lungs  2. Metabolic  3. Endocrine Body Functions Affected:  1. Respiratory  2. Metobolic/Endocrine Activities and Participation Affected:  1. General Tasks and Demands  2. Mobility   Number of elements that affect the Plan of Care: 3: MODERATE COMPLEXITY   CLINICAL PRESENTATION:   Presentation: Stable and uncomplicated: LOW COMPLEXITY   CLINICAL DECISION MAKIN[address] [place] [address] AM-PAC 6 Clicks   Basic Mobility Inpatient Short Form  How much difficulty does the patient currently have. .. Unable A Lot A Little None   1. Turning over in bed (including adjusting bedclothes, sheets and blankets)? [] 1   [] 2   [] 3   [x] 4   2. Sitting down on and standing up from a chair with arms ( e.g., wheelchair, bedside commode, etc.)   [] 1   [] 2   [x] 3   [] 4   3. Moving from lying on back to sitting on the side of the bed? [] 1   [] 2   [] 3   [x] 4   How much help from another person does the patient currently need. .. Total A Lot A Little None   4. Moving to and from a bed to a chair (including a wheelchair)? [] 1   [] 2   [x] 3   [] 4   5. Need to walk in hospital room? [] 1   [] 2   [x] 3   [] 4   6. Climbing 3-5 steps with a railing? [] 1   [] 2   [x] 3   [] 4   © , Trustees of [address] [place] [address], under license to [facility]. All rights reserved      Score:  Initial: 20 Most Recent: X (Date: -- )    Interpretation of Tool:  Represents activities that are increasingly more difficult (i.e. Bed mobility, Transfers, Gait). Score 24 23 22-20 19-15 14-10 9-7 6     Modifier CH CI CJ CK CL CM CN      ?  Mobility - Walking and Moving Around:     - CURRENT STATUS: CJ - 20%-39% impaired, limited or restricted    - GOAL STATUS: CI - 1%-19% impaired, limited or restricted    - D/C STATUS:  ---------------To be determined---------------  Payor: Anna Emmanuel / Plan: Maryann Mtz / Product Type: Medicare /      Medical Necessity:     · Patient is expected to demonstrate progress in strength, balance and functional technique to increase independence with standing, transfers and gait with a r/walker. Reason for Services/Other Comments:  · Patient continues to require skilled intervention due to medical complications. Use of outcome tool(s) and clinical judgement create a POC that gives a: Clear prediction of patient's progress: LOW COMPLEXITY            TREATMENT:   (In addition to Assessment/Re-Assessment sessions the following treatments were rendered)   Pre-treatment Symptoms/Complaints:  Patient had no complaints of pain just tired and still have trouble with breathing. Pain: Initial:   Pain Intensity 1: 0  Pain Intervention(s) 1: Ambulation/Increased Activity, Nurse notified, Repositioned  Post Session:  0/10     Therapeutic Activity: (    25 minutes): Therapeutic activities including chair transfers, sit to stand, standing balance and stepping in place to improve mobility, strength and balance. Required minimal Safety awareness training;Verbal cues to promote static and dynamic balance in standing. Patient performed BLE AROM strength exercises. Date:  1/25/18 Date:  1/26/18 Date:     Activity/Exercise Parameters Parameters Parameters   Ankle pumps seated  2 x 10 X 10    Heel slides in recliner 2 x 10 x10    Hip abduction in recliner 2 x 10 X 10    SLR from recliner 2 x 10 X 10    Knee extension seated 2 x 10     Hip flexion/marching seated 2 x 10             races/Orthotics/Lines/Etc:   · Non re-breather   Treatment/Session Assessment:    · Response to Treatment:  Patient participated well but fatigues easily and becomes anxious when she has SOB. With mobility O2 drops to 77%   · Interdisciplinary Collaboration:   o Physical Therapist  o Registered Nurse  · After treatment position/precautions:   o Up in chair  o Bed/Chair-wheels locked  o Call light within reach  o RN notified   · Compliance with Program/Exercises:  Will assess as treatment progresses. · Recommendations/Intent for next treatment session: \"Next visit will focus on advancements to more challenging activities\".   Total Treatment Duration:  PT Patient Time In/Time Out  Time In: 1045  Time Out: [address], [id number], DPT

## 2018-02-02 NOTE — ADT AUTH CERT NOTES
Pneumonia, Community Acquired - Care Day 11 (2/1/2018) by Sim Borrego RN        Review Status Review Entered       Completed 2/1/2018       Details              Care Day: 11 Care Date: 2/1/2018 Level of Care:        Guideline Day 2        Level Of Care       (X) Floor              Clinical Status       (X) * No CO2 retention or acidosis       (X) * No requirement for mechanical ventilation       (X) * Hypotension absent       (X) * Fever absent or reduced       ( ) * No hypoxia on room air or oxygenation improved       2/1/2018 3:24 PM EST by Melanie Augustin         Continues to require OPTIFLOW: Sat 93% 50L HFNC/FI02 70%              (X) * Mental status improved or at baseline              Activity       ( ) * Increased activity              Routes       (X) Oral hydration, medications       2/1/2018 3:24 PM EST by Melanie Augustin         Tylenol 650mg po prn x 1, Alb neb qid, Liptiro 20mg pod, Azithromycin 500mg IV d, D51/2 w/10kcl @ 50cc/hr, Heparin 5000u sq q 12 hrs, COzaar 50mg pod, SoluMedrol 80mg IV w 12 hrs, Toprol XL 50mg pod, Zosyn 4.5g IV q 8 hrs, Vanco 1000mg IV q 18 hrs              (X) Usual diet              Interventions       (X) Pulse oximetry       (X) Possible oxygen       2/1/2018 3:24 PM EST by Melanie Augustin         Optiflow: 50L/HFNC, Fi02 70%                     Medications       (X) IV or oral antibiotics       2/1/2018 3:24 PM EST by Melanie Augustin         Zosyn 4.5g IV q 8 hrs, Vanco 1000mg IV q 18 hrs; Azithromycin 500mg IV d                                          * Milestone              Additional Notes       24 Hour Events:       Afebrile, VSS       On Optiflow       Continues on antibiotics and Tamiflu       Bronch with BAL performed yesterday - studies pending       140/77 98.1 °F (36.7 °C) 80 19 93 %/50LHFNC              Labs: WBC 15.1, RBC 3.62, H/H 10.8/32.1, Cl 108, Glu 137       PLAN:               HCAP       1/24 Continue vanc/zosyn. BCNTD.  Incentive spirometer       1/25 Increased oxygen demands. Consulted pulm.        1/26 Pulmonary added azith and lasix. Considered thoracentesis but patient desats when off of oxygen. She would need to be intubated for thoracentesis. Conservative measures for now including recommending cpap which patient refused but is now willing to try. Transfer to 5th floor when bed available.        1/29 BC-NGTD. Continues Vancomycin, Zosyn, and Azithromycin. Respiratory status not much improved. Pulmonary following.        1/30 +mycoplasma study. Remains afebrile.  Pt not improving despite tamiflu, antibx, and steroids.  Pulm planning on bronch tomorrow AM.       1/31 Bronch this AM - studies pending.  Pt states she feels less dyspneic s/p bronch, but still having pleuritic chest pain with deep inspiration.         2/1 Studies from bronch pending.  Pt remains on Optiflow.  Continues on broad spectrum antibx.  Pulm following.               Influenza       1/26 Added Tamiflu.                 Stage IIB left breast cancer and IIIB right breast cancer       1/24 sp Cycle one TC. Next Cycle planned for 1/27/2018. Currently on hold.                Hypokalemia       1/27 Replete with 40 meq K+       1/29 K+ up to 4.1               Diarrhea       1/28 C-diff negative.  Utilize Imodium as needed.                Continue home meds       Consult PT/OT/CM       Heparin for DVT prophylaxis               Disposition: TBD.  Pt still requiring O2 via optiflow.           Pneumonia, Community Acquired - Care Day 8 (1/29/2018) by Munira Morales RN        Review Status Review Entered       Completed 1/30/2018       Details              Care Day: 8 Care Date: 1/29/2018 Level of Care:        Guideline Day 2        Level Of Care       (X) Floor              Clinical Status       (X) * No CO2 retention or acidosis       (X) * No requirement for mechanical ventilation       (X) * Hypotension absent       (X) * Fever absent or reduced       ( ) * No hypoxia on room air or oxygenation improved       1/30/2018 4:22 PM EST by Lilian Scott         50L/Heated HFNC, 60% FI02              (X) * Mental status improved or at baseline              Activity       ( ) * Increased activity              Routes       (X) Oral hydration, medications       1/30/2018 4:22 PM EST by Lilian Scott         Alb neb qid, Azithromycin 500mg IV d, D51/2 w/10kcl@ 50cc/hr, Heparin 5000u sq q 12 hrs, SoluMedrol 80mg IV q 12 hrs, Tamiflu 30mg po q 12 hrs, Zosyn 4.5g IV q 8hrs, Vanco 1000mg IV q 18 hrs              (X) Usual diet              Interventions       (X) Pulse oximetry       (X) Possible oxygen       1/30/2018 4:22 PM EST by Lilian Scott         50LHFNC                     Medications       (X) IV or oral antibiotics                                   * Milestone              Additional Notes       24 Hour Events:       Afebrile, VSS       On Optiflow       Continues on antibiotics and Tamiflu               158/79 97.3 °F (36.3 °C) 73 16 97 %              Labs:  WBC 11.6, RBC 3.32, H/H 9.8/30, RDW 15.8, grans 87, lymph 5, eos 0, aneu 10.1, cl 111, glu 199, ca 8.2, tp 5.7, alb 2, andrew 3.7, ag ratio 0.5       PLAN:               HCAP       1/24 Continue vanc/zosyn. BCNTD. Incentive spirometer       1/25 Increased oxygen demands. Consulted pulm.        1/26 Pulmonary added azith and lasix. Considered thoracentesis but patient desats when off of oxygen. She would need to be intubated for thoracentesis. Conservative measures for now including recommending cpap which patient refused but is now willing to try. Transfer to 5th floor when bed available.        1/29 BC-NGTD. Continues Vancomycin, Zosyn, and Azithromycin. Respiratory status not much improved. Pulmonary following.                Influenza       1/26 Added Tamiflu.                 Stage IIB left breast cancer and IIIB right breast cancer       1/24 sp Cycle one TC. Next Cycle planned for 1/27/2018.  Currently on hold.                Hypokalemia       1/27 Replete with 40 meq K+       1/29 K+ up to 4.1               Diarrhea       1/28 C-diff negative.  Utilize Imodium as needed.                Consult PT/OT/CM

## 2018-02-02 NOTE — PROGRESS NOTES
Problem: Nutrition Deficit  Goal: *Optimize nutritional status  Nutrition F/U  Received 2nd nutrition consult for general nutrition management and supplements   Assessment:   · Diet order(s): Cardiac with Ensure Enlive tid  · Pt reports she is eating better now that she is receiving food she likes better. She reports she ate all of the grilled cheese and all of the fish. She does like the Ensure [place] but says she has only received 2 bottles at most.  Last 3 Recorded Weights in this Encounter    01/29/18 0400 01/30/18 0508 02/02/18 0518   Weight: 72.6 kg (160 lb) 76.8 kg (169 lb 4.8 oz) 72.5 kg (159 lb 14.4 oz)   Macronutrient needs:   EER: 2023-3870 kcal /day (20-25 kcal/kg ABW)   EPR: 63-78 grams protein/day (1.2-1.5 grams/kg IBW)   Intake/Comparative Standards: Average intake for past 3 day(s)/2 recorded meal(s): 35%. This potentially meets ~50-75% of kcal and ~50-75% of protein needs   Intervention:   Meals and snacks: Continue current diet. Nutrition Supplement Therapy: Continue Ensure Enlive tid. Confirmed with  that pt is receiving these.     Larisa Alvarez, [address] [place] [address], [address] [place] [address]

## 2018-02-02 NOTE — PROGRESS NOTES
Hernán Ahuja  Admission Date: 1/22/2018             Daily Progress Note: 2/2/2018    The patient's chart is reviewed and the patient is discussed with the staff. Admitted with dyspnea and cough. CXR with bilateral infiltrates. Abx started for ? Pneumonia.  Blood cultures negative. On Taxotere and Cytoxan for breast cancer (has received 1 cycle). Tested positive for Influenza B - on Tamiflu.  CRP elevated, steroids added for suspected inflammatory process. Significant hypoxia - on optiflow.  No improvement so bronchoscopy performed 1/31. Subjective:     Now on NRB with sat 96. Very weak and not getting OOB. Some scant yellow sputum. Some epistaxis.  .     Current Facility-Administered Medications   Medication Dose Route Frequency    guaiFENesin ER (MUCINEX) tablet 1,200 mg  1,200 mg Oral BID    HYDROcodone-acetaminophen (NORCO) 7.5-325 mg per tablet 1 Tab  1 Tab Oral Q6H PRN    losartan (COZAAR) tablet 50 mg  50 mg Oral DAILY    hydrALAZINE (APRESOLINE) 20 mg/mL injection 20 mg  20 mg IntraVENous Q6H PRN    acetaminophen (TYLENOL) tablet 650 mg  650 mg Oral Q6H PRN    loperamide (IMODIUM) capsule 2 mg  2 mg Oral Q4H PRN    methylPREDNISolone (PF) (SOLU-MEDROL) injection 80 mg  80 mg IntraVENous Q12H    albuterol (PROVENTIL VENTOLIN) nebulizer solution 2.5 mg  2.5 mg Nebulization QID RT    piperacillin-tazobactam (ZOSYN) 4.5 g in 0.9% sodium chloride (MBP/ADV) 100 mL  4.5 g IntraVENous Q8H    dextrose 5% - 0.45% NaCl with KCl 10 mEq/L infusion  50 mL/hr IntraVENous CONTINUOUS    sodium chloride (OCEAN) 0.65 % nasal spray 2 Spray  2 Spray Both Nostrils Q2H PRN    HYDROcodone-homatropine (HYCODAN) 5-1.5 mg/5 mL (5 mL) syrup 5 mL  5 mL Oral Q4H PRN    lip protectant (BLISTEX) ointment   Topical PRN    alteplase (CATHFLO) 2 mg in sterile water (preservative free) 2 mL injection  2 mg InterCATHeter PRN    atorvastatin (LIPITOR) tablet 20 mg  20 mg Oral DAILY    LORazepam (ATIVAN) tablet 1 mg  1 mg Oral BID PRN    metoprolol succinate (TOPROL-XL) XL tablet 50 mg  50 mg Oral DAILY    prochlorperazine (COMPAZINE) tablet 10 mg  10 mg Oral Q6H PRN    sertraline (ZOLOFT) tablet 100 mg  100 mg Oral DAILY    sodium chloride (NS) flush 5-10 mL  5-10 mL IntraVENous Q8H    sodium chloride (NS) flush 5-10 mL  5-10 mL IntraVENous PRN    heparin (porcine) injection 5,000 Units  5,000 Units SubCUTAneous Q12H     ROS:    Constitutional: negative for fever, chills, sweats  Cardiac: negative for chest pain, palpitations, syncope, edema  GI: negative for dysphagia, reflux, vomiting, diarrhea, abdominal pain, or melena  Neuro: negative for focal weakness, numbness, headache                Objective:     Vitals:    02/02/18 0035 02/02/18 0404 02/02/18 0518 02/02/18 0714   BP: 165/76 160/69  153/73   Pulse: 90 83  90   Resp: 18 17  16   Temp:  96.4 °F (35.8 °C)  97 °F (36.1 °C)   SpO2: 92% 92%  94%   Weight:   159 lb 14.4 oz (72.5 kg)      Intake and Output:   01/31 1901 - 02/02 0700  In: 5050 [P.O.:120; I.V.:2616]  Out: 3700 [Urine:3700]  02/02 0701 - 02/02 1900  In: 286 [I.V.:286]  Out: 200 [Urine:200]    Physical Exam:   Constitutional:  the patient is well developed and in no acute distress  HEENT:  Sclera clear, pupils equal, oral mucosa moist  Lungs: clear bilaterally. Wearing optiflow device - 75% flow today  Cardiovascular:  RRR without M,G,R  Abd/GI: soft and non-tender; with positive bowel sounds. Ext: warm without cyanosis. There is no lower leg edema. Musculoskeletal: moves all four extremities with equal strength  Skin:  no jaundice or rashes, no wounds   Neuro: no gross neuro deficits   Musculoskeletal: can't ambulate - too short of breath, weak. No deformity  Psychiatric: Calm.      ROS:  Poor appetite, weak, short of breath   Lines: peripheral IV    CHEST XRAY: none today    LAB  Recent Labs      02/01/18   0601  01/31/18   0527   WBC  15.1*  15.7*   HGB  10.8*  10.7*   HCT  32.1*  32.7* PLT  281  298     Recent Labs      02/01/18   0601  01/31/18   0527   NA  143  145   K  3.5  3.9   CL  108*  109*   CO2  27  28   GLU  137*  127*   BUN  20  20   CREA  0.88  0.89   MG  2.0  2.0     BAL:    Results for Michael Guzmán (MRN [id number]) as of 2/2/2018 10:02   Ref. Range 1/31/2018 11:25   BODY FLUID TYPE Latest Units:   BRONCHIAL LAVAGE   FLUID APPEARANCE Latest Units:   HAZY   FLUID COLOR Latest Units:   COLORLESS   FLUID RBC CT. Latest Units: /cu mm <78612   FLUID WBC COUNT Latest Units: /cu mm 355   FLD NEUTROPHILS Latest Units: % 60   FLD LYMPHS Latest Units: % 31   FLD MONO/MACROPHAGES Latest Units: % 9     Resp Viral panel: all negative including influenza! Assessment:  (Medical Decision Making)     Hospital Problems  Date Reviewed: 1/25/2018          Codes Class Noted POA    ARDS (adult respiratory distress syndrome) ([facility] ICD-10-CM: N83  ICD-9-CM: 518.52  1/29/2018 Unknown    Remains on high flow oxygen support - suspect secondary to recent flu. Try to mobilize    Influenza ICD-10-CM: J11.1  ICD-9-CM: 487.1  1/27/2018 Unknown    Has completed tamiflu. Hypokalemia ICD-10-CM: E87.6  ICD-9-CM: 276.8  1/27/2018 Unknown    resolved    Elevated brain natriuretic peptide (BNP) level ICD-10-CM: R79.89  ICD-9-CM: 790.99  1/26/2018 Unknown    Lab Results   Component Value Date/Time     01/29/2018 04:43 AM     01/25/2018 04:16 PM    BNP 39 01/22/2018 06:48 PM         * (Principal)Acute respiratory failure with hypoxia (HCC) ICD-10-CM: J96.01  ICD-9-CM: 518.81  1/25/2018 No    Remains on high flow oxygen    Pulmonary infiltrates ICD-10-CM: R91.8  ICD-9-CM: 793.19  1/25/2018 Unknown    Routine cx with scant GNR and yeast. Viral panel negative. CD4:8 high suggesting sarcoid but BAL was neutrophilic. HTN (hypertension), benign (Chronic) ICD-10-CM: I10  ICD-9-CM: 401.1  1/23/2018 Yes    Controlled.     Recurrent major depressive disorder, in remission (HCC) (Chronic) ICD-10-CM: F33.40  ICD-9-CM: 296.35  1/23/2018 Yes    On zoloft    Breast cancer ([facility] (Chronic) ICD-10-CM: C50.919  ICD-9-CM: 174.9  1/23/2018 Yes    Has had 1 cycle of chemo - on hold for now    S/P bilateral mastectomy (Chronic) ICD-10-CM: Z90.13  ICD-9-CM: V45.71  1/23/2018 Yes    Overview Signed 1/25/2018  2:39 PM by Derick Zazueta NP     10/11/17  BILATERAL BREAST MASTECTOMY SIMPLE ARTOURA ULTRA HIGH PROFILE BREAST IMPLANTS 700cc 13.5 cm 8.3cm [id number] X 3 /  MEDIUM HEIGHT 550cc 13.5 cm 11.7cm 7.4cm [id number] X 3   BILATERAL SENTINEL NODE BIOPSY WITH LYMPHATIC MAPPING  RIGHT AXILLARY DISSECTION  BILATERAL BREAST RECONSTRUCTION WITH PLACEMENT TISSUE EXPANDERS AND ALLODERM         For cancer    HCAP (healthcare-associated pneumonia) ICD-10-CM: J18.9  ICD-9-CM: 581  1/23/2018 Yes    Remains on abx          Plan:  (Medical Decision Making)     Stop ABx. Continue steroids. Mobilize. Try to wean FIO2 and get back on Optiflow to try to avoid pulmonary oxygen toxicity. [facility] consulted as recovery likely to be slow. Basilio Nelson MD    More than 50% of time documented was spent face-to-face contact with the patient and in the care of the patient on the floor/unit where the patient is located.

## 2018-02-02 NOTE — PROGRESS NOTES
ISAAC spoke with Saira Wellington at [facility] to follow-up on referral. She said they are still waiting to hear back from insurance.

## 2018-02-02 NOTE — PROGRESS NOTES
Placed patient on NRB after patients sats dropped to 86%. Patient complained that her nose and throat were very sore from the high flow nasal cannula. Placed on NRB mask to give patient a break from the high flow in her nose. Patients sats are 92% and patient states she is comfortable. Will continue to monitor.

## 2018-02-02 NOTE — PROGRESS NOTES
[facility]        Inpatient Hematology / Oncology Progress Note      Admission Date: 2018  6:38 PM  Reason for Admission/Hospital Course: Pulmonary infiltrates on CXR [R91.8]      24 Hour Events:  Afebrile, VSS  On Optiflow  Bronch with BAL performed  - studies pending  Pulm placed consult for [facility]    ROS:  Constitutional: negative for fever, chills. Positive for weakness, malaise, fatigue. CV:  +pleuritic chest pain with deep inspiration. negative for palpitations, edema. Respiratory: Positive for dyspnea, cough. GI: negative for nausea, abdominal pain. + diarrhea. 10 point review of systems is otherwise negative with the exception of the elements mentioned above in the HPI. Allergies   Allergen Reactions    Dilaudid [Hydromorphone] Other (comments)     \"out of her mind\"    Sulfa (Sulfonamide Antibiotics) Itching    Tetracycline Nausea Only       OBJECTIVE:  Patient Vitals for the past 8 hrs:   BP Temp Pulse Resp SpO2   18 1541 159/78 97.8 °F (36.6 °C) 82 18 91 %   18 1225 - - - - 94 %   18 1135 - - - - 91 %   18 1116 168/75 98 °F (36.7 °C) 95 16 91 %     Temp (24hrs), Av.5 °F (36.4 °C), Min:96.4 °F (35.8 °C), Max:98.1 °F (36.7 °C)     0701 -  1900  In: 977 [I.V.:977]  Out: 700 [Urine:700]    Physical Exam:  Constitutional: Frail appearing female in no acute distress, sitting comfortably in the hospital bed. HEENT: Normocephalic and atraumatic. Oropharynx is clear, mucous membranes are moist. Extraocular muscles are intact. Sclerae anicteric. Skin Warm and dry.  + bruising to upper extremities bilaterally. No rash noted. No erythema. No pallor. Respiratory Anterior lung sounds diminished bilaterally. On Optiflow   CVS Normal rate, regular rhythm and normal S1 and S2. No murmurs, gallops, or rubs. Abdomen Soft, nontender and nondistended, normoactive bowel sounds. No palpable mass. No hepatosplenomegaly. Neuro Grossly nonfocal with no obvious sensory or motor deficits. MSK Normal range of motion in general.  No edema and no tenderness. Psych Appropriate mood and affect. Labs:      Recent Labs      02/01/18   0601  01/31/18   0527   WBC  15.1*  15.7*   RBC  3.62*  3.68*   HGB  10.8*  10.7*   HCT  32.1*  32.7*   MCV  88.7  88.9   MCH  29.8  29.1   MCHC  33.6  32.7   RDW  16.0*  15.6*   PLT  281  298   GRANS  89*  89*   LYMPH  4*  4*   MONOS  6  6   EOS  0*  0*   BASOS  0  0   IG  1  1   DF  AUTOMATED  AUTOMATED   ANEU  13.4*  13.9*   ABL  0.6  0.7   ABM  1.0  0.9   TRISHA  0.0  0.0   ABB  0.0  0.0   AIG  0.1  0.2        Recent Labs      02/01/18   0601  01/31/18   0527   NA  143  145   K  3.5  3.9   CL  108*  109*   CO2  27  28   AGAP  8  8   GLU  137*  127*   BUN  20  20   CREA  0.88  0.89   GFRAA  >60  >60   GFRNA  >60  >60   CA  8.4  8.5   MG  2.0  2.0         Imaging:  XR CHEST PORT [[id number]] Collected: 01/26/18 0816      Order Status: Completed Updated: 01/26/18 0835     Narrative:       Chest portable    CLINICAL INDICATION: Respiratory failure, dyspnea, follow-up infiltrates,  hypertension, bilateral mastectomy and history of breast cancer    COMPARISON: 1/25/2018    TECHNIQUE: single AP portable view chest at 8:23 AM upright     FINDINGS: The left portacatheter is stable. The mediastinal and hilar contours  are stable. There is no pneumothorax or focal dense consolidation. Reticular and  patchy opacities bilaterally, slightly more prominent in the upper lobes, are  similar to prior. No evidence of enlarging pleural effusion.  Surgical changes  involving bilateral breasts are again noted with probable implants.         Impression:       IMPRESSION: No significant change involving diffuse lung infiltrates.       XR CHEST Art Cobb [[id number]] Collected: 01/25/18 0125     Order Status: Completed Updated: 01/25/18 0128     Narrative:       AP portable chest    INDICATION: Shortness of breath    COMPARISON: 1/22/2018    FINDINGS: No change in cardiomegaly. Bilateral patchy and diffuse  reticulonodular interstitial changes with patchy alveolar densities bilaterally  without significant change. No change position of left-sided Port-A-Cath  catheter. Negative for pneumothorax.       Impression:       IMPRESSION: No significant change     XR CHEST PORT [[id number]] Collected: 01/22/18 1907     Order Status: Completed Updated: 01/22/18 1914     Narrative:       History: Shortness of breath, breast cancer    Exam: portable chest    Comparison: 10/12/2017     Findings: There is coarsening of the interstitial lung markings. There is new  focal increased opacity seen peripherally within the right midlung. No  pneumothorax. No change in the appearance of the mediastinal contour or osseous  structures. There is a new port overlying the left chest wall. Impressions: New focal opacity seen within the right midlung.           ASSESSMENT:    Problem List  Date Reviewed: 1/25/2018          Codes Class Noted    ARDS (adult respiratory distress syndrome) ([facility] ICD-10-CM: J50  ICD-9-CM: 518.52  1/29/2018        Influenza ICD-10-CM: J11.1  ICD-9-CM: 487.1  1/27/2018        Hypokalemia ICD-10-CM: E87.6  ICD-9-CM: 276.8  1/27/2018        Elevated brain natriuretic peptide (BNP) level ICD-10-CM: R79.89  ICD-9-CM: 790.99  1/26/2018        * (Principal)Acute respiratory failure with hypoxia ([facility] ICD-10-CM: J96.01  ICD-9-CM: 518.81  1/25/2018        Pulmonary infiltrates ICD-10-CM: R91.8  ICD-9-CM: 793.19  1/25/2018        HTN (hypertension), benign (Chronic) ICD-10-CM: I10  ICD-9-CM: 401.1  1/23/2018        Recurrent major depressive disorder, in remission (HCC) (Chronic) ICD-10-CM: F33.40  ICD-9-CM: 296.35  1/23/2018        Breast cancer ([facility] (Chronic) ICD-10-CM: C50.919  ICD-9-CM: 174.9  1/23/2018        S/P bilateral mastectomy (Chronic) ICD-10-CM: Z90.13  ICD-9-CM: V45.71  1/23/2018    Overview Signed 1/25/2018  2:39 PM by Casey Ruvalcaba Niecy Dinh, ZANE     10/11/17  BILATERAL BREAST MASTECTOMY SIMPLE ARTOURA ULTRA HIGH PROFILE BREAST IMPLANTS 700cc 13.5 cm 8.3cm [id number] X 3 /  MEDIUM HEIGHT 550cc 13.5 cm 11.7cm 7.4cm [id number] X 3   BILATERAL SENTINEL NODE BIOPSY WITH LYMPHATIC MAPPING  RIGHT AXILLARY DISSECTION  BILATERAL BREAST RECONSTRUCTION WITH PLACEMENT TISSUE EXPANDERS AND ALLODERM             HCAP (healthcare-associated pneumonia) ICD-10-CM: J18.9  ICD-9-CM: 338  1/23/2018        Dehydration ICD-10-CM: E86.0  ICD-9-CM: 276.51  1/22/2018        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 BCNTD. Incentive spirometer  1/25 Increased oxygen demands. Consulted pulm. 1/26 Pulmonary added azith and lasix. Considered thoracentesis but patient desats when off of oxygen. She would need to be intubated for thoracentesis. Conservative measures for now including recommending cpap which patient refused but is now willing to try. Transfer to 5th floor when bed available. 1/29 BC-NGTD. Continues Vancomycin, Zosyn, and Azithromycin. Respiratory status not much improved. Pulmonary following. 1/30 +mycoplasma study. Remains afebrile. Pt not improving despite tamiflu, antibx, and steroids. Pulm planning on bronch tomorrow AM.  1/31 Bronch this AM - studies pending. Pt states she feels less dyspneic s/p bronch, but still having pleuritic chest pain with deep inspiration. 2/2 Studies from bronch pending. Pt remains on Optiflow. Completed antibx today. Pulm following.   Pulm placed consult for [facility]. Influenza  1/26 Added Tamiflu.      Stage IIB left breast cancer and IIIB right breast cancer  1/24 sp Cycle one TC. Next Cycle planned for 1/27/2018. Currently on hold. Hypokalemia  1/27 Replete with 40 meq K+  1/29 K+ up to 4.1    Diarrhea  1/28 C-diff negative. Utilize Imodium as needed. Continue home meds  Consult PT/OT/CM  Heparin for DVT prophylaxis    Disposition: TBD. Pt still requiring O2 via optiflow. [facility] consult pending. Larry Wild NP   [facility]  [address] [place] [address]  Office : [phone]  Fax : [phone]     Attending Addendum:  I personally evaluated the patient with Larry Wild N.P.,  and agree with the assessment, findings and plan as documented. Appears stable, heart regular without murmur, lungs clear, abdomen benign. Plan for discharge to LTAC once available. Likely early next week.                Saba Gallardo MD  [facility]  [address] [address]  Office : [phone]  Fax : [phone]

## 2018-02-02 NOTE — PROGRESS NOTES
Requesting mucinex but was discontinue today. Placed call to Dr. Palmer Santos, order received to reorder.

## 2018-02-03 NOTE — PROGRESS NOTES
Georganna Brunner  Admission Date: 1/22/2018             Daily Progress Note: 2/3/2018    The patient's chart is reviewed and the patient is discussed with the staff. 70 yo female with history of breast CA s/p bilateral mastectomy 10/2017, GERD, HTN,and HL. She completed chemo 1/6/18. Followed at the infusion center and c/o cough, decreased po intake, and weakness and was found to be hypotensive which did not improve with IVF and was referred to the ER for further evaluation. CXR with new focal opacity R midlung and she was admitted, started on abx, nebs, steroids. Heme/Onc consulted to resume care - next chemo was due 1/27 but due to acute illness was held. Patient remained hypoxic and we were consulted to assist.  CRP was elevated and steroids were intensified. Influenza B returned + and Tamiflu and completed a 5 day course. She failed to improve despite intense therapy and underwent bronch for airway inspection/BAL/cleanout - small amount of mucus plugging. She completed a 10 day course of abx. Art Casper was consulted with suspected slow recovery and continued high O2 requirements      Subjective:     Currently on 10 L NRB - O2 sats continue to drop into the 80s if she talks at all.  + cough but not able to expectorate. C/O HA on L side. Denies chest pain or palpitations.       Current Facility-Administered Medications   Medication Dose Route Frequency    guaiFENesin ER (MUCINEX) tablet 1,200 mg  1,200 mg Oral BID    HYDROcodone-acetaminophen (NORCO) 7.5-325 mg per tablet 1 Tab  1 Tab Oral Q6H PRN    losartan (COZAAR) tablet 50 mg  50 mg Oral DAILY    hydrALAZINE (APRESOLINE) 20 mg/mL injection 20 mg  20 mg IntraVENous Q6H PRN    acetaminophen (TYLENOL) tablet 650 mg  650 mg Oral Q6H PRN    loperamide (IMODIUM) capsule 2 mg  2 mg Oral Q4H PRN    methylPREDNISolone (PF) (SOLU-MEDROL) injection 80 mg  80 mg IntraVENous Q12H    albuterol (PROVENTIL VENTOLIN) nebulizer solution 2.5 mg  2.5 mg Nebulization QID RT    dextrose 5% - 0.45% NaCl with KCl 10 mEq/L infusion  50 mL/hr IntraVENous CONTINUOUS    sodium chloride (OCEAN) 0.65 % nasal spray 2 Spray  2 Spray Both Nostrils Q2H PRN    HYDROcodone-homatropine (HYCODAN) 5-1.5 mg/5 mL (5 mL) syrup 5 mL  5 mL Oral Q4H PRN    lip protectant (BLISTEX) ointment   Topical PRN    alteplase (CATHFLO) 2 mg in sterile water (preservative free) 2 mL injection  2 mg InterCATHeter PRN    atorvastatin (LIPITOR) tablet 20 mg  20 mg Oral DAILY    LORazepam (ATIVAN) tablet 1 mg  1 mg Oral BID PRN    metoprolol succinate (TOPROL-XL) XL tablet 50 mg  50 mg Oral DAILY    prochlorperazine (COMPAZINE) tablet 10 mg  10 mg Oral Q6H PRN    sertraline (ZOLOFT) tablet 100 mg  100 mg Oral DAILY    sodium chloride (NS) flush 5-10 mL  5-10 mL IntraVENous Q8H    sodium chloride (NS) flush 5-10 mL  5-10 mL IntraVENous PRN    heparin (porcine) injection 5,000 Units  5,000 Units SubCUTAneous Q12H       Review of Systems  Constitutional: negative for fever, chills, sweats  Cardiovascular: negative for chest pain, palpitations, syncope, edema  Gastrointestinal:  negative for dysphagia, reflux, vomiting, diarrhea, abdominal pain, or melena  Neurologic:  negative for focal weakness, numbness, headache    Objective:     Vitals:    02/03/18 0033 02/03/18 0226 02/03/18 0330 02/03/18 0539   BP: 183/74 (!) 179/93 159/75    Pulse: 78 (!) 116 83    Resp: 28  28    Temp: 98.3 °F (36.8 °C)  97.8 °F (36.6 °C)    SpO2: 91%  (!) 87%    Weight:    174 lb (78.9 kg)     Intake and Output:   02/01 1901 - 02/03 0700  In: 3033 [P.O.:340; I.V.:2693]  Out: 2700 [Urine:2700]       Physical Exam:   Constitution:  the patient is well developed and in no acute distress  EENMT:  Sclera clear, pupils equal, oral mucosa moist  Respiratory: scattered squeaks, 10L NRB  Cardiovascular:  RRR without M,G,R  Gastrointestinal: soft and non-tender; with positive bowel sounds.   Musculoskeletal: warm without cyanosis. There is no lower leg edema.   Skin:  no jaundice or rashes, no open wounds   Neurologic: no gross neuro deficits     Psychiatric:  alert and oriented x 3    CXR:   1/30      LAB   Recent Labs      02/03/18   0500  02/01/18   0601   WBC  20.4*  15.1*   HGB  11.3*  10.8*   HCT  33.6*  32.1*   PLT  322  281     Recent Labs      02/03/18   0500  02/01/18   0601   NA  144  143   K  3.6  3.5   CL  108*  108*   CO2  27  27   GLU  101*  137*   BUN  25*  20   CREA  0.77  0.88   MG  2.1  2.0   CA  8.7  8.4   ALB  2.1*   --    TBILI  0.4   --    ALT  20   --    SGOT  26   --        Assessment:  (Medical Decision Making)     Hospital Problems  Date Reviewed: 2/3/2018          Codes Class Noted POA    ARDS (adult respiratory distress syndrome) ([facility] [address] ICD-10-CM: O03  ICD-9-CM: 518.52  1/29/2018 Unknown    Suspect secondary to recent flu  Remains on High Flow O2       Influenza ICD-10-CM: J11.1  ICD-9-CM: 487.1  1/27/2018 Unknown    S/P tx with Tamiflu      Hypokalemia ICD-10-CM: E87.6  ICD-9-CM: 276.8  1/27/2018 Unknown    K+ 3.6      Elevated brain natriuretic peptide (BNP) level ICD-10-CM: R79.89  ICD-9-CM: 790.99  1/26/2018 Unknown    238  Not symptomatically improved with lasix      * (Principal)Acute respiratory failure with hypoxia (HCC) ICD-10-CM: J96.01  ICD-9-CM: 518.81  1/25/2018 No    Remains on high flow NC - sats continue to drop into the 80s with conversation  Does not like Opti-flow or CPAP      Pulmonary infiltrates ICD-10-CM: R91.8  ICD-9-CM: 793.19  1/25/2018 Unknown    Diffuse bilateral  S/P abx   BAL cx with no new results      HTN (hypertension), benign (Chronic) ICD-10-CM: I10  ICD-9-CM: 401.1  1/23/2018 Yes        Recurrent major depressive disorder, in remission (HCC) (Chronic) ICD-10-CM: F33.40  ICD-9-CM: 296.35  1/23/2018 Yes        Breast cancer (HCC) (Chronic) ICD-10-CM: C50.919  ICD-9-CM: 174.9  1/23/2018 Yes        S/P bilateral mastectomy (Chronic) ICD-10-CM: Z90.13  ICD-9-CM: V45.71  1/23/2018 Yes     10/11/17  BILATERAL BREAST MASTECTOMY SIMPLE ARTOURA ULTRA HIGH PROFILE BREAST IMPLANTS 700cc 13.5 cm 8.3cm [id number] X 3 /  MEDIUM HEIGHT 550cc 13.5 cm 11.7cm 7.4cm [id number] X 3   BILATERAL SENTINEL NODE BIOPSY WITH LYMPHATIC MAPPING  RIGHT AXILLARY DISSECTION  BILATERAL BREAST RECONSTRUCTION WITH PLACEMENT TISSUE EXPANDERS AND ALLODERM         HCAP (healthcare-associated pneumonia) ICD-10-CM: J18.9  ICD-9-CM: 421  1/23/2018 Yes         Micro:  1/31 PJP: pending  1/31 viral panel: negative  1/31 legionella: not detected   1/31 CMV: not detected  1/31 atypical PNA: not detected  1/31 sputum: Gm stain GNR / yeast / cx: NRF  1/26 Influenza B: POS  1/23 mycoplasma Pneumoniae Ab: 214  1/22 Influenza: negative  1/22 BC: negative x 2    Plan:  (Medical Decision Making)     --s/p Tamiflu / HCAP tx (10 days Vanc / Zosyn and 7 days azithro) stopped 2/2. WBC 20.4 today. Afebrile  --cytology from BAL - atypical cells  --Albuterol  --mucinex  --solumedrol 80 mg IV q 12 hours  --PT/OT following but patient sats have not allowed for activity  --langley remains in place secondary to hypoxia / debility  --referral to [facility], [facility] with ongoing high O2 demands and likelihood of slow recovery - awaiting insurance approval.      More than 50% of the time documented was spent in face-to-face contact with the patient and in the care of the patient on the floor/unit where the patient is located. Harley Gottron, NP    Lungs:  Bilateral crackles  Heart:  RRR with no Murmur/Rubs/Gallops    Additional Comments: now on non rebreather    I have spoken with and examined the patient. I agree with the above assessment and plan as documented.     Lilly Cruz MD

## 2018-02-03 NOTE — PROGRESS NOTES
[facility] Hematology & Oncology        Inpatient Hematology / Oncology Progress Note      Admission Date: 2018  6:38 PM  Reason for Admission/Hospital Course: Pulmonary infiltrates on CXR [R91.8]      24 Hour Events:  Afebrile, VSS  On NRB mask  Bronch with BAL-rare atypical cells  Awaiting [employer]/insurance approval    ROS:  Constitutional: negative for fever, chills. Positive for weakness, malaise, fatigue. CV:  +pleuritic chest pain with deep inspiration. negative for palpitations, edema. Respiratory: Positive for dyspnea, cough. GI: negative for nausea, abdominal pain. + diarrhea. 10 point review of systems is otherwise negative with the exception of the elements mentioned above in the HPI. Allergies   Allergen Reactions    Dilaudid [Hydromorphone] Other (comments)     \"out of her mind\"    Sulfa (Sulfonamide Antibiotics) Itching    Tetracycline Nausea Only       OBJECTIVE:  Patient Vitals for the past 8 hrs:   BP Temp Pulse Resp SpO2   18 1300 134/63 97.8 °F (36.6 °C) 79 22 92 %   18 1148 - - - - 92 %   18 0750 - - - - (!) 80 %     Temp (24hrs), Av.9 °F (36.6 °C), Min:97.8 °F (36.6 °C), Max:98.3 °F (36.8 °C)     0701 -  1900  In: 220 [I.V.:220]  Out: 600 [Urine:600]    Physical Exam:  Constitutional: Frail appearing female in no acute distress, sitting comfortably in the hospital bed. HEENT: Normocephalic and atraumatic. Oropharynx is clear, mucous membranes are moist. Extraocular muscles are intact. Sclerae anicteric. Skin Warm and dry.  + bruising to upper extremities bilaterally. No rash noted. No erythema. No pallor. Respiratory Anterior lung sounds diminished bilaterally. On NRB mask. CVS Normal rate, regular rhythm and normal S1 and S2. No murmurs, gallops, or rubs. Abdomen Soft, nontender and nondistended, normoactive bowel sounds. No palpable mass. No hepatosplenomegaly.    Neuro Grossly nonfocal with no obvious sensory or motor deficits. MSK Normal range of motion in general.  No edema and no tenderness. Psych Appropriate mood and affect. Labs:      Recent Labs      02/03/18   0500  02/01/18   0601   WBC  20.4*  15.1*   RBC  3.78*  3.62*   HGB  11.3*  10.8*   HCT  33.6*  32.1*   MCV  88.9  88.7   MCH  29.9  29.8   MCHC  33.6  33.6   RDW  16.4*  16.0*   PLT  322  281   GRANS  85*  89*   LYMPH  6*  4*   MONOS  8  6   EOS  0*  0*   BASOS  0  0   IG  1  1   DF  AUTOMATED  AUTOMATED   ANEU  17.4*  13.4*   ABL  1.2  0.6   ABM  1.6*  1.0   TRISHA  0.0  0.0   ABB  0.0  0.0   AIG  0.2  0.1        Recent Labs      02/03/18   0500  02/01/18   0601   NA  144  143   K  3.6  3.5   CL  108*  108*   CO2  27  27   AGAP  9  8   GLU  101*  137*   BUN  25*  20   CREA  0.77  0.88   GFRAA  >60  >60   GFRNA  >60  >60   CA  8.7  8.4   SGOT  26   --    AP  99   --    TP  6.1*   --    ALB  2.1*   --    GLOB  4.0*   --    AGRAT  0.5*   --    MG  2.1  2.0         Imaging:  XR CHEST PORT [[id number]] Collected: 01/26/18 0816      Order Status: Completed Updated: 01/26/18 0835     Narrative:       Chest portable    CLINICAL INDICATION: Respiratory failure, dyspnea, follow-up infiltrates,  hypertension, bilateral mastectomy and history of breast cancer    COMPARISON: 1/25/2018    TECHNIQUE: single AP portable view chest at 8:23 AM upright     FINDINGS: The left portacatheter is stable. The mediastinal and hilar contours  are stable. There is no pneumothorax or focal dense consolidation. Reticular and  patchy opacities bilaterally, slightly more prominent in the upper lobes, are  similar to prior. No evidence of enlarging pleural effusion.  Surgical changes  involving bilateral breasts are again noted with probable implants.         Impression:       IMPRESSION: No significant change involving diffuse lung infiltrates.       XR CHEST Liz Mendes [[id number]] Collected: 01/25/18 0125     Order Status: Completed Updated: 01/25/18 0128     Narrative:       AP portable chest    INDICATION: Shortness of breath    COMPARISON: 1/22/2018    FINDINGS: No change in cardiomegaly. Bilateral patchy and diffuse  reticulonodular interstitial changes with patchy alveolar densities bilaterally  without significant change. No change position of left-sided Port-A-Cath  catheter. Negative for pneumothorax.       Impression:       IMPRESSION: No significant change     XR CHEST PORT [[id number]] Collected: 01/22/18 1907     Order Status: Completed Updated: 01/22/18 1914     Narrative:       History: Shortness of breath, breast cancer    Exam: portable chest    Comparison: 10/12/2017     Findings: There is coarsening of the interstitial lung markings. There is new  focal increased opacity seen peripherally within the right midlung. No  pneumothorax. No change in the appearance of the mediastinal contour or osseous  structures. There is a new port overlying the left chest wall. Impressions: New focal opacity seen within the right midlung.           ASSESSMENT:    Problem List  Date Reviewed: 2/3/2018          Codes Class Noted    ARDS (adult respiratory distress syndrome) ([facility] ICD-10-CM: M21  ICD-9-CM: 518.52  1/29/2018        Influenza ICD-10-CM: J11.1  ICD-9-CM: 487.1  1/27/2018        Hypokalemia ICD-10-CM: E87.6  ICD-9-CM: 276.8  1/27/2018        Elevated brain natriuretic peptide (BNP) level ICD-10-CM: R79.89  ICD-9-CM: 790.99  1/26/2018        * (Principal)Acute respiratory failure with hypoxia (HCC) ICD-10-CM: J96.01  ICD-9-CM: 518.81  1/25/2018        Pulmonary infiltrates ICD-10-CM: R91.8  ICD-9-CM: 793.19  1/25/2018        HTN (hypertension), benign (Chronic) ICD-10-CM: I10  ICD-9-CM: 401.1  1/23/2018        Recurrent major depressive disorder, in remission ([facility] (Chronic) ICD-10-CM: F33.40  ICD-9-CM: 296.35  1/23/2018        Breast cancer ([facility] (Chronic) ICD-10-CM: C50.919  ICD-9-CM: 174.9  1/23/2018        S/P bilateral mastectomy (Chronic) ICD-10-CM: Z90.13  ICD-9-CM: V45.71 1/23/2018    Overview Signed 1/25/2018  2:39 PM by Tab Pagan NP     10/11/17  BILATERAL BREAST MASTECTOMY SIMPLE ARTOURA ULTRA HIGH PROFILE BREAST IMPLANTS 700cc 13.5 cm 8.3cm [id number] X 3 /  MEDIUM HEIGHT 550cc 13.5 cm 11.7cm 7.4cm [id number] X 3   BILATERAL SENTINEL NODE BIOPSY WITH LYMPHATIC MAPPING  RIGHT AXILLARY DISSECTION  BILATERAL BREAST RECONSTRUCTION WITH PLACEMENT TISSUE EXPANDERS AND ALLODERM             HCAP (healthcare-associated pneumonia) ICD-10-CM: J18.9  ICD-9-CM: 599  1/23/2018        Dehydration ICD-10-CM: E86.0  ICD-9-CM: 276.51  1/22/2018        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 BCNTD. Incentive spirometer  1/25 Increased oxygen demands. Consulted pulm. 1/26 Pulmonary added azith and lasix. Considered thoracentesis but patient desats when off of oxygen. She would need to be intubated for thoracentesis. Conservative measures for now including recommending cpap which patient refused but is now willing to try. Transfer to 5th floor when bed available. 1/29 BC-NGTD. Continues Vancomycin, Zosyn, and Azithromycin. Respiratory status not much improved. Pulmonary following. 1/30 +mycoplasma study. Remains afebrile. Pt not improving despite tamiflu, antibx, and steroids. Pulm planning on bronch tomorrow AM.  1/31 Bronch this AM - studies pending. Pt states she feels less dyspneic s/p bronch, but still having pleuritic chest pain with deep inspiration. 2/2 Studies from bronch pending. Pt remains on Optiflow.   Completed antibx today. Pulm following. Pulm placed consult for [facility]. Influenza  1/26 Added Tamiflu.      Stage IIB left breast cancer and IIIB right breast cancer  1/24 sp Cycle one TC. Next Cycle planned for 1/27/2018. Currently on hold. Hypokalemia  1/27 Replete with 40 meq K+  1/29 K+ up to 4.1    Diarrhea  1/28 C-diff negative. Utilize Imodium as needed. Continue home meds  Consult PT/OT/CM  Heparin for DVT prophylaxis      2/3/18:  She continues to require O2 support. She states she does better on NRB. BAL cytology revealed \"rare atypical cells\", but unlikely of malignancy. Awaiting insurance approval for Jaime, will transfer to 5th floor. Continue with supportive care. Pulmonary continues to follow. ZANE Quijano [facility] Hematology & Oncology  [address] [place] [address]  Office : [phone]  Fax : [phone]               Attending Addendum:  I personally evaluated the patient with Kendra Das, N.P.,  and agree with the assessment, findings and plan as documented. Appears stable, heart regular without murmur, lungs clear, abdomen benign. Supportive care as above. Await placement to [facility]. Holding further chemotherapy for now.                Tabby Medina MD  [address]  Office : [phone]  Fax : [phone]

## 2018-02-03 NOTE — PROGRESS NOTES
TRANSFER - OUT REPORT:    Verbal report given to Cynthia Resendiz RN Oncology(name) on Diaz Lr  being transferred to 5th floor oncology, room 525(unit) for routine progression of care       Report consisted of patients Situation, Background, Assessment and   Recommendations(SBAR). Information from the following report(s) SBAR, Kardex, Intake/Output and MAR was reviewed with the receiving nurse. Lines:   Venous Access Device Bard Power Port 01/06/18 Upper chest (subclavicular area), left (Active)   Central Line Being Utilized Yes 2/3/2018 11:48 AM   Criteria for Appropriate Use Long term IV/antibiotic administration 2/3/2018 11:48 AM   Site Assessment Other (Comment) 2/2/2018  3:19 PM   Date of Last Dressing Change 02/02/18 2/3/2018 11:48 AM   Dressing Status Loose 2/2/2018  3:19 PM   Dressing Type Disk with Chlorhexadine gluconate (CHG) 2/3/2018 11:48 AM   Action Taken Other (comment) 1/24/2018  2:20 PM   Date Accessed (Medial Site) 01/29/18 2/2/2018  3:19 PM   Access Time (Medial Site) 1430 1/22/2018  2:30 PM   Access Needle Size (Site #1) 20 G 2/2/2018  3:19 PM   Access Needle Length (Medial Site) 0.75 inches 2/1/2018  2:40 PM   Positive Blood Return (Medial Site) Yes 2/2/2018  3:19 PM   Action Taken (Medial Site) Infusing 2/3/2018 11:48 AM        Opportunity for questions and clarification was provided. Patient transported with:   O2 @ 10L NRB liters   RN and tech at bedside.

## 2018-02-03 NOTE — PROGRESS NOTES
TRANSFER - IN REPORT:    Verbal report received from [facility], RN(name) on Juan Greene  being received from 2nd floor (unit) for routine progression of care      Report consisted of patients Situation, Background, Assessment and   Recommendations(SBAR). Information from the following report(s) SBAR, MAR and Recent Results was reviewed with the receiving nurse. Opportunity for questions and clarification was provided. Assessment completed upon patients arrival to unit and care assumed.

## 2018-02-03 NOTE — PROGRESS NOTES
Patient on HF 10L NC. Dropped to 74% oxygen sat and maintaining at 88%. RT in room and changed to Non-rebreather 10L, saturation at this time 92%. Respirations labored, RR 22.

## 2018-02-04 NOTE — PROGRESS NOTES
Assumed care of patient. Assessment completed and documented, see docflow. Received patient in bed. Patient A/Ox3, resting quietly in bed. NAD noted at present. Bilateral crackles lung sounds. Patient on 15LNRB, continuous oximetry in place; O2 sats 90-92%. Ivan in place to bedside, draining yellow straw tinged urine. Door open, call light within reach. Will continue to monitor.

## 2018-02-04 NOTE — PROGRESS NOTES
Dual skin assessment done with Taryn Waldron RN. Some bruising on the stomach and legs. Reconstructive surgery on breasts. Scar on forehead. Some redness on her bottom with a small 1-2cm tear on the left. Allevyn applied to the redden area.

## 2018-02-04 NOTE — PROGRESS NOTES
[facility]        Inpatient Hematology / Oncology Progress Note      Admission Date: 2018  6:38 PM  Reason for Admission/Hospital Course: Pulmonary infiltrates on CXR [R91.8]      24 Hour Events:  Afebrile, VSS  On NRB mask  Awaiting [employer]/insurance approval  No new complaints    ROS:  Constitutional: negative for fever, chills. Positive for weakness, malaise, fatigue. CV:  +pleuritic chest pain with deep inspiration. negative for palpitations, edema. Respiratory: Positive for dyspnea, cough. GI: negative for nausea, diarrhea, abdominal pain. 10 point review of systems is otherwise negative with the exception of the elements mentioned above in the HPI. Allergies   Allergen Reactions    Dilaudid [Hydromorphone] Other (comments)     \"out of her mind\"    Sulfa (Sulfonamide Antibiotics) Itching    Tetracycline Nausea Only       OBJECTIVE:  Patient Vitals for the past 8 hrs:   BP Temp Pulse Resp SpO2 Height   18 1115 181/80 97.9 °F (36.6 °C) 82 23 90 % -   18 0944 - - - - 92 % -   18 0822 188/82 - 79 - - -   18 0745 97/79 97.2 °F (36.2 °C) 80 22 92 % -     Temp (24hrs), Av.3 °F (36.3 °C), Min:96.2 °F (35.7 °C), Max:97.9 °F (36.6 °C)     0701 -  1900  In: 120 [P.O.:120]  Out: 800 [Urine:800]    Physical Exam:  Constitutional: Frail appearing female in no acute distress, sitting comfortably in the hospital bed. HEENT: Normocephalic and atraumatic. Oropharynx is clear, mucous membranes are moist. Extraocular muscles are intact. Sclerae anicteric. Skin Warm and dry.  + bruising to upper extremities bilaterally. No rash noted. No erythema. No pallor. Respiratory Anterior lung sounds diminished bilaterally. On NRB mask. CVS Normal rate, regular rhythm and normal S1 and S2. No murmurs, gallops, or rubs. Abdomen Soft, nontender and nondistended, normoactive bowel sounds. No palpable mass. No hepatosplenomegaly.    Neuro Grossly nonfocal with no obvious sensory or motor deficits. MSK Normal range of motion in general.  No edema and no tenderness. Psych Appropriate mood and affect. Labs:      Recent Labs      02/03/18   0500   WBC  20.4*   RBC  3.78*   HGB  11.3*   HCT  33.6*   MCV  88.9   MCH  29.9   MCHC  33.6   RDW  16.4*   PLT  322   GRANS  85*   LYMPH  6*   MONOS  8   EOS  0*   BASOS  0   IG  1   DF  AUTOMATED   ANEU  17.4*   ABL  1.2   ABM  1.6*   TRISHA  0.0   ABB  0.0   AIG  0.2        Recent Labs      02/03/18   0500   NA  144   K  3.6   CL  108*   CO2  27   AGAP  9   GLU  101*   BUN  25*   CREA  0.77   GFRAA  >60   GFRNA  >60   CA  8.7   SGOT  26   AP  99   TP  6.1*   ALB  2.1*   GLOB  4.0*   AGRAT  0.5*   MG  2.1         Imaging:  XR CHEST PORT [[id number]] Collected: 01/26/18 0816      Order Status: Completed Updated: 01/26/18 0835     Narrative:       Chest portable    CLINICAL INDICATION: Respiratory failure, dyspnea, follow-up infiltrates,  hypertension, bilateral mastectomy and history of breast cancer    COMPARISON: 1/25/2018    TECHNIQUE: single AP portable view chest at 8:23 AM upright     FINDINGS: The left portacatheter is stable. The mediastinal and hilar contours  are stable. There is no pneumothorax or focal dense consolidation. Reticular and  patchy opacities bilaterally, slightly more prominent in the upper lobes, are  similar to prior. No evidence of enlarging pleural effusion. Surgical changes  involving bilateral breasts are again noted with probable implants.         Impression:       IMPRESSION: No significant change involving diffuse lung infiltrates.       XR CHEST SNGL V [[id number]] Collected: 01/25/18 0125     Order Status: Completed Updated: 01/25/18 0128     Narrative:       AP portable chest    INDICATION: Shortness of breath    COMPARISON: 1/22/2018    FINDINGS: No change in cardiomegaly.  Bilateral patchy and diffuse  reticulonodular interstitial changes with patchy alveolar densities bilaterally  without significant change. No change position of left-sided Port-A-Cath  catheter. Negative for pneumothorax.       Impression:       IMPRESSION: No significant change     XR CHEST PORT [[id number]] Collected: 01/22/18 1907     Order Status: Completed Updated: 01/22/18 1914     Narrative:       History: Shortness of breath, breast cancer    Exam: portable chest    Comparison: 10/12/2017     Findings: There is coarsening of the interstitial lung markings. There is new  focal increased opacity seen peripherally within the right midlung. No  pneumothorax. No change in the appearance of the mediastinal contour or osseous  structures. There is a new port overlying the left chest wall. Impressions: New focal opacity seen within the right midlung.           ASSESSMENT:    Problem List  Date Reviewed: 2/4/2018          Codes Class Noted    ARDS (adult respiratory distress syndrome) ([facility] ICD-10-CM: J80  ICD-9-CM: 518.52  1/29/2018        Influenza ICD-10-CM: J11.1  ICD-9-CM: 487.1  1/27/2018        Hypokalemia ICD-10-CM: E87.6  ICD-9-CM: 276.8  1/27/2018        Elevated brain natriuretic peptide (BNP) level ICD-10-CM: R79.89  ICD-9-CM: 790.99  1/26/2018        * (Principal)Acute respiratory failure with hypoxia ([facility] ICD-10-CM: J96.01  ICD-9-CM: 518.81  1/25/2018        Pulmonary infiltrates ICD-10-CM: R91.8  ICD-9-CM: 793.19  1/25/2018        HTN (hypertension), benign (Chronic) ICD-10-CM: I10  ICD-9-CM: 401.1  1/23/2018        Recurrent major depressive disorder, in remission ([facility] (Chronic) ICD-10-CM: F33.40  ICD-9-CM: 296.35  1/23/2018        Breast cancer ([facility] (Chronic) ICD-10-CM: C50.919  ICD-9-CM: 174.9  1/23/2018        S/P bilateral mastectomy (Chronic) ICD-10-CM: Z90.13  ICD-9-CM: V45.71  1/23/2018    Overview Signed 1/25/2018  2:39 PM by Laura Martin NP     10/11/17  BILATERAL BREAST MASTECTOMY SIMPLE ARTOURA ULTRA HIGH PROFILE BREAST IMPLANTS 700cc 13.5 cm 8.3cm [id number] X 3 /  MEDIUM HEIGHT 550cc 13.5 cm 11.7cm 7.4cm [id number] X 3   BILATERAL SENTINEL NODE BIOPSY WITH LYMPHATIC MAPPING  RIGHT AXILLARY DISSECTION  BILATERAL BREAST RECONSTRUCTION WITH PLACEMENT TISSUE EXPANDERS AND ALLODERM             HCAP (healthcare-associated pneumonia) ICD-10-CM: J18.9  ICD-9-CM: 324  1/23/2018        Dehydration ICD-10-CM: E86.0  ICD-9-CM: 276.51  1/22/2018        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 BCNTD. Incentive spirometer  1/25 Increased oxygen demands. Consulted pulm. 1/26 Pulmonary added azith and lasix. Considered thoracentesis but patient desats when off of oxygen. She would need to be intubated for thoracentesis. Conservative measures for now including recommending cpap which patient refused but is now willing to try. Transfer to 5th floor when bed available. 1/29 BC-NGTD. Continues Vancomycin, Zosyn, and Azithromycin. Respiratory status not much improved. Pulmonary following. 1/30 +mycoplasma study. Remains afebrile. Pt not improving despite tamiflu, antibx, and steroids. Pulm planning on bronch tomorrow AM.  1/31 Bronch this AM - studies pending. Pt states she feels less dyspneic s/p bronch, but still having pleuritic chest pain with deep inspiration. 2/2 Studies from bronch pending. Pt remains on Optiflow. Completed antibx today. Pulm following. Pulm placed consult for Trish. Influenza  1/26 Added Tamiflu.      Stage IIB left breast cancer and IIIB right breast cancer  1/24 sp Cycle one TC.  Next Cycle planned for 1/27/2018. Currently on hold. Hypokalemia  1/27 Replete with 40 meq K+  1/29 K+ up to 4.1    Diarrhea  1/28 C-diff negative. Utilize Imodium as needed. Continue home meds  Consult PT/OT/CM  Heparin for DVT prophylaxis      2/3/18:  She continues to require O2 support. She states she does better on NRB. BAL cytology revealed \"rare atypical cells\", but unlikely of malignancy. Awaiting insurance approval for Jaime, will transfer to 5th floor. Continue with supportive care. Pulmonary continues to follow. 2/4/18: No clinical changes. She continues on NRB. Awaiting [employer] approval.  Continue supportive care. Pulmonary following. Alexia Ventura NP   [facility]  [address] [place] [address]  Office : [phone]  Fax : [phone]         Attending Addendum:  I personally evaluated the patient with Alexia Ventura NLO.,  and agree with the assessment, findings and plan as documented. Appears stable, heart regular without murmur, lungs clear, abdomen benign. Continues to have significant oxygen needs, awaiting LTAC placement, pulmonology following. Holding chemotherapy for now.                Shen Nevarez MD  [facility]  [id number] [place] [address]  Office : [phone]  Fax : [phone]

## 2018-02-04 NOTE — PROGRESS NOTES
Critical Care Outreach Nurse Progress Report:    Subjective: In to assess pt secondary to MD concern. MEWS Score: 2 (02/04/18 1530)    Vitals:    02/04/18 0944 02/04/18 1115 02/04/18 1356 02/04/18 1530   BP:  181/80  188/78   Pulse:  82  84   Resp:  23  26   Temp:  97.9 °F (36.6 °C)  96.6 °F (35.9 °C)   SpO2: 92% 90% 94% 90%   Weight:            Objective: Pt resting in bed, on CPAP, in NAD at this time. Pain Intensity 1: 0 (02/04/18 1200)     Pain Intervention(s) 1: Ambulation/Increased Activity, Nurse notified, Repositioned  Patient Stated Pain Goal: 0    Assessment: Drowsy (recently medicated w/morphine and ativan). Awakens to voice and follows commands. Oriented x3. Lung sounds w/faint bibasilar crackles and squeaks heard. On CPAP 8, FiO2 100%, O2 Sat 90%, HR 86. Pt on continuous oximetry. RR 26 but nonlabored at rest in bed. No complaints voiced. Plan: Will follow per outreach protocol.

## 2018-02-04 NOTE — PROGRESS NOTES
END OF SHIFT NOTE:    Intake/Output  02/03 1901 - 02/04 0700  In: 450 [I.V.:450]  Out: 1000 [Urine:1000]   Voiding: YES  Catheter: YES  Drain:              Stool:  0 occurrences. Stool Assessment  Stool Color: Brown (02/03/18 0901)  Stool Appearance: Soft (02/03/18 0901)  Stool Amount: Medium (02/03/18 0901)  Stool Source/Status: Rectum (02/03/18 0901)    Emesis:  0 occurrences. VITAL SIGNS  Patient Vitals for the past 12 hrs:   Temp Pulse Resp BP SpO2   02/04/18 0330 96.2 °F (35.7 °C) 81 26 115/73 91 %   02/03/18 2335 97.8 °F (36.6 °C) 77 24 112/70 94 %       Pain Assessment  Pain 1  Pain Scale 1: Numeric (0 - 10) (02/04/18 0346)  Pain Intensity 1: 0 (02/04/18 0346)  Patient Stated Pain Goal: 0 (02/04/18 0346)  Pain Intervention(s) 1: Ambulation/Increased Activity;Nurse notified;Repositioned (01/26/18 8297)    Ambulating  No    Additional Information: Patient given ativan with night time medications. Rested well through the night. Uneventful night. Shift report given to oncoming nurse at the bedside.     Josue Liang

## 2018-02-04 NOTE — PROGRESS NOTES
[facility]   Admission Date: 1/22/2018             Daily Progress Note: 2/4/2018    The patient's chart is reviewed and the patient is discussed with the staff. 68 yo female with history of breast CA s/p bilateral mastectomy 10/2017, GERD, HTN,and HL. She completed chemo 1/6/18. Followed at the infusion center and c/o cough, decreased po intake, and weakness and was found to be hypotensive which did not improve with IVF and was referred to the ER for further evaluation. CXR with new focal opacity R midlung and she was admitted, started on abx, nebs, steroids. Heme/Onc consulted to resume care - next chemo was due 1/27 but due to acute illness was held. Patient remained hypoxic and we were consulted to assist.  CRP was elevated and steroids were intensified. Influenza B returned + and Tamiflu and completed a 5 day course. She failed to improve despite intense therapy and underwent bronch for airway inspection/BAL/cleanout - small amount of mucus plugging. She completed a 10 day course of abx. [facility] was consulted with suspected slow recovery and continued high O2 requirements      Subjective:     Currently on 10 L NRB - O2 sats continue to drop into the 90s if she talks at all.  + cough but not able to expectorate. C/O HA on L side. Denies chest pain or palpitations.       Current Facility-Administered Medications   Medication Dose Route Frequency    methylPREDNISolone (PF) (SOLU-MEDROL) injection 60 mg  60 mg IntraVENous Q12H    morphine injection 2 mg  2 mg IntraVENous Q2H PRN    guaiFENesin ER (MUCINEX) tablet 1,200 mg  1,200 mg Oral BID    HYDROcodone-acetaminophen (NORCO) 7.5-325 mg per tablet 1 Tab  1 Tab Oral Q6H PRN    losartan (COZAAR) tablet 50 mg  50 mg Oral DAILY    hydrALAZINE (APRESOLINE) 20 mg/mL injection 20 mg  20 mg IntraVENous Q6H PRN    acetaminophen (TYLENOL) tablet 650 mg  650 mg Oral Q6H PRN    loperamide (IMODIUM) capsule 2 mg  2 mg Oral Q4H PRN    albuterol (PROVENTIL VENTOLIN) nebulizer solution 2.5 mg  2.5 mg Nebulization QID RT    dextrose 5% - 0.45% NaCl with KCl 10 mEq/L infusion  50 mL/hr IntraVENous CONTINUOUS    sodium chloride (OCEAN) 0.65 % nasal spray 2 Spray  2 Spray Both Nostrils Q2H PRN    HYDROcodone-homatropine (HYCODAN) 5-1.5 mg/5 mL (5 mL) syrup 5 mL  5 mL Oral Q4H PRN    lip protectant (BLISTEX) ointment   Topical PRN    alteplase (CATHFLO) 2 mg in sterile water (preservative free) 2 mL injection  2 mg InterCATHeter PRN    atorvastatin (LIPITOR) tablet 20 mg  20 mg Oral DAILY    LORazepam (ATIVAN) tablet 1 mg  1 mg Oral BID PRN    metoprolol succinate (TOPROL-XL) XL tablet 50 mg  50 mg Oral DAILY    prochlorperazine (COMPAZINE) tablet 10 mg  10 mg Oral Q6H PRN    sertraline (ZOLOFT) tablet 100 mg  100 mg Oral DAILY    sodium chloride (NS) flush 5-10 mL  5-10 mL IntraVENous Q8H    sodium chloride (NS) flush 5-10 mL  5-10 mL IntraVENous PRN    heparin (porcine) injection 5,000 Units  5,000 Units SubCUTAneous Q12H       Review of Systems  Constitutional: negative for fever, chills, sweats  Cardiovascular: negative for chest pain, palpitations, syncope, edema  Gastrointestinal:  negative for dysphagia, reflux, vomiting, diarrhea, abdominal pain, or melena  Neurologic:  negative for focal weakness, numbness, headache    Objective:     Vitals:    02/04/18 0745 02/04/18 0822 02/04/18 0944 02/04/18 1115   BP: 97/79 188/82  181/80   Pulse: 80 79  82   Resp: 22   23   Temp: 97.2 °F (36.2 °C)   97.9 °F (36.6 °C)   SpO2: 92%  92% 90%   Weight:         Intake and Output:   02/02 1901 - 02/04 0700  In: 0654 [I.V.:1338]  Out: 2900 [Urine:2900]  02/04 0701 - 02/04 1900  In: 120 [P.O.:120]  Out: 800 [Urine:800]    Physical Exam:   Constitution:  the patient is well developed and in no acute distress  EENMT:  Sclera clear, pupils equal, oral mucosa moist  Respiratory: scattered squeaks, 10L NRB  Cardiovascular:  RRR without M,G,R  Gastrointestinal: soft and non-tender; with positive bowel sounds. Musculoskeletal: warm without cyanosis. There is no lower leg edema.   Skin:  no jaundice or rashes, no open wounds   Neurologic: no gross neuro deficits     Psychiatric:  alert and oriented x 3    CXR:   1/30      LAB   Recent Labs      02/03/18   0500   WBC  20.4*   HGB  11.3*   HCT  33.6*   PLT  322     Recent Labs      02/03/18   0500   NA  144   K  3.6   CL  108*   CO2  27   GLU  101*   BUN  25*   CREA  0.77   MG  2.1   CA  8.7   ALB  2.1*   TBILI  0.4   ALT  20   SGOT  26       Assessment:  (Medical Decision Making)     Hospital Problems  Date Reviewed: 2/3/2018          Codes Class Noted POA    ARDS (adult respiratory distress syndrome) ([facility] ICD-10-CM: N91  ICD-9-CM: 518.52  1/29/2018 Unknown    Suspect secondary to recent flu  Remains on High Flow O2       Influenza ICD-10-CM: J11.1  ICD-9-CM: 487.1  1/27/2018 Unknown    S/P tx with Tamiflu      Hypokalemia ICD-10-CM: E87.6  ICD-9-CM: 276.8  1/27/2018 Unknown    K+ 3.6      Elevated brain natriuretic peptide (BNP) level ICD-10-CM: R79.89  ICD-9-CM: 790.99  1/26/2018 Unknown    238  Not symptomatically improved with lasix      * (Principal)Acute respiratory failure with hypoxia [facility]) ICD-10-CM: J96.01  ICD-9-CM: 518.81  1/25/2018 No    Remains on high flow NC - sats continue to drop into the 80s with conversation  Does not like Opti-flow or CPAP      Pulmonary infiltrates ICD-10-CM: R91.8  ICD-9-CM: 793.19  1/25/2018 Unknown    Diffuse bilateral  S/P abx   BAL cx with no new results      HTN (hypertension), benign (Chronic) ICD-10-CM: I10  ICD-9-CM: 401.1  1/23/2018 Yes        Recurrent major depressive disorder, in remission (HCC) (Chronic) ICD-10-CM: F33.40  ICD-9-CM: 296.35  1/23/2018 Yes        Breast cancer ([address] (Chronic) ICD-10-CM: C50.919  ICD-9-CM: 174.9  1/23/2018 Yes        S/P bilateral mastectomy (Chronic) ICD-10-CM: Z90.13  ICD-9-CM: V45.71  1/23/2018 Yes 10/11/17  BILATERAL BREAST MASTECTOMY SIMPLE ARTOURA ULTRA HIGH PROFILE BREAST IMPLANTS 700cc 13.5 cm 8.3cm [id number] X 3 /  MEDIUM HEIGHT 550cc 13.5 cm 11.7cm 7.4cm [id number] X 3   BILATERAL SENTINEL NODE BIOPSY WITH LYMPHATIC MAPPING  RIGHT AXILLARY DISSECTION  BILATERAL BREAST RECONSTRUCTION WITH PLACEMENT TISSUE EXPANDERS AND ALLODERM         HCAP (healthcare-associated pneumonia) ICD-10-CM: J18.9  ICD-9-CM: 618  1/23/2018 Yes         Micro:  1/31 PJP: negative  1/31 viral panel: negative  1/31 legionella: not detected   1/31 CMV: not detected  1/31 atypical PNA: not detected  1/31 sputum: Gm stain GNR / yeast / cx: NRF  1/26 Influenza B: POS  1/23 mycoplasma Pneumoniae Ab: 214  1/22 Influenza: negative  1/22 BC: negative x 2    Plan:  (Medical Decision Making)     --Follow up CXR pending  --patient willing to try CPAP for hypoxia / increased work of breathing  --s/p Tamiflu / HCAP tx (10 days Vanc / Zosyn and 7 days azithro) stopped 2/2. WBC 20.4 today. Afebrile  --Albuterol  --mucinex  --solumedrol 60 mg IV q 12 hours  --Heme/Onc following - --cytology from BAL - atypical cells but unlikely for malignancy  --prn morphine for dyspnea  --PT/OT following but patient sats have not allowed for activity  --langley remains in place secondary to hypoxia / debility  --referral to [facility] with ongoing high O2 demands and likelihood of slow recovery - awaiting insurance approval.      More than 50% of the time documented was spent in face-to-face contact with the patient and in the care of the patient on the floor/unit where the patient is located.     Gloria Man NP

## 2018-02-05 NOTE — PROGRESS NOTES
PT note: Attempted PT treatment again this morning. Nursing students at bedside and assisting with feeding. Pt reports feeling SOB at rest, O2 sat at 92%. Nursing at bedside stated pt desats easily with rolling. Pt requested to hold treatment until tomorrow. Will check back later time/date as schedule allows.     Thanks,  Daryle Gail, LUIS CARLOST

## 2018-02-05 NOTE — PROGRESS NOTES
Date of Outreach Update:  Juan Greene was seen and assessed resting in bed with CPAP in place. Eyes closed. Respirations mildly labored; abdominal breathing pattern observed. Will continue to follow up per outreach protocol.     Signed By:   Mary Alice Zayas RN    February 5, 2018 1:35 AM

## 2018-02-05 NOTE — PROGRESS NOTES
Problem: Falls - Risk of  Goal: *Absence of Falls  Document Anurag Fall Risk and appropriate interventions in the flowsheet.    Outcome: Progressing Towards Goal  Fall Risk Interventions:  Mobility Interventions: PT Consult for mobility concerns         Medication Interventions: Bed/chair exit alarm, Teach patient to arise slowly    Elimination Interventions: Call light in reach    History of Falls Interventions: Consult care management for discharge planning

## 2018-02-05 NOTE — PROGRESS NOTES
PT note: Attempted PT treatment this morning however pt is currently getting a bath with nursing. Will check back later time/date as schedule allows.     Thanks,  Franci Martinez, LUIS CARLOST

## 2018-02-05 NOTE — PROGRESS NOTES
Mrs. Tiarra Almanza had uneventful day. Mrs Tiarra Almanza does require 1 to 1 feed assist, she desats to low 80's and 79%. Mrs. Villa requires NRB when eating meals, at this time. Mrs. Tiarra Almanza currently on CPAP 6L at 100% and tolerating well. Mrs Tiarra Almanza remains stable.  BSR given to Beryl Webster

## 2018-02-05 NOTE — PROGRESS NOTES
[facility]        Inpatient Hematology / Oncology Progress Note      Admission Date: 2018  6:38 PM  Reason for Admission/Hospital Course: Pulmonary infiltrates on CXR [R91.8]      24 Hour Events:  Afebrile, VSS  On NRB mask  Awaiting [employer]/insurance approval  No new complaints    ROS:  Constitutional: negative for fever, chills. Positive for weakness, malaise, fatigue. CV:  +pleuritic chest pain with deep inspiration. negative for palpitations, edema. Respiratory: Positive for dyspnea, cough. GI: negative for nausea, diarrhea, abdominal pain. 10 point review of systems is otherwise negative with the exception of the elements mentioned above in the HPI. Allergies   Allergen Reactions    Dilaudid [Hydromorphone] Other (comments)     \"out of her mind\"    Sulfa (Sulfonamide Antibiotics) Itching    Tetracycline Nausea Only       OBJECTIVE:  Patient Vitals for the past 8 hrs:   BP Temp Pulse Resp SpO2   18 0759 162/89 96.6 °F (35.9 °C) 89 24 91 %   18 0648 - - - - (!) 88 %   18 0448 184/76 97.6 °F (36.4 °C) 75 22 93 %   18 0418 - - - - 96 %     Temp (24hrs), Av.3 °F (36.3 °C), Min:96.6 °F (35.9 °C), Max:97.9 °F (36.6 °C)         Physical Exam:  Constitutional: Frail appearing female in no acute distress, sitting comfortably in the hospital bed. HEENT: Normocephalic and atraumatic. Oropharynx is clear, mucous membranes are moist. Extraocular muscles are intact. Sclerae anicteric. Skin Warm and dry.  + bruising to upper extremities bilaterally. No rash noted. No erythema. No pallor. Respiratory Anterior lung sounds diminished bilaterally. On NRB mask. CVS Normal rate, regular rhythm and normal S1 and S2. No murmurs, gallops, or rubs. Abdomen Soft, nontender and nondistended, normoactive bowel sounds. No palpable mass. No hepatosplenomegaly. Neuro Grossly nonfocal with no obvious sensory or motor deficits.    MSK Normal range of motion in general.  No edema and no tenderness. Psych Appropriate mood and affect. Tearful. Labs:      Recent Labs      02/05/18   0331  02/03/18   0500   WBC  15.7*  20.4*   RBC  3.43*  3.78*   HGB  10.1*  11.3*   HCT  31.2*  33.6*   MCV  91.0  88.9   MCH  29.4  29.9   MCHC  32.4  33.6   RDW  16.4*  16.4*   PLT  243  322   GRANS  90*  85*   LYMPH  4*  6*   MONOS  5  8   EOS  0*  0*   BASOS  0  0   IG  1  1   DF  AUTOMATED  AUTOMATED   ANEU  14.1*  17.4*   ABL  0.6  1.2   ABM  0.8  1.6*   TRISHA  0.0  0.0   ABB  0.0  0.0   AIG  0.1  0.2        Recent Labs      02/05/18   0331  02/03/18   0500   NA  144  144   K  3.7  3.6   CL  108*  108*   CO2  28  27   AGAP  8  9   GLU  129*  101*   BUN  21  25*   CREA  0.62  0.77   GFRAA  >60  >60   GFRNA  >60  >60   CA  8.2*  8.7   SGOT  22  26   AP  95  99   TP  5.7*  6.1*   ALB  2.0*  2.1*   GLOB  3.7*  4.0*   AGRAT  0.5*  0.5*   MG  2.0  2.1         Imaging:  XR CHEST PORT [[id number]] Collected: 01/26/18 0816      Order Status: Completed Updated: 01/26/18 0835     Narrative:       Chest portable    CLINICAL INDICATION: Respiratory failure, dyspnea, follow-up infiltrates,  hypertension, bilateral mastectomy and history of breast cancer    COMPARISON: 1/25/2018    TECHNIQUE: single AP portable view chest at 8:23 AM upright     FINDINGS: The left portacatheter is stable. The mediastinal and hilar contours  are stable. There is no pneumothorax or focal dense consolidation. Reticular and  patchy opacities bilaterally, slightly more prominent in the upper lobes, are  similar to prior. No evidence of enlarging pleural effusion.  Surgical changes  involving bilateral breasts are again noted with probable implants.         Impression:       IMPRESSION: No significant change involving diffuse lung infiltrates.       XR CHEST Bernabe Sanchez [[id number]] Collected: 01/25/18 0125     Order Status: Completed Updated: 01/25/18 0128     Narrative:       AP portable chest    INDICATION: Shortness of breath    COMPARISON: 1/22/2018    FINDINGS: No change in cardiomegaly. Bilateral patchy and diffuse  reticulonodular interstitial changes with patchy alveolar densities bilaterally  without significant change. No change position of left-sided Port-A-Cath  catheter. Negative for pneumothorax.       Impression:       IMPRESSION: No significant change     XR CHEST PORT [[id number]] Collected: 01/22/18 1907     Order Status: Completed Updated: 01/22/18 1914     Narrative:       History: Shortness of breath, breast cancer    Exam: portable chest    Comparison: 10/12/2017     Findings: There is coarsening of the interstitial lung markings. There is new  focal increased opacity seen peripherally within the right midlung. No  pneumothorax. No change in the appearance of the mediastinal contour or osseous  structures. There is a new port overlying the left chest wall. Impressions: New focal opacity seen within the right midlung.           ASSESSMENT:    Problem List  Date Reviewed: 2/4/2018          Codes Class Noted    ARDS (adult respiratory distress syndrome) ([facility] ICD-10-CM: H85  ICD-9-CM: 518.52  1/29/2018        Influenza ICD-10-CM: J11.1  ICD-9-CM: 487.1  1/27/2018        Hypokalemia ICD-10-CM: E87.6  ICD-9-CM: 276.8  1/27/2018        Elevated brain natriuretic peptide (BNP) level ICD-10-CM: R79.89  ICD-9-CM: 790.99  1/26/2018        * (Principal)Acute respiratory failure with hypoxia (HCC) ICD-10-CM: J96.01  ICD-9-CM: 518.81  1/25/2018        Pulmonary infiltrates ICD-10-CM: R91.8  ICD-9-CM: 793.19  1/25/2018        HTN (hypertension), benign (Chronic) ICD-10-CM: I10  ICD-9-CM: 401.1  1/23/2018        Recurrent major depressive disorder, in remission ([facility] (Chronic) ICD-10-CM: F33.40  ICD-9-CM: 296.35  1/23/2018        Breast cancer ([facility] (Chronic) ICD-10-CM: C50.919  ICD-9-CM: 174.9  1/23/2018        S/P bilateral mastectomy (Chronic) ICD-10-CM: Z90.13  ICD-9-CM: V45.71  1/23/2018    Overview Signed 1/25/2018 2:39 PM by Elmo Rodríguez NP     10/11/17  BILATERAL BREAST MASTECTOMY SIMPLE ARTOURA ULTRA HIGH PROFILE BREAST IMPLANTS 700cc 13.5 cm 8.3cm [id number] X 3 /  MEDIUM HEIGHT 550cc 13.5 cm 11.7cm 7.4cm [id number] X 3   BILATERAL SENTINEL NODE BIOPSY WITH LYMPHATIC MAPPING  RIGHT AXILLARY DISSECTION  BILATERAL BREAST RECONSTRUCTION WITH PLACEMENT TISSUE EXPANDERS AND ALLODERM             HCAP (healthcare-associated pneumonia) ICD-10-CM: J18.9  ICD-9-CM: 705  1/23/2018        Dehydration ICD-10-CM: E86.0  ICD-9-CM: 276.51  1/22/2018        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 BCNTD. Incentive spirometer  1/25 Increased oxygen demands. Consulted pulm. 1/26 Pulmonary added azith and lasix. Considered thoracentesis but patient desats when off of oxygen. She would need to be intubated for thoracentesis. Conservative measures for now including recommending cpap which patient refused but is now willing to try. Transfer to 5th floor when bed available. 1/29 BC-NGTD. Continues Vancomycin, Zosyn, and Azithromycin. Respiratory status not much improved. Pulmonary following. 1/30 +mycoplasma study. Remains afebrile. Pt not improving despite tamiflu, antibx, and steroids. Pulm planning on bronch tomorrow AM.  1/31 Bronch this AM - studies pending. Pt states she feels less dyspneic s/p bronch, but still having pleuritic chest pain with deep inspiration. 2/2 Studies from bronch pending. Pt remains on Optiflow. Completed antibx today. Pulm following.   Pulm placed consult for [employer] [facility] [employer]. Influenza  1/26 Added Tamiflu.      Stage IIB left breast cancer and IIIB right breast cancer  1/24 sp Cycle one TC. Next Cycle planned for 1/27/2018. Currently on hold. Hypokalemia  1/27 Replete with 40 meq K+  1/29 K+ up to 4.1    Diarrhea  1/28 C-diff negative. Utilize Imodium as needed. Continue home meds  Consult PT/OT/CM  Heparin for DVT prophylaxis      2/3/18:  She continues to require O2 support. She states she does better on NRB. BAL cytology revealed \"rare atypical cells\", but unlikely of malignancy. Awaiting insurance approval for [place], will transfer to 5th floor. Continue with supportive care. Pulmonary continues to follow. 2/5/18: No clinical changes. She continues on NRB. Awaiting [employer] approval.  Continue supportive care. Pulmonary following.                [facility]  [address] [address]  Office : [phone]  Fax : [phone]

## 2018-02-05 NOTE — PROGRESS NOTES
Dell Fernandez  Admission Date: 1/22/2018             Daily Progress Note: 2/5/2018    The patient's chart is reviewed and the patient is discussed with the staff. 70 yo female with history of breast CA s/p bilateral mastectomy 10/2017, GERD, HTN,and HL. She completed chemo 1/6/18. Followed at the infusion center and c/o cough, decreased po intake, and weakness and was found to be hypotensive which did not improve with IVF and was referred to the ER for further evaluation. CXR with new focal opacity R midlung and she was admitted, started on abx, nebs, steroids. Heme/Onc consulted to resume care - next chemo was due 1/27 but due to acute illness was held. Patient remained hypoxic and we were consulted to assist.  CRP was elevated and steroids were intensified. Influenza B returned + and Tamiflu and completed a 5 day course. She failed to improve despite intense therapy and underwent bronch for airway inspection/BAL/cleanout - small amount of mucus plugging. She completed a 10 day course of abx. [facility] was consulted with suspected slow recovery and continued high O2 requirements      Subjective:     Patient was on CPAP but had to go up to 100%. Placed on BIPAP and tolerating but saturation at only 94%. She is a DNR patient. No pain. Coughing up secretions.        Current Facility-Administered Medications   Medication Dose Route Frequency    methylPREDNISolone (PF) (SOLU-MEDROL) injection 60 mg  60 mg IntraVENous Q12H    morphine injection 2 mg  2 mg IntraVENous Q2H PRN    guaiFENesin ER (MUCINEX) tablet 1,200 mg  1,200 mg Oral BID    HYDROcodone-acetaminophen (NORCO) 7.5-325 mg per tablet 1 Tab  1 Tab Oral Q6H PRN    losartan (COZAAR) tablet 50 mg  50 mg Oral DAILY    hydrALAZINE (APRESOLINE) 20 mg/mL injection 20 mg  20 mg IntraVENous Q6H PRN    acetaminophen (TYLENOL) tablet 650 mg  650 mg Oral Q6H PRN    loperamide (IMODIUM) capsule 2 mg  2 mg Oral Q4H PRN    albuterol (PROVENTIL VENTOLIN) nebulizer solution 2.5 mg  2.5 mg Nebulization QID RT    dextrose 5% - 0.45% NaCl with KCl 10 mEq/L infusion  50 mL/hr IntraVENous CONTINUOUS    sodium chloride (OCEAN) 0.65 % nasal spray 2 Spray  2 Spray Both Nostrils Q2H PRN    HYDROcodone-homatropine (HYCODAN) 5-1.5 mg/5 mL (5 mL) syrup 5 mL  5 mL Oral Q4H PRN    lip protectant (BLISTEX) ointment   Topical PRN    alteplase (CATHFLO) 2 mg in sterile water (preservative free) 2 mL injection  2 mg InterCATHeter PRN    atorvastatin (LIPITOR) tablet 20 mg  20 mg Oral DAILY    LORazepam (ATIVAN) tablet 1 mg  1 mg Oral BID PRN    metoprolol succinate (TOPROL-XL) XL tablet 50 mg  50 mg Oral DAILY    prochlorperazine (COMPAZINE) tablet 10 mg  10 mg Oral Q6H PRN    sertraline (ZOLOFT) tablet 100 mg  100 mg Oral DAILY    sodium chloride (NS) flush 5-10 mL  5-10 mL IntraVENous Q8H    sodium chloride (NS) flush 5-10 mL  5-10 mL IntraVENous PRN    heparin (porcine) injection 5,000 Units  5,000 Units SubCUTAneous Q12H       Review of Systems  Constitutional: negative for fever, chills, sweats  Cardiovascular: negative for chest pain, palpitations, syncope, edema  Gastrointestinal:  negative for dysphagia, reflux, vomiting, diarrhea, abdominal pain, or melena  Neurologic:  negative for focal weakness, numbness, headache    Objective:     Vitals:    02/05/18 0940 02/05/18 1131 02/05/18 1132 02/05/18 1334   BP:  154/89     Pulse:  88     Resp:  24  28   Temp:  96.3 °F (35.7 °C)     SpO2:  95% 92% (!) 86%   Weight: 170 lb 12.8 oz (77.5 kg)        Intake and Output:   02/03 1901 - 02/05 0700  In: 1588 [P.O.:295; I.V.:1293]  Out: 2750 [Urine:2750]  02/05 0701 - 02/05 1900  In: 240 [P.O.:240]  Out: 800 [Urine:800]    Physical Exam:   Constitution:  the patient is well developed and in no acute distress on BIPAP at 100%.  Awake and answering questions but noted some abdominal muscle use  EENMT:  Sclera clear, pupils equal, oral mucosa moist  Respiratory: noted coarse sounds b/l. Cardiovascular:  RRR without M,G,R  Gastrointestinal: soft and non-tender; with positive bowel sounds. Musculoskeletal: warm without cyanosis. There is no lower leg edema.   Skin:  no jaundice or rashes, no open wounds   Neurologic: no gross neuro deficits     Psychiatric:  alert and oriented x 3    CXR: today with worsening b/l infiltrates          1/30      LAB   Recent Labs      02/05/18   0331  02/03/18   0500   WBC  15.7*  20.4*   HGB  10.1*  11.3*   HCT  31.2*  33.6*   PLT  243  322     Recent Labs      02/05/18   0331  02/03/18   0500   NA  144  144   K  3.7  3.6   CL  108*  108*   CO2  28  27   GLU  129*  101*   BUN  21  25*   CREA  0.62  0.77   MG  2.0  2.1   CA  8.2*  8.7   ALB  2.0*  2.1*   TBILI  0.3  0.4   ALT  21  20   SGOT  22  26       Assessment:  (Medical Decision Making)     Hospital Problems  Date Reviewed: 2/3/2018          Codes Class Noted POA    ARDS (adult respiratory distress syndrome) ([facility] ICD-10-CM: L71  ICD-9-CM: 518.52  1/29/2018 Unknown    Suspect secondary to recent flu and now R/C with   Remains on High Flow O2       Influenza ICD-10-CM: J11.1  ICD-9-CM: 487.1  1/27/2018 Unknown    S/P tx with Tamiflu      Hypokalemia ICD-10-CM: E87.6  ICD-9-CM: 276.8  1/27/2018 Unknown    K+ 3.6      Elevated brain natriuretic peptide (BNP) level ICD-10-CM: R79.89  ICD-9-CM: 790.99  1/26/2018 Unknown    238  Not symptomatically improved with lasix      * (Principal)Acute respiratory failure with hypoxia [facility]) ICD-10-CM: J96.01  ICD-9-CM: 518.81  1/25/2018 No    Remains on high flow NC - sats continue to drop into the 80s with conversation  Does not like Opti-flow or CPAP      Pulmonary infiltrates ICD-10-CM: R91.8  ICD-9-CM: 793.19  1/25/2018 Unknown    Diffuse bilateral  S/P abx   BAL cx with no new results      HTN (hypertension), benign (Chronic) ICD-10-CM: I10  ICD-9-CM: 401.1  1/23/2018 Yes        Recurrent major depressive disorder, in remission [facility]) (Chronic) ICD-10-CM: F33.40  ICD-9-CM: 296.35  1/23/2018 Yes        Breast cancer ([facility] (Chronic) ICD-10-CM: C50.919  ICD-9-CM: 174.9  1/23/2018 Yes        S/P bilateral mastectomy (Chronic) ICD-10-CM: Z90.13  ICD-9-CM: V45.71  1/23/2018 Yes     10/11/17  BILATERAL BREAST MASTECTOMY SIMPLE ARTOURA ULTRA HIGH PROFILE BREAST IMPLANTS 700cc 13.5 cm 8.3cm [id number] X 3 /  MEDIUM HEIGHT 550cc 13.5 cm 11.7cm 7.4cm [id number] X 3   BILATERAL SENTINEL NODE BIOPSY WITH LYMPHATIC MAPPING  RIGHT AXILLARY DISSECTION  BILATERAL BREAST RECONSTRUCTION WITH PLACEMENT TISSUE EXPANDERS AND ALLODERM         HCAP (healthcare-associated pneumonia) ICD-10-CM: J18.9  ICD-9-CM: 711  1/23/2018 Yes         Micro:  1/31 PJP: negative  1/31 viral panel: negative  1/31 legionella: not detected   1/31 CMV: not detected  1/31 atypical PNA: not detected  1/31 sputum: klebsiella / Candida parapsilosis complex  1/26 Influenza B: POS  1/23 mycoplasma Pneumoniae Ab: 214  1/22 Influenza: negative  1/22 BC: negative x 2    Plan:  (Medical Decision Making)     --was on CPAP and tried BIPAP and awake but high FIO2 requirements and is DNR  --cxr worse and will restart abx -- zosyn, vanco for now. S/p 10 days of abx with Vanco/Zosyn and 7 days of azith and s/p tamifu. WBC is 14 today  --continue steorids  --morphine prn  --spoke with Dr. Jael Jloly given overall worse oxygenation/respiratory status would not send he to an [facility], she will just come back. Staff calling family to inform them of acut condition overall. --Heme/Onc following - --cytology from BAL - atypical cells but unlikely for malignancy  --continue remaining treatment.     Condition is critical  Time spent with care today is 35 minutes  Discussed case with Dr. Jael Jolly, nurse, respiratory therapist.    Clement Rosenberg MD

## 2018-02-05 NOTE — PROGRESS NOTES
Pharmacokinetic Consult to Pharmacist    Cynthia Dinah is a 69 y.o. female being treated for upper resp infxn with vanc/cefepime. Weight: 77.5 kg (170 lb 12.8 oz)  Lab Results   Component Value Date/Time    BUN 21 02/05/2018 03:31 AM    Creatinine 0.62 02/05/2018 03:31 AM    WBC 15.7 02/05/2018 03:31 AM    Procalcitonin 0.1 01/25/2018 04:37 PM    Lactic Acid (POC) 1.2 01/22/2018 08:27 PM      Estimated Creatinine Clearance: 76 mL/min (based on Cr of 0.62). Day 1. Vancomycin restarted at previous therapeutic dosing of 1g q18h. Will continue to follow patient. Thank you,  Som Sahu, Pharm. D.   Clinical Pharmacist  [phone]

## 2018-02-05 NOTE — PROGRESS NOTES
Called to patient from because patient was desating and needed help adjusting CPAP mask. RN had patient on NRB and patients sat was 77 and dropping. Placed patient back on CPAP. Patient had increased WOB and sat was not increasing on CPAP. Placed patient on BIPAP 12/8 100%. Patients sat is increased and is currently at 88%. Patient received a dose of morphine. She still has shallow breathing and is tachypnic. Will continue to monitor.

## 2018-02-05 NOTE — PROGRESS NOTES
TRANSFER - IN REPORT:    Verbal report received from [place], [facility]) on [place]  being received from [facility](unit) for change in patient condition(worsening resp failure requiring continuous BiPAP)      Report consisted of patients Situation, Background, Assessment and   Recommendations(SBAR). Information from the following report(s) SBAR and Kardex was reviewed with the receiving nurse. Opportunity for questions and clarification was provided. Assessment completed upon patients arrival to unit and care assumed.

## 2018-02-05 NOTE — PROGRESS NOTES
Spoke with Lino Jones at [facility], and she stated that pre cert was approved and patient could move to floor today. Notified NP Mike Khan and patient is currently not medically stable for discharge to [facility] at this time. NP/MD to re-evaluate in AM. Ivan Phillips notified as well. CM will continue to follow.

## 2018-02-05 NOTE — PROGRESS NOTES
Problem: Falls - Risk of  Goal: *Absence of Falls  Document Anurag Fall Risk and appropriate interventions in the flowsheet.    Outcome: Progressing Towards Goal  Fall Risk Interventions:  Mobility Interventions: Communicate number of staff needed for ambulation/transfer, Patient to call before getting OOB         Medication Interventions: Evaluate medications/consider consulting pharmacy    Elimination Interventions: Call light in reach, Patient to call for help with toileting needs    History of Falls Interventions: Consult care management for discharge planning

## 2018-02-05 NOTE — PROGRESS NOTES
Pt seen for critical care outreach rounds. Primary RN Mela Polk in the room with pt who is on a continuous bedside sat monitor. Pt awake, tachypneic with RR 32-26, retractions, abdominal breathing, and accesory muscle use. She is on CPAP per face mask pressure of 6 Fi02 100%. Pt reports she is exhausted and is having to work very hard to breathe. I contacted Dr. Mana Pinzon who ordered BiPap. Britt Edwards RT placed pt on BiPap 12/10, R 20, [id number] After about 20 minutes on these settings pt's RR has decreased to 28 and her 02 sat has increased to 94%. Dr. Mana Pinzon in to see pt, confirmed DNR status. RT to try pt on optiflow in 1-2 hours to see if she has improved. Critical care outreach nurse will continue to assist with this patient.

## 2018-02-05 NOTE — PROGRESS NOTES
Pt currently on optiflow but desatting to 85% at times, requiring pursed lip breathing and coaching to regain sat. She states she feels okay on optiflow but is \"getting really tired\". I updated Dr. Ernestina Adams and orders received to transfer pt to ICU for likely ongoing BiPap needs.

## 2018-02-06 NOTE — PROGRESS NOTES
No change in assessment. VSS. Afebrile. BIPAP @ 90% O2. Asleep with respirations even and unlabored.

## 2018-02-06 NOTE — PROGRESS NOTES
Change in pt's condition warranting transfer to CCU. j Chart reviewed and discussed with Dr. Petar Rosenberg. Pt approved by insurance for LTAC. MD requesting no transfer to LTAC at present due to high risk of intubation. Jean Martin, liaison with [facility] notified. CM to follow and assist with any needs.

## 2018-02-06 NOTE — PROGRESS NOTES
Patient desat to 74% taking oxygen off to blow her nose. Patient recovered slowly with sats >90% with optiflow and NRB on.

## 2018-02-06 NOTE — PROGRESS NOTES
Mrs. Jasmineina Loose with tachypnea and dyspnea. Mrs. Villa request Morphine dose. Morphine 2 mg IV administered.

## 2018-02-06 NOTE — PROGRESS NOTES
Bedside shift change report given to Bijal Dorantes RN (oncoming nurse) by Nahomi Gardiner (offgoing nurse). Report included the following information Kardex, Intake/Output, MAR, Recent Results, Med Rec Status and Cardiac Rhythm NSR.

## 2018-02-06 NOTE — PROGRESS NOTES
Still on optiflow and NRB. Saturation at 88-89% with mild tachypnea no accessory muscle use. Daughter here and gave her update regarding patient's condition. She is aware of acuity. ABG earlier on combination setup was fairly good. Holding off intubating patient for now, but low threshold. Will use BIPAP QHS,she is aware. Daughter aware of all issues.     Tom Pagan MD

## 2018-02-06 NOTE — PROGRESS NOTES
Respiratory therapist \"Whitney\" exchanged BIPAP mask for a smaller one due to air leak and Mrs. Villa's c/o pain to face around the upper portion of the mask.

## 2018-02-06 NOTE — PROGRESS NOTES
Hydralazine 20 mg IV administered for hypertension per prn order. Javier Stephon Narendra denies any needs at this time.

## 2018-02-06 NOTE — PROGRESS NOTES
PT Note:  Therapist is discontinuing physical therapy at this time due to transfer to unit. Ms. Guera Brewer physical therapy goals were not met. Please reorder PT when our services are again appropriate. Thank you.   Lorena Carty, PT, DPT  2/6/2018

## 2018-02-06 NOTE — PROGRESS NOTES
Bedside shift report received from [facility]. Patient is on 100% fio2 on bipap, alert and oriented. VSS.

## 2018-02-06 NOTE — PROGRESS NOTES
Bedside shift change report given to Luis Gonzales RN (oncoming nurse) by Arnel Martínez (offgoing nurse). Report included the following information Kardex, Intake/Output, MAR, Recent Results, Med Rec Status and Cardiac Rhythm NSR.   Mrs. Torres Ayala has arrived to room 3306 from the floor for BIPAP. Alert and oriented x4.

## 2018-02-06 NOTE — PROGRESS NOTES
Kelechi Rota  Admission Date: 1/22/2018             Daily Progress Note: 2/6/2018    The patient's chart is reviewed and the patient is discussed with the staff. 70 yo female with history of breast CA s/p bilateral mastectomy 10/2017, GERD, HTN,and HL. She completed chemo 1/6/18. Followed at the infusion center and c/o cough, decreased po intake, and weakness and was found to be hypotensive which did not improve with IVF and was referred to the ER for further evaluation. CXR with new focal opacity R midlung and she was admitted, started on abx, nebs, steroids. Heme/Onc consulted to resume care - next chemo was due 1/27 but due to acute illness was held. Patient remained hypoxic and we were consulted to assist.  CRP was elevated and steroids were intensified. Influenza B returned + and Tamiflu and completed a 5 day course. She failed to improve despite intense therapy and underwent bronch for airway inspection/BAL/cleanout - small amount of mucus plugging. She completed a 10 day course of abx. Jaime was consulted with suspected slow recovery and continued high O2 requirements      Subjective:     Transferred down to CCU yesterday since not able to breath and had to be on BIPAP. Today on BIPAP at 100% FIO2 saturation barely holding. Taken off to optiflow and saturation dropping to 70's. Now with non-rebreather and optiflow and saturation at 93%. Patient reports breathing is not as good.        Current Facility-Administered Medications   Medication Dose Route Frequency    cefepime (MAXIPIME) 1 g in 0.9% sodium chloride (MBP/ADV) 50 mL ADV  1 g IntraVENous Q12H    vancomycin (VANCOCIN) 1,000 mg in 0.9% sodium chloride (MBP/ADV) 250 mL  1 g IntraVENous Q18H    zinc oxide-cod liver oil (DESITIN) 40 % paste   Topical PRN    methylPREDNISolone (PF) (SOLU-MEDROL) injection 60 mg  60 mg IntraVENous Q12H    morphine injection 2 mg  2 mg IntraVENous Q2H PRN    guaiFENesin ER (MUCINEX) tablet 1,200 mg  1,200 mg Oral BID    HYDROcodone-acetaminophen (NORCO) 7.5-325 mg per tablet 1 Tab  1 Tab Oral Q6H PRN    losartan (COZAAR) tablet 50 mg  50 mg Oral DAILY    hydrALAZINE (APRESOLINE) 20 mg/mL injection 20 mg  20 mg IntraVENous Q6H PRN    acetaminophen (TYLENOL) tablet 650 mg  650 mg Oral Q6H PRN    loperamide (IMODIUM) capsule 2 mg  2 mg Oral Q4H PRN    albuterol (PROVENTIL VENTOLIN) nebulizer solution 2.5 mg  2.5 mg Nebulization QID RT    sodium chloride (OCEAN) 0.65 % nasal spray 2 Spray  2 Spray Both Nostrils Q2H PRN    HYDROcodone-homatropine (HYCODAN) 5-1.5 mg/5 mL (5 mL) syrup 5 mL  5 mL Oral Q4H PRN    lip protectant (BLISTEX) ointment   Topical PRN    alteplase (CATHFLO) 2 mg in sterile water (preservative free) 2 mL injection  2 mg InterCATHeter PRN    atorvastatin (LIPITOR) tablet 20 mg  20 mg Oral DAILY    LORazepam (ATIVAN) tablet 1 mg  1 mg Oral BID PRN    metoprolol succinate (TOPROL-XL) XL tablet 50 mg  50 mg Oral DAILY    prochlorperazine (COMPAZINE) tablet 10 mg  10 mg Oral Q6H PRN    sertraline (ZOLOFT) tablet 100 mg  100 mg Oral DAILY    sodium chloride (NS) flush 5-10 mL  5-10 mL IntraVENous Q8H    sodium chloride (NS) flush 5-10 mL  5-10 mL IntraVENous PRN    heparin (porcine) injection 5,000 Units  5,000 Units SubCUTAneous Q12H       Review of Systems  Constitutional: negative for fever, chills, sweats  Cardiovascular: negative for chest pain, palpitations, syncope, edema  Gastrointestinal:  negative for dysphagia, reflux, vomiting, diarrhea, abdominal pain, or melena  Neurologic:  negative for focal weakness, numbness, headache    Objective:     Vitals:    02/06/18 0836 02/06/18 0837 02/06/18 0846 02/06/18 0849   BP:   167/73    Pulse:   93    Resp:       Temp:       SpO2: 96% 96%  90%   Weight:         Blood pressure 167/73, pulse 93, temperature 97.5 °F (36.4 °C), resp.  rate 22, weight 166 lb 10.7 oz (75.6 kg), SpO2 90 %, not currently breastfeeding. Intake and Output:   02/04 1901 - 02/06 0700  In: 3417 [P.O.:570; I.V.:1193]  Out: 2725 [Urine:2725]       Physical Exam:   Constitution:  the patient is well developed and in no acute distress but looks unwell on optiflow and non-rebreather. EENMT:  Sclera clear, pupils equal, oral mucosa moist  Respiratory: noted coarse sounds b/l. Cardiovascular:  RRR without M,G,R  Gastrointestinal: soft and non-tender; with positive bowel sounds. Musculoskeletal: warm without cyanosis. There is no lower leg edema.   Skin:  no jaundice or rashes, no open wounds   Neurologic: no gross neuro deficits     Psychiatric:  alert and oriented x 3    CXR: yesterday with worsening b/l infiltrates          1/30      LAB   Recent Labs      02/05/18   0331   WBC  15.7*   HGB  10.1*   HCT  31.2*   PLT  243     Recent Labs      02/05/18   0331   NA  144   K  3.7   CL  108*   CO2  28   GLU  129*   BUN  21   CREA  0.62   MG  2.0   CA  8.2*   ALB  2.0*   TBILI  0.3   ALT  21   SGOT  22       Assessment:  (Medical Decision Making)     Hospital Problems  Date Reviewed: 2/3/2018          Codes Class Noted POA    ARDS (adult respiratory distress syndrome) ([facility] ICD-10-CM: Y50  ICD-9-CM: 518.52  1/29/2018 Unknown    Suspect secondary to recent flu and now R/C with   Remains on High Flow O2       Influenza ICD-10-CM: J11.1  ICD-9-CM: 487.1  1/27/2018 Unknown    S/P tx with Tamiflu      Hypokalemia ICD-10-CM: E87.6  ICD-9-CM: 276.8  1/27/2018 Unknown           Elevated brain natriuretic peptide (BNP) level ICD-10-CM: R79.89  ICD-9-CM: 790.99  1/26/2018 Unknown    238  Not symptomatically improved with lasix      * (Principal)Acute respiratory failure with hypoxia (HCC) ICD-10-CM: J96.01  ICD-9-CM: 518.81  1/25/2018 No    See below      Pulmonary infiltrates ICD-10-CM: R91.8  ICD-9-CM: 793.19  1/25/2018 Unknown    Diffuse bilateral  S/P abx   BAL cx with no new results      HTN (hypertension), benign (Chronic) ICD-10-CM: I10  ICD-9-CM: 401.1  1/23/2018 Yes        Recurrent major depressive disorder, in remission (HCC) (Chronic) ICD-10-CM: F33.40  ICD-9-CM: 296.35  1/23/2018 Yes        Breast cancer ([facility] (Chronic) ICD-10-CM: C50.919  ICD-9-CM: 174.9  1/23/2018 Yes        S/P bilateral mastectomy (Chronic) ICD-10-CM: Z90.13  ICD-9-CM: V45.71  1/23/2018 Yes     10/11/17  BILATERAL BREAST MASTECTOMY SIMPLE ARTOURA ULTRA HIGH PROFILE BREAST IMPLANTS 700cc 13.5 cm 8.3cm [id number] X 3 /  MEDIUM HEIGHT 550cc 13.5 cm 11.7cm 7.4cm [id number] X 3   BILATERAL SENTINEL NODE BIOPSY WITH LYMPHATIC MAPPING  RIGHT AXILLARY DISSECTION  BILATERAL BREAST RECONSTRUCTION WITH PLACEMENT TISSUE EXPANDERS AND ALLODERM         HCAP (healthcare-associated pneumonia) ICD-10-CM: J18.9  ICD-9-CM: 504  1/23/2018 Yes         Micro:  1/31 PJP: negative  1/31 viral panel: negative  1/31 legionella: not detected   1/31 CMV: not detected  1/31 atypical PNA: not detected  1/31 sputum: klebsiella / Candida parapsilosis complex  1/26 Influenza B: POS  1/23 mycoplasma Pneumoniae Ab: 214  1/22 Influenza: negative  1/22 BC: negative x 2    Plan:  (Medical Decision Making)     --on optiflow and non-rebreather and saturation at 93% and continue BIPAP on 100% FIO2 as needed. Spoke with patient and she would like vent support if needed but not forever -- she would want to try for 1-2 weeks only. Spoke to daughter , Liz Mcwilliams, and gave her update. She is aware of acutity and will be here. She also reports understand mother's wishes and not for lifelong vent support. Also not when no hope for recovery. So change DNR to full code for now  --restarted abx -- zosyn, vanco yesterday and get sputum sample today. Note has completed 10 days of abx this admission.   --continue steroids for now  --morphine prn  --Heme/Onc following - --cytology from BAL - atypical cells but unlikely for malignancy s/p chemo in January  --PO intake as tolerated, but if persistent chocking then stop.  --continue remaining treatment. NO transfer to [facility] S[facility] given high risk to be intubated.     Condition is critical  Time spent with care today is 40 minutes  Discussed case with patient, daughter,  nurses, and respiratory therapist.        Milan Banda MD

## 2018-02-06 NOTE — PROGRESS NOTES
Interdisciplinary team rounds were held 2/6/2018 with the following team members:Nursing, Nurse Practitioner, Nutrition, Palliative Care, Pharmacy, Physical Therapy, Physician, Respiratory Therapy, Speech Therapy and Clinical Coordinator and the patient. Plan of care discussed. See clinical pathway and/or care plan for interventions and desired outcomes.

## 2018-02-06 NOTE — PROGRESS NOTES
Primary Nurse Bunny Cruz RN and Maury Moreira RN performed a dual skin assessment on this patient No impairment noted. Excoriation to gluteal folds. No DTIs/ No pressure ulcers observed.

## 2018-02-06 NOTE — PROGRESS NOTES
JavierDaisy states,\"The morphine helped a little but I would like to have my Ativan now. \"  Ativan 1 mg PO administered per patient request.

## 2018-02-06 NOTE — PROGRESS NOTES
[facility]        Inpatient Hematology / Oncology Progress Note      Admission Date: 2018  6:38 PM  Reason for Admission/Hospital Course: Pulmonary infiltrates on CXR [R91.8]      24 Hour Events:  Now in ccu on bipap/optiflow  Alert and oriented  Code status changed from dnr    ROS:  Constitutional:  Positive for weakness, malaise, fatigue. CV:  +pleuritic chest pain with deep inspiration. negative for palpitations, edema. Respiratory: Positive for dyspnea  GI: negative for nausea, diarrhea, abdominal pain. 10 point review of systems is otherwise negative with the exception of the elements mentioned above in the HPI. Allergies   Allergen Reactions    Dilaudid [Hydromorphone] Other (comments)     \"out of her mind\"    Sulfa (Sulfonamide Antibiotics) Itching    Tetracycline Nausea Only       OBJECTIVE:  Patient Vitals for the past 8 hrs:   BP Temp Pulse Resp SpO2   18 1154 - - - - 95 %   18 1114 - 98 °F (36.7 °C) - - -   18 1102 155/68 - 86 (!) 60 94 %   18 1031 147/68 - 86 (!) 31 92 %   18 1002 136/63 - 88 19 94 %   18 0932 147/67 - 95 24 92 %   18 0902 169/72 - 88 (!) 36 93 %   18 0849 - - - - 90 %   18 0846 167/73 - 93 - -   18 0837 - - - - 96 %   18 0836 - - - - 96 %   18 0832 167/73 - 83 25 96 %   18 0802 177/78 - 84 27 95 %   18 0732 176/76 - 84 25 96 %   18 0702 170/74 97.5 °F (36.4 °C) 79 22 96 %   18 0632 182/77 - 85 (!) 59 95 %   18 0602 165/71 - 80 (!) 57 96 %   18 0532 177/73 - 86 (!) 33 92 %   18 0502 143/63 - 77 21 96 %     Temp (24hrs), Av.9 °F (36.6 °C), Min:97.1 °F (36.2 °C), Max:98.5 °F (36.9 °C)    701 -  1900  In: 200 [P.O.:200]  Out: 350 [Urine:350]    Physical Exam:  Constitutional: Frail appearing female in no acute distress, sitting  in the hospital bed. HEENT: Normocephalic and atraumatic.  Oropharynx is clear, mucous membranes are moist. Extraocular muscles are intact. Sclerae anicteric. Skin Warm and dry.  + bruising to upper extremities bilaterally. No rash noted. No erythema. No pallor. Respiratory Coarse sounds bilaterally   CVS Anatoliy, regular rhythm and normal S1 and S2. No murmurs, gallops, or rubs. Abdomen Soft, nontender and nondistended, normoactive bowel sounds. Neuro Grossly nonfocal with no obvious sensory or motor deficits. MSK Normal range of motion in general.  No edema and no tenderness. Psych Appropriate mood and affect. Tearful. Labs:      Recent Labs      02/06/18   0950  02/05/18   0331   WBC  21.9*  15.7*   RBC  3.65*  3.43*   HGB  10.7*  10.1*   HCT  32.8*  31.2*   MCV  89.9  91.0   MCH  29.3  29.4   MCHC  32.6  32.4   RDW  16.5*  16.4*   PLT  237  243   GRANS  95*  90*   LYMPH  3*  4*   MONOS  1*  5   EOS  0*  0*   BASOS  0  0   IG  1  1   DF  AUTOMATED  AUTOMATED   ANEU  21.0*  14.1*   ABL  0.6  0.6   ABM  0.2  0.8   TRISHA  0.0  0.0   ABB  0.0  0.0   AIG  0.1  0.1        Recent Labs      02/06/18   0950  02/05/18   0331   NA  142  144   K  3.9  3.7   CL  105  108*   CO2  27  28   AGAP  10  8   GLU  173*  129*   BUN  23  21   CREA  0.70  0.62   GFRAA  >60  >60   GFRNA  >60  >60   CA  8.8  8.2*   SGOT   --   22   AP   --   95   TP   --   5.7*   ALB   --   2.0*   GLOB   --   3.7*   AGRAT   --   0.5*   MG   --   2.0         Imaging:  XR CHEST PORT [[id number]] Collected: 01/26/18 0816      Order Status: Completed Updated: 01/26/18 0835     Narrative:       Chest portable    CLINICAL INDICATION: Respiratory failure, dyspnea, follow-up infiltrates,  hypertension, bilateral mastectomy and history of breast cancer    COMPARISON: 1/25/2018    TECHNIQUE: single AP portable view chest at 8:23 AM upright     FINDINGS: The left portacatheter is stable. The mediastinal and hilar contours  are stable. There is no pneumothorax or focal dense consolidation.  Reticular and  patchy opacities bilaterally, slightly more prominent in the upper lobes, are  similar to prior. No evidence of enlarging pleural effusion. Surgical changes  involving bilateral breasts are again noted with probable implants.         Impression:       IMPRESSION: No significant change involving diffuse lung infiltrates.       XR CHEST SNGL V [[id number]] Collected: 01/25/18 0125     Order Status: Completed Updated: 01/25/18 0128     Narrative:       AP portable chest    INDICATION: Shortness of breath    COMPARISON: 1/22/2018    FINDINGS: No change in cardiomegaly. Bilateral patchy and diffuse  reticulonodular interstitial changes with patchy alveolar densities bilaterally  without significant change. No change position of left-sided Port-A-Cath  catheter. Negative for pneumothorax.       Impression:       IMPRESSION: No significant change     XR CHEST PORT [[id number]] Collected: 01/22/18 1907     Order Status: Completed Updated: 01/22/18 1914     Narrative:       History: Shortness of breath, breast cancer    Exam: portable chest    Comparison: 10/12/2017     Findings: There is coarsening of the interstitial lung markings. There is new  focal increased opacity seen peripherally within the right midlung. No  pneumothorax. No change in the appearance of the mediastinal contour or osseous  structures. There is a new port overlying the left chest wall. Impressions: New focal opacity seen within the right midlung.           ASSESSMENT:    Problem List  Date Reviewed: 2/4/2018          Codes Class Noted    ARDS (adult respiratory distress syndrome) ([facility] ICD-10-CM: N69  ICD-9-CM: 518.52  1/29/2018        Influenza ICD-10-CM: J11.1  ICD-9-CM: 487.1  1/27/2018        Hypokalemia ICD-10-CM: E87.6  ICD-9-CM: 276.8  1/27/2018        Elevated brain natriuretic peptide (BNP) level ICD-10-CM: R79.89  ICD-9-CM: 790.99  1/26/2018        * (Principal)Acute respiratory failure with hypoxia ([facility] ICD-10-CM: J96.01  ICD-9-CM: 518.81  1/25/2018 Pulmonary infiltrates ICD-10-CM: R91.8  ICD-9-CM: 793.19  1/25/2018        HTN (hypertension), benign (Chronic) ICD-10-CM: I10  ICD-9-CM: 401.1  1/23/2018        Recurrent major depressive disorder, in remission ([facility] (Chronic) ICD-10-CM: F33.40  ICD-9-CM: 296.35  1/23/2018        Breast cancer ([facility] (Chronic) ICD-10-CM: C50.919  ICD-9-CM: 174.9  1/23/2018        S/P bilateral mastectomy (Chronic) ICD-10-CM: Z90.13  ICD-9-CM: V45.71  1/23/2018    Overview Signed 1/25/2018  2:39 PM by Tata Hernandez NP     10/11/17  BILATERAL BREAST MASTECTOMY SIMPLE ARTOURA ULTRA HIGH PROFILE BREAST IMPLANTS 700cc 13.5 cm 8.3cm [id number] X 3 /  MEDIUM HEIGHT 550cc 13.5 cm 11.7cm 7.4cm [id number] X 3   BILATERAL SENTINEL NODE BIOPSY WITH LYMPHATIC MAPPING  RIGHT AXILLARY DISSECTION  BILATERAL BREAST RECONSTRUCTION WITH PLACEMENT TISSUE EXPANDERS AND ALLODERM             HCAP (healthcare-associated pneumonia) ICD-10-CM: J18.9  ICD-9-CM: 497  1/23/2018        Dehydration ICD-10-CM: E86.0  ICD-9-CM: 276.51  1/22/2018        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 BCNTD. Incentive spirometer  1/25 Increased oxygen demands. Consulted pulm. 1/26 Pulmonary added azith and lasix. Considered thoracentesis but patient desats when off of oxygen. She would need to be intubated for thoracentesis. Conservative measures for now including recommending cpap which patient refused but is now willing to try. Transfer to 5th floor when bed available. 1/29 BC-NGTD.  Continues Vancomycin, Zosyn, and Azithromycin. Respiratory status not much improved. Pulmonary following. 1/30 +mycoplasma study. Remains afebrile. Pt not improving despite tamiflu, antibx, and steroids. Pulm planning on bronch tomorrow AM.  1/31 Bronch this AM - studies pending. Pt states she feels less dyspneic s/p bronch, but still having pleuritic chest pain with deep inspiration. 2/2 Studies from bronch pending. Pt remains on Optiflow. Completed antibx today. Pulm following. Pulm placed consult for [place]. Influenza  1/26 Added Tamiflu.      Stage IIB left breast cancer and IIIB right breast cancer  1/24 sp Cycle one TC. Next Cycle planned for 1/27/2018. Currently on hold. Hypokalemia  1/27 Replete with 40 meq K+  1/29 K+ up to 4.1    Diarrhea  1/28 C-diff negative. Utilize Imodium as needed. Continue home meds  Consult PT/OT/CM  Heparin for DVT prophylaxis      2/3/18:  She continues to require O2 support. She states she does better on NRB. BAL cytology revealed \"rare atypical cells\", but unlikely of malignancy. Awaiting insurance approval for [facility], will transfer to 5th floor. Continue with supportive care. Pulmonary continues to follow. 2/5/18: No clinical changes. She continues on NRB. [facility]  Continue supportive care. Pulmonary following. 2/6/18: Patient moved to CCU DT hypoxia. Now on bipap/optiflow. Patient made the decision to change her code status from DNR to full code. She does want to be ventilated for a short period of time but does not want long term vent support. Discussed plan of care with her and with Dr. Iam Pierre.                Yoselin Rodriguez NP   [place] [facility] [facility]  [address] [address]  Office : [phone]  Fax : [phone]

## 2018-02-06 NOTE — PROGRESS NOTES
Bedside shift change report given to [id number] [facility] (oncoming nurse) by Emmie Stewart (offgoing nurse). Report included the following information Kardex, Intake/Output, MAR, Recent Results, Med Rec Status and Cardiac Rhythm NSR. Alert and oriented x4. BIPAP 100% with desaturation into 70% with BIPAP removal for sips of H2O, dyspnea with rest and exertion. Left chest port single lumen accessed and capped. Ivan patent. No DTIs/No pressure ulcers observed. Hydralazine prn IV dose given once for HTN overnight.

## 2018-02-06 NOTE — PROGRESS NOTES
OT NOTE:    Therapist is discharging patient from OT at this time due to decline in medical status and transfer to ICU. Please reconsult OT when MD deems patient appropriate for continued services. Thank you.   Vernell Tinsley MS, OTR/L  2/6/2018

## 2018-02-07 NOTE — PROCEDURES
Attempted to place [facility] via RIJV. US used and vessel identified without difficulty and collapsibility assured. Accessed vein 3 times with very high pressure evident on all attempts. Will hold on placing QLC since she is likely to bleed significantly from site. Pressure applied and no hematoma evident.     Christel Goodell, MD

## 2018-02-07 NOTE — ROUTINE PROCESS
BiPAP FiO2 decreased to 90% by RT. Currently maintaining O2 saturation without any decrease at 94-95%.

## 2018-02-07 NOTE — PROGRESS NOTES
Moderate dyspnea at rest. Optiflow and NRB setup removed and placed back onto BIPAP. Morphine 2 mg IV administered and pulled Mrs. Villa up in bed with HOB elevated. Respiratory therapist notified and breathing treatment being administered.

## 2018-02-07 NOTE — PROGRESS NOTES
Patient hypertensive with minimal effect from scheduled AM antihypertensive medications. 20mg hydralazine PRN administered.   WIll continue to monitor BP closely

## 2018-02-07 NOTE — PROGRESS NOTES
[address] [place] [address] Hematology & Oncology        Inpatient Hematology / Oncology Progress Note      Admission Date: 2018  6:38 PM  Reason for Admission/Hospital Course: Pulmonary infiltrates on CXR [R91.8]      24 Hour Events: On full face bipap at 100%  Alert     ROS:  Constitutional:  Positive for weakness, malaise, fatigue. CV:  +pleuritic chest pain with deep inspiration. negative for palpitations, edema. Respiratory: Positive for dyspnea  GI: negative for nausea, diarrhea, abdominal pain. 10 point review of systems is otherwise negative with the exception of the elements mentioned above in the HPI. Allergies   Allergen Reactions    Dilaudid [Hydromorphone] Other (comments)     \"out of her mind\"    Sulfa (Sulfonamide Antibiotics) Itching    Tetracycline Nausea Only       OBJECTIVE:  Patient Vitals for the past 8 hrs:   BP Temp Pulse Resp SpO2 Weight   18 1213 - - - - 95 % -   18 1202 147/67 - 86 30 96 % -   18 1132 144/65 - 92 25 98 % -   18 1102 159/71 - 85 27 98 % -   18 1053 188/81 - 86 - - -   18 1032 188/81 - 85 30 97 % -   18 1002 177/77 - 84 24 97 % -   18 0932 177/77 - 88 26 97 % -   18 0902 192/79 - 98 (!) 35 94 % -   18 0853 - - - - 95 % -   18 0833 187/80 - 80 - - -   18 0832 187/80 - 75 23 97 % -   18 0802 194/84 - 80 28 97 % -   18 0732 190/85 97.5 °F (36.4 °C) 81 26 97 % -   18 0702 168/71 - 74 12 99 % -   18 0636 - - - - - 169 lb 1.5 oz (76.7 kg)   18 0634 - - - - 99 % -   18 0632 142/66 - 68 12 99 % -   18 0602 136/61 - 69 12 100 % -     Temp (24hrs), Av.6 °F (36.4 °C), Min:97.5 °F (36.4 °C), Max:97.8 °F (36.6 °C)    701 -  1900  In: 250 [I.V.:250]  Out: -     Physical Exam:  Constitutional: Frail appearing female in no acute distress,lying in the hospital bed. HEENT: Normocephalic and atraumatic.  Oropharynx is clear, mucous membranes are moist. Extraocular muscles are intact. Sclerae anicteric. Skin Warm and dry.  + bruising to upper extremities bilaterally. No rash noted. No erythema. No pallor. Respiratory Coarse sounds bilaterally on bipap   CVS Anatoliy, regular rhythm and normal S1 and S2. No murmurs, gallops, or rubs. Abdomen Soft, nontender and nondistended, normoactive bowel sounds. Neuro Grossly nonfocal with no obvious sensory or motor deficits. MSK   No edema and no tenderness. Psych Appropriate mood and affect. Tearful.         Labs:      Recent Labs      02/07/18   0507  02/06/18   0950  02/05/18   0331   WBC  23.0*  21.9*  15.7*   RBC  3.49*  3.65*  3.43*   HGB  10.5*  10.7*  10.1*   HCT  31.8*  32.8*  31.2*   MCV  91.1  89.9  91.0   MCH  30.1  29.3  29.4   MCHC  33.0  32.6  32.4   RDW  16.9*  16.5*  16.4*   PLT  246  237  243   GRANS   --   95*  90*   LYMPH   --   3*  4*   MONOS   --   1*  5   EOS   --   0*  0*   BASOS   --   0  0   IG   --   1  1   DF   --   AUTOMATED  AUTOMATED   ANEU   --   21.0*  14.1*   ABL   --   0.6  0.6   ABM   --   0.2  0.8   TRISHA   --   0.0  0.0   ABB   --   0.0  0.0   AIG   --   0.1  0.1        Recent Labs      02/07/18   0507  02/06/18   0950  02/05/18   0331   NA  144  142  144   K  3.9  3.9  3.7   CL  107  105  108*   CO2  30  27  28   AGAP  7  10  8   GLU  123*  173*  129*   BUN  28*  23  21   CREA  0.57*  0.70  0.62   GFRAA  >60  >60  >60   GFRNA  >60  >60  >60   CA  9.0  8.8  8.2*   SGOT   --    --   22   AP   --    --   95   TP   --    --   5.7*   ALB   --    --   2.0*   GLOB   --    --   3.7*   AGRAT   --    --   0.5*   MG  2.2   --   2.0         Imaging:  XR CHEST PORT [[id number]] Collected: 01/26/18 0816      Order Status: Completed Updated: 01/26/18 0835     Narrative:       Chest portable    CLINICAL INDICATION: Respiratory failure, dyspnea, follow-up infiltrates,  hypertension, bilateral mastectomy and history of breast cancer    COMPARISON: 1/25/2018    TECHNIQUE: single AP portable view chest at 8:23 AM upright     FINDINGS: The left portacatheter is stable. The mediastinal and hilar contours  are stable. There is no pneumothorax or focal dense consolidation. Reticular and  patchy opacities bilaterally, slightly more prominent in the upper lobes, are  similar to prior. No evidence of enlarging pleural effusion. Surgical changes  involving bilateral breasts are again noted with probable implants.         Impression:       IMPRESSION: No significant change involving diffuse lung infiltrates.       XR CHEST SNGL V [[id number]] Collected: 01/25/18 0125     Order Status: Completed Updated: 01/25/18 0128     Narrative:       AP portable chest    INDICATION: Shortness of breath    COMPARISON: 1/22/2018    FINDINGS: No change in cardiomegaly. Bilateral patchy and diffuse  reticulonodular interstitial changes with patchy alveolar densities bilaterally  without significant change. No change position of left-sided Port-A-Cath  catheter. Negative for pneumothorax.       Impression:       IMPRESSION: No significant change     XR CHEST PORT [[id number]] Collected: 01/22/18 1907     Order Status: Completed Updated: 01/22/18 1914     Narrative:       History: Shortness of breath, breast cancer    Exam: portable chest    Comparison: 10/12/2017     Findings: There is coarsening of the interstitial lung markings. There is new  focal increased opacity seen peripherally within the right midlung. No  pneumothorax. No change in the appearance of the mediastinal contour or osseous  structures. There is a new port overlying the left chest wall. Impressions: New focal opacity seen within the right midlung.           ASSESSMENT:    Problem List  Date Reviewed: 2/4/2018          Codes Class Noted    ARDS (adult respiratory distress syndrome) ([facility] ICD-10-CM: M96  ICD-9-CM: 518.52  1/29/2018        Influenza ICD-10-CM: J11.1  ICD-9-CM: 487.1  1/27/2018        Hypokalemia ICD-10-CM: E87.6  ICD-9-CM: 276.8  1/27/2018 Elevated brain natriuretic peptide (BNP) level ICD-10-CM: R79.89  ICD-9-CM: 790.99  1/26/2018        * (Principal)Acute respiratory failure with hypoxia [facility]) ICD-10-CM: J96.01  ICD-9-CM: 518.81  1/25/2018        Pulmonary infiltrates ICD-10-CM: R91.8  ICD-9-CM: 793.19  1/25/2018        HTN (hypertension), benign (Chronic) ICD-10-CM: I10  ICD-9-CM: 401.1  1/23/2018        Recurrent major depressive disorder, in remission ([facility] (Chronic) ICD-10-CM: F33.40  ICD-9-CM: 296.35  1/23/2018        Breast cancer ([facility] (Chronic) ICD-10-CM: C50.919  ICD-9-CM: 174.9  1/23/2018        S/P bilateral mastectomy (Chronic) ICD-10-CM: Z90.13  ICD-9-CM: V45.71  1/23/2018    Overview Signed 1/25/2018  2:39 PM by Tab Pagan, NP     10/11/17  BILATERAL BREAST MASTECTOMY SIMPLE ARTOURA ULTRA HIGH PROFILE BREAST IMPLANTS 700cc 13.5 cm 8.3cm [id number] X 3 /  MEDIUM HEIGHT 550cc 13.5 cm 11.7cm 7.4cm [id number] X 3   BILATERAL SENTINEL NODE BIOPSY WITH LYMPHATIC MAPPING  RIGHT AXILLARY DISSECTION  BILATERAL BREAST RECONSTRUCTION WITH PLACEMENT TISSUE EXPANDERS AND ALLODERM             HCAP (healthcare-associated pneumonia) ICD-10-CM: J18.9  ICD-9-CM: 148  1/23/2018        Dehydration ICD-10-CM: E86.0  ICD-9-CM: 276.51  1/22/2018        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) ICD-10-CM: C50.011, C50.012  ICD-9-CM: 174.0  9/19/2017        Chronic midline low back pain without sciatica ICD-10-CM: M54.5, G89.29  ICD-9-CM: 724.2, 338.29  8/17/2016        Allergic rhinitis ICD-10-CM: J30.9  ICD-9-CM: 477.9  Unknown        Hypercholesteremia ICD-10-CM: E78.00  ICD-9-CM: 272.0  Unknown        Headache(784.0) ICD-10-CM: R51  ICD-9-CM: 784.0  Unknown                PLAN:    HCAP  1/24 Continue vanc/zosyn. BCNTD. Incentive spirometer  1/25 Increased oxygen demands. Consulted pulm. 1/26 Pulmonary added benita and lasix. Considered thoracentesis but patient desats when off of oxygen.  She would need to be intubated for thoracentesis. Conservative measures for now including recommending cpap which patient refused but is now willing to try. Transfer to 5th floor when bed available. 1/29 BC-NGTD. Continues Vancomycin, Zosyn, and Azithromycin. Respiratory status not much improved. Pulmonary following. 1/30 +mycoplasma study. Remains afebrile. Pt not improving despite tamiflu, antibx, and steroids. Pulm planning on bronch tomorrow AM.  1/31 Bronch this AM - studies pending. Pt states she feels less dyspneic s/p bronch, but still having pleuritic chest pain with deep inspiration. 2/2 Studies from bronch pending. Pt remains on Optiflow. Completed antibx today. Pulm following. Pulm placed consult for [employer]. Influenza  1/26 Added Tamiflu.      Stage IIB left breast cancer and IIIB right breast cancer  1/24 sp Cycle one TC. Next Cycle planned for 1/27/2018. Currently on hold. Hypokalemia  1/27 Replete with 40 meq K+  1/29 K+ up to 4.1    Diarrhea  1/28 C-diff negative. Utilize Imodium as needed. Continue home meds  Consult PT/OT/CM  Heparin for DVT prophylaxis      2/3/18:  She continues to require O2 support. She states she does better on NRB. BAL cytology revealed \"rare atypical cells\", but unlikely of malignancy. Awaiting insurance approval for [place], will transfer to 5th floor. Continue with supportive care. Pulmonary continues to follow. 2/5/18: No clinical changes. She continues on NRB. Awaiting [facility] approval.  Continue supportive care. Pulmonary following. 2/6/18: Patient moved to CCU DT hypoxia. Now on bipap/optiflow. Patient made the decision to change her code status from DNR to full code. She does want to be ventilated for a short period of time but does not want long term vent support. Discussed plan of care with her and with Dr. Eladio Hagen. 2/7/18: CXR slightly improved. Continues on bipap 100%.                Jenelle Person, [facility]  [address]  Office : [phone]  Fax : [phone]

## 2018-02-07 NOTE — PROGRESS NOTES
Bedside shift change report given to Aleksander Ashley 1313 (oncoming nurse) by Radha Duran (offgoing nurse). Report included the following information Kardex, Intake/Output, MAR, Recent Results, Med Rec Status and Cardiac Rhythm NSR. Alert and oriented x4. Mrs. Villa states,\"I feel better this morning compared to yesterday. \"  BIPAP 100%. Left chest single lumen port patent, capped. No DTIs/No pressure ulcers observed.

## 2018-02-07 NOTE — PROGRESS NOTES
's dyspnea has improved and she is able to put the head of her bed back a little bit to rest. O2 saturation 94% on BIPAP. Daughter is now back at the bedside and has been updated on plan of care and episode of dyspnea requiring Mrs. Villa to be placed back on BIPAP. Daughter verbalizes understanding.

## 2018-02-07 NOTE — PROGRESS NOTES
Morphine 2 mg IV administered for dyspnea. VSS, afebrile. BIPAP 100%. Daughter and son in law at bedside.

## 2018-02-07 NOTE — PROGRESS NOTES
Bedside shift report given to [facility].

## 2018-02-07 NOTE — PROGRESS NOTES
Frank Verdin  Admission Date: 1/22/2018             ICU Daily Progress Note: 2/7/2018     70 yo female with Stage IIB left breast CA and Stage IIIB right breast cancer s/p bilateral mastectomy 10/2017, GERD, HTN,and HL. She completed chemo 1/6/18. Followed at the infusion center and c/o cough, decreased po intake, and weakness and was found to be hypotensive which did not improve with IVF and was referred to the ER for further evaluation. CXR with new focal opacity R midlung and she was admitted, started on abx, nebs, steroids. Heme/Onc consulted to resume care - next chemo was due 1/27 but due to acute illness was held. Patient remained hypoxic and we were consulted to assist.  CRP was elevated and steroids were intensified. Influenza B returned + and Tamiflu and completed a 5 day course. She failed to improve despite intense therapy and underwent bronch for airway inspection/BAL/cleanout - small amount of mucus plugging. She completed a 10 day course of abx. Jamil Silveira was consulted with suspected slow recovery and continued high O2 requirements. Subjective:     Over the past 24 hours    On BiPAP- 100% FiO2, unable to tolerate NRB without significant desaturations. S/P tamiflu/abx. WBC climbing. ARDS  Hypertensive, receiving PRN meds  Code status reversed yesterday from DNR to FULL code per the daughter   ON FFM with no nutrition.     Current Facility-Administered Medications   Medication Dose Route Frequency    LORazepam (ATIVAN) injection 0.5 mg  0.5 mg IntraVENous Q4H PRN    cefepime (MAXIPIME) 1 g in 0.9% sodium chloride (MBP/ADV) 50 mL ADV  1 g IntraVENous Q12H    vancomycin (VANCOCIN) 1,000 mg in 0.9% sodium chloride (MBP/ADV) 250 mL  1 g IntraVENous Q18H    zinc oxide-cod liver oil (DESITIN) 40 % paste   Topical PRN    methylPREDNISolone (PF) (SOLU-MEDROL) injection 60 mg  60 mg IntraVENous Q12H    morphine injection 2 mg  2 mg IntraVENous Q2H PRN    guaiFENesin ER (MUCINEX) tablet 1,200 mg 1,200 mg Oral BID    HYDROcodone-acetaminophen (NORCO) 7.5-325 mg per tablet 1 Tab  1 Tab Oral Q6H PRN    losartan (COZAAR) tablet 50 mg  50 mg Oral DAILY    hydrALAZINE (APRESOLINE) 20 mg/mL injection 20 mg  20 mg IntraVENous Q6H PRN    acetaminophen (TYLENOL) tablet 650 mg  650 mg Oral Q6H PRN    loperamide (IMODIUM) capsule 2 mg  2 mg Oral Q4H PRN    albuterol (PROVENTIL VENTOLIN) nebulizer solution 2.5 mg  2.5 mg Nebulization QID RT    sodium chloride (OCEAN) 0.65 % nasal spray 2 Spray  2 Spray Both Nostrils Q2H PRN    HYDROcodone-homatropine (HYCODAN) 5-1.5 mg/5 mL (5 mL) syrup 5 mL  5 mL Oral Q4H PRN    lip protectant (BLISTEX) ointment   Topical PRN    alteplase (CATHFLO) 2 mg in sterile water (preservative free) 2 mL injection  2 mg InterCATHeter PRN    atorvastatin (LIPITOR) tablet 20 mg  20 mg Oral DAILY    metoprolol succinate (TOPROL-XL) XL tablet 50 mg  50 mg Oral DAILY    prochlorperazine (COMPAZINE) tablet 10 mg  10 mg Oral Q6H PRN    sertraline (ZOLOFT) tablet 100 mg  100 mg Oral DAILY    sodium chloride (NS) flush 5-10 mL  5-10 mL IntraVENous Q8H    sodium chloride (NS) flush 5-10 mL  5-10 mL IntraVENous PRN    heparin (porcine) injection 5,000 Units  5,000 Units SubCUTAneous Q12H     ROS:    Constitutional: negative for fever, chills, sweats  Cardiac: negative for chest pain, palpitations, syncope, edema  GI: negative for dysphagia, reflux, vomiting, diarrhea, abdominal pain, or melena  Neuro: negative for focal weakness, numbness, headache        Objective:     Vitals:    02/07/18 0853 02/07/18 0902 02/07/18 0932 02/07/18 1002   BP:  192/79 177/77 177/77   Pulse:  98 88 84   Resp:  (!) 35 26 24   Temp:       SpO2: 95% 94% 97% 97%   Weight:         Intake and Output:   02/05 1901 - 02/07 0700  In: 1180 [P.O.:530;  I.V.:650]  Out: 2425 [Urine:2425]  02/07 0701 - 02/07 1900  In: 250 [I.V.:250]  Out: -     Physical Exam:          GEN: acutely ill, BiPAP   HEENT:  no alar flaring or epistaxis, oral mucosa moist without cyanosis,   NECK:  no JVD, no retractions, no thyromegaly or masses,   LUNGS:  Scattered rhonchi  HEART:  RRR with no M,G,R;  ABDOMEN:  soft with no tenderness; positive bowel sounds present  EXTREMITIES:  warm with no cyanosis, mild lower leg edema  SKIN:  no jaundice or ecchymosis   NEURO:  sedated on vent    LINES: venous access, langley   DRIPS:   NUTRITION: None    Ventilator Settings  Mode FIO2 Rate Tidal Volume Pressure PEEP      100 %                 Peak airway pressure:     Minute ventilation: 8.2 l/min       CHEST XRAY:         CXR improved     LAB  Recent Labs      02/07/18   0507  02/06/18   0950  02/05/18   0331   WBC  23.0*  21.9*  15.7*   HGB  10.5*  10.7*  10.1*   HCT  31.8*  32.8*  31.2*   PLT  246  237  243     Recent Labs      02/07/18   0507  02/06/18   0950  02/05/18   0331   NA  144  142  144   K  3.9  3.9  3.7   CL  107  105  108*   CO2  30  27  28   GLU  123*  173*  129*   BUN  28*  23  21   CREA  0.57*  0.70  0.62   MG  2.2   --   2.0     Recent Labs      02/04/18   1557   PH  7.41   PCO2  39   PO2  98   HCO3  24     BAL:     Results for [employer] (MRN [id number]) as of 2/2/2018 10:02    Ref. Range 1/31/2018 11:25   BODY FLUID TYPE Latest Units:   BRONCHIAL LAVAGE   FLUID APPEARANCE Latest Units:   HAZY   FLUID COLOR Latest Units:   COLORLESS   FLUID RBC CT.  Latest Units: /cu mm <33877   FLUID WBC COUNT Latest Units: /cu mm 355   FLD NEUTROPHILS Latest Units: % 60   FLD LYMPHS Latest Units: % 31   FLD MONO/MACROPHAGES Latest Units: % 9      Resp Viral panel: all negative including influenza!   PJP negative        Assessment:     Hospital Problems  Date Reviewed: 2/4/2018          Codes Class Noted POA    ARDS (adult respiratory distress syndrome) ([facility] ICD-10-CM: D69  ICD-9-CM: 518.52  1/29/2018 Unknown    CXR better today    Influenza ICD-10-CM: J11.1  ICD-9-CM: 487.1  1/27/2018 Unknown    Completed Rx    Hypokalemia ICD-10-CM: E87.6  ICD-9-CM: 276.8 1/27/2018 Unknown        Elevated brain natriuretic peptide (BNP) level ICD-10-CM: R79.89  ICD-9-CM: 790.99  1/26/2018 Unknown    127 yesterday    * (Principal)Acute respiratory failure with hypoxia (HCC) ICD-10-CM: J96.01  ICD-9-CM: 518.81  1/25/2018 No    Ongoing; need to try to wean oxygen a little    Pulmonary infiltrates ICD-10-CM: R91.8  ICD-9-CM: 793.19  1/25/2018 Unknown    Ongoing but reduced. HTN (hypertension), benign (Chronic) ICD-10-CM: I10  ICD-9-CM: 401.1  1/23/2018 Yes        Recurrent major depressive disorder, in remission ([facility] (Chronic) ICD-10-CM: F33.40  ICD-9-CM: 296.35  1/23/2018 Yes        Breast cancer ([facility] (Chronic) ICD-10-CM: C50.919  ICD-9-CM: 174.9  1/23/2018 Yes        S/P bilateral mastectomy (Chronic) ICD-10-CM: Z90.13  ICD-9-CM: V45.71  1/23/2018 Yes    Overview Signed 1/25/2018  2:39 PM by Judson Sin NP     10/11/17  BILATERAL BREAST MASTECTOMY SIMPLE ARTOURA ULTRA HIGH PROFILE BREAST IMPLANTS 700cc 13.5 cm 8.3cm [id number] X 3 /  MEDIUM HEIGHT 550cc 13.5 cm 11.7cm 7.4cm [id number] X 3   BILATERAL SENTINEL NODE BIOPSY WITH LYMPHATIC MAPPING  RIGHT AXILLARY DISSECTION  BILATERAL BREAST RECONSTRUCTION WITH PLACEMENT TISSUE EXPANDERS AND ALLODERM             HCAP (healthcare-associated pneumonia) ICD-10-CM: J18.9  ICD-9-CM: 970  1/23/2018 Yes    BAL without pathogen identified. Plan     Continue BIPAP; wean oxygen as tolerated. Intubate prn. Place CVL for HPA as not able to eat with Large FM. cefepime   solumedrol 60 mg Q 12 hours; continue  Spoke with daughter at bedside. Remains critically ill: E/M 33 minutes    Seven Weller MD      More than 50% of time documented was spent in face-to-face contact with the patient and in the care of the patient on the floor/unit where the patient is located.

## 2018-02-07 NOTE — PROGRESS NOTES
PICC team consulted for IV access d/t inability to safely maintain IJ central line access. Magalis Noyola RN called stating No PICC or midline able to be inserted d/t bilateral mastectomies. Dr Thomas Seek aware.   Will attempt to diurese and MD will reassess

## 2018-02-07 NOTE — PROGRESS NOTES
A follow up visit was made to the patient. Support and prayer were provided. Her daughter was with her.       [employer]

## 2018-02-07 NOTE — PROGRESS NOTES
Problem: Falls - Risk of  Goal: *Absence of Falls  Document Anurag Fall Risk and appropriate interventions in the flowsheet.    Outcome: Progressing Towards Goal  Fall Risk Interventions:  Mobility Interventions: Bed/chair exit alarm, OT consult for ADLs, Patient to call before getting OOB, PT Consult for mobility concerns, PT Consult for assist device competence, Strengthening exercises (ROM-active/passive), Utilize walker, cane, or other assitive device, Utilize gait belt for transfers/ambulation         Medication Interventions: Assess postural VS orthostatic hypotension, Bed/chair exit alarm, Evaluate medications/consider consulting pharmacy, Patient to call before getting OOB, Teach patient to arise slowly, Utilize gait belt for transfers/ambulation    Elimination Interventions: Call light in reach, Bed/chair exit alarm, Patient to call for help with toileting needs    History of Falls Interventions: Bed/chair exit alarm, Consult care management for discharge planning, Door open when patient unattended, Evaluate medications/consider consulting pharmacy, Room close to nurse's station, Utilize gait belt for transfer/ambulation

## 2018-02-08 NOTE — PROGRESS NOTES
Patient sleeping quietly, tolerating HOB lowered, O2 sat 96-97% on BiPAP 85%. NSR 80s on monitor, /67. NAD noted at this time. Will continue to monitor.

## 2018-02-08 NOTE — PROCEDURES
Endotracheal Intubation Procedure Note    Indication for endotracheal intubation: respiratory failure  Sedation: fentanyl and etomidate  Paralytic: None  Lidocaine: no  Atropine: no  Equipment: Renny 3 laryngoscope blade. 7.5 ETT  Cricoid Pressure: no  Number of attempts: 1  ETT location confirmed by by auscultation and ETCO2 monitor.       Pito García MD

## 2018-02-08 NOTE — PROGRESS NOTES
Patient audibly upset and tired. Patient pleading with daughter to [facility] let me go. Im tired of all this\". Dr Dennis Villanueva called to notify of change and assess patient. Daughter speaking with patient about not being able to say goodbye to patients grandchildren.

## 2018-02-08 NOTE — PROGRESS NOTES
Pharmacokinetic Consult to Pharmacist    Madai Rivas is a 69 y.o. female being treated for upper resp infxn with vanc/cefepime. Weight: 73.2 kg (161 lb 6 oz)  Lab Results   Component Value Date/Time    BUN 33 (H) 02/08/2018 04:25 AM    Creatinine 0.73 02/08/2018 04:25 AM    WBC 20.4 (H) 02/08/2018 04:25 AM    Procalcitonin 0.1 01/25/2018 04:37 PM    Lactic Acid (POC) 1.2 01/22/2018 08:27 PM      Estimated Creatinine Clearance: 62.8 mL/min (based on Cr of 0.73). Lab Results   Component Value Date/Time    Vancomycin,trough 16.8 02/08/2018 01:39 AM       Day 4. Vancomycin restarted at previous therapeutic dosing of 1g q18h. Trough therapeutic. Will continue to follow patient.       Thank you,  Cedric Ch, PharmD  Clinical Pharmacist  [phone]

## 2018-02-08 NOTE — PROGRESS NOTES
[place]  Admission Date: 1/22/2018             ICU Daily Progress Note: 2/8/2018     68 yo female with Stage IIB left breast CA and Stage IIIB right breast cancer s/p bilateral mastectomy 10/2017, GERD, HTN,and HL. She completed chemo 1/6/18. Followed at the infusion center and c/o cough, decreased po intake, and weakness and was found to be hypotensive which did not improve with IVF and was referred to the ER for further evaluation. CXR with new focal opacity R midlung and she was admitted, started on abx, nebs, steroids. Heme/Onc consulted to resume care - next chemo was due 1/27 but due to acute illness was held. Patient remained hypoxic and we were consulted to assist.  CRP was elevated and steroids were intensified. Influenza B returned + and Tamiflu and completed a 5 day course. She failed to improve despite intense therapy and underwent bronch for airway inspection/BAL/cleanout - small amount of mucus plugging. She completed a 10 day course of abx. Kieran Potter was consulted with suspected slow recovery and continued high O2 requirements. Subjective:     Over the past 24 hours    Remains on BIPAP but weaned to 80%. Continues to desatureate quickly. Good diuresis to lasix.  BP up this AM.     Current Facility-Administered Medications   Medication Dose Route Frequency    LORazepam (ATIVAN) injection 0.5 mg  0.5 mg IntraVENous Q4H PRN    cefepime (MAXIPIME) 1 g in 0.9% sodium chloride (MBP/ADV) 50 mL ADV  1 g IntraVENous Q12H    vancomycin (VANCOCIN) 1,000 mg in 0.9% sodium chloride (MBP/ADV) 250 mL  1 g IntraVENous Q18H    zinc oxide-cod liver oil (DESITIN) 40 % paste   Topical PRN    methylPREDNISolone (PF) (SOLU-MEDROL) injection 60 mg  60 mg IntraVENous Q12H    morphine injection 2 mg  2 mg IntraVENous Q2H PRN    guaiFENesin ER (MUCINEX) tablet 1,200 mg  1,200 mg Oral BID    HYDROcodone-acetaminophen (NORCO) 7.5-325 mg per tablet 1 Tab  1 Tab Oral Q6H PRN    losartan (COZAAR) tablet 50 mg 50 mg Oral DAILY    hydrALAZINE (APRESOLINE) 20 mg/mL injection 20 mg  20 mg IntraVENous Q6H PRN    acetaminophen (TYLENOL) tablet 650 mg  650 mg Oral Q6H PRN    loperamide (IMODIUM) capsule 2 mg  2 mg Oral Q4H PRN    albuterol (PROVENTIL VENTOLIN) nebulizer solution 2.5 mg  2.5 mg Nebulization QID RT    sodium chloride (OCEAN) 0.65 % nasal spray 2 Spray  2 Spray Both Nostrils Q2H PRN    HYDROcodone-homatropine (HYCODAN) 5-1.5 mg/5 mL (5 mL) syrup 5 mL  5 mL Oral Q4H PRN    lip protectant (BLISTEX) ointment   Topical PRN    alteplase (CATHFLO) 2 mg in sterile water (preservative free) 2 mL injection  2 mg InterCATHeter PRN    atorvastatin (LIPITOR) tablet 20 mg  20 mg Oral DAILY    metoprolol succinate (TOPROL-XL) XL tablet 50 mg  50 mg Oral DAILY    prochlorperazine (COMPAZINE) tablet 10 mg  10 mg Oral Q6H PRN    sertraline (ZOLOFT) tablet 100 mg  100 mg Oral DAILY    sodium chloride (NS) flush 5-10 mL  5-10 mL IntraVENous Q8H    sodium chloride (NS) flush 5-10 mL  5-10 mL IntraVENous PRN    heparin (porcine) injection 5,000 Units  5,000 Units SubCUTAneous Q12H     ROS:    Constitutional: negative for fever, chills, sweats  Cardiac: negative for chest pain, palpitations, syncope, edema  GI: negative for dysphagia, reflux, vomiting, diarrhea, abdominal pain, or melena  Neuro: negative for focal weakness, numbness, headache        Objective:     Vitals:    02/08/18 0602 02/08/18 0702 02/08/18 0731 02/08/18 0817   BP: 162/72 162/68 180/77    Pulse: 84 79 85    Resp: 22 20 28    Temp:   97.4 °F (36.3 °C)    SpO2: 93% 94% 93% 90%   Weight:         Intake and Output:   02/06 1901 - 02/08 0700  In: 770 [P.O.:120;  I.V.:650]  Out: 7927 [Urine:3950]       Physical Exam:          GEN: acutely ill, BiPAP   HEENT:  no alar flaring or epistaxis, oral mucosa moist without cyanosis,   NECK:  no JVD, no retractions, no thyromegaly or masses,   LUNGS:  Scattered rhonchi and crackles  HEART:  RRR with no M,G,R;  ABDOMEN:  soft with no tenderness; positive bowel sounds present  EXTREMITIES:  warm with no cyanosis, mild lower leg edema  SKIN:  no jaundice or ecchymosis   NEURO:  sedated on vent    LINES: venous access, langley   DRIPS:   NUTRITION: None    Ventilator Settings  Mode FIO2 Rate Tidal Volume Pressure PEEP      80 %                 Peak airway pressure:     Minute ventilation: 18 l/min       CHEST XRAY:       2/8:        2/7:        CXR improved     LAB  Recent Labs      02/08/18   0425  02/07/18   0507  02/06/18   0950   WBC  20.4*  23.0*  21.9*   HGB  10.9*  10.5*  10.7*   HCT  33.1*  31.8*  32.8*   PLT  248  246  237     Recent Labs      02/08/18   0425  02/07/18   0507  02/06/18   0950   NA  143  144  142   K  3.5  3.9  3.9   CL  102  107  105   CO2  30  30  27   GLU  133*  123*  173*   BUN  33*  28*  23   CREA  0.73  0.57*  0.70   MG  2.1  2.2   --      Recent Labs      02/08/18   0305   PH  7.47*   PCO2  42   PO2  68*   HCO3  30*     BAL:     Results for Heriberto Rodriguez (MRN [id number]) as of 2/2/2018 10:02    Ref. Range 1/31/2018 11:25   BODY FLUID TYPE Latest Units:   BRONCHIAL LAVAGE   FLUID APPEARANCE Latest Units:   HAZY   FLUID COLOR Latest Units:   COLORLESS   FLUID RBC CT. Latest Units: /cu mm <55924   FLUID WBC COUNT Latest Units: /cu mm 355   FLD NEUTROPHILS Latest Units: % 60   FLD LYMPHS Latest Units: % 31   FLD MONO/MACROPHAGES Latest Units: % 9      Resp Viral panel: all negative including influenza!   PJP negative        Assessment:     Hospital Problems  Date Reviewed: 2/4/2018          Codes Class Noted POA    ARDS (adult respiratory distress syndrome) ([facility] ICD-10-CM: D22  ICD-9-CM: 518.52  1/29/2018 Unknown    FIO2 requirement down.     Influenza ICD-10-CM: J11.1  ICD-9-CM: 487.1  1/27/2018 Unknown    Completed Rx    Hypokalemia ICD-10-CM: E87.6  ICD-9-CM: 276.8  1/27/2018 Unknown        Elevated brain natriuretic peptide (BNP) level ICD-10-CM: R79.89  ICD-9-CM: 790.99  1/26/2018 Unknown    Diuresing    * (Principal)Acute respiratory failure with hypoxia ([facility] ICD-10-CM: J96.01  ICD-9-CM: 518.81  1/25/2018 No    Ongoing. Pulmonary infiltrates ICD-10-CM: R91.8  ICD-9-CM: 793.19  1/25/2018 Unknown    Ongoing but reduced. HTN (hypertension), benign (Chronic) ICD-10-CM: I10  ICD-9-CM: 401.1  1/23/2018 Yes    May benefit from change to decadron    Recurrent major depressive disorder, in remission ([facility] (Chronic) ICD-10-CM: F33.40  ICD-9-CM: 296.35  1/23/2018 Yes        Breast cancer ([facility] (Chronic) ICD-10-CM: C50.919  ICD-9-CM: 174.9  1/23/2018 Yes        S/P bilateral mastectomy (Chronic) ICD-10-CM: Z90.13  ICD-9-CM: V45.71  1/23/2018 Yes    Overview Signed 1/25/2018  2:39 PM by Marium Salgado NP     10/11/17  BILATERAL BREAST MASTECTOMY SIMPLE ARTOURA ULTRA HIGH PROFILE BREAST IMPLANTS 700cc 13.5 cm 8.3cm [id number] X 3 /  MEDIUM HEIGHT 550cc 13.5 cm 11.7cm 7.4cm [id number] X 3   BILATERAL SENTINEL NODE BIOPSY WITH LYMPHATIC MAPPING  RIGHT AXILLARY DISSECTION  BILATERAL BREAST RECONSTRUCTION WITH PLACEMENT TISSUE EXPANDERS AND ALLODERM             HCAP (healthcare-associated pneumonia) ICD-10-CM: J18.9  ICD-9-CM: 350  1/23/2018 Yes    BAL without pathogen identified. Plan     Continue BIPAP; wean oxygen as tolerated. Intubate prn.   cefepime   Change steroids to decadron   Spoke with daughter at bedside. HPA via port. Lasix q 12. Check PCT and stop ATB if low. Humaira Call MD      More than 50% of time documented was spent in face-to-face contact with the patient and in the care of the patient on the floor/unit where the patient is located.

## 2018-02-08 NOTE — PROGRESS NOTES
Problem: Nutrition Deficit  Goal: *Optimize nutritional status  Nutrition F/U:  Assessment:  The patient was intubated earlier this afternoon and a FT is being placed. \"TPN\"/ProcalAmine will be discontinued and TF will be initiated for nutrition support since the only reason that TF wasn't initially chosen was due to lack of access. Diprivan is infusing at 8.7 ml/hr which contributes ~230 calories/day. Macronutrient Needs:  Estimated calorie needs - 4929-9477 sanjiv/day (20-25 sanjiv/kg/day)   Estimated protein needs - 62-78 gm pro/day (1.2-1.5 gm pro/kgIBW/day) (GFR >60 ml/min)  CHO limit/day - 300 gm CHO/day (4 mg/kgIBW/min/day - TPN) vs 228 gm CHO/day (50% sanjiv needs - TF)  Fluid/day - 1.5-1.9 liters/day (1 ml/sanjiv/day)  Intake/Comparative Standards:  No data recorded for the last 2 days. Intervention:   Meals and Snacks: NPO. Enteral Nutrition: Start TF with Promote @ 20 ml/hr with a 30 ml/hr water flush and advance as tolerated to the goal rate of 50 ml/hr - provides 1200 calories/day (82% calorie goal), 76 grams protein/day (100% protein goal), 156 grams CHO/day (does not exceed max CHO limit) and 1728 ml water/day (>100% fluid goal). Parenteral Nutrition: Cancel ProcalAmine and lipids. Mineral Supplement Therapy: Nutrition Support Orders/Electrolyte Management Replacement Protocols are active on the MAR. Received 40 meq IV KCl today. Coordination of Nutrition Care by a Nutrition Professional: AM CCU rounds, collaboration with Dr. Sarah Goins and Stephanie Fry, [facility]. Nutrition Discharge Plan: Too soon to determine. Eber Angela.  Jeremias Daniel  [phone]

## 2018-02-08 NOTE — PROGRESS NOTES
Problem: Nutrition Deficit  Goal: *Optimize nutritional status  Nutrition F/U:  Telephone Nutrition Consult for TPN Management and Electrolyte Replacement Management. (Dr. Parmjit Armijo)  Assessment:  Anthropometrics:   Ht - 5'3\", wgt - 73.2 kg (CCU bed 2/8/18), BMI 28.9 c/w overweight in a person > 72years of age, edema - none reported. Macronutrient Needs:  Estimated calorie needs - 0655-9668 sanjiv/day (20-25 sanjiv/kg/day)   Estimated protein needs - 62-78 gm pro/day (1.2-1.5 gm pro/kgIBW/day) (GFR >60 ml/min)  CHO limit/day - 300 gm CHO/day (4 mg/kgIBW/min/day)  Fluid/day - 1.5-1.9 liters/day (1 ml/sanjiv/day)  Intake/Comparative Standards:   No data recorded for the last 2 days. Pertinent Labs:   Potassium 3.5, . Pertinent Medications:   Decadron, Maxipeme, Vancomycin, Lasix. GI Function/Activity:   Active bowel sounds, last reported bowel movement 2/6/18. Diet:   Regular with Ensure Enlive. IV Access:   Single port. Food/Nutrition History:   69year old lady admitted with acute respiratory failure/pneumonia, cough and decreased oral intake for 5 days PTA. She'd had a protracted hospital course with continued poor oral intake. She was initially flu negative but is now flu positive. She was transferred to the CCU 2/6/18 and is now requiring continuous BIPAP support. Enteral FT is unable to be placed. Diagnosis (Nutrition): Inadequate energy intake related to need for BIPAP and lack of enteral access as evidenced by NPO for the last 2 days and poor oral intake throughout this admission. .     Intervention:  Meals and Snacks: NPO. Parenteral Nutrition: Start 2 liters ProcalAmine with 250 ml 20% lipids - 992 calories/day (68% calorie goal), 58 grams protein/day (94% protein goal), 60 grams CHO/day as glycerin (does not exceed max CHO limit) and 2250 ml water/day (>100% fluid goal) via port.   Mineral Supplement Therapy: Nutrition Support Orders for Electrolyte Management Replacement are activated and placed on the MAR. Coordination of Nutrition Care by a Nutrition Professional: AM CCU rounds. Discharge Nutrition Plan: Too soon to determine. [address]  [phone]

## 2018-02-08 NOTE — PROGRESS NOTES
Pt emotionally and physically exhausted from BIPAP. Offered to proceed with intubation and she is agreeable.     Josephine Bruno MD

## 2018-02-08 NOTE — PROGRESS NOTES
Attempted to take patient off BiPAP and place on NRB to take PO medications, took a sip of water, no coughing noted but, patient desat to 79% on NRB, tachypneic, placed back on BiPAP immediately. O2 sat slowly increased back to 91%. Patient took ~10 min to fully recover on BiPAP. Given ativan 0.5mg per orders for anxiety during this time. Unable to give PO meds. Patient now resting quietly RR 20s, O2 sat 97% on BiPAP 85%. Will continue to monitor.

## 2018-02-08 NOTE — PROGRESS NOTES
Bedside and Verbal shift change report given to [address] (oncoming nurse) by ERENDIRA Almaguer (offgoing nurse). Report included the following information SBAR, Kardex, Intake/Output, MAR, Recent Results and Cardiac Rhythm NSR 90s. Patient is alert and oriented x4, NSR 90s on monitor, /70, O2 sat 94% on BiPAP FiO2 85%. Lung sounds coarse upper, diminished lower. Bowel sounds hypoactive. Ivan draining clear yellow urine. Sacrum intact, no pressure ulcer, allevyn in place. Patient repositioned in bed, desat to 88% on BiPAP, recovered well to 95% after 1-2min, patient and daughter anxious during that time, reassured. Patient now resting quietly, NAD noted. Denies pain. Will continue to monitor.

## 2018-02-08 NOTE — PROGRESS NOTES
[facility]        Inpatient Hematology / Oncology Progress Note      Admission Date: 2018  6:38 PM  Reason for Admission/Hospital Course: Pulmonary infiltrates on CXR [R91.8]      24 Hour Events: On full face bipap - weaned down to 80%  Frustrated with mask  Daughter at bedside    ROS:  Constitutional:  Positive for weakness, malaise, fatigue. CV:  +pleuritic chest pain with deep inspiration. negative for palpitations, edema. Respiratory: Positive for dyspnea  GI: negative for nausea, diarrhea, abdominal pain. 10 point review of systems is otherwise negative with the exception of the elements mentioned above in the HPI. Allergies   Allergen Reactions    Dilaudid [Hydromorphone] Other (comments)     \"out of her mind\"    Sulfa (Sulfonamide Antibiotics) Itching    Tetracycline Nausea Only       OBJECTIVE:  Patient Vitals for the past 8 hrs:   BP Temp Pulse Resp SpO2   18 1237 145/60 - 97 - -   18 1032 141/61 - 100 (!) 65 91 %   18 1002 166/70 - 100 (!) 33 90 %   18 0921 177/75 - 98 (!) 39 (!) 86 %   18 0902 168/72 - 92 25 93 %   18 0842 173/71 - - - -   18 0832 173/71 - 99 29 90 %   18 0817 - - - - 90 %   18 0802 189/77 - 84 24 90 %   18 0731 180/77 97.4 °F (36.3 °C) 85 28 93 %   18 0702 162/68 - 79 20 94 %   18 0602 162/72 - 84 22 93 %   18 0531 152/67 - 73 17 93 %   18 0502 153/65 - 75 18 92 %     Temp (24hrs), Av.5 °F (36.4 °C), Min:97.4 °F (36.3 °C), Max:97.6 °F (36.4 °C)         Physical Exam:  Constitutional: Frail appearing female in no acute distress,lying in the hospital bed. HEENT: Normocephalic and atraumatic. Oropharynx is clear, mucous membranes are moist. Extraocular muscles are intact. Sclerae anicteric. Skin Warm and dry.  + bruising to upper extremities bilaterally. No rash noted. No erythema. No pallor.     Respiratory Coarse sounds bilaterally on bipap   CVS Ismael Devoid, regular rhythm and normal S1 and S2. No murmurs, gallops, or rubs. Abdomen Soft, nontender and nondistended, normoactive bowel sounds. Neuro Grossly nonfocal with no obvious sensory or motor deficits. MSK   No edema and no tenderness. Psych Appropriate mood and affect. Frustrated about mask today. Labs:      Recent Labs      02/08/18 0425 02/07/18   0507  02/06/18   0950   WBC  20.4*  23.0*  21.9*   RBC  3.65*  3.49*  3.65*   HGB  10.9*  10.5*  10.7*   HCT  33.1*  31.8*  32.8*   MCV  90.7  91.1  89.9   MCH  29.9  30.1  29.3   MCHC  32.9  33.0  32.6   RDW  16.8*  16.9*  16.5*   PLT  248  246  237   GRANS   --    --   95*   LYMPH   --    --   3*   MONOS   --    --   1*   EOS   --    --   0*   BASOS   --    --   0   IG   --    --   1   DF   --    --   AUTOMATED   ANEU   --    --   21.0*   ABL   --    --   0.6   ABM   --    --   0.2   TRISHA   --    --   0.0   ABB   --    --   0.0   AIG   --    --   0.1        Recent Labs      02/08/18 0425 02/07/18   0507  02/06/18   0950   NA  143  144  142   K  3.5  3.9  3.9   CL  102  107  105   CO2  30  30  27   AGAP  11  7  10   GLU  133*  123*  173*   BUN  33*  28*  23   CREA  0.73  0.57*  0.70   GFRAA  >60  >60  >60   GFRNA  >60  >60  >60   CA  8.8  9.0  8.8   MG  2.1  2.2   --    PHOS  3.6   --    --          Imaging:  XR CHEST PORT [[id number]] Collected: 01/26/18 0816      Order Status: Completed Updated: 01/26/18 0835     Narrative:       Chest portable    CLINICAL INDICATION: Respiratory failure, dyspnea, follow-up infiltrates,  hypertension, bilateral mastectomy and history of breast cancer    COMPARISON: 1/25/2018    TECHNIQUE: single AP portable view chest at 8:23 AM upright     FINDINGS: The left portacatheter is stable. The mediastinal and hilar contours  are stable. There is no pneumothorax or focal dense consolidation. Reticular and  patchy opacities bilaterally, slightly more prominent in the upper lobes, are  similar to prior.  No evidence of enlarging pleural effusion. Surgical changes  involving bilateral breasts are again noted with probable implants.         Impression:       IMPRESSION: No significant change involving diffuse lung infiltrates.       XR CHEST SNGL V [[id number]] Collected: 01/25/18 0125     Order Status: Completed Updated: 01/25/18 0128     Narrative:       AP portable chest    INDICATION: Shortness of breath    COMPARISON: 1/22/2018    FINDINGS: No change in cardiomegaly. Bilateral patchy and diffuse  reticulonodular interstitial changes with patchy alveolar densities bilaterally  without significant change. No change position of left-sided Port-A-Cath  catheter. Negative for pneumothorax.       Impression:       IMPRESSION: No significant change     XR CHEST PORT [[id number]] Collected: 01/22/18 1907     Order Status: Completed Updated: 01/22/18 1914     Narrative:       History: Shortness of breath, breast cancer    Exam: portable chest    Comparison: 10/12/2017     Findings: There is coarsening of the interstitial lung markings. There is new  focal increased opacity seen peripherally within the right midlung. No  pneumothorax. No change in the appearance of the mediastinal contour or osseous  structures. There is a new port overlying the left chest wall. Impressions: New focal opacity seen within the right midlung.           ASSESSMENT:    Problem List  Date Reviewed: 2/4/2018          Codes Class Noted    ARDS (adult respiratory distress syndrome) ([facility] ICD-10-CM: Z29  ICD-9-CM: 518.52  1/29/2018        Influenza ICD-10-CM: J11.1  ICD-9-CM: 487.1  1/27/2018        Hypokalemia ICD-10-CM: E87.6  ICD-9-CM: 276.8  1/27/2018        Elevated brain natriuretic peptide (BNP) level ICD-10-CM: R79.89  ICD-9-CM: 790.99  1/26/2018        * (Principal)Acute respiratory failure with hypoxia (HCC) ICD-10-CM: J96.01  ICD-9-CM: 518.81  1/25/2018        Pulmonary infiltrates ICD-10-CM: R91.8  ICD-9-CM: 793.19  1/25/2018        HTN (hypertension), benign (Chronic) ICD-10-CM: I10  ICD-9-CM: 401.1  1/23/2018        Recurrent major depressive disorder, in remission ([facility] (Chronic) ICD-10-CM: F33.40  ICD-9-CM: 296.35  1/23/2018        Breast cancer ([facility] (Chronic) ICD-10-CM: C50.919  ICD-9-CM: 174.9  1/23/2018        S/P bilateral mastectomy (Chronic) ICD-10-CM: Z90.13  ICD-9-CM: V45.71  1/23/2018    Overview Signed 1/25/2018  2:39 PM by Paulina Pierce NP     10/11/17  BILATERAL BREAST MASTECTOMY SIMPLE ARTOURA ULTRA HIGH PROFILE BREAST IMPLANTS 700cc 13.5 cm 8.3cm [id number] X 3 /  MEDIUM HEIGHT 550cc 13.5 cm 11.7cm 7.4cm [id number] X 3   BILATERAL SENTINEL NODE BIOPSY WITH LYMPHATIC MAPPING  RIGHT AXILLARY DISSECTION  BILATERAL BREAST RECONSTRUCTION WITH PLACEMENT TISSUE EXPANDERS AND ALLODERM             HCAP (healthcare-associated pneumonia) ICD-10-CM: J18.9  ICD-9-CM: 783  1/23/2018        Dehydration ICD-10-CM: E86.0  ICD-9-CM: 276.51  1/22/2018        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 BCNTD. Incentive spirometer  1/25 Increased oxygen demands. Consulted pulm. 1/26 Pulmonary added azith and lasix. Considered thoracentesis but patient desats when off of oxygen. She would need to be intubated for thoracentesis. Conservative measures for now including recommending cpap which patient refused but is now willing to try. Transfer to 5th floor when bed available. 1/29 BC-NGTD. Continues Vancomycin, Zosyn, and Azithromycin. Respiratory status not much improved. Pulmonary following. 1/30 +mycoplasma study. Remains afebrile. Pt not improving despite tamiflu, antibx, and steroids. Pulm planning on bronch tomorrow AM.  1/31 Bronch this AM - studies pending. Pt states she feels less dyspneic s/p bronch, but still having pleuritic chest pain with deep inspiration. 2/2 Studies from bronch pending. Pt remains on Optiflow. Completed antibx today. Pulm following. Pulm placed consult for [facility]. 2/3/18:  She continues to require O2 support. She states she does better on NRB. BAL cytology revealed \"rare atypical cells\", but unlikely of malignancy. Awaiting insurance approval [facility], will transfer to 5th floor. Continue with supportive care. Pulmonary continues to follow. 2/5/18: No clinical changes. She continues on NRB. Awaiting [facility] approval.  Continue supportive care. Pulmonary following. 2/6/18: Patient moved to CCU DT hypoxia. Now on bipap/optiflow. Patient made the decision to change her code status from DNR to full code. She does want to be ventilated for a short period of time but does not want long term vent support. Discussed plan of care with her and with Dr. Oksana Lam. 2/7/18: CXR slightly improved. Continues on bipap 100%. 2/8 Continues on BiPAP - weaned down to 80%. CXR today shows no change in b/l pneumonia. Continues on Cef/Vanc. Influenza  1/26 Added Tamiflu.      Stage IIB left breast cancer and IIIB right breast cancer  1/24 sp Cycle one TC. Next Cycle planned for 1/27/2018. Currently on hold. Hypokalemia  1/27 Replete with 40 meq K+  1/29 K+ up to 4.1    Diarrhea  1/28 C-diff negative. Utilize Imodium as needed.      Continue home meds  Consult PT/OT/CM  Heparin for DVT prophylaxis            [facility]  [address]  Office : [phone]  Fax : [phone]

## 2018-02-08 NOTE — PROGRESS NOTES
Patient was intubated with a number 7.5 ET Tube. Tube placement verified by auscultation. ET Tube is secured at the 21 cm travis at the lip and on the right side. Patient was intubated by Dr. Annia Doyle on the 1 attempt. Breath sounds are diminished. Patient is Negative for subcutaneous air and chest excursion is symmetric. Trachea is midline. Patient is also Negative for cyanosis and is Negative for pitting edema. Patient placed on ventilator on documented settings. All alarms are set and audible. Resuscitation bag is at the head of the bed.       Ventilator Settings  Mode FIO2 Rate Tidal Volume Pressure PEEP I:E Ratio   PRVC  100 %   14   420    10 cm H20  1:2.56      Peak airway pressure: 32.3 cm H2O   Minute ventilation: 5.3 l/min     ABG:   Recent Labs      02/08/18   0305   PH  7.47*   PCO2  42   PO2  68*   HCO3  30*

## 2018-02-08 NOTE — PROGRESS NOTES
Patient frustrated and asking the daughter to \"let me go\". Aidee Barrow NP notified and to the bedside to assess and discuss possibility of changing code status. BiPAP mask changed to nose and mouth as opposed to full facemask. Patient slightly more comfortable. Dr Fili Mata to be notified of patients requests.

## 2018-02-09 NOTE — PROGRESS NOTES
Physical Therapy Note:    Participated in interdisciplinary rounds in ICU/CCU and chart reviewed. Patient is experiencing decrease in function from baseline. Patient would benefit from skilled acute therapy to increase independence with self care/ADLs, strength, endurance, and functional mobility. Recommend PT/OT consult when medically stable and MD agrees.     Thank you for your consideration,  LUIS CARLOS MejiaT

## 2018-02-09 NOTE — PROGRESS NOTES
Pt becoming increasingly restless. Thrashing about and disconnecting self from vent circuit. Adult nonverbal pain score 7. PRN morphine given per MAR. Bilateral wrist restraints remain in place for pt safety.

## 2018-02-09 NOTE — PROGRESS NOTES
Patient tolerating TF currently. Residual at 1300=90ml and rate increased to 30ml/hr and residual at 1700=75ml and tube feeding increased to 40 per orders. Will continue to monitor.

## 2018-02-09 NOTE — PROGRESS NOTES
Pt remains in NSR, but with increased PAC's. -150. Unable to receive toprol xl per NGT. Order received for IV lopressor. Will continue to monitor.

## 2018-02-09 NOTE — PROGRESS NOTES
Ventilator check complete; patient has a #7.5 ET tube secured at the 21 at the lip. Patient is sedated. Patient is not able to follow commands. Breath sounds are coarse. Trachea is midline, Negative for subcutaneous air, and chest excursion is symmetric. Patient is also Negative for cyanosis and is Negative for pitting edema. All alarms are set and audible. Resuscitation bag is at the head of the bed.       Ventilator Settings  Mode FIO2 Rate Tidal Volume Pressure PEEP I:E Ratio   PRVC  75 %   14 420 ml     10 cm H20  1:2.56      Peak airway pressure: 16.4 cm H2O   Minute ventilation: 7.7 l/min     ABG:   Recent Labs      02/09/18   0345  02/08/18   1506  02/08/18   0305   PH  7.45  7.40  7.47*   PCO2  46*  54*  42   PO2  67*  110*  68*   HCO3  31*  32*  30*         Natacha Diaz, RT

## 2018-02-09 NOTE — PROGRESS NOTES
Ventilator check complete; patient has a #7.5 ET tube secured at the 21 at the lip. Patient is sedated. Patient is not able to follow commands. Breath sounds are coarse and diminished. Trachea is midline, Negative for subcutaneous air, and chest excursion is symmetric. Patient is also Negative for cyanosis and is Negative for pitting edema. All alarms are set and audible. Resuscitation bag is at the head of the bed.       Ventilator Settings  Mode FIO2 Rate Tidal Volume Pressure PEEP I:E Ratio   PRVC  75 %    420 ml     10 cm H20  1:2.56      Peak airway pressure: 26.4 cm H2O   Minute ventilation: 9.6 l/min     ABG:   Recent Labs      02/09/18   0345  02/08/18   1506  02/08/18   0305   PH  7.45  7.40  7.47*   PCO2  46*  54*  42   PO2  67*  110*  68*   HCO3  31*  32*  30*         Lisa Higuera, RT

## 2018-02-09 NOTE — PROGRESS NOTES
Per Jorge Khan, liaison with [facility], case closed with Trish by [facility]. Will need to do new referral and precert if LTAC needed.

## 2018-02-09 NOTE — PROGRESS NOTES
Patient is intubated. Wright daughter at bedside. Daughter was very engaging with . Daughter said mother has been very independent and lived by herself. Patient lost another daughter in 2017 and this has been very hard on her.   5 years ago. Daughter said there is one other son but she is primary care giver for mother. Daughter demonstrated a strong sindhu in God. Prayer. Will continue to follow thru the weekend. Daughter was thankful for the care provided.     Delonte Grant, staff Camille gillis 57, [facility]  /   [email or web address]

## 2018-02-09 NOTE — PROGRESS NOTES
Problem: Nutrition Deficit  Goal: *Optimize nutritional status  Nutrition F/U:  TF Management for [facility]. Assessment:  Weight 70.8 kg (CCU bed 2/9/18), edema - none reported. The patient was intubated yesterday and a FT was placed. Unfortunately I forgot to write the TF orders yesterday after my assessment was completed. It was brought to my attention this morning during the AM IDT rounds and the order was written. TF is now infusing and will advance as tolerated to her goal rate. Noted that Diprivan dose has been increased to 17.6 ml/hr which provides 465 calories/day. Labs are remarkable for BUN 51, creatinine 1.08 and sodium 145 - Lasix has been held. Abdomen is soft with active bowel sounds. Last bowel movement was today. Macronutrient Needs:  Estimated calorie needs - 4201-7610 sanjiv/day (20-25 sanjiv/kg/day)   Estimated protein needs - 62-78 gm pro/day (1.2-1.5 gm pro/kgIBW/day)   CHO limit/day - 228 gm CHO/day (50% sanjiv needs - TF)  Fluid/day - 1.5-1.9 liters/day (1 ml/sanjiv/day)  Intake/Comparative Standards: The patient was NPO until this morning. Diprivan is contributing 465 calories/day - 26% calorie needs. Intervention:   Meals and Snacks: NPO. Enteral Nutrition: Continue TF and water flush as written to her goal rate of 50 ml/hr (Promote) with a 30 ml/hr water flush - 1200 calories/day (82% calorie goal), 76 grams protein/day (100% protein goal), 156 grams CHO/day (does not exceed max CHO limit) and 1728 ml water/day (>100% fluid goal). Mineral Supplement Therapy: Nutrition Support Orders/Electrolyte Management Replacement Protocols are active on the MAR. Coordination of Nutrition Care by a Nutrition Professional: AM CCU rounds, collaboration with Emmett Garcia, [address] [place] [address]. Nutrition Discharge Plan: Too soon to determine. Steffanie Dahl.  Sandra Mendoza  [phone]

## 2018-02-09 NOTE — PROGRESS NOTES
Promote tube feeding started at 20ml/hr with 30ml/hr water flushes. Gastric residual 5ml green secretions prior to initiating tube feeding. Will monitor residuals per order.

## 2018-02-09 NOTE — PROGRESS NOTES
Madai Rivas  Admission Date: 1/22/2018             ICU Daily Progress Note: 2/9/2018     68 yo female with Stage IIB left breast CA and Stage IIIB right breast cancer s/p bilateral mastectomy 10/2017, GERD, HTN,and HL. She completed chemo 1/6/18. Followed at the infusion center and c/o cough, decreased po intake, and weakness and was found to be hypotensive which did not improve with IVF and was referred to the ER for further evaluation. CXR with new focal opacity R midlung and she was admitted, started on abx, nebs, steroids. Heme/Onc consulted to resume care - next chemo was due 1/27 but due to acute illness was held. Patient remained hypoxic and we were consulted to assist.  CRP was elevated and steroids were intensified. Influenza B returned + and Tamiflu and completed a 5 day course. She failed to improve despite intense therapy and underwent bronch for airway inspection/BAL/cleanout - small amount of mucus plugging. She completed a 10 day course of abx. Ivan Coffman was consulted with suspected slow recovery and continued high O2 requirements. Intubated 2/8 for ongoing respiratory distress. Subjective: Intubated yesterday. Weaned to 75% oxygen. TF started. Fentanyl added.      Current Facility-Administered Medications   Medication Dose Route Frequency    [START ON 2/10/2018] vanc trough reminder   Other ONCE    fentaNYL in normal saline (pf) 25 mcg/mL infusion  0-200 mcg/hr IntraVENous TITRATE    dexamethasone (DECADRON) 4 mg/mL injection 8 mg  8 mg IntraVENous Q12H    NUTRITIONAL SUPPORT ELECTROLYTE PRN ORDERS   Does Not Apply PRN    propofol (DIPRIVAN) infusion  0-50 mcg/kg/min IntraVENous TITRATE    metoprolol (LOPRESSOR) injection 5 mg  5 mg IntraVENous Q6H PRN    LORazepam (ATIVAN) injection 0.5 mg  0.5 mg IntraVENous Q4H PRN    cefepime (MAXIPIME) 1 g in 0.9% sodium chloride (MBP/ADV) 50 mL ADV  1 g IntraVENous Q12H    vancomycin (VANCOCIN) 1,000 mg in 0.9% sodium chloride (MBP/ADV) 250 mL  1 g IntraVENous Q18H    zinc oxide-cod liver oil (DESITIN) 40 % paste   Topical PRN    morphine injection 2 mg  2 mg IntraVENous Q2H PRN    guaiFENesin ER (MUCINEX) tablet 1,200 mg  1,200 mg Oral BID    HYDROcodone-acetaminophen (NORCO) 7.5-325 mg per tablet 1 Tab  1 Tab Oral Q6H PRN    losartan (COZAAR) tablet 50 mg  50 mg Oral DAILY    hydrALAZINE (APRESOLINE) 20 mg/mL injection 20 mg  20 mg IntraVENous Q6H PRN    acetaminophen (TYLENOL) tablet 650 mg  650 mg Oral Q6H PRN    loperamide (IMODIUM) capsule 2 mg  2 mg Oral Q4H PRN    albuterol (PROVENTIL VENTOLIN) nebulizer solution 2.5 mg  2.5 mg Nebulization QID RT    sodium chloride (OCEAN) 0.65 % nasal spray 2 Spray  2 Spray Both Nostrils Q2H PRN    HYDROcodone-homatropine (HYCODAN) 5-1.5 mg/5 mL (5 mL) syrup 5 mL  5 mL Oral Q4H PRN    lip protectant (BLISTEX) ointment   Topical PRN    alteplase (CATHFLO) 2 mg in sterile water (preservative free) 2 mL injection  2 mg InterCATHeter PRN    atorvastatin (LIPITOR) tablet 20 mg  20 mg Oral DAILY    metoprolol succinate (TOPROL-XL) XL tablet 50 mg  50 mg Oral DAILY    prochlorperazine (COMPAZINE) tablet 10 mg  10 mg Oral Q6H PRN    sertraline (ZOLOFT) tablet 100 mg  100 mg Oral DAILY    sodium chloride (NS) flush 5-10 mL  5-10 mL IntraVENous Q8H    sodium chloride (NS) flush 5-10 mL  5-10 mL IntraVENous PRN    heparin (porcine) injection 5,000 Units  5,000 Units SubCUTAneous Q12H     ROS:    Constitutional: negative for fever, chills, sweats  Cardiac: negative for chest pain, palpitations, syncope, edema  GI: negative for dysphagia, reflux, vomiting, diarrhea, abdominal pain, or melena  Neuro: negative for focal weakness, numbness, headache        Objective:     Vitals:    02/09/18 0802 02/09/18 0832 02/09/18 0902 02/09/18 0952   BP: 132/61 145/66 145/67 171/69   Pulse: (!) 101 (!) 112 (!) 102 (!) 103   Resp: 24 30 27    Temp:       SpO2: 93% (!) 89% 92%    Weight:         Intake and Output: 02/07 1901 - 02/09 0700  In: 634.5 [I.V.:634.5]  Out: 8857 [Urine:3975]  02/09 0701 - 02/09 1900  In: 80   Out: -     Physical Exam:          GEN: comfortable on vent  HEENT:  no alar flaring or epistaxis, oral mucosa moist without cyanosis,   NECK:  no JVD, no retractions, no thyromegaly or masses,   LUNGS:  Scattered rhonchi and crackles  HEART:  RRR with no M,G,R;  ABDOMEN:  soft with no tenderness; positive bowel sounds present  EXTREMITIES:  warm with no cyanosis, mild lower leg edema  SKIN:  no jaundice or ecchymosis   NEURO:  sedated on vent    LINES: venous access, langley, ETT  DRIPS: fentanyl and propofol  NUTRITION: TF    Ventilator Settings  Mode FIO2 Rate Tidal Volume Pressure PEEP   PRVC  75 %    420 ml     10 cm H20      Peak airway pressure: 26.4 cm H2O   Minute ventilation: 9.6 l/min     Results for Alonzo Vences (MRN [id number]) as of 2/9/2018 11:36   Ref.  Range 2/9/2018 03:45   pH Latest Ref Range: 7.35 - 7.45   7.45   PCO2 Latest Ref Range: 35 - 45 mmHg 46 (H)   PO2 Latest Ref Range: 80 - 105 mmHg 67 (L)   BICARBONATE Latest Ref Range: 22 - 26 mmol/L 31 (H)   O2 SAT Latest Ref Range: 92 - 98.5 % 92   BASE EXCESS Latest Ref Range: 0 - 3 mmol/L 6.2 (H)   Arterial O2 Hgb Latest Ref Range: 94 - 97 % 91.6 (L)   TOTAL HEMOGLOBIN Latest Ref Range: 11.7 - 15.0 GM/DL 11.2 (L)   CARBOXYHEMOGLOBIN Latest Ref Range: 0.5 - 1.5 % 0.3 (L)   METHEMOGLOBIN Latest Ref Range: 0.0 - 1.5 % 0.6   DEOXYHEMOGLOBIN Latest Ref Range: 0.0 - 5.0 % 8 (H)   SITE Unknown LR   ALLENS TEST Latest Units:   POSITIVE   MODE Unknown PRVC   FIO2 Latest Units: % 75.0   Tidal volume Latest Units:   420.0   RATE Latest Units:   14.0   PEEP/CPAP Latest Units:   10.0       CHEST XRAY:     2/9:          2/8:            LAB  Recent Labs      02/09/18   0442  02/08/18   0425  02/07/18   0507   WBC  19.6*  20.4*  23.0*   HGB  10.3*  10.9*  10.5*   HCT  32.3*  33.1*  31.8*   PLT  227  248  246     Recent Labs      02/09/18   0442  02/08/18 2240  02/08/18   0425  02/07/18   0507   NA  145   --   143  144   K  3.7  4.0  3.5  3.9   CL  103   --   102  107   CO2  32   --   30  30   GLU  131*   --   133*  123*   BUN  51*   --   33*  28*   CREA  1.08*   --   0.73  0.57*   MG  2.2   --   2.1  2.2   PHOS   --    --   3.6   --      Recent Labs      02/09/18   0345  02/08/18   1506  02/08/18   0305   PH  7.45  7.40  7.47*   PCO2  46*  54*  42   PO2  67*  110*  68*   HCO3  31*  32*  30*     BAL:     Results for Iris Section (MRN [id number]) as of 2/2/2018 10:02    Ref. Range 1/31/2018 11:25   BODY FLUID TYPE Latest Units:   BRONCHIAL LAVAGE   FLUID APPEARANCE Latest Units:   HAZY   FLUID COLOR Latest Units:   COLORLESS   FLUID RBC CT. Latest Units: /cu mm <78341   FLUID WBC COUNT Latest Units: /cu mm 355   FLD NEUTROPHILS Latest Units: % 60   FLD LYMPHS Latest Units: % 31   FLD MONO/MACROPHAGES Latest Units: % 9      Resp Viral panel: all negative including influenza!   PJP negative        Assessment:     Hospital Problems  Date Reviewed: 2/4/2018          Codes Class Noted POA    ARDS (adult respiratory distress syndrome) ([facility] [place] [facility] ICD-10-CM: I23  ICD-9-CM: 518.52  1/29/2018 Unknown    FIO2 requirement down to 75%. Influenza ICD-10-CM: J11.1  ICD-9-CM: 487.1  1/27/2018 Unknown    Completed Rx    Hypokalemia ICD-10-CM: E87.6  ICD-9-CM: 276.8  1/27/2018 Unknown        Elevated brain natriuretic peptide (BNP) level ICD-10-CM: R79.89  ICD-9-CM: 790.99  1/26/2018 Unknown    Diuresing    * (Principal)Acute respiratory failure with hypoxia (HCC) ICD-10-CM: J96.01  ICD-9-CM: 518.81  1/25/2018 No    Ongoing. Pulmonary infiltrates ICD-10-CM: R91.8  ICD-9-CM: 793.19  1/25/2018 Unknown    Ongoing but reduced. HTN (hypertension), benign (Chronic) ICD-10-CM: I10  ICD-9-CM: 401.1  1/23/2018 Yes    May benefit from change to decadron.      Recurrent major depressive disorder, in remission [facility]) (Chronic) ICD-10-CM: F33.40  ICD-9-CM: 296.35  1/23/2018 Yes        Breast cancer [facility]) (Chronic) ICD-10-CM: C50.919  ICD-9-CM: 174.9  1/23/2018 Yes        S/P bilateral mastectomy (Chronic) ICD-10-CM: Z90.13  ICD-9-CM: V45.71  1/23/2018 Yes    Overview Signed 1/25/2018  2:39 PM by Nino Holt NP     10/11/17  BILATERAL BREAST MASTECTOMY SIMPLE ARTOURA ULTRA HIGH PROFILE BREAST IMPLANTS 700cc 13.5 cm 8.3cm [id number] X 3 /  MEDIUM HEIGHT 550cc 13.5 cm 11.7cm 7.4cm [id number] X 3   BILATERAL SENTINEL NODE BIOPSY WITH LYMPHATIC MAPPING  RIGHT AXILLARY DISSECTION  BILATERAL BREAST RECONSTRUCTION WITH PLACEMENT TISSUE EXPANDERS AND ALLODERM             HCAP (healthcare-associated pneumonia) ICD-10-CM: J18.9  ICD-9-CM: 810  1/23/2018 Yes    BAL without pathogen identified. Plan     Continue vent wean oxygen as tolerated. PCT low again yesterday; stop ATB  Continue steroids. Spoke with daughter at bedside. TF  Hold lasix with Cr rising. Radha Hobson MD      More than 50% of time documented was spent in face-to-face contact with the patient and in the care of the patient on the floor/unit where the patient is located.

## 2018-02-09 NOTE — PROGRESS NOTES
Patient moving back and forth in the bed. Patient indicated she was in pain from ETT. MDs notified and orders for fentanyl gtt for pain control and attempt to reduce amount of propofol needed to keep patient comfortable.

## 2018-02-09 NOTE — PROGRESS NOTES
[facility]        Inpatient Hematology / Oncology Progress Note      Admission Date: 2018  6:38 PM  Reason for Admission/Hospital Course: Pulmonary infiltrates on CXR [R91.8]      24 Hour Events:  Afebrile, mildly tachycardic  On ventilator - O2 weaned down to 75%  Daughter at bedside    ROS:  Unable to obtain    10 point review of systems is otherwise negative with the exception of the elements mentioned above in the HPI. Allergies   Allergen Reactions    Dilaudid [Hydromorphone] Other (comments)     \"out of her mind\"    Sulfa (Sulfonamide Antibiotics) Itching    Tetracycline Nausea Only       OBJECTIVE:  Patient Vitals for the past 8 hrs:   BP Temp Pulse Resp SpO2 Weight   18 0952 171/69 - (!) 103 - - -   18 0902 145/67 - (!) 102 27 92 % -   18 0832 145/66 - (!) 112 30 (!) 89 % -   18 0802 132/61 - (!) 101 24 93 % -   18 0755 - - 100 14 91 % -   18 0732 146/65 97.6 °F (36.4 °C) 98 23 92 % -   18 0702 143/61 - 97 23 93 % -   18 0632 149/66 - 97 24 91 % -   18 0621 - - - - - 156 lb 1.4 oz (70.8 kg)     Temp (24hrs), Av.8 °F (36.6 °C), Min:97.6 °F (36.4 °C), Max:97.9 °F (36.6 °C)    701 -  1900  In: 90   Out: -     Physical Exam:  Constitutional: Frail appearing female in no acute distress,lying comfortably in the hospital bed. HEENT: Normocephalic and atraumatic. Mucous membranes are moist.    Skin Warm and dry.  + bruising to upper extremities bilaterally. No rash noted. No erythema. No pallor. Respiratory Coarse sounds bilaterally. Intubated on ventilator - O2 down to 75%   CVS Mildly tachycardic, regular rhythm and normal S1 and S2. No murmurs, gallops, or rubs. Abdomen Soft, nontender and nondistended, normoactive bowel sounds. Neuro Intubated and sedated   MSK   No edema and no tenderness.    Psych Intubated and sedated       Labs:      Recent Labs      18   0442  18   1833 02/07/18   0507   WBC  19.6*  20.4*  23.0*   RBC  3.52*  3.65*  3.49*   HGB  10.3*  10.9*  10.5*   HCT  32.3*  33.1*  31.8*   MCV  91.8  90.7  91.1   MCH  29.3  29.9  30.1   MCHC  31.9  32.9  33.0   RDW  17.2*  16.8*  16.9*   PLT  227  248  246        Recent Labs      02/09/18   0442  02/08/18   2240  02/08/18   0425  02/07/18   0507   NA  145   --   143  144   K  3.7  4.0  3.5  3.9   CL  103   --   102  107   CO2  32   --   30  30   AGAP  10   --   11  7   GLU  131*   --   133*  123*   BUN  51*   --   33*  28*   CREA  1.08*   --   0.73  0.57*   GFRAA  >60   --   >60  >60   GFRNA  52*   --   >60  >60   CA  8.6   --   8.8  9.0   MG  2.2   --   2.1  2.2   PHOS   --    --   3.6   --          Imaging:  XR CHEST PORT [[id number]] Collected: 01/26/18 0816      Order Status: Completed Updated: 01/26/18 0835     Narrative:       Chest portable    CLINICAL INDICATION: Respiratory failure, dyspnea, follow-up infiltrates,  hypertension, bilateral mastectomy and history of breast cancer    COMPARISON: 1/25/2018    TECHNIQUE: single AP portable view chest at 8:23 AM upright     FINDINGS: The left portacatheter is stable. The mediastinal and hilar contours  are stable. There is no pneumothorax or focal dense consolidation. Reticular and  patchy opacities bilaterally, slightly more prominent in the upper lobes, are  similar to prior. No evidence of enlarging pleural effusion. Surgical changes  involving bilateral breasts are again noted with probable implants.         Impression:       IMPRESSION: No significant change involving diffuse lung infiltrates.       XR CHEST SNGL V [[id number]] Collected: 01/25/18 0125     Order Status: Completed Updated: 01/25/18 0128     Narrative:       AP portable chest    INDICATION: Shortness of breath    COMPARISON: 1/22/2018    FINDINGS: No change in cardiomegaly.  Bilateral patchy and diffuse  reticulonodular interstitial changes with patchy alveolar densities bilaterally  without significant change. No change position of left-sided Port-A-Cath  catheter. Negative for pneumothorax.       Impression:       IMPRESSION: No significant change     XR CHEST PORT [[id number]] Collected: 01/22/18 1907     Order Status: Completed Updated: 01/22/18 1914     Narrative:       History: Shortness of breath, breast cancer    Exam: portable chest    Comparison: 10/12/2017     Findings: There is coarsening of the interstitial lung markings. There is new  focal increased opacity seen peripherally within the right midlung. No  pneumothorax. No change in the appearance of the mediastinal contour or osseous  structures. There is a new port overlying the left chest wall. Impressions: New focal opacity seen within the right midlung.           ASSESSMENT:    Problem List  Date Reviewed: 2/4/2018          Codes Class Noted    ARDS (adult respiratory distress syndrome) ([facility] ICD-10-CM: J80  ICD-9-CM: 518.52  1/29/2018        Influenza ICD-10-CM: J11.1  ICD-9-CM: 487.1  1/27/2018        Hypokalemia ICD-10-CM: E87.6  ICD-9-CM: 276.8  1/27/2018        Elevated brain natriuretic peptide (BNP) level ICD-10-CM: R79.89  ICD-9-CM: 790.99  1/26/2018        * (Principal)Acute respiratory failure with hypoxia ([facility] ICD-10-CM: J96.01  ICD-9-CM: 518.81  1/25/2018        Pulmonary infiltrates ICD-10-CM: R91.8  ICD-9-CM: 793.19  1/25/2018        HTN (hypertension), benign (Chronic) ICD-10-CM: I10  ICD-9-CM: 401.1  1/23/2018        Recurrent major depressive disorder, in remission ([facility] (Chronic) ICD-10-CM: F33.40  ICD-9-CM: 296.35  1/23/2018        Breast cancer ([facility] (Chronic) ICD-10-CM: C50.919  ICD-9-CM: 174.9  1/23/2018        S/P bilateral mastectomy (Chronic) ICD-10-CM: Z90.13  ICD-9-CM: V45.71  1/23/2018    Overview Signed 1/25/2018  2:39 PM by Jerilee Hodgkins, NP     10/11/17  BILATERAL BREAST MASTECTOMY SIMPLE ARTOURA ULTRA HIGH PROFILE BREAST IMPLANTS 700cc 13.5 cm 8.3cm [id number] X 3 /  MEDIUM HEIGHT 550cc 13.5 cm 11.7cm 7.4cm [id number] X 3   BILATERAL SENTINEL NODE BIOPSY WITH LYMPHATIC MAPPING  RIGHT AXILLARY DISSECTION  BILATERAL BREAST RECONSTRUCTION WITH PLACEMENT TISSUE EXPANDERS AND ALLODERM             HCAP (healthcare-associated pneumonia) ICD-10-CM: J18.9  ICD-9-CM: 764  1/23/2018        Dehydration ICD-10-CM: E86.0  ICD-9-CM: 276.51  1/22/2018        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 BCNTD. Incentive spirometer  1/25 Increased oxygen demands. Consulted pulm. 1/26 Pulmonary added azith and lasix. Considered thoracentesis but patient desats when off of oxygen. She would need to be intubated for thoracentesis. Conservative measures for now including recommending cpap which patient refused but is now willing to try. Transfer to 5th floor when bed available. 1/29 BC-NGTD. Continues Vancomycin, Zosyn, and Azithromycin. Respiratory status not much improved. Pulmonary following. 1/30 +mycoplasma study. Remains afebrile. Pt not improving despite tamiflu, antibx, and steroids. Pulm planning on bronch tomorrow AM.  1/31 Bronch this AM - studies pending. Pt states she feels less dyspneic s/p bronch, but still having pleuritic chest pain with deep inspiration. 2/2 Studies from bronch pending. Pt remains on Optiflow. Completed antibx today. Pulm following. Pulm placed consult for [facility]. 2/3/18:  She continues to require O2 support. She states she does better on NRB. BAL cytology revealed \"rare atypical cells\", but unlikely of malignancy.   Awaiting insurance approval for [facility], will transfer to 5th floor. Continue with supportive care. Pulmonary continues to follow. 2/5/18: No clinical changes. She continues on NRB. Awaiting [facility] approval.  Continue supportive care. Pulmonary following. 2/6/18: Patient moved to CCU DT hypoxia. Now on bipap/optiflow. Patient made the decision to change her code status from DNR to full code. She does want to be ventilated for a short period of time but does not want long term vent support. Discussed plan of care with her and with Dr. Ayah Bills. 2/7/18: CXR slightly improved. Continues on bipap 100%. 2/8 Continues on BiPAP - weaned down to 80%. CXR today shows no change in b/l pneumonia. Continues on Cef/Vanc.  2/9 Became frustrated with BiPAP mask yesterday and was agreeable to intubation. On ventilator. O2 weaned down to 75%. CXR today showed slight improvement in b/l pneumonia. Continues on Cef/Vanc. Influenza  1/26 Added Tamiflu.      Stage IIB left breast cancer and IIIB right breast cancer  1/24 sp Cycle one TC. Next Cycle planned for 1/27/2018. Currently on hold. Hypokalemia  1/27 Replete with 40 meq K+  1/29 K+ up to 4.1    Diarrhea  1/28 C-diff negative. Utilize Imodium as needed. KELLY  2/9 Cr up to 1.08.  Pulm holding lasix.     Continue home meds  Consult PT/OT/CM  Heparin for DVT prophylaxis            Merwin Cooks, NP [facility]  [address] [address]  Office : [phone]  Fax : [phone]

## 2018-02-10 PROBLEM — J93.12 SECONDARY SPONTANEOUS PNEUMOTHORAX: Status: ACTIVE | Noted: 2018-01-01

## 2018-02-10 NOTE — PROGRESS NOTES
Chest tubes placed on right and left. Air leaks noted in both right and left tubes. Small amount of serosanguinous drainage in tubing.

## 2018-02-10 NOTE — PROGRESS NOTES
Patient stacking breaths on top on vent breaths. Patient not withdrawing from pain. Propofol infusion decreased to 20mcg/kg/min. [place] RT notified. Sasha adjusted vent settings to PS/CPAP with 75% FiO2. Patient with resp currently 29-30 resp/min. Will continue to monitor.

## 2018-02-10 NOTE — PROGRESS NOTES
Padmini Dalal  Admission Date: 1/22/2018             ICU Daily Progress Note: 2/10/2018     70 yo female with Stage IIB left breast CA and Stage IIIB right breast cancer s/p bilateral mastectomy 10/2017, GERD, HTN,and HL. She completed chemo 1/6/18. Followed at the infusion center and c/o cough, decreased po intake, and weakness and was found to be hypotensive which did not improve with IVF and was referred to the ER for further evaluation. CXR with new focal opacity R midlung and she was admitted, started on abx, nebs, steroids. Heme/Onc consulted to resume care - next chemo was due 1/27 but due to acute illness was held. Patient remained hypoxic and we were consulted to assist.  CRP was elevated and steroids were intensified. Influenza B returned + and Tamiflu and completed a 5 day course. She failed to improve despite intense therapy and underwent bronch for airway inspection/BAL/cleanout - small amount of mucus plugging. She completed a 10 day course of abx. Eliza Garcia was consulted with suspected slow recovery and continued high O2 requirements. Intubated 2/8 for ongoing respiratory distress.      Subjective:     Developed bilateral PTX this AM.  Bilateral 25F chest tubes placed w/o complication    Current Facility-Administered Medications   Medication Dose Route Frequency    fentaNYL in normal saline (pf) 25 mcg/mL infusion  0-200 mcg/hr IntraVENous TITRATE    dextrose 5% lactated ringers infusion  50 mL/hr IntraVENous CONTINUOUS    guaiFENesin (ROBITUSSIN) 100 mg/5 mL oral liquid 400 mg  400 mg Per NG tube Q6H PRN    metoprolol tartrate (LOPRESSOR) tablet 25 mg  25 mg Oral Q12H    pantoprazole (PROTONIX) 40 mg in sodium chloride 0.9 % 10 mL injection  40 mg IntraVENous DAILY    dexamethasone (DECADRON) 4 mg/mL injection 8 mg  8 mg IntraVENous Q12H    NUTRITIONAL SUPPORT ELECTROLYTE PRN ORDERS   Does Not Apply PRN    propofol (DIPRIVAN) infusion  0-50 mcg/kg/min IntraVENous TITRATE    metoprolol (LOPRESSOR) injection 5 mg  5 mg IntraVENous Q6H PRN    LORazepam (ATIVAN) injection 0.5 mg  0.5 mg IntraVENous Q4H PRN    zinc oxide-cod liver oil (DESITIN) 40 % paste   Topical PRN    morphine injection 2 mg  2 mg IntraVENous Q2H PRN    HYDROcodone-acetaminophen (NORCO) 7.5-325 mg per tablet 1 Tab  1 Tab Oral Q6H PRN    losartan (COZAAR) tablet 50 mg  50 mg Oral DAILY    hydrALAZINE (APRESOLINE) 20 mg/mL injection 20 mg  20 mg IntraVENous Q6H PRN    acetaminophen (TYLENOL) tablet 650 mg  650 mg Oral Q6H PRN    loperamide (IMODIUM) capsule 2 mg  2 mg Oral Q4H PRN    albuterol (PROVENTIL VENTOLIN) nebulizer solution 2.5 mg  2.5 mg Nebulization QID RT    sodium chloride (OCEAN) 0.65 % nasal spray 2 Spray  2 Spray Both Nostrils Q2H PRN    HYDROcodone-homatropine (HYCODAN) 5-1.5 mg/5 mL (5 mL) syrup 5 mL  5 mL Oral Q4H PRN    lip protectant (BLISTEX) ointment   Topical PRN    alteplase (CATHFLO) 2 mg in sterile water (preservative free) 2 mL injection  2 mg InterCATHeter PRN    atorvastatin (LIPITOR) tablet 20 mg  20 mg Oral DAILY    prochlorperazine (COMPAZINE) tablet 10 mg  10 mg Oral Q6H PRN    sertraline (ZOLOFT) tablet 100 mg  100 mg Oral DAILY    sodium chloride (NS) flush 5-10 mL  5-10 mL IntraVENous Q8H    sodium chloride (NS) flush 5-10 mL  5-10 mL IntraVENous PRN    heparin (porcine) injection 5,000 Units  5,000 Units SubCUTAneous Q12H     ROS:    Constitutional: negative for fever, chills, sweats  Cardiac: negative for chest pain, palpitations, syncope, edema  GI: negative for dysphagia, reflux, vomiting, diarrhea, abdominal pain, or melena  Neuro: negative for focal weakness, numbness, headache        Objective:     Vitals:    02/10/18 0501 02/10/18 0517 02/10/18 0532 02/10/18 0602   BP: 138/61 140/61 132/62 144/65   Pulse: (!) 109 (!) 110 (!) 109 (!) 110   Resp: 24 (!) 46 27 29   Temp:       SpO2: 92% 91% 92% 91%   Weight:         Intake and Output:   02/08 1901 - 02/10 0700  In: 2978 [I.V.:1547]  Out: 1500 [Urine:1500]       Physical Exam:          GEN: comfortable on vent sedated  HEENT:  no alar flaring or epistaxis, oral mucosa moist without cyanosis,   NECK:  no JVD, no retractions, no thyromegaly or masses,   LUNGS:  Scattered rhonchi and crackles  HEART:  RRR with no M,G,R;  ABDOMEN:  soft with no tenderness; positive bowel sounds present  EXTREMITIES:  warm with no cyanosis, mild lower leg edema  SKIN:  no jaundice or ecchymosis   NEURO:  sedated on vent    LINES: venous access, langley, ETT  DRIPS: fentanyl and propofol  NUTRITION: TF    Ventilator Settings  Mode FIO2 Rate Tidal Volume Pressure PEEP   PRVC  70 %    420 ml     10 cm H20      Peak airway pressure: 33.1 cm H2O   Minute ventilation: 14.1 l/min     Results for Anca Lozada (MRN [id number]) as of 2/9/2018 11:36      CHEST XRAY:     2/9:          2/8:            LAB  Recent Labs      02/10/18   0325  02/09/18   0442  02/08/18   0425   WBC  23.4*  19.6*  20.4*   HGB  10.0*  10.3*  10.9*   HCT  32.1*  32.3*  33.1*   PLT  288  227  248     Recent Labs      02/10/18   0325  02/09/18   1832  02/09/18 0442 02/08/18   2240  02/08/18   0425   NA  144   --   145   --   143   K  4.6   --   3.7  4.0  3.5   CL  102   --   103   --   102   CO2  35*   --   32   --   30   GLU  244*   --   131*   --   133*   BUN  66*   --   51*   --   33*   CREA  1.64*   --   1.08*   --   0.73   MG   --    --   2.2   --   2.1   PHOS   --   3.0   --    --   3.6     Recent Labs      02/10/18   0428  02/10/18   0328  02/09/18   0345   PH  7.29*  7.18*  7.45   PCO2  66*  84*  46*   PO2  79*  98  67*   HCO3  31*  31*  31*     BAL:     Results for Anca Lozada (MRN [id number]) as of 2/2/2018 10:02    Ref. Range 1/31/2018 11:25   BODY FLUID TYPE Latest Units:   BRONCHIAL LAVAGE   FLUID APPEARANCE Latest Units:   HAZY   FLUID COLOR Latest Units:   COLORLESS   FLUID RBC CT.  Latest Units: /cu mm <27171   FLUID WBC COUNT Latest Units: /cu mm 355   FLD NEUTROPHILS Latest Units: % 60   FLD LYMPHS Latest Units: % 31   FLD MONO/MACROPHAGES Latest Units: % 9      Resp Viral panel: all negative including influenza!   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 (HCC) C50.011, C50.012    Breast cancer ([facility] C50.919    S/P bilateral mastectomy Z90.13    Dehydration E86.0    HCAP (healthcare-associated pneumonia) J18.9    Acute respiratory failure with hypoxia ([facility]) J96.01    Pulmonary infiltrates R91.8    Elevated brain natriuretic peptide (BNP) level R79.89    Influenza J11.1    Hypokalemia E87.6    ARDS (adult respiratory distress syndrome) [facility]) [address] Problems  Date Reviewed: 2/4/2018          Codes Class Noted POA    ARDS (adult respiratory distress syndrome) ([facility] ICD-10-CM: P21  ICD-9-CM: 518.52  1/29/2018 Unknown    FIO2 requirement down to 75%. Continue to supplement O2 to keep O2 sats > 92%  Wean O2 as tolerated with goal FiO2 less than 60     Influenza ICD-10-CM: J11.1  ICD-9-CM: 487.1  1/27/2018 Unknown    Completed Rx    Hypokalemia ICD-10-CM: E87.6  ICD-9-CM: 276.8  1/27/2018 Unknown        Elevated brain natriuretic peptide (BNP) level ICD-10-CM: R79.89  ICD-9-CM: 790.99  1/26/2018 Unknown    Lasix on hold due to increased Cr    * (Principal)Acute respiratory failure with hypoxia ([facility] ICD-10-CM: J96.01  ICD-9-CM: 518.81  1/25/2018 No    Ongoing. Pulmonary infiltrates ICD-10-CM: R91.8  ICD-9-CM: 793.19  1/25/2018 Unknown    Ongoing but reduced. HTN (hypertension), benign (Chronic) ICD-10-CM: I10  ICD-9-CM: 401.1  1/23/2018 Yes    May benefit from change to decadron. Recurrent major depressive disorder, in remission ([facility] (Chronic) ICD-10-CM: F33.40  ICD-9-CM: 296.35  1/23/2018 Yes        Breast cancer ([facility] (Chronic) ICD-10-CM: C50.919  ICD-9-CM: 174.9  1/23/2018 Yes        S/P bilateral mastectomy (Chronic) ICD-10-CM: Z90.13  ICD-9-CM: V45.71  1/23/2018 Yes    Overview Signed 1/25/2018  2:39 PM by Yessenia Johnson NP     10/11/17  BILATERAL BREAST MASTECTOMY SIMPLE ARTOURA ULTRA HIGH PROFILE BREAST IMPLANTS 700cc 13.5 cm 8.3cm [id number] X 3 /  MEDIUM HEIGHT 550cc 13.5 cm 11.7cm 7.4cm [phone] X 3   BILATERAL SENTINEL NODE BIOPSY WITH LYMPHATIC MAPPING  RIGHT AXILLARY DISSECTION  BILATERAL BREAST RECONSTRUCTION WITH PLACEMENT TISSUE EXPANDERS AND ALLODERM             HCAP (healthcare-associated pneumonia) ICD-10-CM: J18.9  ICD-9-CM: 904  1/23/2018 Yes    BAL without pathogen identified. Hospital Problems  Date Reviewed: 2/4/2018          Codes Class Noted POA    Secondary spontaneous pneumothorax ICD-10-CM: J93.12  ICD-9-CM: 512.82  2/10/2018 Unknown    Overview Signed 2/10/2018  7:26 AM by Glo Angelucci, MD     bilateral         25F chest tubes placed w/o complication but some difficulty due to presence of breast implants post mastectomy  CXR confirmed lung re-expansion  Remove Chest tubes once extubated or on PSV. Total CC time spent 40 min    Glo Angelucci, MD      More than 50% of time documented was spent in face-to-face contact with the patient and in the care of the patient on the floor/unit where the patient is located.

## 2018-02-10 NOTE — PROGRESS NOTES
Ventilator check complete; patient has a #7.5 ET tube secured at the 21 at the lip. Patient is sedated. Patient is able to follow commands. Breath sounds are coarse and diminished. Trachea is midline, Negative for subcutaneous air, and chest excursion is symmetric. Patient is also Negative for cyanosis and is Negative for pitting edema. All alarms are set and audible. Resuscitation bag is at the head of the bed.       Ventilator Settings  Mode FIO2 Rate Tidal Volume Pressure PEEP I:E Ratio   PRVC  75 %   14 420 ml     10 cm H20  1:2.56      Peak airway pressure: 17.3 cm H2O   Minute ventilation: 7.5 l/min     ABG:   Recent Labs      02/09/18   0345  02/08/18   1506  02/08/18   0305   PH  7.45  7.40  7.47*   PCO2  46*  54*  42   PO2  67*  110*  68*   HCO3  31*  32*  30*         Natacha Diaz, RT

## 2018-02-10 NOTE — PROGRESS NOTES
Dr. Johnson in room to place Bilateral chest tubes. Time out performed. Family notified. Will continue to monitor.

## 2018-02-10 NOTE — PROGRESS NOTES
Patient becoming hypotensive with systolics in 95I and MAP in 50s. Dr Maldonado Postin notified by Soraya PETER about ABG results and BP. Verbal order for 500ml NS bolus taken and administered. Will closely monitor patients BP.

## 2018-02-10 NOTE — PROGRESS NOTES
Brief encouragement for daughter of patient.     En Garner, staff Camille gillis 25, [address] [facility]  /   [email or web address]".[email or web address]

## 2018-02-10 NOTE — PROGRESS NOTES
[facility] Hematology & Oncology        Inpatient Hematology / Oncology Progress Note      Admission Date: 1/22/2018  6:38 PM  Reason for Admission/Hospital Course: Pulmonary infiltrates on CXR [R91.8]      24 Hour Events:  Bilateral PTX on CXR this AM, bilateral CT placed  On ventilator - O2 remains at 75%  Daughter at bedside    ROS:  Unable to obtain    10 point review of systems is otherwise negative with the exception of the elements mentioned above in the HPI.      Allergies   Allergen Reactions    Dilaudid [Hydromorphone] Other (comments)     \"out of her mind\"    Sulfa (Sulfonamide Antibiotics) Itching    Tetracycline Nausea Only       OBJECTIVE:  Patient Vitals for the past 8 hrs:   BP Temp Pulse Resp SpO2 Weight   02/10/18 1317 130/62 - 89 (!) 33 91 % -   02/10/18 1302 129/60 - 85 (!) 54 93 % -   02/10/18 1247 127/61 - 93 (!) 57 91 % -   02/10/18 1231 122/57 - 91 (!) 54 (!) 88 % -   02/10/18 1216 105/54 - 89 (!) 58 90 % -   02/10/18 1205 - - 88 27 90 % -   02/10/18 1202 115/58 98.2 °F (36.8 °C) 88 (!) 50 90 % -   02/10/18 1147 117/55 - 88 (!) 46 90 % -   02/10/18 1132 125/58 - 88 (!) 48 (!) 89 % -   02/10/18 1117 134/63 - 90 (!) 47 91 % -   02/10/18 1102 128/60 - 92 (!) 44 91 % -   02/10/18 1049 - - 91 25 90 % -   02/10/18 1047 133/61 - 91 (!) 50 91 % -   02/10/18 1031 112/53 - 91 (!) 45 94 % -   02/10/18 1017 127/60 - 96 (!) 45 90 % -   02/10/18 1002 132/60 - 96 (!) 42 90 % -   02/10/18 0957 119/59 - 95 - - -   02/10/18 0935 - - 92 (!) 33 93 % -   02/10/18 0932 110/53 - 92 (!) 36 92 % -   02/10/18 0930 - - 92 30 92 % -   02/10/18 0902 116/56 - 93 30 92 % -   02/10/18 0832 120/57 - 95 (!) 31 91 % -   02/10/18 0802 116/56 - 97 (!) 33 90 % -   02/10/18 0747 116/57 - 97 (!) 40 90 % -   02/10/18 0732 123/58 98.4 °F (36.9 °C) 96 (!) 34 90 % -   02/10/18 0727 - - - - - 157 lb 3 oz (71.3 kg)   02/10/18 0717 104/51 - 97 - (!) 89 % -   02/10/18 0701 103/53 - 100 - 91 % -   02/10/18 0602 144/65 - (!) 110 29 91 % -     Temp (24hrs), Av.6 °F (37 °C), Min:98.2 °F (36.8 °C), Max:98.9 °F (37.2 °C)    02/10 0701 - 02/10 1900  In: -   Out: 237 [Urine:125]    Physical Exam:  Constitutional: Frail appearing female in no acute distress, lying comfortably in the hospital bed. HEENT: Normocephalic and atraumatic. Mucous membranes are moist.    Skin Warm and dry.  + bruising to upper extremities bilaterally. No rash noted. No erythema. No pallor. Respiratory Coarse sounds bilaterally. Intubated on ventilator. Bilateral chest tubes   CVS Mildly tachycardic, regular rhythm and normal S1 and S2. No murmurs, gallops, or rubs. Abdomen Soft, nontender and nondistended, normoactive bowel sounds. Neuro Intubated and sedated   MSK   No edema and no tenderness.    Psych Intubated and sedated       Labs:      Recent Labs      02/10/18   0325  18   0442  18   0425   WBC  23.4*  19.6*  20.4*   RBC  3.33*  3.52*  3.65*   HGB  10.0*  10.3*  10.9*   HCT  32.1*  32.3*  33.1*   MCV  96.4  91.8  90.7   MCH  30.0  29.3  29.9   MCHC  31.2*  31.9  32.9   RDW  17.8*  17.2*  16.8*   PLT  288  227  248        Recent Labs      02/10/18   0325  18   1832  18   0442  18   2240  18   0425   NA  144   --   145   --   143   K  4.6   --   3.7  4.0  3.5   CL  102   --   103   --   102   CO2  35*   --   32   --   30   AGAP  7   --   10   --   11   GLU  244*   --   131*   --   133*   BUN  66*   --   51*   --   33*   CREA  1.64*   --   1.08*   --   0.73   GFRAA  39*   --   >60   --   >60   GFRNA  32*   --   52*   --   >60   CA  8.8   --   8.6   --   8.8   MG   --    --   2.2   --   2.1   PHOS   --   3.0   --    --   3.6         Imaging:  XR CHEST PORT [[id number]] Collected: 18      Order Status: Completed Updated: 18     Narrative:       Chest portable    CLINICAL INDICATION: Respiratory failure, dyspnea, follow-up infiltrates,  hypertension, bilateral mastectomy and history of breast cancer    COMPARISON: 1/25/2018    TECHNIQUE: single AP portable view chest at 8:23 AM upright     FINDINGS: The left portacatheter is stable. The mediastinal and hilar contours  are stable. There is no pneumothorax or focal dense consolidation. Reticular and  patchy opacities bilaterally, slightly more prominent in the upper lobes, are  similar to prior. No evidence of enlarging pleural effusion. Surgical changes  involving bilateral breasts are again noted with probable implants.         Impression:       IMPRESSION: No significant change involving diffuse lung infiltrates.       XR CHEST SNGL V [[id number]] Collected: 01/25/18 0125     Order Status: Completed Updated: 01/25/18 0128     Narrative:       AP portable chest    INDICATION: Shortness of breath    COMPARISON: 1/22/2018    FINDINGS: No change in cardiomegaly. Bilateral patchy and diffuse  reticulonodular interstitial changes with patchy alveolar densities bilaterally  without significant change. No change position of left-sided Port-A-Cath  catheter. Negative for pneumothorax.       Impression:       IMPRESSION: No significant change     XR CHEST PORT [[id number]] Collected: 01/22/18 1907     Order Status: Completed Updated: 01/22/18 1914     Narrative:       History: Shortness of breath, breast cancer    Exam: portable chest    Comparison: 10/12/2017     Findings: There is coarsening of the interstitial lung markings. There is new  focal increased opacity seen peripherally within the right midlung. No  pneumothorax. No change in the appearance of the mediastinal contour or osseous  structures. There is a new port overlying the left chest wall. Impressions: New focal opacity seen within the right midlung.           ASSESSMENT:    Problem List  Date Reviewed: 2/4/2018          Codes Class Noted    Secondary spontaneous pneumothorax ICD-10-CM: J93.12  ICD-9-CM: 512.82  2/10/2018    Overview Signed 2/10/2018  7:26 AM by Freddy Gaytan MD bilateral             ARDS (adult respiratory distress syndrome) ([facility] ICD-10-CM: J80  ICD-9-CM: 518.52  1/29/2018        Influenza ICD-10-CM: J11.1  ICD-9-CM: 487.1  1/27/2018        Hypokalemia ICD-10-CM: E87.6  ICD-9-CM: 276.8  1/27/2018        Elevated brain natriuretic peptide (BNP) level ICD-10-CM: R79.89  ICD-9-CM: 790.99  1/26/2018        * (Principal)Acute respiratory failure with hypoxia (HCC) ICD-10-CM: J96.01  ICD-9-CM: 518.81  1/25/2018        Pulmonary infiltrates ICD-10-CM: R91.8  ICD-9-CM: 793.19  1/25/2018        HTN (hypertension), benign (Chronic) ICD-10-CM: I10  ICD-9-CM: 401.1  1/23/2018        Recurrent major depressive disorder, in remission ([facility] (Chronic) ICD-10-CM: F33.40  ICD-9-CM: 296.35  1/23/2018        Breast cancer ([facility] (Chronic) ICD-10-CM: C50.919  ICD-9-CM: 174.9  1/23/2018        S/P bilateral mastectomy (Chronic) ICD-10-CM: Z90.13  ICD-9-CM: V45.71  1/23/2018    Overview Signed 1/25/2018  2:39 PM by Jacqueline Crisostomo NP     10/11/17  BILATERAL BREAST MASTECTOMY SIMPLE ARTOURA ULTRA HIGH PROFILE BREAST IMPLANTS 700cc 13.5 cm 8.3cm [id number] X 3 /  MEDIUM HEIGHT 550cc 13.5 cm 11.7cm 7.4cm [id number] X 3   BILATERAL SENTINEL NODE BIOPSY WITH LYMPHATIC MAPPING  RIGHT AXILLARY DISSECTION  BILATERAL BREAST RECONSTRUCTION WITH PLACEMENT TISSUE EXPANDERS AND ALLODERM             HCAP (healthcare-associated pneumonia) ICD-10-CM: J18.9  ICD-9-CM: 561  1/23/2018        Dehydration ICD-10-CM: E86.0  ICD-9-CM: 276.51  1/22/2018        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 BCNTD. Incentive spirometer  1/25 Increased oxygen demands. Consulted pulm. 1/26 Pulmonary added azith and lasix. Considered thoracentesis but patient desats when off of oxygen. She would need to be intubated for thoracentesis. Conservative measures for now including recommending cpap which patient refused but is now willing to try. Transfer to 5th floor when bed available. 1/29 BC-NGTD. Continues Vancomycin, Zosyn, and Azithromycin. Respiratory status not much improved. Pulmonary following. 1/30 +mycoplasma study. Remains afebrile. Pt not improving despite tamiflu, antibx, and steroids. Pulm planning on bronch tomorrow AM.  1/31 Bronch this AM - studies pending. Pt states she feels less dyspneic s/p bronch, but still having pleuritic chest pain with deep inspiration. 2/2 Studies from bronch pending. Pt remains on Optiflow. Completed antibx today. Pulm following. Pulm placed consult for [facility]. 2/3/18:  She continues to require O2 support. She states she does better on NRB. BAL cytology revealed \"rare atypical cells\", but unlikely of malignancy. Awaiting insurance approval for [facility], will transfer to 5th floor. Continue with supportive care. Pulmonary continues to follow. 2/5/18: No clinical changes. She continues on NRB. Awaiting [facility] approval.  Continue supportive care. Pulmonary following. 2/6/18: Patient moved to CCU DT hypoxia. Now on bipap/optiflow. Patient made the decision to change her code status from DNR to full code. She does want to be ventilated for a short period of time but does not want long term vent support. Discussed plan of care with her and with Dr. Joya Colin. 2/7/18: CXR slightly improved. Continues on bipap 100%. 2/8 Continues on BiPAP - weaned down to 80%. CXR today shows no change in b/l pneumonia. Continues on Cef/Vanc.  2/9 Became frustrated with BiPAP mask yesterday and was agreeable to intubation.   On ventilator. O2 weaned down to 75%. CXR today showed slight improvement in b/l pneumonia. Continues on Cef/Vanc.  2/10 bilateral PTX o/n due to mechanical ventilation, now with bilat chest tube placement. Oxygenation relatively unchanged. Influenza  1/26 Added Tamiflu.      Stage IIB left breast cancer and IIIB right breast cancer  1/24 sp Cycle one TC. Next Cycle planned for 1/27/2018. Currently on hold. Hypokalemia  1/27 Replete with 40 meq K+  1/29 K+ up to 4.1    Diarrhea  1/28 C-diff negative. Utilize Imodium as needed. KELLY  2/9 Cr up to 1.08.  Pulm holding lasix. 2/10 Cr now 1.6, holding diuretics    Dispo  Daughter at bedside, expresses concerns about continued aggressive measures. The patient was clear prior to intubation that she did not want heroic measures. Siddharthsavana Garcia is mainly concerned about Ms. Villa not suffering. I agree that we do not want to persist if care is futile, but aside from the PTX issues, oxygenation has been getting slightly better on the vent. I would recommend continued full support for now, but if she does not improve early-middle of next week, we should have more discussions about goals of care and potential comfort measures.               Papa Gonzalez MD   [facility]ology [facility] On[facility]  [id number] [employer]  Office : [phone]  Fax : [phone]

## 2018-02-10 NOTE — PROGRESS NOTES
Patient continues to be non-responsive to painful stimuli. Propofol has been slowly weaned off and fentanyl gtt weaned to 25mcg/hr. Janes Ortiz RT notified and will be drawing an ABG.

## 2018-02-10 NOTE — PROGRESS NOTES
Bedside shift report from [facility]. Pt turned. Dual skin assessment. Gtts verified. Will continue to monitor.

## 2018-02-10 NOTE — PROGRESS NOTES
Ventilator check complete; patient has a #7.5 ET tube secured at the 21 at the lip. Patient is sedated. Patient is not able to follow commands. Breath sounds are diminished. Trachea is midline, Negative for subcutaneous air, and chest excursion is symmetric. Patient is also Negative for cyanosis. All alarms are set and audible. Resuscitation bag is at the head of the bed.       Ventilator Settings  Mode FIO2 Rate Tidal Volume Pressure PEEP I:E Ratio   PRVC  70 %   28 420 ml     10 cm H20  1:2.04      Peak airway pressure: 34.8 cm H2O   Minute ventilation: 12.8 l/min     ABG:   Recent Labs      02/10/18   0428  02/10/18   0328  02/09/18   0345   PH  7.29*  7.18*  7.45   PCO2  66*  84*  46*   PO2  79*  98  67*   HCO3  31*  31*  31*         Sasha Dahl, RT

## 2018-02-10 NOTE — PROGRESS NOTES
Pt noted to have guppy breathing. ABG with respiratory acidosis from limited ventilation from still lungs. Will place on bicarb drip to try to achieve a pH of about 7.30 with permissive hypercapnia and improve catecholamine responsiveness.     Shila Lanier MD

## 2018-02-11 PROBLEM — N17.9 ACUTE KIDNEY INJURY (HCC): Status: ACTIVE | Noted: 2018-01-01

## 2018-02-11 NOTE — PROGRESS NOTES
Patient rolled onto her left side to relieve pressure. Within 3 minutes, cardiac rhythm became irregular and slowed into the 60s while O2 saturation dropped to 83-84%. O2 breaths provided and Dr Sarah Goins made aware and to the bedside. Patient rolled into supine position and rhythm stabilized into SR in the 80s and O2 saturation slowly increased into low 90s.

## 2018-02-11 NOTE — PROGRESS NOTES
Bedside shift report from [facility]. Pt turned. Dual skin assessment. Gtts verified. Will continue to monitor.

## 2018-02-11 NOTE — PROGRESS NOTES
Ventilator check complete; patient has a #7.5 ET tube secured at the 21 at the lip. Patient is not sedated. Patient is not able to follow commands. Breath sounds are coarse. Trachea is midline, Negative for subcutaneous air, and chest excursion is symmetric. Patient is also Negative for cyanosis and is Positive for pitting edema. All alarms are set and audible. Resuscitation bag is at the head of the bed.       Ventilator Settings  Mode FIO2 Rate Tidal Volume Pressure PEEP I:E Ratio   PRVC  80 %  28  400 ml  10 cm H2O  10 cm H20  1:1.79      Peak airway pressure: 34.2 cm H2O   Minute ventilation: 11.2 l/min     ABG:   Recent Labs      02/11/18   0342  02/10/18   2250  02/10/18   1724   PH  7.24*  7.30*  7.21*   PCO2  83*  66*  76*   PO2  128*  68*  76*   HCO3  35*  32*  29*         Lino Belle, RT

## 2018-02-11 NOTE — PROGRESS NOTES
[facility]        Inpatient Hematology / Oncology Progress Note      Admission Date: 1/22/2018  6:38 PM  Reason for Admission/Hospital Course: Pulmonary infiltrates on CXR [R91.8]      24 Hour Events:  PTX on left worsened, suction increased  On ventilator - O2 remains at 80%  NSG notes less purposeful movement, remains on fentanyl but off other sedation  Daughter at bedside    ROS:  Unable to obtain    10 point review of systems is otherwise negative with the exception of the elements mentioned above in the HPI.      Allergies   Allergen Reactions    Dilaudid [Hydromorphone] Other (comments)     \"out of her mind\"    Sulfa (Sulfonamide Antibiotics) Itching    Tetracycline Nausea Only       OBJECTIVE:  Patient Vitals for the past 8 hrs:   BP Temp Pulse Resp SpO2 Weight   02/11/18 1032 149/62 - (!) 105 26 95 % -   02/11/18 1027 143/65 - (!) 103 - - -   02/11/18 1017 143/65 - (!) 105 (!) 31 95 % -   02/11/18 1002 138/63 - (!) 103 28 95 % -   02/11/18 0947 140/65 - (!) 102 28 94 % -   02/11/18 0932 144/64 - (!) 101 27 94 % -   02/11/18 0917 140/63 - (!) 102 29 94 % -   02/11/18 0901 134/56 - (!) 101 29 94 % -   02/11/18 0847 140/63 - (!) 102 24 93 % -   02/11/18 0832 138/63 - (!) 102 30 93 % -   02/11/18 0817 139/62 - 100 (!) 32 92 % -   02/11/18 0802 133/63 - (!) 103 27 93 % -   02/11/18 0752 - - (!) 101 27 94 % -   02/11/18 0747 138/65 - 99 30 94 % -   02/11/18 0732 134/63 - (!) 101 27 94 % -   02/11/18 0716 143/64 (P) 98.3 °F (36.8 °C) 100 29 94 % -   02/11/18 0702 139/61 - 100 27 95 % -   02/11/18 0632 125/60 - 100 (!) 35 95 % -   02/11/18 0617 138/60 - 100 28 95 % -   02/11/18 0602 144/63 - 100 28 95 % -   02/11/18 0516 149/67 - 99 27 94 % -   02/11/18 0502 143/63 - (!) 101 23 94 % -   02/11/18 0447 139/63 - 97 (!) 32 94 % -   02/11/18 0435 - - - - - 164 lb 0.4 oz (74.4 kg)   02/11/18 0432 132/62 - 95 (!) 38 94 % -   02/11/18 0417 126/60 - 93 (!) 39 93 % -   02/11/18 0402 104/51 - 91 (!) 36 95 % -   18 0347 104/54 - 96 28 97 % -   18 0336 - - 94 22 97 % -     Temp (24hrs), Av.3 °F (36.8 °C), Min:97.8 °F (36.6 °C), Max:98.8 °F (37.1 °C)         Physical Exam:  Constitutional: Frail appearing female in no acute distress, lying comfortably in the hospital bed. HEENT: Normocephalic and atraumatic. Mucous membranes are moist.    Skin Warm and dry.  + bruising to upper extremities bilaterally. No rash noted. No erythema. No pallor. Respiratory Coarse sounds bilaterally. Intubated on ventilator. Bilateral chest tubes   CVS Mildly tachycardic, regular rhythm and normal S1 and S2. No murmurs, gallops, or rubs. Abdomen Soft, nontender and nondistended, normoactive bowel sounds. Neuro Intubated and sedated   MSK   No edema and no tenderness.    Psych Intubated and sedated       Labs:      Recent Labs      02/11/18   0332  02/10/18   0325  02/09/18   0442   WBC  16.8*  23.4*  19.6*   RBC  2.98*  3.33*  3.52*   HGB  8.7*  10.0*  10.3*   HCT  28.7*  32.1*  32.3*   MCV  96.3  96.4  91.8   MCH  29.2  30.0  29.3   MCHC  30.3*  31.2*  31.9   RDW  17.7*  17.8*  17.2*   PLT  136*  288  227        Recent Labs      18   0332  02/10/18   0325  02/09/18   1832  18   0442   NA  142  144   --   145   K  4.2  4.6   --   3.7   CL  100  102   --   103   CO2  35*  35*   --   32   AGAP  7  7   --   10   GLU  243*  244*   --   131*   BUN  87*  66*   --   51*   CREA  1.78*  1.64*   --   1.08*   GFRAA  36*  39*   --   >60   GFRNA  29*  32*   --   52*   CA  8.0*  8.8   --   8.6   MG  2.5*   --    --   2.2   PHOS   --    --   3.0   --          Imaging:  XR CHEST PORT [[id number]] Collected: 18      Order Status: Completed Updated: 18     Narrative:       Chest portable    CLINICAL INDICATION: Respiratory failure, dyspnea, follow-up infiltrates,  hypertension, bilateral mastectomy and history of breast cancer    COMPARISON: 2018    TECHNIQUE: single AP portable view chest at 8:23 AM upright     FINDINGS: The left portacatheter is stable. The mediastinal and hilar contours  are stable. There is no pneumothorax or focal dense consolidation. Reticular and  patchy opacities bilaterally, slightly more prominent in the upper lobes, are  similar to prior. No evidence of enlarging pleural effusion. Surgical changes  involving bilateral breasts are again noted with probable implants.         Impression:       IMPRESSION: No significant change involving diffuse lung infiltrates.       XR CHEST SNGL V [[id number]] Collected: 01/25/18 0125     Order Status: Completed Updated: 01/25/18 0128     Narrative:       AP portable chest    INDICATION: Shortness of breath    COMPARISON: 1/22/2018    FINDINGS: No change in cardiomegaly. Bilateral patchy and diffuse  reticulonodular interstitial changes with patchy alveolar densities bilaterally  without significant change. No change position of left-sided Port-A-Cath  catheter. Negative for pneumothorax.       Impression:       IMPRESSION: No significant change     XR CHEST PORT [[id number]] Collected: 01/22/18 1907     Order Status: Completed Updated: 01/22/18 1914     Narrative:       History: Shortness of breath, breast cancer    Exam: portable chest    Comparison: 10/12/2017     Findings: There is coarsening of the interstitial lung markings. There is new  focal increased opacity seen peripherally within the right midlung. No  pneumothorax. No change in the appearance of the mediastinal contour or osseous  structures. There is a new port overlying the left chest wall. Impressions: New focal opacity seen within the right midlung.           ASSESSMENT:    Problem List  Date Reviewed: 2/4/2018          Codes Class Noted    Acute kidney injury [facility]) ICD-10-CM: N17.9  ICD-9-CM: 584.9  2/11/2018        Secondary spontaneous pneumothorax- bilateral ICD-10-CM: J93.12  ICD-9-CM: 512.82  2/10/2018    Overview Signed 2/10/2018  7:26 AM by Coco Gloria MD Nilda     bilateral             ARDS (adult respiratory distress syndrome) ([facility] ICD-10-CM: J80  ICD-9-CM: 518.52  1/29/2018        Influenza ICD-10-CM: J11.1  ICD-9-CM: 487.1  1/27/2018        Hypokalemia ICD-10-CM: E87.6  ICD-9-CM: 276.8  1/27/2018        Elevated brain natriuretic peptide (BNP) level ICD-10-CM: R79.89  ICD-9-CM: 790.99  1/26/2018        * (Principal)Acute respiratory failure with hypoxia (HCC) ICD-10-CM: J96.01  ICD-9-CM: 518.81  1/25/2018        Pulmonary infiltrates ICD-10-CM: R91.8  ICD-9-CM: 793.19  1/25/2018        HTN (hypertension), benign (Chronic) ICD-10-CM: I10  ICD-9-CM: 401.1  1/23/2018        Recurrent major depressive disorder, in remission ([facility] (Chronic) ICD-10-CM: F33.40  ICD-9-CM: 296.35  1/23/2018        Breast cancer ([facility] (Chronic) ICD-10-CM: C50.919  ICD-9-CM: 174.9  1/23/2018        S/P bilateral mastectomy (Chronic) ICD-10-CM: Z90.13  ICD-9-CM: V45.71  1/23/2018    Overview Signed 1/25/2018  2:39 PM by Mariana Jackson NP     10/11/17  BILATERAL BREAST MASTECTOMY SIMPLE ARTOURA ULTRA HIGH PROFILE BREAST IMPLANTS 700cc 13.5 cm 8.3cm [id number] X 3 /  MEDIUM HEIGHT 550cc 13.5 cm 11.7cm 7.4cm [id number] X 3   BILATERAL SENTINEL NODE BIOPSY WITH LYMPHATIC MAPPING  RIGHT AXILLARY DISSECTION  BILATERAL BREAST RECONSTRUCTION WITH PLACEMENT TISSUE EXPANDERS AND ALLODERM             HCAP (healthcare-associated pneumonia) ICD-10-CM: J18.9  ICD-9-CM: 731  1/23/2018        Dehydration ICD-10-CM: E86.0  ICD-9-CM: 276.51  1/22/2018        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 BCNTD. Incentive spirometer  1/25 Increased oxygen demands. Consulted pulm. 1/26 Pulmonary added azith and lasix. Considered thoracentesis but patient desats when off of oxygen. She would need to be intubated for thoracentesis. Conservative measures for now including recommending cpap which patient refused but is now willing to try. Transfer to 5th floor when bed available. 1/29 BC-NGTD. Continues Vancomycin, Zosyn, and Azithromycin. Respiratory status not much improved. Pulmonary following. 1/30 +mycoplasma study. Remains afebrile. Pt not improving despite tamiflu, antibx, and steroids. Pulm planning on bronch tomorrow AM.  1/31 Bronch this AM - studies pending. Pt states she feels less dyspneic s/p bronch, but still having pleuritic chest pain with deep inspiration. 2/2 Studies from bronch pending. Pt remains on Optiflow. Completed antibx today. Pulm following. Pulm placed consult for [facility]. 2/3/18:  She continues to require O2 support. She states she does better on NRB. BAL cytology revealed \"rare atypical cells\", but unlikely of malignancy. Awaiting insurance approval for [place], will transfer to 5th floor. Continue with supportive care. Pulmonary continues to follow. 2/5/18: No clinical changes. She continues on NRB. Awaiting [facility] approval.  Continue supportive care. Pulmonary following. 2/6/18: Patient moved to CCU DT hypoxia. Now on bipap/optiflow. Patient made the decision to change her code status from DNR to full code. She does want to be ventilated for a short period of time but does not want long term vent support. Discussed plan of care with her and with Dr. Wiley Dela Cruz. 2/7/18: CXR slightly improved. Continues on bipap 100%. 2/8 Continues on BiPAP - weaned down to 80%. CXR today shows no change in b/l pneumonia. Continues on Cef/Vanc.  2/9 Became frustrated with BiPAP mask yesterday and was agreeable to intubation. On ventilator. O2 weaned down to 75%. CXR today showed slight improvement in b/l pneumonia. Continues on Cef/Vanc.  2/10 bilateral PTX o/n due to mechanical ventilation, now with bilat chest tube placement. Oxygenation relatively unchanged. 2/11 increased suction to L CT due to worsening PTX. Oxygenation not meaningfully changed. Steroids/abx ongoing      Influenza  1/26 Added Tamiflu.      Stage IIB left breast cancer and IIIB right breast cancer  1/24 sp Cycle one TC. Next Cycle planned for 1/27/2018. Currently on hold. Hypokalemia  1/27 Replete with 40 meq K+  1/29 K+ up to 4.1    Diarrhea  1/28 C-diff negative. Utilize Imodium as needed. KELLY  2/9 Cr up to 1.08.  Pulm holding lasix. 2/10 Cr now 1.6, holding diuretics  2/11 Cr 1.7, remains off nephrotoxins. She did require pressors yesterday so may have a component of ATN from hypotension     Dispo  Daughter at bedside, we again discussed the role of continued aggressive measures. The patient was clear prior to intubation that she did not want heroic measures. Stevan Quiñonez is mainly concerned about Ms. Villa not suffering. I agree that we do not want to persist if care is futile, her oxygenation had been getting slightly better on the vent, but now she seems to be plateauing, or at least we are losing ground in some areas. I would recommend continued full support for today, but if she does not improve in the next 24-48 hours, we should have more discussions about goals of care and potential comfort measures. Consult palliative care to assist in this.               Teresita Bishop MD   [facility]  [address] [place] [address]  Office : [phone]  Fax : [phone]

## 2018-02-11 NOTE — PROGRESS NOTES
Mesha Chanel  Admission Date: 1/22/2018             ICU Daily Progress Note: 2/11/2018     70 yo female with Stage IIB left breast CA and Stage IIIB right breast cancer s/p bilateral mastectomy 10/2017, GERD, HTN,and HL. She completed chemo 1/6/18. Followed at the infusion center and c/o cough, decreased po intake, and weakness and was found to be hypotensive which did not improve with IVF and was referred to the ER for further evaluation. CXR with new focal opacity R midlung and she was admitted, started on abx, nebs, steroids. Heme/Onc consulted to resume care - next chemo was due 1/27 but due to acute illness was held. Patient remained hypoxic and we were consulted to assist.  CRP was elevated and steroids were intensified. Influenza B returned + and Tamiflu and completed a 5 day course. She failed to improve despite intense therapy and underwent bronch for airway inspection/BAL/cleanout - small amount of mucus plugging. She completed a 10 day course of abx. Annie Verde was consulted with suspected slow recovery and continued high O2 requirements. Intubated 2/8 for ongoing respiratory distress. Developed B tension PTX and had CT placed B on 2/10. Subjective:     Remains on full vent support with FIO2 80%. Place on bicarb drip to allow better tolerance of permissive hypercapnea. Off pressors.       Current Facility-Administered Medications   Medication Dose Route Frequency    NOREPINephrine (LEVOPHED) 8 mg in dextrose 5% 250 mL infusion  2-16 mcg/min IntraVENous TITRATE    sodium bicarbonate (8.4%) 150 mEq in dextrose 5% 1,000 mL infusion   IntraVENous CONTINUOUS    fentaNYL in normal saline (pf) 25 mcg/mL infusion  0-200 mcg/hr IntraVENous TITRATE    dextrose 5% lactated ringers infusion  50 mL/hr IntraVENous CONTINUOUS    guaiFENesin (ROBITUSSIN) 100 mg/5 mL oral liquid 400 mg  400 mg Per NG tube Q6H PRN    metoprolol tartrate (LOPRESSOR) tablet 25 mg  25 mg Oral Q12H    pantoprazole (PROTONIX) 40 mg in sodium chloride 0.9 % 10 mL injection  40 mg IntraVENous DAILY    dexamethasone (DECADRON) 4 mg/mL injection 8 mg  8 mg IntraVENous Q12H    NUTRITIONAL SUPPORT ELECTROLYTE PRN ORDERS   Does Not Apply PRN    propofol (DIPRIVAN) infusion  0-50 mcg/kg/min IntraVENous TITRATE    metoprolol (LOPRESSOR) injection 5 mg  5 mg IntraVENous Q6H PRN    LORazepam (ATIVAN) injection 0.5 mg  0.5 mg IntraVENous Q4H PRN    zinc oxide-cod liver oil (DESITIN) 40 % paste   Topical PRN    morphine injection 2 mg  2 mg IntraVENous Q2H PRN    HYDROcodone-acetaminophen (NORCO) 7.5-325 mg per tablet 1 Tab  1 Tab Oral Q6H PRN    losartan (COZAAR) tablet 50 mg  50 mg Oral DAILY    hydrALAZINE (APRESOLINE) 20 mg/mL injection 20 mg  20 mg IntraVENous Q6H PRN    acetaminophen (TYLENOL) tablet 650 mg  650 mg Oral Q6H PRN    loperamide (IMODIUM) capsule 2 mg  2 mg Oral Q4H PRN    albuterol (PROVENTIL VENTOLIN) nebulizer solution 2.5 mg  2.5 mg Nebulization QID RT    sodium chloride (OCEAN) 0.65 % nasal spray 2 Spray  2 Spray Both Nostrils Q2H PRN    HYDROcodone-homatropine (HYCODAN) 5-1.5 mg/5 mL (5 mL) syrup 5 mL  5 mL Oral Q4H PRN    lip protectant (BLISTEX) ointment   Topical PRN    alteplase (CATHFLO) 2 mg in sterile water (preservative free) 2 mL injection  2 mg InterCATHeter PRN    atorvastatin (LIPITOR) tablet 20 mg  20 mg Oral DAILY    prochlorperazine (COMPAZINE) tablet 10 mg  10 mg Oral Q6H PRN    sertraline (ZOLOFT) tablet 100 mg  100 mg Oral DAILY    sodium chloride (NS) flush 5-10 mL  5-10 mL IntraVENous Q8H    sodium chloride (NS) flush 5-10 mL  5-10 mL IntraVENous PRN    heparin (porcine) injection 5,000 Units  5,000 Units SubCUTAneous Q12H     ROS:    Constitutional: negative for fever, chills, sweats  Cardiac: negative for chest pain, palpitations, syncope, edema  GI: negative for dysphagia, reflux, vomiting, diarrhea, abdominal pain, or melena  Neuro: negative for focal weakness, numbness, headache        Objective:     Vitals:    02/11/18 0732 02/11/18 0747 02/11/18 0752 02/11/18 0802   BP: 134/63 138/65  133/63   Pulse: (!) 101 99 (!) 101 (!) 103   Resp: 27 30 27 27   Temp:       SpO2: 94% 94% 94% 93%   Weight:         Intake and Output:   02/09 1901 - 02/11 0700  In: 4853.2 [I.V.:2593.2]  Out: 1252 [Urine:875]       Physical Exam:          GEN: comfortable on vent  HEENT:  no alar flaring or epistaxis, oral mucosa moist without cyanosis,   NECK:  no JVD, no retractions, no thyromegaly or masses,   LUNGS:  Scattered rhonchi and crackles  HEART:  RRR with no M,G,R;  ABDOMEN:  soft with no tenderness; positive bowel sounds present  EXTREMITIES:  warm with no cyanosis, mild lower leg edema  SKIN:  no jaundice or ecchymosis   NEURO:  sedated on vent    LINES: venous access, langley, ETT  DRIPS: fentanyl and propofol  NUTRITION: TF    Ventilator Settings  Mode FIO2 Rate Tidal Volume Pressure PEEP   PRVC  80 %    400 ml  10 cm H2O  10 cm H20      Peak airway pressure: 34.2 cm H2O   Minute ventilation: 11.2 l/min     Results for Heriberto Rodriguez (MRN [id number]) as of 2/11/2018 09:21   Ref.  Range 2/11/2018 08:00   pH Latest Ref Range: 7.35 - 7.45   7.27 (L)   PCO2 Latest Ref Range: 35 - 45 mmHg 84 (H)   PO2 Latest Ref Range: 80 - 105 mmHg 77 (L)   BICARBONATE Latest Ref Range: 22 - 26 mmol/L 38 (H)   O2 SAT Latest Ref Range: 92 - 98.5 % 96   BASE EXCESS Latest Ref Range: 0 - 3 mmol/L 8.6 (H)   Arterial O2 Hgb Latest Ref Range: 94 - 97 % 95.1   TOTAL HEMOGLOBIN Latest Ref Range: 11.7 - 15.0 GM/DL 10.2 (L)   CARBOXYHEMOGLOBIN Latest Ref Range: 0.5 - 1.5 % 0.5   METHEMOGLOBIN Latest Ref Range: 0.0 - 1.5 % 0.1   DEOXYHEMOGLOBIN Latest Ref Range: 0.0 - 5.0 % 4   SITE Unknown RR   ALLENS TEST Latest Units:   POSITIVE   MODE Unknown PRVC   FIO2 Latest Units: % 80.0   Tidal volume Latest Units:   400.0   RATE Latest Units:   24.0   PEEP/CPAP Latest Units:   10.0         CHEST XRAY: 2/11:        2/9:            LAB  Recent Labs      02/11/18   0332  02/10/18   0325  02/09/18   0442   WBC  16.8*  23.4*  19.6*   HGB  8.7*  10.0*  10.3*   HCT  28.7*  32.1*  32.3*   PLT  136*  288  227     Recent Labs      02/11/18   0332  02/10/18   0325  02/09/18   1832  02/09/18   0442   NA  142  144   --   145   K  4.2  4.6   --   3.7   CL  100  102   --   103   CO2  35*  35*   --   32   GLU  243*  244*   --   131*   BUN  87*  66*   --   51*   CREA  1.78*  1.64*   --   1.08*   MG  2.5*   --    --   2.2   PHOS   --    --   3.0   --      Recent Labs      02/11/18   0800  02/11/18   0342  02/10/18   2250   PH  7.27*  7.24*  7.30*   PCO2  84*  83*  66*   PO2  77*  128*  68*   HCO3  38*  35*  32*         Assessment:     Hospital Problems  Date Reviewed: 2/4/2018          Codes Class Noted POA    Acute kidney injury ([facility] ICD-10-CM: N17.9  ICD-9-CM: 584.9  2/11/2018 Unknown    Cr up to 1.78    Secondary spontaneous pneumothorax- bilateral ICD-10-CM: J93.12  ICD-9-CM: 512.82  2/10/2018 Unknown    Overview Signed 2/10/2018  7:26 AM by Bianca Sheehan MD     bilateral         Still with L PTX and bilateral air leaks L > R    ARDS (adult respiratory distress syndrome) ([facility] ICD-10-CM: D32  ICD-9-CM: 518.52  1/29/2018 Unknown    No significant improvement    Influenza ICD-10-CM: J11.1  ICD-9-CM: 487.1  1/27/2018 Unknown    Completed Rx     Elevated brain natriuretic peptide (BNP) level ICD-10-CM: R79.89  ICD-9-CM: 790.99  1/26/2018 Unknown        * (Principal)Acute respiratory failure with hypoxia ([facility] ICD-10-CM: J96.01  ICD-9-CM: 518.81  1/25/2018 No    Remains on full support.  Try to wean oxygen    Pulmonary infiltrates ICD-10-CM: R91.8  ICD-9-CM: 793.19  1/25/2018 Unknown        HTN (hypertension), benign (Chronic) ICD-10-CM: I10  ICD-9-CM: 401.1  1/23/2018 Yes        Recurrent major depressive disorder, in remission ([facility] (Chronic) ICD-10-CM: F33.40  ICD-9-CM: 296.35  1/23/2018 Yes        Breast cancer ([facility] (Chronic) ICD-10-CM: C50.919  ICD-9-CM: 174.9  1/23/2018 Yes        S/P bilateral mastectomy (Chronic) ICD-10-CM: Z90.13  ICD-9-CM: V45.71  1/23/2018 Yes    Overview Signed 1/25/2018  2:39 PM by Priyank Carvalho NP     10/11/17  BILATERAL BREAST MASTECTOMY SIMPLE ARTOURA ULTRA HIGH PROFILE BREAST IMPLANTS 700cc 13.5 cm 8.3cm [id number] X 3 /  MEDIUM HEIGHT 550cc 13.5 cm 11.7cm 7.4cm [id number] X 3   BILATERAL SENTINEL NODE BIOPSY WITH LYMPHATIC MAPPING  RIGHT AXILLARY DISSECTION  BILATERAL BREAST RECONSTRUCTION WITH PLACEMENT TISSUE EXPANDERS AND ALLODERM             HCAP (healthcare-associated pneumonia) ICD-10-CM: J18.9  ICD-9-CM: 732  1/23/2018 Yes                Plan     Continue vent wean oxygen as tolerated. Increase suction to L chest tube. Continue bicarb drip. Increase LR due to rising creatinine  Continue steroids. TF being tolerated. Try to wean oxygen a little. Spoke with daughter; prognosis appears worse with MSOF developing. Likely PC eval in Alex Duane, MD      More than 50% of time documented was spent in face-to-face contact with the patient and in the care of the patient on the floor/unit where the patient is located.

## 2018-02-11 NOTE — PROGRESS NOTES
Visit by spiritual care volunteer, Praful Camacho  Patient was unresponsive. Daughter and grandson were present. Daughter still tearful over situation and also her mother's spiritual condition. Keisha Lawson indicated that his grandmother was a Ciara Samuel. Prayer with family.       Corinne Quintero,  Staff   C: [phone]  /  [email or web address]

## 2018-02-12 NOTE — PROGRESS NOTES
Pts HR, BP and RR increasing. HR becoming irregular. Pt moving arms and right leg in bed. Eyes tearing. Fentanyl increased for comfort.

## 2018-02-12 NOTE — CONSULTS
Palliative Care    Patient: Diaz Lr MRN: [id number]  SSN: [id number]    YOB: 1940  Age: 69 y.o. Sex: female       Date of Request: 2/12/2018  Date of Consult:  2/12/2018  Reason for Consult:  goals of care  Requesting Physician: Del Garrison NP     Assessment/Plan:     Principal Diagnosis:    Agitation  R45.1    Additional Diagnoses:   · Acute Respiratory Failure, Unspecified  J96.00  · Altered Mental Status R41.82  · Debility, Unspecified  R53.81  · Frailty  R54  · Counseling, Encounter for Medical Advice  Z71.9  · Encounter for Palliative Care  Z51.5    Palliative Performance Scale (PPS):  PPS: 20    Medical Decision Making:   Reviewed and summarized notes from admission to present   Discussed case with appropriate providers- ICU IDT; Karlie Vaughn RN  Reviewed laboratory and x-ray data from admission to present     Pt intubated and ventilated, restless and agitated at present. Daughter, Ramon Morrison, and her  at bedside. Karlie Vaughn RN, at bedside as well. Introduced role of PC and reviewed events. Ramon Morrison has excellent understanding of pt's condition. She states her mother would not want ongoing aggressive measures if she were not improving. She is waiting to speak with the pulmonologist today before making any decisions. She states if they decide to proceed with comfort measures, she would like to give other family an opportunity to come to the hospital ([place] in [place] Counseled that this is appropriate. Provided support. Will continue to follow.      Will discuss findings with members of the interdisciplinary team.      Thank you for this referral.          .    Subjective:     History obtained from:  Family, Care Provider and Chart    Chief Complaint: PNA, flu, ARDS  History of Present Illness:  Ms Cata Tovar is a 68 yo  female with PMH of breast cancer, OA, depression, HTN, HLD, and kidney stones, who presented to the ER from the [facility] with c/o persistent hypotension despite 2 liters of fluid. Pt reported associated 5 day history of cough, diarrhea, decreased oral intake and weakness. Pt denied fevers, n/v, dysuria. Work up in the ER revealed new focal opacity in the right mid lung. Pt was admitted for further management. Pt had worsening hypoxia, and influenza B swab was positive. She underwent bronchoscopy with airway inspection, BAL, and washout on 1/31/2018. She completed 10 course of antibiotics and steroids, but has continued to decline. She required intubation on 2/8/2018, and developed a tension pneumothorax on 2/10/2018. She remains intubated and ventilated, with high oxygen requirements. Advance Directive: No       Code Status:  DNR            Health Care Power of : No - Patient does not have a [address]     Past Medical History:   Diagnosis Date    Adverse effect of anesthesia     after hernia surgery bp dropped low and she went to ICU    Arthritis     hands; wrists; back; right knee    Breast cancer ([address]     Depression     Fibrocystic breast disease     bilateral    GERD (gastroesophageal reflux disease)     Headache(784.0)     previously on Topamax    HTN (hypertension), benign     Hypercholesteremia     Kidney stones       Past Surgical History:   Procedure Laterality Date    HX BREAST RECONSTRUCTION  10/11/2017    HX BREAST RECONSTRUCTION Bilateral 10/11/2017    BILATERAL BREAST RECONSTRUCTION WITH PLACEMENT TISSUE EXPANDERS AND ALLODERM performed by Destini Lloyd MD at [address] [place] [address] HX CATARACT REMOVAL      bilateral    HX COLONOSCOPY  Oct 2010    Diverticula    HX HERNIA REPAIR  48/8865    umbilical hernia; done at [address] in [place]    HX KNEE REPLACEMENT      left    HX KNEE REPLACEMENT  02/19/2009    total knee replacement (Left)     HX MASTECTOMY Bilateral 10/11/2017    BILATERAL BREAST MASTECTOMY SIMPLE ARTOURA ULTRA HIGH PROFILE BREAST IMPLANTS 700cc 13.5 cm 8.3cm [id number] X 3 /  MEDIUM HEIGHT 550cc 13.5 cm 11.7cm 7.4cm [id number] X 3  performed by Martha Zamora DO at [address] HX SKIN BIOPSY  10/03/2017    on nose    HX VASCULAR ACCESS       Family History   Problem Relation Age of Onset    Other Brother      Myodisplasia syndrome     Heart Disease Brother     Hypertension Brother     Hypertension Mother     Diabetes Mother     Cancer Daughter      cervical      Social History   Substance Use Topics    Smoking status: Former Smoker     Types: Cigarettes     Quit date: 1/1/1992    Smokeless tobacco: Never Used    Alcohol use 0.0 oz/week     0 Standard drinks or equivalent per week      Comment: Occasional     Prior to Admission medications    Medication Sig Start Date End Date Taking? Authorizing Provider   ondansetron hcl (ZOFRAN, AS HYDROCHLORIDE,) 8 mg tablet Take 1 Tab by mouth every eight (8) hours as needed for Nausea. 12/29/17  Yes Ginny Kim MD   albuterol (PROVENTIL HFA, VENTOLIN HFA, PROAIR HFA) 90 mcg/actuation inhaler Take 1-2 Puffs by inhalation every four (4) hours as needed for Wheezing or Shortness of Breath. 12/13/17  Yes Danna Miranda MD   metoprolol succinate (TOPROL-XL) 50 mg XL tablet Take 1 Tab by mouth daily. 11/6/17  Yes Danna Miranda MD   oxyCODONE IR (ROXICODONE) 5 mg immediate release tablet Take 5 mg by mouth every six (6) hours as needed for Pain. Yes Historical Provider   guaiFENesin-dextromethorphan SR [facility] DM) 600-30 mg per tablet Take 1 Tab by mouth every twelve (12) hours as needed for Cough. Yes Historical Provider   DOCUSATE CALCIUM (STOOL SOFTENER PO) Take  by mouth as needed. Yes Historical Provider   meloxicam (MOBIC) 7.5 mg tablet Take 1-2 Tabs by mouth daily. Patient taking differently: Take 7.5-15 mg by mouth as needed. Indications: OSTEOARTHRITIS 9/19/17  Yes Danna Miranda MD   losartan-hydroCHLOROthiazide [facility]) 50-12.5 mg per tablet Take 1 Tab by mouth daily.   Patient taking differently: Take 1 Tab by mouth daily. Indications: hypertension 9/19/17  Yes Saray Helton MD   Cetirizine (ZYRTEC) 10 mg cap Take 10 mg by mouth daily. Take / use AM day of surgery  per anesthesia protocols. Yes Historical Provider   PSEUDOEPHEDRINE HCL (SUDAFED 12 HOUR PO) Take  by mouth as needed. Yes Historical Provider   sertraline (ZOLOFT) 100 mg tablet Take 1 Tab by mouth daily. Patient taking differently: Take 100 mg by mouth daily. Take / use AM day of surgery  per anesthesia protocols. Indications: ANXIETY WITH DEPRESSION 8/18/17  Yes Saray Helton MD   atorvastatin (LIPITOR) 20 mg tablet Take 1 Tab by mouth daily. Patient taking differently: Take 20 mg by mouth daily. Take / use AM day of surgery  per anesthesia protocols. Indications: hypercholesterolemia, hyperlipidemia 8/18/17  Yes Saray Helton MD   ranitidine (ZANTAC) 150 mg tablet Take 150 mg by mouth as needed for Indigestion. Take / use AM day of surgery  per anesthesia protocols. Yes Historical Provider   LORazepam (ATIVAN) 1 mg tablet Take 1 Tab by mouth every six (6) hours as needed for Anxiety. Max Daily Amount: 4 mg. Indications: anxiety, CANCER CHEMOTHERAPY-INDUCED NAUSEA AND VOMITING 1/5/18   Asmita Hill NP   dexamethasone (DECADRON) 4 mg tablet Take two (2) tabs twice daily the day before, day of and day after chemo. 12/29/17   Kayla Horta MD   prochlorperazine (COMPAZINE) 10 mg tablet Take 1 Tab by mouth every six (6) hours as needed. 12/29/17   Kayla Horta MD   predniSONE (DELTASONE) 20 mg tablet Take 2 daily for 3 days, then 1 daily for 2 days 12/13/17   Saray Helton MD   acetaminophen (TYLENOL) 325 mg tablet Take 325 mg by mouth every four (4) hours as needed for Pain. Historical Provider   meclizine (ANTIVERT) 25 mg tablet Take 1 Tab by mouth every six (6) hours as needed for Dizziness. Patient taking differently: Take 25 mg by mouth every six (6) hours as needed for Dizziness.  Indications: VERTIGO 8/17/16   Michael Voss MD       Allergies   Allergen Reactions    Dilaudid [Hydromorphone] Other (comments)     \"out of her mind\"    Sulfa (Sulfonamide Antibiotics) Itching    Tetracycline Nausea Only        Review of Systems:  Review of systems not obtained due to patient factors- intubated and ventilated      Objective:     Visit Vitals    BP 91/47    Pulse 80    Temp 97.5 °F (36.4 °C)    Resp 15    Wt 78.4 kg (172 lb 13.5 oz)    SpO2 100%    Breastfeeding No    BMI 30.62 kg/m2        Physical Exam:    General:  Restless and agitated. Intubated and ventilated    Eyes:  Conjunctivae/corneas clear    Nose: Nares normal. Septum midline. Neck: Supple, symmetrical, trachea midline   Lungs:   Coarse bilaterally   Heart:  Regular rate and rhythm   Abdomen:   Soft, non-tender, non-distended   Extremities: Normal, atraumatic, no cyanosis or edema   Skin: Skin color, texture, turgor normal.   Neurologic: Spontaneous movement noted.    Psych: Unable      Assessment:     Hospital Problems  Date Reviewed: 2/4/2018          Codes Class Noted POA    Acute kidney injury [facility]) ICD-10-CM: N17.9  ICD-9-CM: 584.9  2/11/2018 Unknown        Secondary spontaneous pneumothorax- bilateral ICD-10-CM: J93.12  ICD-9-CM: 512.82  2/10/2018 Unknown    Overview Signed 2/10/2018  7:26 AM by Lauren Kim MD     bilateral             ARDS (adult respiratory distress syndrome) ([facility] ICD-10-CM: B04  ICD-9-CM: 518.52  1/29/2018 Unknown        Influenza ICD-10-CM: J11.1  ICD-9-CM: 487.1  1/27/2018 Unknown        Hypokalemia ICD-10-CM: E87.6  ICD-9-CM: 276.8  1/27/2018 Unknown        Elevated brain natriuretic peptide (BNP) level ICD-10-CM: R79.89  ICD-9-CM: 790.99  1/26/2018 Unknown        * (Principal)Acute respiratory failure with hypoxia (HCC) ICD-10-CM: J96.01  ICD-9-CM: 518.81  1/25/2018 No        Pulmonary infiltrates ICD-10-CM: R91.8  ICD-9-CM: 793.19  1/25/2018 Unknown        HTN (hypertension), benign (Chronic) ICD-10-CM: I10  ICD-9-CM: 401.1  1/23/2018 Yes        Recurrent major depressive disorder, in remission ([facility] (Chronic) ICD-10-CM: F33.40  ICD-9-CM: 296.35  1/23/2018 Yes        Breast cancer ([facility] (Chronic) ICD-10-CM: C50.919  ICD-9-CM: 174.9  1/23/2018 Yes        S/P bilateral mastectomy (Chronic) ICD-10-CM: Z90.13  ICD-9-CM: V45.71  1/23/2018 Yes    Overview Signed 1/25/2018  2:39 PM by Sudheer Islas NP     10/11/17  BILATERAL BREAST MASTECTOMY SIMPLE ARTOURA ULTRA HIGH PROFILE BREAST IMPLANTS 700cc 13.5 cm 8.3cm [id number] X 3 /  MEDIUM HEIGHT 550cc 13.5 cm 11.7cm 7.4cm [id number] X 3   BILATERAL SENTINEL NODE BIOPSY WITH LYMPHATIC MAPPING  RIGHT AXILLARY DISSECTION  BILATERAL BREAST RECONSTRUCTION WITH PLACEMENT TISSUE EXPANDERS AND ALLODERM             HCAP (healthcare-associated pneumonia) ICD-10-CM: J18.9  ICD-9-CM: 144  1/23/2018 Yes              Signed By: Oanh Luevano NP     February 12, 2018

## 2018-02-12 NOTE — PROGRESS NOTES
Patients daughter inquiring about compassionate extubation tonight. Information about palliation and comfort provided.   Emotional support provided for family

## 2018-02-12 NOTE — PROGRESS NOTES
End of life care. Daughter at bedside holding her mom. Daughter said mother did not want to live like this. Difficult decision for her since her father  a few years ago. Prayer. Compassionate care given by her nurse. Thanks.     Alex Lemon, staff [address], [address] [facility]  /   [email or web address]

## 2018-02-12 NOTE — PROGRESS NOTES
Fentanyl at 150mcg/hr. Patient no longer stacking agonal breaths after vent breaths or breathing over the ventilator.

## 2018-02-12 NOTE — PROGRESS NOTES
Dr. Adamaris Muniz notified of Pts HR staying irregular with highest rate of 150 then slowing to 70's and pt going in and out of afib. No new orders received. Will continue to monitor.

## 2018-02-12 NOTE — PROGRESS NOTES
RT attempted to wean O2 to 75% after AM ABG. Pt O2 down to 88%. RT notified. Vent O2 returned to 85% with with oxygen returning to 94%. Will continue to monitor.

## 2018-02-12 NOTE — PROGRESS NOTES
[facility]        Inpatient Hematology / Oncology Progress Note      Admission Date: 2018  6:38 PM  Reason for Admission/Hospital Course: Pulmonary infiltrates on CXR [R91.8]      24 Hour Events:  Chest tube in place for spontaneous pneumothorax  On vent/intubated. Increasing FiO2  Daughter at bedside    ROS:  Unable to obtain -unresponsive, sedated    10 point review of systems is otherwise negative with the exception of the elements mentioned above in the HPI.      Allergies   Allergen Reactions    Dilaudid [Hydromorphone] Other (comments)     \"out of her mind\"    Sulfa (Sulfonamide Antibiotics) Itching    Tetracycline Nausea Only       OBJECTIVE:  Patient Vitals for the past 8 hrs:   BP Temp Pulse Resp SpO2   18 1332 93/46 - 92 24 99 %   18 1317 102/50 - 93 24 98 %   18 1302 97/47 - 92 24 98 %   18 1246 106/51 - 93 24 98 %   18 1231 113/53 - 90 24 99 %   18 1217 91/46 - 97 24 99 %   18 1145 - - 97 (!) 46 91 %   18 1132 104/46 - 94 (!) 0 99 %   18 1124 140/63 - 99 (!) 31 94 %   18 1020 132/61 - 100 28 (!) 89 %   18 1002 144/65 - 97 - 92 %   18 0947 143/62 - 89 (!) 40 94 %   18 0938 154/67 - 60 27 94 %   18 0932 102/53 - 78 30 99 %   18 0917 96/47 - 84 24 100 %   18 0901 101/49 - 83 24 99 %   18 0846 107/53 - 83 24 100 %   18 0832 105/52 - 83 24 100 %   18 0817 109/55 - 85 24 99 %   18 0806 - - 80 15 100 %   18 0802 95/47 - 86 24 99 %   18 0747 100/53 - 88 24 99 %   18 0732 91/47 - 81 24 93 %   18 0716 94/51 97.5 °F (36.4 °C) 89 24 99 %   18 0701 95/55 - 88 24 99 %   18 0632 93/53 - 88 24 99 %   18 0617 93/50 - 90 24 99 %     Temp (24hrs), Av.9 °F (36.6 °C), Min:97.5 °F (36.4 °C), Max:98.3 °F (36.8 °C)    701 - 1900  In: -   Out: 46 [Urine:300]    Physical Exam:  Constitutional: Frail appearing female in no acute distress, lying comfortably in the hospital bed. HEENT: Normocephalic and atraumatic. Mucous membranes are moist.    Skin Warm and dry.  + bruising to upper extremities bilaterally. No rash noted. No erythema. No pallor. Respiratory Coarse sounds bilaterally, gurgling in upper airways. Intubated on ventilator. Bilateral chest tubes   CVS Mildly tachycardic, regular rhythm and normal S1 and S2. No murmurs, gallops, or rubs. Abdomen Soft, nontender and nondistended, normoactive bowel sounds. Neuro Intubated and sedated   MSK   No edema and no tenderness. Psych Intubated and sedated       Labs:      Recent Labs      02/12/18   0301  02/11/18   0332  02/10/18   0325   WBC  17.9*  16.8*  23.4*   RBC  3.00*  2.98*  3.33*   HGB  8.8*  8.7*  10.0*   HCT  29.0*  28.7*  32.1*   MCV  96.7  96.3  96.4   MCH  29.3  29.2  30.0   MCHC  30.3*  30.3*  31.2*   RDW  17.1*  17.7*  17.8*   PLT  121*  136*  288        Recent Labs      02/12/18   0301  02/11/18   0332  02/10/18   0325  02/09/18   1832   NA  139  142  144   --    K  4.4  4.2  4.6   --    CL  90*  100  102   --    CO2  44*  35*  35*   --    AGAP  5*  7  7   --    GLU  322*  243*  244*   --    BUN  89*  87*  66*   --    CREA  1.24*  1.78*  1.64*   --    GFRAA  54*  36*  39*   --    GFRNA  45*  29*  32*   --    CA  8.0*  8.0*  8.8   --    MG   --   2.5*   --    --    PHOS  4.0*   --    --   3.0         Imaging:  XR CHEST PORT [[id number]] Collected: 01/26/18 0816      Order Status: Completed Updated: 01/26/18 0835     Narrative:       Chest portable    CLINICAL INDICATION: Respiratory failure, dyspnea, follow-up infiltrates,  hypertension, bilateral mastectomy and history of breast cancer    COMPARISON: 1/25/2018    TECHNIQUE: single AP portable view chest at 8:23 AM upright     FINDINGS: The left portacatheter is stable. The mediastinal and hilar contours  are stable. There is no pneumothorax or focal dense consolidation.  Reticular and  patchy opacities bilaterally, slightly more prominent in the upper lobes, are  similar to prior. No evidence of enlarging pleural effusion. Surgical changes  involving bilateral breasts are again noted with probable implants.         Impression:       IMPRESSION: No significant change involving diffuse lung infiltrates.       XR CHEST SNGL V [[id number]] Collected: 01/25/18 0125     Order Status: Completed Updated: 01/25/18 0128     Narrative:       AP portable chest    INDICATION: Shortness of breath    COMPARISON: 1/22/2018    FINDINGS: No change in cardiomegaly. Bilateral patchy and diffuse  reticulonodular interstitial changes with patchy alveolar densities bilaterally  without significant change. No change position of left-sided Port-A-Cath  catheter. Negative for pneumothorax.       Impression:       IMPRESSION: No significant change     XR CHEST PORT [[id number]] Collected: 01/22/18 1907     Order Status: Completed Updated: 01/22/18 1914     Narrative:       History: Shortness of breath, breast cancer    Exam: portable chest    Comparison: 10/12/2017     Findings: There is coarsening of the interstitial lung markings. There is new  focal increased opacity seen peripherally within the right midlung. No  pneumothorax. No change in the appearance of the mediastinal contour or osseous  structures. There is a new port overlying the left chest wall. Impressions: New focal opacity seen within the right midlung.           ASSESSMENT:    Problem List  Date Reviewed: 2/4/2018          Codes Class Noted    Acute kidney injury [facility]) ICD-10-CM: N17.9  ICD-9-CM: 584.9  2/11/2018        Secondary spontaneous pneumothorax- bilateral ICD-10-CM: J93.12  ICD-9-CM: 512.82  2/10/2018    Overview Signed 2/10/2018  7:26 AM by Skyler Floyd MD     bilateral             ARDS (adult respiratory distress syndrome) ([facility] ICD-10-CM: K71  ICD-9-CM: 518.52  1/29/2018        Influenza ICD-10-CM: J11.1  ICD-9-CM: 487.1  1/27/2018 Hypokalemia ICD-10-CM: E87.6  ICD-9-CM: 276.8  1/27/2018        Elevated brain natriuretic peptide (BNP) level ICD-10-CM: R79.89  ICD-9-CM: 790.99  1/26/2018        * (Principal)Acute respiratory failure with hypoxia (HCC) ICD-10-CM: J96.01  ICD-9-CM: 518.81  1/25/2018        Pulmonary infiltrates ICD-10-CM: R91.8  ICD-9-CM: 793.19  1/25/2018        HTN (hypertension), benign (Chronic) ICD-10-CM: I10  ICD-9-CM: 401.1  1/23/2018        Recurrent major depressive disorder, in remission ([facility] (Chronic) ICD-10-CM: F33.40  ICD-9-CM: 296.35  1/23/2018        Breast cancer ([facility] (Chronic) ICD-10-CM: C50.919  ICD-9-CM: 174.9  1/23/2018        S/P bilateral mastectomy (Chronic) ICD-10-CM: Z90.13  ICD-9-CM: V45.71  1/23/2018    Overview Signed 1/25/2018  2:39 PM by Rafael Sue NP     10/11/17  BILATERAL BREAST MASTECTOMY SIMPLE ARTOURA ULTRA HIGH PROFILE BREAST IMPLANTS 700cc 13.5 cm 8.3cm [id number] X 3 /  MEDIUM HEIGHT 550cc 13.5 cm 11.7cm 7.4cm [id number] X 3   BILATERAL SENTINEL NODE BIOPSY WITH LYMPHATIC MAPPING  RIGHT AXILLARY DISSECTION  BILATERAL BREAST RECONSTRUCTION WITH PLACEMENT TISSUE EXPANDERS AND ALLODERM             HCAP (healthcare-associated pneumonia) ICD-10-CM: J18.9  ICD-9-CM: 631  1/23/2018        Dehydration ICD-10-CM: E86.0  ICD-9-CM: 276.51  1/22/2018        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) ICD-10-CM: C50.011, C50.012  ICD-9-CM: 174.0  9/19/2017        Chronic midline low back pain without sciatica ICD-10-CM: M54.5, G89.29  ICD-9-CM: 724.2, 338.29  8/17/2016        Allergic rhinitis ICD-10-CM: J30.9  ICD-9-CM: 477.9  Unknown        Hypercholesteremia ICD-10-CM: E78.00  ICD-9-CM: 272.0  Unknown        Headache(784.0) ICD-10-CM: R51  ICD-9-CM: 784.0  Unknown                PLAN:    HCAP  1/24 Continue vanc/zosyn. BCNTD. Incentive spirometer  1/25 Increased oxygen demands. Consulted pulm. 1/26 Pulmonary added benita and lasix.  Considered thoracentesis but patient desats when off of oxygen. She would need to be intubated for thoracentesis. Conservative measures for now including recommending cpap which patient refused but is now willing to try. Transfer to 5th floor when bed available. 1/29 BC-NGTD. Continues Vancomycin, Zosyn, and Azithromycin. Respiratory status not much improved. Pulmonary following. 1/30 +mycoplasma study. Remains afebrile. Pt not improving despite tamiflu, antibx, and steroids. Pulm planning on bronch tomorrow AM.  1/31 Bronch this AM - studies pending. Pt states she feels less dyspneic s/p bronch, but still having pleuritic chest pain with deep inspiration. 2/2 Studies from bronch pending. Pt remains on Optiflow. Completed antibx today. Pulm following. Pulm placed consult for [employer]. 2/3/18:  She continues to require O2 support. She states she does better on NRB. BAL cytology revealed \"rare atypical cells\", but unlikely of malignancy. Awaiting insurance approval for [place], will transfer to 5th floor. Continue with supportive care. Pulmonary continues to follow. 2/5/18: No clinical changes. She continues on NRB. Awaiting [facility] approval.  Continue supportive care. Pulmonary following. 2/6/18: Patient moved to CCU DT hypoxia. Now on bipap/optiflow. Patient made the decision to change her code status from DNR to full code. She does want to be ventilated for a short period of time but does not want long term vent support. Discussed plan of care with her and with Dr. Eladio Hagen. 2/7/18: CXR slightly improved. Continues on bipap 100%. 2/8 Continues on BiPAP - weaned down to 80%. CXR today shows no change in b/l pneumonia. Continues on Cef/Vanc.  2/9 Became frustrated with BiPAP mask yesterday and was agreeable to intubation. On ventilator. O2 weaned down to 75%. CXR today showed slight improvement in b/l pneumonia.   Continues on Cef/Vanc.  2/10 bilateral PTX o/n due to mechanical ventilation, now with bilat chest tube placement. Oxygenation relatively unchanged. 2/11 increased suction to L CT due to worsening PTX. Oxygenation not meaningfully changed. Steroids/abx ongoing      Influenza  1/26 Added Tamiflu.      Stage IIB left breast cancer and IIIB right breast cancer  1/24 sp Cycle one TC. Next Cycle planned for 1/27/2018. Currently on hold. Hypokalemia  1/27 Replete with 40 meq K+  1/29 K+ up to 4.1    Diarrhea  1/28 C-diff negative. Utilize Imodium as needed. KELLY  2/9 Cr up to 1.08.  Pulm holding lasix. 2/10 Cr now 1.6, holding diuretics  2/11 Cr 1.7, remains off nephrotoxins. She did require pressors yesterday so may have a component of ATN from hypotension  2/12 Creatinine stable at 1.24     Dispo  Family awaiting discussion with pulmonology regarding goals of care. No clinical improvement. Palliative care following. No cancer-directed therapy appropriate at this time. We will follow along              [facility]  [address] [place] [address]  Office : [phone]  Fax : [phone]               Attending Addendum:  I personally evaluated the patient with Heather Riggs N.P.,  and agree with the assessment, findings and plan as documented. Family is aware.               Jerman Castro MD  [address]  Office : [phone]  Fax : [phone]

## 2018-02-12 NOTE — PROGRESS NOTES
L pneumothorax increased on x-ray. Verbal order from Dr Maia Medina to increase suction to -30cm. Large air leak still persists. R Chest tube remains at -20cm suction with small air leak still noted.

## 2018-02-12 NOTE — PROGRESS NOTES
Patient continues to be agitated with non-purposeful movements. O2 saturation unable to be maintained above 90% at 90% FiO2. FiO2 increased to 95% and patient now maintaining saturation at 91%.   Sowmya Singh RT made aware and to the bedside to assess patient

## 2018-02-12 NOTE — PROGRESS NOTES
[facility] Hook  Admission Date: 1/22/2018             ICU Daily Progress Note: 2/12/2018     68 yo CF with Stage IIB left breast CA and Stage IIIB right breast cancer s/p bilateral mastectomy 10/2017, GERD, HTN,and HL. She completed chemo 1/6/18. Followed at the infusion center and c/o cough, decreased po intake, weakness and hypotensive that did not improve with IVF. Was referred to the ER and CXR with new focal opacity R midlung. Was admitted, started on abx, nebs, steroids. Heme/Onc consulted to resume care - chemo due 1/27 but due to acute illness was held. She remained hypoxic and we were consulted to assist.  CRP was elevated and steroids were intensified. Influenza B returned + and Tamiflu and completed a 5 day course. She failed to improve despite intense therapy and underwent bronch for airway inspection/BAL/cleanout - small amount of mucus plugging. She completed a 10 day course of abx. Jaime was consulted with suspected slow recovery and continued high O2 requirements. Intubated 2/8 for ongoing respiratory distress. Developed bilateral tension PTX and had chest tubes placed 2/10. Subjective:     Lying in bed, restless flailing arms, unable to follow any commands. Remains on full vent support with FIO2 90%. Family at the bedside.       Current Facility-Administered Medications   Medication Dose Route Frequency    insulin regular (NOVOLIN R, HUMULIN R) injection   SubCUTAneous Q6H    NOREPINephrine (LEVOPHED) 8 mg in dextrose 5% 250 mL infusion  2-16 mcg/min IntraVENous TITRATE    sodium bicarbonate (8.4%) 150 mEq in dextrose 5% 1,000 mL infusion   IntraVENous CONTINUOUS    fentaNYL in normal saline (pf) 25 mcg/mL infusion  0-200 mcg/hr IntraVENous TITRATE    dextrose 5% lactated ringers infusion  75 mL/hr IntraVENous CONTINUOUS    guaiFENesin (ROBITUSSIN) 100 mg/5 mL oral liquid 400 mg  400 mg Per NG tube Q6H PRN    metoprolol tartrate (LOPRESSOR) tablet 25 mg  25 mg Oral Q12H    pantoprazole (PROTONIX) 40 mg in sodium chloride 0.9 % 10 mL injection  40 mg IntraVENous DAILY    dexamethasone (DECADRON) 4 mg/mL injection 8 mg  8 mg IntraVENous Q12H    NUTRITIONAL SUPPORT ELECTROLYTE PRN ORDERS   Does Not Apply PRN    propofol (DIPRIVAN) infusion  0-50 mcg/kg/min IntraVENous TITRATE    metoprolol (LOPRESSOR) injection 5 mg  5 mg IntraVENous Q6H PRN    LORazepam (ATIVAN) injection 0.5 mg  0.5 mg IntraVENous Q4H PRN    zinc oxide-cod liver oil (DESITIN) 40 % paste   Topical PRN    morphine injection 2 mg  2 mg IntraVENous Q2H PRN    HYDROcodone-acetaminophen (NORCO) 7.5-325 mg per tablet 1 Tab  1 Tab Oral Q6H PRN    losartan (COZAAR) tablet 50 mg  50 mg Oral DAILY    hydrALAZINE (APRESOLINE) 20 mg/mL injection 20 mg  20 mg IntraVENous Q6H PRN    acetaminophen (TYLENOL) tablet 650 mg  650 mg Oral Q6H PRN    loperamide (IMODIUM) capsule 2 mg  2 mg Oral Q4H PRN    albuterol (PROVENTIL VENTOLIN) nebulizer solution 2.5 mg  2.5 mg Nebulization QID RT    sodium chloride (OCEAN) 0.65 % nasal spray 2 Spray  2 Spray Both Nostrils Q2H PRN    HYDROcodone-homatropine (HYCODAN) 5-1.5 mg/5 mL (5 mL) syrup 5 mL  5 mL Oral Q4H PRN    lip protectant (BLISTEX) ointment   Topical PRN    alteplase (CATHFLO) 2 mg in sterile water (preservative free) 2 mL injection  2 mg InterCATHeter PRN    atorvastatin (LIPITOR) tablet 20 mg  20 mg Oral DAILY    prochlorperazine (COMPAZINE) tablet 10 mg  10 mg Oral Q6H PRN    sertraline (ZOLOFT) tablet 100 mg  100 mg Oral DAILY    sodium chloride (NS) flush 5-10 mL  5-10 mL IntraVENous Q8H    sodium chloride (NS) flush 5-10 mL  5-10 mL IntraVENous PRN    heparin (porcine) injection 5,000 Units  5,000 Units SubCUTAneous Q12H     Review of Systems  Unobtainable due to patient status.       Objective:     Vitals:    02/12/18 1246 02/12/18 1302 02/12/18 1317 02/12/18 1332   BP: 106/51 97/47 102/50 93/46   Pulse: 93 92 93 92   Resp: 24 24 24 24   Temp:       SpO2: 98% 98% 98% 99%   Weight:         Intake and Output:   02/10 1901 - 02/12 0700  In: 9108.2 [I.V.:5822.2]  Out: 2086 [Urine:1400]  02/12 0701 - 02/12 1900  In: -   Out: 390 [Urine:300]    Physical Exam:          GEN: Restless in bed, on vent support  HEENT:  no epistaxis, oral mucosa moist without cyanosis,   NECK:  no JVD, no retractions, no thyromegaly or masses  LUNGS:  Scattered rhonchi and crackles  HEART:  RRR with no M,G,R;  ABDOMEN:  soft with positive bowel sounds present, NG with TFs  EXTREMITIES:  warm with no cyanosis, mild lower leg edema  SKIN:  no jaundice or ecchymosis   NEURO:  sedated on vent    LINES: ETT, port venous access, peripheral site , NG, chest tube right, chest tube left, langley    DRIPS: D5LR 75ml/hr, Fentanyl, Bicarb    NUTRITION: TF    Ventilator Settings  Mode FIO2 Rate Tidal Volume Pressure PEEP   PRVC  90 %    400 ml  10 cm H2O  10 cm H20      Peak airway pressure: 29.9 cm H2O   Minute ventilation: 10.1 l/min     CXR 2/12/18: Slight improvement of the bilateral reticulonodular interstitial infiltrates. Stable small left apical pneumothorax.       CXR 2/11/18:      CXR 2/9/18:        LAB  Recent Labs      02/12/18   0301  02/11/18   0332  02/10/18   0325   WBC  17.9*  16.8*  23.4*   HGB  8.8*  8.7*  10.0*   HCT  29.0*  28.7*  32.1*   PLT  121*  136*  288     Recent Labs      02/12/18   0301  02/11/18   0332  02/10/18   0325  02/09/18   1832   NA  139  142  144   --    K  4.4  4.2  4.6   --    CL  90*  100  102   --    CO2  44*  35*  35*   --    GLU  322*  243*  244*   --    BUN  89*  87*  66*   --    CREA  1.24*  1.78*  1.64*   --    MG   --   2.5*   --    --    PHOS  4.0*   --    --   3.0     Recent Labs      02/12/18   0244  02/11/18   1805  02/11/18   0800   PH  7.26*  7.31*  7.27*   PCO2  101*  84*  84*   PO2  89  71*  77*   HCO3  44*  41*  38*         Assessment:     Hospital Problems  Date Reviewed: 2/12/2018          Codes Class Noted POA    HCAP (healthcare-associated pneumonia) ICD-10-CM: J18.9  ICD-9-CM: 570  1/23/2018 Yes    Not on antibiotics    Acute kidney injury [facility]) ICD-10-CM: N17.9  ICD-9-CM: 584.9  2/11/2018 No    Creatinine down to 1.24    Secondary spontaneous pneumothorax- bilateral ICD-10-CM: J93.12  ICD-9-CM: 512.82  2/10/2018 No     bilateral         Bilateral air leaks    ARDS (adult respiratory distress syndrome) (HCC) ICD-10-CM: X02  ICD-9-CM: 518.52  1/29/2018 No    unchanged    Influenza ICD-10-CM: J11.1  ICD-9-CM: 487.1  1/27/2018 No    On admission    Hypokalemia ICD-10-CM: E87.6  ICD-9-CM: 276.8  1/27/2018 No    resolved    Elevated brain natriuretic peptide (BNP) level ICD-10-CM: R79.89  ICD-9-CM: 790.99  1/26/2018 No    trended down    * (Principal)Acute respiratory failure with hypoxia [facility]) ICD-10-CM: J96.01  ICD-9-CM: 518.81  1/25/2018 Yes    On vent support    Pulmonary infiltrates ICD-10-CM: R91.8  ICD-9-CM: 793.19  1/25/2018 Yes    unchanged    HTN (hypertension), benign (Chronic) ICD-10-CM: I10  ICD-9-CM: 401.1  1/23/2018 Yes    controlled    Recurrent major depressive disorder, in [facility] ([facility] (Chronic) ICD-10-CM: F33.40  ICD-9-CM: 296.35  1/23/2018 Yes    chronic    Breast cancer (HCC) (Chronic) ICD-10-CM: C50.919  ICD-9-CM: 174.9  1/23/2018 Yes    chronic    S/P bilateral mastectomy (Chronic) ICD-10-CM: Z90.13  ICD-9-CM: V45.71  1/23/2018 Yes     10/11/17  BILATERAL BREAST MASTECTOMY SIMPLE ARTOURA ULTRA HIGH PROFILE BREAST IMPLANTS 700cc 13.5 cm 8.3cm [id number] X 3 /  MEDIUM HEIGHT 550cc 13.5 cm 11.7cm 7.4cm [id number] X 3   BILATERAL SENTINEL NODE BIOPSY WITH LYMPHATIC MAPPING  RIGHT AXILLARY DISSECTION  BILATERAL BREAST RECONSTRUCTION WITH PLACEMENT TISSUE EXPANDERS AND ALLODERM         chronic          Plan     --Albuterol  --Bilateral chest tubes with continuous air leak on left and right air leak  --Bicarb drip.   --ABG with PCO2 101 on vent support  --Creatinine down to 1.24--on LR  --Decadron 8mg IV q12h  --Tolerating TFs  --Prognosis appears worse with MSOF    --Palliative Care following   --Wean vent wean as tolerated. More than 50% of time documented was spent in face-to-face contact with the patient and in the care of the patient on the floor/unit where the patient is located. Kayla Marquez NP      Lungs:  B crackles. B chest tubes in place. Vigorous air leak on left. Heart:  RRR with no Murmur/Rubs/Gallops    Additional Comments:    Patient on vent, max support. PCO2 100. Discussed with daughter that I thought her chances of recovery were essentially 0. Patient did not want to die on a ventilator. Offered compassionate extubation as an option and it seems she is interested in that. She will discuss with family and let us know if/when they are ready for that. I have spoken with and examined the patient. I agree with the above assessment and plan as documented.     Carol Edmond MD

## 2018-02-12 NOTE — PROGRESS NOTES
Ventilator check complete; patient has a #7.5 ET tube secured at the 21 at the lip. Patient is sedated. Patient is not able to follow commands. Breath sounds are coarse. Trachea is midline, Negative for subcutaneous air, and chest excursion is symmetric. Patient is also Negative for cyanosis and is Positive for pitting edema. All alarms are set and audible. Resuscitation bag is at the head of the bed.       Ventilator Settings  Mode FIO2 Rate Tidal Volume Pressure PEEP I:E Ratio   PRVC  85 %   24 400 ml    10 cm H20  1:1.09      Peak airway pressure: 27.8 cm H2O   Minute ventilation: 8.6 l/min     ABG:   Recent Labs      02/11/18   1805  02/11/18   0800  02/11/18   0342   PH  7.31*  7.27*  7.24*   PCO2  84*  84*  83*   PO2  71*  77*  128*   HCO3  41*  38*  35*         Brooke Crum, RT

## 2018-02-12 NOTE — PROGRESS NOTES
Pts HR, BP and RR increasing. Pt having spontaneous movement of upper and lower extremities. Fentanyl gtt increased for comfort. Will continue to monitor.

## 2018-02-12 NOTE — PROGRESS NOTES
Physical Therapy Note:    Participated in interdisciplinary rounds including Juany, Wound Care, Palliative Care NP, RN staff, MD, Respiratory therapy, and Nutrition. Patient at this time is not following commands, therefore with decreased ability to participate in therapy safely. Will continue to participate in rounds to determine appropriateness of PT/OT.     Thank you,  LUIS CARLOS FelizT

## 2018-02-12 NOTE — PROGRESS NOTES
Patients BP steadily dropping. Patient wishes per daughter was not to die on the ventilator. Daughter wishes for compassionate extubation to occur now before patient decompensates further. Dr Marko Addison called and notified of family wishes.

## 2018-02-13 NOTE — PROGRESS NOTES
Patient asystole with no spontaneous resp or palpable pulse. Family at bedside. MD & nursing supervisor notified.  at bedside with family.

## 2018-02-13 NOTE — DISCHARGE SUMMARY
Death Summary    Sheeba Kwong  Admission date:  1/22/2018  Discharge date:  2/12/2018    Admitting Diagnosis:  Pulmonary infiltrates on CXR [R91.8]  Discharge Diagnosis:    Problem List as of 2/12/2018  Date Reviewed: 2/12/2018          Codes Class Noted - Resolved    Acute kidney injury [facility]) ICD-10-CM: N17.9  ICD-9-CM: 584.9  2/11/2018 - Present        Secondary spontaneous pneumothorax- bilateral ICD-10-CM: J93.12  ICD-9-CM: 512.82  2/10/2018 - Present    Overview Signed 2/10/2018  7:26 AM by Sheri Graves MD     bilateral             ARDS (adult respiratory distress syndrome) ([facility] ICD-10-CM: Elease Stands  ICD-9-CM: 518.52  1/29/2018 - Present        Influenza ICD-10-CM: J11.1  ICD-9-CM: 487.1  1/27/2018 - Present        Hypokalemia ICD-10-CM: E87.6  ICD-9-CM: 276.8  1/27/2018 - Present        Elevated brain natriuretic peptide (BNP) level ICD-10-CM: R79.89  ICD-9-CM: 790.99  1/26/2018 - Present        * (Principal)Acute respiratory failure with hypoxia ([facility] ICD-10-CM: J96.01  ICD-9-CM: 518.81  1/25/2018 - Present        Pulmonary infiltrates ICD-10-CM: R91.8  ICD-9-CM: 793.19  1/25/2018 - Present        HTN (hypertension), benign (Chronic) ICD-10-CM: I10  ICD-9-CM: 401.1  1/23/2018 - Present        Recurrent major depressive disorder, in remission ([facility] (Chronic) ICD-10-CM: F33.40  ICD-9-CM: 296.35  1/23/2018 - Present        Breast cancer ([facility] (Chronic) ICD-10-CM: C50.919  ICD-9-CM: 174.9  1/23/2018 - Present        S/P bilateral mastectomy (Chronic) ICD-10-CM: Z90.13  ICD-9-CM: V45.71  1/23/2018 - Present    Overview Signed 1/25/2018  2:39 PM by Tl Mehta NP     10/11/17  BILATERAL BREAST MASTECTOMY SIMPLE ARTOURA ULTRA HIGH PROFILE BREAST IMPLANTS 700cc 13.5 cm 8.3cm [id number] X 3 /  MEDIUM HEIGHT 550cc 13.5 cm 11.7cm 7.4cm [id number] X 3   BILATERAL SENTINEL NODE BIOPSY WITH LYMPHATIC MAPPING  RIGHT AXILLARY DISSECTION  BILATERAL BREAST RECONSTRUCTION WITH PLACEMENT TISSUE EXPANDERS AND ALLODERM HCAP (healthcare-associated pneumonia) ICD-10-CM: J18.9  ICD-9-CM: 486  1/23/2018 - Present        Dehydration ICD-10-CM: E86.0  ICD-9-CM: 276.51  1/22/2018 - Present        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 [facility]) ICD-10-CM: C50.011, C50.012  ICD-9-CM: 174.0  9/19/2017 - Present        Chronic midline low back pain without sciatica ICD-10-CM: M54.5, G89.29  ICD-9-CM: 724.2, 338.29  8/17/2016 - Present        Allergic rhinitis ICD-10-CM: J30.9  ICD-9-CM: 477.9  Unknown - Present        Hypercholesteremia ICD-10-CM: E78.00  ICD-9-CM: 272.0  Unknown - Present        Headache(784.0) ICD-10-CM: R51  ICD-9-CM: 784.0  Unknown - Present        RESOLVED: Infection due to mycoplasma- likely remote ICD-10-CM: A49.3  ICD-9-CM: 041.81  1/25/2018 - 1/29/2018              Consultants: pulmonary, palliative care    Studies/Procedures: BAL/bronch, intubation    Hospital course:  68 yo CF with Stage IIB left breast CA and Stage IIIB right breast cancer s/p bilateral mastectomy 10/2017, GERD, HTN,and HL.  She completed cycle 1 cytoxan and taxotere on 1/6/18.  Further chemo was held due to acute illness. She presented to ER with cough, hypotension that did not improve with IV fluids. CXR with new focal opacity R midlung. Was admitted, started on abx, nebs, steroids.  Remained hypoxic and pulmonary was consulted. Influenza B returned + and completed a 5 day course of tamiflu. She failed to improve despite intense therapy and underwent bronch for airway inspection/BAL/cleanout - small amount of mucus plugging.  She completed a 10 day course of abx. Intubated 2/8 for ongoing respiratory distress. Developed bilateral tension pneumothorax and had chest tubes placed 2/10. Unfortunately, she continued to require max support on the ventilator. Patient's family elected for compassionate extubation.        Final:  --Pronounced dead at 1900 on 2/12/18  --Total discharge greater than 30 minutes in duration. Anastacio Newman NP      Attending Addendum:  I personally evaluated the patient with Brian Kimbrough N.P.,  and agree with the assessment, findings and plan as documented. Family elected to discontinue aggressive therapy.               Amanda Morrissey MD  [address]  Office : [phone]  Fax : [phone]

## 2018-02-13 NOTE — PROGRESS NOTES
Patient compassionately extubated after premedication with 5mg morphine. Agonal breathing and patient appearing to be struggling to guppy breathe. Second 5mg morphine administered for comfort to complete 10mg dose. Extra 2mg morphine administered per order as dyspnea persisted. Patient with asystole at 1900. Dr Ayan Redding notified.

## 2018-02-13 NOTE — PROGRESS NOTES
Family requested the nurse to contact the on-call . Pt  before  arrived. Daughter of the pt and daughter's  were at bedside. Daughter was tearfully stroking pt's forehead and  was holding the daughter. While  was calling their children to come to the hospital, daughter spoke to the  about her mother. Sons arrived.  moved them to the conference room while nurses were caring for the body.  escorted family to view the body while they waited for nursing supervisor to arrive. Family were leaving the room when nursing supervisor arrived.  provided spiritual care through presence, active listening, supportive responses, pastoral conversation, sharing of scripture, prayer and assurance of prayer.  remained with the family until they left. Family grieving appropriately.

## 2018-02-16 ENCOUNTER — APPOINTMENT (OUTPATIENT)
Dept: INFUSION THERAPY | Age: 78
End: 2018-02-16

## 2019-03-21 NOTE — PROGRESS NOTES
Probable sundowning last night. Better today  Low grade temp overnight as well. appreciate hospitalist input  Visit Vitals    /56 (BP 1 Location: Right arm, BP Patient Position: At rest)    Pulse 80    Temp 98.8 °F (37.1 °C)    Resp 24    Ht 5' 3\" (1.6 m)    Wt 181 lb (82.1 kg)    SpO2 93%    BMI 32.06 kg/m2      Chest dry. JPs blood-tinged, less so on the right especially compared to yesterday. Plan-  Cont current mgmt  Daughter very uncomfortable taking her home based on last night's issues. Counseled her regarding sundowning. I advise her to take her home tomorrow even if she has a similar night tonight, as she is more likely to have worsening nocturnal delirium the longer she stays here. Daughter seems to be in agreement, as long as it isn't worse tonight. [place]

## 2020-07-14 NOTE — ED NOTES
PT report given to Jesus Cohen in [address] [place] [address], verbalized complete understanding of pt report and current condition, denies questions at this time. VSS NAD. Pt remains a/o x 4 skin warm dry intact. Pt denies needs at this time. 1. The severity of signs/symptoms.(See ED/admit documents)

## 2024-12-26 NOTE — PROGRESS NOTES
Patient agitated and spontaneously, non-purposefully moving extremities. BP elevated and asynchronous with ventilator. Tearing noted at eyes. Morphine administered for comfort. Fentanyl gtt already infusing at 200mcg/hr. show

## (undated) DEVICE — KENDALL RADIOLUCENT FOAM MONITORING ELECTRODE RECTANGULAR SHAPE: Brand: KENDALL

## (undated) DEVICE — SURGICAL PROCEDURE PACK BASIC ST FRANCIS

## (undated) DEVICE — SUTURE PERMA HND SZ 2 0 L18IN NONABSORBABLE BLK L19MM PS 2 583H

## (undated) DEVICE — SUTURE MCRYL SZ 4-0 L18IN ABSRB UD L19MM PS-2 3/8 CIR PRIM Y496G

## (undated) DEVICE — STAPLER SKIN W5.7XL3.8MM CLSR 0.5MM WIRE S STL WIDE 35

## (undated) DEVICE — AMD ANTIMICROBIAL GAUZE SPONGES,12 PLY USP TYPE VII, 0.2% POLYHEXAMETHYLENE BIGUANIDE HCI (PHMB): Brand: CURITY

## (undated) DEVICE — REM POLYHESIVE ADULT PATIENT RETURN ELECTRODE: Brand: VALLEYLAB

## (undated) DEVICE — KIT TISS EXP W/ 60ML SYR 122CM TRNSF SET PIERCING DEV L25CM

## (undated) DEVICE — Device

## (undated) DEVICE — SUT ETHLN 3-0 18IN PS2 BLK --

## (undated) DEVICE — DRAPE SHT 3 QTR PROXIMA 53X77 --

## (undated) DEVICE — SUTURE ABSRB X-1 REV CUT 1/2 CIR 22MM UD BRAID 27IN SZ 3-0 J458H

## (undated) DEVICE — TRAY PREP DRY W/ PREM GLV 2 APPL 6 SPNG 2 UNDPD 1 OVERWRAP

## (undated) DEVICE — APPLIER CLP AUTO MED 9.75 IN TI SURGCLP SUPER INTLOK 20 DISP

## (undated) DEVICE — SUTURE PERMAHAND SZ 2-0 L12X18IN NONABSORBABLE BLK SILK A185H

## (undated) DEVICE — SOLUTION IV 1000ML 0.9% SOD CHL

## (undated) DEVICE — SUTURE MCRYL SZ 4-0 L27IN ABSRB UD L19MM PS-2 1/2 CIR PRIM Y426H

## (undated) DEVICE — BLADE ELECTRODE: Brand: VALLEYLAB

## (undated) DEVICE — DRAIN SURG W10MMXL20CM SIL FLAT HUBLESS W/ RADPQ STRP 3/4

## (undated) DEVICE — SYR BULB 60ML IRRIGATION -- CONVERT TO ITEM 116413

## (undated) DEVICE — SINGLE USE SUCTION VALVE MAJ-209: Brand: SINGLE USE SUCTION VALVE (STERILE)

## (undated) DEVICE — (D)PREP SKN CHLRAPRP APPL 26ML -- CONVERT TO ITEM 371833

## (undated) DEVICE — SINGLE USE BIOPSY VALVE MAJ-210: Brand: SINGLE USE BIOPSY VALVE (STERILE)

## (undated) DEVICE — SUT ETHLN 5-0 18IN P3 BLK --

## (undated) DEVICE — KIT PAINBUSTER W/ SILVERSOAK SOAK CATH 10IN 400ML X4ML PER

## (undated) DEVICE — 48" PROBE COVER W/GEL, ULTRASOUND, STERILE: Brand: SITE-RITE

## (undated) DEVICE — CONTAINER,SPECIMEN,O.R.STRL,4.5OZ: Brand: MEDLINE

## (undated) DEVICE — SUTURE PDS II SZ 3-0 L18IN ABSRB UD PS-1 L24MM 3/8 CIR REV Z683G

## (undated) DEVICE — SYSTEM IMPL DEL FOR BRST IMPL FUN (SEE COMMENT)

## (undated) DEVICE — SUTURE VCRL SZ 2-0 L27IN ABSRB UD L36MM CP-1 1/2 CIR REV J266H

## (undated) DEVICE — TELFA NON-ADHERENT ABSORBENT DRESSING: Brand: TELFA

## (undated) DEVICE — SOLUTION IRRIG 1000ML H2O STRL BLT

## (undated) DEVICE — BRONCHOSCOPY PACK: Brand: MEDLINE INDUSTRIES, INC.

## (undated) DEVICE — SPONGE LAP 18X18IN STRL -- 5/PK

## (undated) DEVICE — UTILITY MARKER,BLACK WITH LABELS: Brand: DEVON

## (undated) DEVICE — MEDI-VAC YANKAUER SUCTION HANDLE W/BULBOUS TIP: Brand: CARDINAL HEALTH

## (undated) DEVICE — TRAY CATH 16F DRN BG LTX -- CONVERT TO ITEM 363158

## (undated) DEVICE — 2000CC GUARDIAN II: Brand: GUARDIAN

## (undated) DEVICE — OCCLUSIVE GAUZE STRIP,3% BISMUTH TRIBROMOPHENATE IN PETROLATUM BLEND: Brand: XEROFORM

## (undated) DEVICE — SUTURE VCRL SZ 3-0 L27IN ABSRB UD L26MM SH 1/2 CIR J416H

## (undated) DEVICE — MASTISOL ADHESIVE LIQ 2/3ML

## (undated) DEVICE — SUTURE ETHLN SZ 2-0 L18IN NONABSORBABLE BLK L19MM PS-2 PRIM 593H

## (undated) DEVICE — KENDALL SCD EXPRESS SLEEVES, KNEE LENGTH, MEDIUM: Brand: KENDALL SCD

## (undated) DEVICE — ABDOMINAL PAD: Brand: DERMACEA

## (undated) DEVICE — DRAPE,TOP,102X53,STERILE: Brand: MEDLINE

## (undated) DEVICE — SUTURE ETHLN SZ 2-0 L18IN NONABSORBABLE BLK L26MM PS 3/8 585H

## (undated) DEVICE — STANDARD HYPODERMIC NEEDLE,POLYPROPYLENE HUB: Brand: MONOJECT

## (undated) DEVICE — SKIN STAPLER: Brand: SIGNET